# Patient Record
Sex: FEMALE | Race: WHITE | NOT HISPANIC OR LATINO | Employment: OTHER | URBAN - METROPOLITAN AREA
[De-identification: names, ages, dates, MRNs, and addresses within clinical notes are randomized per-mention and may not be internally consistent; named-entity substitution may affect disease eponyms.]

---

## 2017-01-23 ENCOUNTER — OFFICE VISIT - RIVER FALLS (OUTPATIENT)
Dept: FAMILY MEDICINE | Facility: CLINIC | Age: 82
End: 2017-01-23

## 2017-01-24 ENCOUNTER — OFFICE VISIT - RIVER FALLS (OUTPATIENT)
Dept: FAMILY MEDICINE | Facility: CLINIC | Age: 82
End: 2017-01-24

## 2017-01-24 ASSESSMENT — MIFFLIN-ST. JEOR: SCORE: 1106.01

## 2017-02-17 ENCOUNTER — OFFICE VISIT - RIVER FALLS (OUTPATIENT)
Dept: FAMILY MEDICINE | Facility: CLINIC | Age: 82
End: 2017-02-17

## 2017-03-17 ENCOUNTER — OFFICE VISIT - RIVER FALLS (OUTPATIENT)
Dept: FAMILY MEDICINE | Facility: CLINIC | Age: 82
End: 2017-03-17

## 2017-03-21 ENCOUNTER — OFFICE VISIT - RIVER FALLS (OUTPATIENT)
Dept: FAMILY MEDICINE | Facility: CLINIC | Age: 82
End: 2017-03-21

## 2017-03-21 ASSESSMENT — MIFFLIN-ST. JEOR: SCORE: 1093.54

## 2017-03-23 ENCOUNTER — OFFICE VISIT - RIVER FALLS (OUTPATIENT)
Dept: FAMILY MEDICINE | Facility: CLINIC | Age: 82
End: 2017-03-23

## 2017-03-23 ASSESSMENT — MIFFLIN-ST. JEOR: SCORE: 1101.7

## 2017-03-28 ENCOUNTER — OFFICE VISIT - RIVER FALLS (OUTPATIENT)
Dept: FAMILY MEDICINE | Facility: CLINIC | Age: 82
End: 2017-03-28

## 2017-04-13 ENCOUNTER — OFFICE VISIT - RIVER FALLS (OUTPATIENT)
Dept: FAMILY MEDICINE | Facility: CLINIC | Age: 82
End: 2017-04-13

## 2017-04-27 ENCOUNTER — OFFICE VISIT - RIVER FALLS (OUTPATIENT)
Dept: FAMILY MEDICINE | Facility: CLINIC | Age: 82
End: 2017-04-27

## 2017-05-02 ENCOUNTER — OFFICE VISIT - RIVER FALLS (OUTPATIENT)
Dept: FAMILY MEDICINE | Facility: CLINIC | Age: 82
End: 2017-05-02

## 2017-05-02 ASSESSMENT — MIFFLIN-ST. JEOR: SCORE: 1102.61

## 2017-05-08 ENCOUNTER — COMMUNICATION - RIVER FALLS (OUTPATIENT)
Dept: FAMILY MEDICINE | Facility: CLINIC | Age: 82
End: 2017-05-08

## 2017-05-08 ENCOUNTER — OFFICE VISIT - RIVER FALLS (OUTPATIENT)
Dept: FAMILY MEDICINE | Facility: CLINIC | Age: 82
End: 2017-05-08

## 2017-05-08 ASSESSMENT — MIFFLIN-ST. JEOR: SCORE: 1098.08

## 2017-05-09 LAB — HBA1C MFR BLD: 6.9 %

## 2017-05-12 ENCOUNTER — OFFICE VISIT - RIVER FALLS (OUTPATIENT)
Dept: FAMILY MEDICINE | Facility: CLINIC | Age: 82
End: 2017-05-12

## 2017-06-08 ENCOUNTER — OFFICE VISIT - RIVER FALLS (OUTPATIENT)
Dept: FAMILY MEDICINE | Facility: CLINIC | Age: 82
End: 2017-06-08

## 2017-06-08 ASSESSMENT — MIFFLIN-ST. JEOR: SCORE: 1102.61

## 2017-06-19 ENCOUNTER — AMBULATORY - RIVER FALLS (OUTPATIENT)
Dept: FAMILY MEDICINE | Facility: CLINIC | Age: 82
End: 2017-06-19

## 2017-07-17 ENCOUNTER — AMBULATORY - RIVER FALLS (OUTPATIENT)
Dept: FAMILY MEDICINE | Facility: CLINIC | Age: 82
End: 2017-07-17

## 2017-08-14 ENCOUNTER — AMBULATORY - RIVER FALLS (OUTPATIENT)
Dept: FAMILY MEDICINE | Facility: CLINIC | Age: 82
End: 2017-08-14

## 2017-08-22 ENCOUNTER — OFFICE VISIT - RIVER FALLS (OUTPATIENT)
Dept: FAMILY MEDICINE | Facility: CLINIC | Age: 82
End: 2017-08-22

## 2017-08-22 ASSESSMENT — MIFFLIN-ST. JEOR: SCORE: 1098.08

## 2017-08-29 ENCOUNTER — OFFICE VISIT - RIVER FALLS (OUTPATIENT)
Dept: FAMILY MEDICINE | Facility: CLINIC | Age: 82
End: 2017-08-29

## 2017-08-29 ASSESSMENT — MIFFLIN-ST. JEOR: SCORE: 1098.08

## 2017-09-01 ENCOUNTER — AMBULATORY - RIVER FALLS (OUTPATIENT)
Dept: FAMILY MEDICINE | Facility: CLINIC | Age: 82
End: 2017-09-01

## 2017-09-01 ENCOUNTER — OFFICE VISIT - RIVER FALLS (OUTPATIENT)
Dept: FAMILY MEDICINE | Facility: CLINIC | Age: 82
End: 2017-09-01

## 2017-09-01 ASSESSMENT — MIFFLIN-ST. JEOR: SCORE: 1098.08

## 2017-09-11 ENCOUNTER — OFFICE VISIT - RIVER FALLS (OUTPATIENT)
Dept: FAMILY MEDICINE | Facility: CLINIC | Age: 82
End: 2017-09-11

## 2017-09-11 ENCOUNTER — AMBULATORY - RIVER FALLS (OUTPATIENT)
Dept: FAMILY MEDICINE | Facility: CLINIC | Age: 82
End: 2017-09-11

## 2017-09-25 ENCOUNTER — AMBULATORY - RIVER FALLS (OUTPATIENT)
Dept: FAMILY MEDICINE | Facility: CLINIC | Age: 82
End: 2017-09-25

## 2017-11-02 ENCOUNTER — AMBULATORY - RIVER FALLS (OUTPATIENT)
Dept: FAMILY MEDICINE | Facility: CLINIC | Age: 82
End: 2017-11-02

## 2017-11-17 ENCOUNTER — AMBULATORY - RIVER FALLS (OUTPATIENT)
Dept: FAMILY MEDICINE | Facility: CLINIC | Age: 82
End: 2017-11-17

## 2017-11-22 ENCOUNTER — AMBULATORY - RIVER FALLS (OUTPATIENT)
Dept: FAMILY MEDICINE | Facility: CLINIC | Age: 82
End: 2017-11-22

## 2017-11-24 ENCOUNTER — AMBULATORY - RIVER FALLS (OUTPATIENT)
Dept: FAMILY MEDICINE | Facility: CLINIC | Age: 82
End: 2017-11-24

## 2017-11-25 LAB
CREAT SERPL-MCNC: 0.8 MG/DL (ref 0.6–0.88)
GLUCOSE BLD-MCNC: 145 MG/DL (ref 65–99)
HBA1C MFR BLD: 6.5 %

## 2017-11-30 ENCOUNTER — OFFICE VISIT - RIVER FALLS (OUTPATIENT)
Dept: FAMILY MEDICINE | Facility: CLINIC | Age: 82
End: 2017-11-30

## 2017-11-30 ENCOUNTER — AMBULATORY - RIVER FALLS (OUTPATIENT)
Dept: FAMILY MEDICINE | Facility: CLINIC | Age: 82
End: 2017-11-30

## 2017-11-30 ASSESSMENT — MIFFLIN-ST. JEOR: SCORE: 1093.54

## 2018-01-02 ENCOUNTER — AMBULATORY - RIVER FALLS (OUTPATIENT)
Dept: FAMILY MEDICINE | Facility: CLINIC | Age: 83
End: 2018-01-02

## 2018-01-29 ENCOUNTER — AMBULATORY - RIVER FALLS (OUTPATIENT)
Dept: FAMILY MEDICINE | Facility: CLINIC | Age: 83
End: 2018-01-29

## 2018-02-08 ENCOUNTER — OFFICE VISIT - RIVER FALLS (OUTPATIENT)
Dept: FAMILY MEDICINE | Facility: CLINIC | Age: 83
End: 2018-02-08

## 2018-02-08 ASSESSMENT — MIFFLIN-ST. JEOR: SCORE: 1113.95

## 2018-02-26 ENCOUNTER — AMBULATORY - RIVER FALLS (OUTPATIENT)
Dept: FAMILY MEDICINE | Facility: CLINIC | Age: 83
End: 2018-02-26

## 2018-03-13 ENCOUNTER — AMBULATORY - RIVER FALLS (OUTPATIENT)
Dept: FAMILY MEDICINE | Facility: CLINIC | Age: 83
End: 2018-03-13

## 2018-03-13 ENCOUNTER — OFFICE VISIT - RIVER FALLS (OUTPATIENT)
Dept: FAMILY MEDICINE | Facility: CLINIC | Age: 83
End: 2018-03-13

## 2018-03-13 ASSESSMENT — MIFFLIN-ST. JEOR: SCORE: 1102.61

## 2018-03-22 ENCOUNTER — AMBULATORY - RIVER FALLS (OUTPATIENT)
Dept: FAMILY MEDICINE | Facility: CLINIC | Age: 83
End: 2018-03-22

## 2018-03-22 ENCOUNTER — OFFICE VISIT - RIVER FALLS (OUTPATIENT)
Dept: FAMILY MEDICINE | Facility: CLINIC | Age: 83
End: 2018-03-22

## 2018-03-22 ASSESSMENT — MIFFLIN-ST. JEOR: SCORE: 1089

## 2018-03-23 LAB
CHOLEST SERPL-MCNC: 195 MG/DL
CHOLEST/HDLC SERPL: 4 {RATIO}
CREAT SERPL-MCNC: 0.84 MG/DL (ref 0.6–0.88)
GLUCOSE BLD-MCNC: 155 MG/DL (ref 65–99)
HBA1C MFR BLD: 7 %
HDLC SERPL-MCNC: 49 MG/DL
LDLC SERPL CALC-MCNC: 118 MG/DL
NONHDLC SERPL-MCNC: 146 MG/DL
TRIGL SERPL-MCNC: 158 MG/DL

## 2018-04-09 ENCOUNTER — AMBULATORY - RIVER FALLS (OUTPATIENT)
Dept: FAMILY MEDICINE | Facility: CLINIC | Age: 83
End: 2018-04-09

## 2018-05-04 ENCOUNTER — OFFICE VISIT - RIVER FALLS (OUTPATIENT)
Dept: FAMILY MEDICINE | Facility: CLINIC | Age: 83
End: 2018-05-04

## 2018-05-04 ASSESSMENT — MIFFLIN-ST. JEOR: SCORE: 1075.4

## 2018-05-09 ENCOUNTER — OFFICE VISIT - RIVER FALLS (OUTPATIENT)
Dept: FAMILY MEDICINE | Facility: CLINIC | Age: 83
End: 2018-05-09

## 2018-05-09 ASSESSMENT — MIFFLIN-ST. JEOR: SCORE: 1093.54

## 2018-05-22 ENCOUNTER — OFFICE VISIT - RIVER FALLS (OUTPATIENT)
Dept: FAMILY MEDICINE | Facility: CLINIC | Age: 83
End: 2018-05-22

## 2018-05-22 ASSESSMENT — MIFFLIN-ST. JEOR: SCORE: 1089

## 2018-06-06 ENCOUNTER — AMBULATORY - RIVER FALLS (OUTPATIENT)
Dept: FAMILY MEDICINE | Facility: CLINIC | Age: 83
End: 2018-06-06

## 2018-06-22 ENCOUNTER — AMBULATORY - RIVER FALLS (OUTPATIENT)
Dept: FAMILY MEDICINE | Facility: CLINIC | Age: 83
End: 2018-06-22

## 2018-06-23 LAB — HBA1C MFR BLD: 6.8 %

## 2018-07-05 ENCOUNTER — OFFICE VISIT - RIVER FALLS (OUTPATIENT)
Dept: FAMILY MEDICINE | Facility: CLINIC | Age: 83
End: 2018-07-05

## 2018-07-05 ASSESSMENT — MIFFLIN-ST. JEOR: SCORE: 1078.8

## 2018-07-09 ENCOUNTER — OFFICE VISIT - RIVER FALLS (OUTPATIENT)
Dept: FAMILY MEDICINE | Facility: CLINIC | Age: 83
End: 2018-07-09

## 2018-07-09 ASSESSMENT — MIFFLIN-ST. JEOR: SCORE: 1084.47

## 2018-07-17 ENCOUNTER — AMBULATORY - RIVER FALLS (OUTPATIENT)
Dept: FAMILY MEDICINE | Facility: CLINIC | Age: 83
End: 2018-07-17

## 2018-07-31 ENCOUNTER — AMBULATORY - RIVER FALLS (OUTPATIENT)
Dept: FAMILY MEDICINE | Facility: CLINIC | Age: 83
End: 2018-07-31

## 2018-08-14 ENCOUNTER — OFFICE VISIT - RIVER FALLS (OUTPATIENT)
Dept: FAMILY MEDICINE | Facility: CLINIC | Age: 83
End: 2018-08-14

## 2018-08-14 ENCOUNTER — AMBULATORY - RIVER FALLS (OUTPATIENT)
Dept: FAMILY MEDICINE | Facility: CLINIC | Age: 83
End: 2018-08-14

## 2018-08-14 ASSESSMENT — MIFFLIN-ST. JEOR: SCORE: 1084.47

## 2018-08-28 ENCOUNTER — AMBULATORY - RIVER FALLS (OUTPATIENT)
Dept: FAMILY MEDICINE | Facility: CLINIC | Age: 83
End: 2018-08-28

## 2018-09-26 ENCOUNTER — AMBULATORY - RIVER FALLS (OUTPATIENT)
Dept: FAMILY MEDICINE | Facility: CLINIC | Age: 83
End: 2018-09-26

## 2018-10-10 ENCOUNTER — AMBULATORY - RIVER FALLS (OUTPATIENT)
Dept: FAMILY MEDICINE | Facility: CLINIC | Age: 83
End: 2018-10-10

## 2018-11-08 ENCOUNTER — OFFICE VISIT - RIVER FALLS (OUTPATIENT)
Dept: FAMILY MEDICINE | Facility: CLINIC | Age: 83
End: 2018-11-08

## 2018-11-08 ASSESSMENT — MIFFLIN-ST. JEOR: SCORE: 1090.82

## 2018-11-09 ENCOUNTER — AMBULATORY - RIVER FALLS (OUTPATIENT)
Dept: FAMILY MEDICINE | Facility: CLINIC | Age: 83
End: 2018-11-09

## 2018-12-10 ENCOUNTER — AMBULATORY - RIVER FALLS (OUTPATIENT)
Dept: FAMILY MEDICINE | Facility: CLINIC | Age: 83
End: 2018-12-10

## 2018-12-24 ENCOUNTER — AMBULATORY - RIVER FALLS (OUTPATIENT)
Dept: FAMILY MEDICINE | Facility: CLINIC | Age: 83
End: 2018-12-24

## 2018-12-25 LAB — HBA1C MFR BLD: 7.2 %

## 2019-01-09 ENCOUNTER — AMBULATORY - RIVER FALLS (OUTPATIENT)
Dept: FAMILY MEDICINE | Facility: CLINIC | Age: 84
End: 2019-01-09

## 2019-01-09 LAB — INR PPP: 1.8

## 2019-01-24 ENCOUNTER — AMBULATORY - RIVER FALLS (OUTPATIENT)
Dept: FAMILY MEDICINE | Facility: CLINIC | Age: 84
End: 2019-01-24

## 2019-01-24 LAB — INR PPP: 2.1

## 2019-02-07 ENCOUNTER — OFFICE VISIT - RIVER FALLS (OUTPATIENT)
Dept: FAMILY MEDICINE | Facility: CLINIC | Age: 84
End: 2019-02-07

## 2019-02-22 ENCOUNTER — AMBULATORY - RIVER FALLS (OUTPATIENT)
Dept: FAMILY MEDICINE | Facility: CLINIC | Age: 84
End: 2019-02-22

## 2019-02-22 LAB — INR PPP: 1.9

## 2019-03-08 ENCOUNTER — AMBULATORY - RIVER FALLS (OUTPATIENT)
Dept: FAMILY MEDICINE | Facility: CLINIC | Age: 84
End: 2019-03-08

## 2019-03-08 LAB — INR PPP: 1.6

## 2019-03-28 ENCOUNTER — AMBULATORY - RIVER FALLS (OUTPATIENT)
Dept: FAMILY MEDICINE | Facility: CLINIC | Age: 84
End: 2019-03-28

## 2019-03-28 ENCOUNTER — OFFICE VISIT - RIVER FALLS (OUTPATIENT)
Dept: FAMILY MEDICINE | Facility: CLINIC | Age: 84
End: 2019-03-28

## 2019-03-28 ASSESSMENT — MIFFLIN-ST. JEOR: SCORE: 1074.49

## 2019-03-29 LAB
BUN SERPL-MCNC: 25 MG/DL (ref 7–25)
BUN/CREAT RATIO - HISTORICAL: ABNORMAL (ref 6–22)
CALCIUM SERPL-MCNC: 9.3 MG/DL (ref 8.6–10.4)
CHLORIDE BLD-SCNC: 101 MMOL/L (ref 98–110)
CHOLEST SERPL-MCNC: 165 MG/DL
CHOLEST/HDLC SERPL: 4 {RATIO}
CO2 SERPL-SCNC: 26 MMOL/L (ref 20–32)
CREAT SERPL-MCNC: 0.8 MG/DL (ref 0.6–0.88)
EGFRCR SERPLBLD CKD-EPI 2021: 65 ML/MIN/1.73M2
ERYTHROCYTE [DISTWIDTH] IN BLOOD BY AUTOMATED COUNT: 12 % (ref 11–15)
GLUCOSE BLD-MCNC: 141 MG/DL (ref 65–99)
HBA1C MFR BLD: 6.7 %
HCT VFR BLD AUTO: 39.8 % (ref 35–45)
HDLC SERPL-MCNC: 41 MG/DL
HGB BLD-MCNC: 13.4 GM/DL (ref 11.7–15.5)
INR PPP: 2
LDLC SERPL CALC-MCNC: 99 MG/DL
MCH RBC QN AUTO: 29.6 PG (ref 27–33)
MCHC RBC AUTO-ENTMCNC: 33.7 GM/DL (ref 32–36)
MCV RBC AUTO: 87.9 FL (ref 80–100)
NONHDLC SERPL-MCNC: 124 MG/DL
PLATELET # BLD AUTO: 204 10*3/UL (ref 140–400)
PMV BLD: 11 FL (ref 7.5–12.5)
POTASSIUM BLD-SCNC: 4.4 MMOL/L (ref 3.5–5.3)
PROTHROMBIN TIME: 20.1 S (ref 9–11.5)
RBC # BLD AUTO: 4.53 10*6/UL (ref 3.8–5.1)
SODIUM SERPL-SCNC: 137 MMOL/L (ref 135–146)
TRIGL SERPL-MCNC: 150 MG/DL
WBC # BLD AUTO: 5.9 10*3/UL (ref 3.8–10.8)

## 2019-04-22 ENCOUNTER — AMBULATORY - RIVER FALLS (OUTPATIENT)
Dept: FAMILY MEDICINE | Facility: CLINIC | Age: 84
End: 2019-04-22

## 2019-04-22 LAB — INR PPP: 2.4

## 2019-04-23 ENCOUNTER — OFFICE VISIT - RIVER FALLS (OUTPATIENT)
Dept: FAMILY MEDICINE | Facility: CLINIC | Age: 84
End: 2019-04-23

## 2019-04-23 ASSESSMENT — MIFFLIN-ST. JEOR: SCORE: 1075.4

## 2019-05-14 ENCOUNTER — OFFICE VISIT - RIVER FALLS (OUTPATIENT)
Dept: FAMILY MEDICINE | Facility: CLINIC | Age: 84
End: 2019-05-14

## 2019-05-14 ASSESSMENT — MIFFLIN-ST. JEOR: SCORE: 1082.65

## 2019-05-22 ENCOUNTER — COMMUNICATION - RIVER FALLS (OUTPATIENT)
Dept: FAMILY MEDICINE | Facility: CLINIC | Age: 84
End: 2019-05-22

## 2019-05-23 ENCOUNTER — AMBULATORY - RIVER FALLS (OUTPATIENT)
Dept: FAMILY MEDICINE | Facility: CLINIC | Age: 84
End: 2019-05-23

## 2019-05-23 LAB — INR PPP: 1.8

## 2019-06-06 ENCOUNTER — AMBULATORY - RIVER FALLS (OUTPATIENT)
Dept: FAMILY MEDICINE | Facility: CLINIC | Age: 84
End: 2019-06-06

## 2019-06-06 LAB — INR PPP: 1.4

## 2019-06-11 ENCOUNTER — OFFICE VISIT - RIVER FALLS (OUTPATIENT)
Dept: FAMILY MEDICINE | Facility: CLINIC | Age: 84
End: 2019-06-11

## 2019-06-11 ASSESSMENT — MIFFLIN-ST. JEOR: SCORE: 1061.79

## 2019-06-13 ENCOUNTER — AMBULATORY - RIVER FALLS (OUTPATIENT)
Dept: FAMILY MEDICINE | Facility: CLINIC | Age: 84
End: 2019-06-13

## 2019-06-13 LAB — INR PPP: 1.8

## 2019-06-28 ENCOUNTER — AMBULATORY - RIVER FALLS (OUTPATIENT)
Dept: FAMILY MEDICINE | Facility: CLINIC | Age: 84
End: 2019-06-28

## 2019-06-28 LAB — INR PPP: 1.9

## 2019-07-23 ENCOUNTER — OFFICE VISIT - RIVER FALLS (OUTPATIENT)
Dept: FAMILY MEDICINE | Facility: CLINIC | Age: 84
End: 2019-07-23

## 2019-07-23 ASSESSMENT — MIFFLIN-ST. JEOR: SCORE: 1058.16

## 2019-07-25 ENCOUNTER — OFFICE VISIT - RIVER FALLS (OUTPATIENT)
Dept: FAMILY MEDICINE | Facility: CLINIC | Age: 84
End: 2019-07-25

## 2019-07-25 ENCOUNTER — COMMUNICATION - RIVER FALLS (OUTPATIENT)
Dept: FAMILY MEDICINE | Facility: CLINIC | Age: 84
End: 2019-07-25

## 2019-07-25 ASSESSMENT — MIFFLIN-ST. JEOR: SCORE: 1057.25

## 2019-07-30 ENCOUNTER — AMBULATORY - RIVER FALLS (OUTPATIENT)
Dept: FAMILY MEDICINE | Facility: CLINIC | Age: 84
End: 2019-07-30

## 2019-07-30 LAB — INR PPP: 2.2

## 2019-08-15 ENCOUNTER — COMMUNICATION - RIVER FALLS (OUTPATIENT)
Dept: FAMILY MEDICINE | Facility: CLINIC | Age: 84
End: 2019-08-15

## 2019-08-16 ENCOUNTER — AMBULATORY - RIVER FALLS (OUTPATIENT)
Dept: FAMILY MEDICINE | Facility: CLINIC | Age: 84
End: 2019-08-16

## 2019-08-17 LAB
HBA1C MFR BLD: 6.1 %
TSH SERPL DL<=0.005 MIU/L-ACNC: 2.38 MIU/L (ref 0.4–4.5)

## 2019-08-19 ENCOUNTER — COMMUNICATION - RIVER FALLS (OUTPATIENT)
Dept: FAMILY MEDICINE | Facility: CLINIC | Age: 84
End: 2019-08-19

## 2019-09-05 ENCOUNTER — OFFICE VISIT - RIVER FALLS (OUTPATIENT)
Dept: FAMILY MEDICINE | Facility: CLINIC | Age: 84
End: 2019-09-05

## 2019-09-05 ASSESSMENT — MIFFLIN-ST. JEOR: SCORE: 1075.4

## 2019-09-19 ENCOUNTER — AMBULATORY - RIVER FALLS (OUTPATIENT)
Dept: FAMILY MEDICINE | Facility: CLINIC | Age: 84
End: 2019-09-19

## 2019-09-19 LAB — INR PPP: 2.5

## 2019-10-23 ENCOUNTER — AMBULATORY - RIVER FALLS (OUTPATIENT)
Dept: FAMILY MEDICINE | Facility: CLINIC | Age: 84
End: 2019-10-23

## 2019-10-23 LAB — INR PPP: 2.3

## 2019-11-20 ENCOUNTER — AMBULATORY - RIVER FALLS (OUTPATIENT)
Dept: FAMILY MEDICINE | Facility: CLINIC | Age: 84
End: 2019-11-20

## 2019-11-20 LAB — INR PPP: 2

## 2019-12-03 ENCOUNTER — OFFICE VISIT - RIVER FALLS (OUTPATIENT)
Dept: FAMILY MEDICINE | Facility: CLINIC | Age: 84
End: 2019-12-03

## 2019-12-10 ENCOUNTER — OFFICE VISIT - RIVER FALLS (OUTPATIENT)
Dept: FAMILY MEDICINE | Facility: CLINIC | Age: 84
End: 2019-12-10

## 2019-12-10 ASSESSMENT — MIFFLIN-ST. JEOR: SCORE: 1098.44

## 2019-12-11 ENCOUNTER — COMMUNICATION - RIVER FALLS (OUTPATIENT)
Dept: FAMILY MEDICINE | Facility: CLINIC | Age: 84
End: 2019-12-11

## 2019-12-11 LAB
BUN SERPL-MCNC: 23 MG/DL (ref 7–25)
BUN/CREAT RATIO - HISTORICAL: ABNORMAL (ref 6–22)
CALCIUM SERPL-MCNC: 9.6 MG/DL (ref 8.6–10.4)
CHLORIDE BLD-SCNC: 101 MMOL/L (ref 98–110)
CO2 SERPL-SCNC: 29 MMOL/L (ref 20–32)
CREAT SERPL-MCNC: 0.76 MG/DL (ref 0.6–0.88)
EGFRCR SERPLBLD CKD-EPI 2021: 69 ML/MIN/1.73M2
ERYTHROCYTE [DISTWIDTH] IN BLOOD BY AUTOMATED COUNT: 11.7 % (ref 11–15)
GLUCOSE BLD-MCNC: 126 MG/DL (ref 65–99)
HBA1C MFR BLD: 6.9 %
HCT VFR BLD AUTO: 35.2 % (ref 35–45)
HGB BLD-MCNC: 12.1 GM/DL (ref 11.7–15.5)
INR PPP: 1.8
MCH RBC QN AUTO: 30.7 PG (ref 27–33)
MCHC RBC AUTO-ENTMCNC: 34.4 GM/DL (ref 32–36)
MCV RBC AUTO: 89.3 FL (ref 80–100)
PLATELET # BLD AUTO: 176 10*3/UL (ref 140–400)
PMV BLD: 10.3 FL (ref 7.5–12.5)
POTASSIUM BLD-SCNC: 4.9 MMOL/L (ref 3.5–5.3)
PROTHROMBIN TIME: 17.7 S (ref 9–11.5)
RBC # BLD AUTO: 3.94 10*6/UL (ref 3.8–5.1)
SODIUM SERPL-SCNC: 137 MMOL/L (ref 135–146)
WBC # BLD AUTO: 5.6 10*3/UL (ref 3.8–10.8)

## 2019-12-27 ENCOUNTER — AMBULATORY - RIVER FALLS (OUTPATIENT)
Dept: FAMILY MEDICINE | Facility: CLINIC | Age: 84
End: 2019-12-27

## 2019-12-27 LAB — INR PPP: 2

## 2020-01-10 ENCOUNTER — OFFICE VISIT - RIVER FALLS (OUTPATIENT)
Dept: FAMILY MEDICINE | Facility: CLINIC | Age: 85
End: 2020-01-10

## 2020-01-10 LAB — INR PPP: 2.2

## 2020-01-10 ASSESSMENT — MIFFLIN-ST. JEOR: SCORE: 1098.08

## 2020-02-22 ENCOUNTER — OFFICE VISIT - RIVER FALLS (OUTPATIENT)
Dept: FAMILY MEDICINE | Facility: CLINIC | Age: 85
End: 2020-02-22

## 2020-02-22 ASSESSMENT — MIFFLIN-ST. JEOR: SCORE: 1128.92

## 2020-02-26 ENCOUNTER — AMBULATORY - RIVER FALLS (OUTPATIENT)
Dept: FAMILY MEDICINE | Facility: CLINIC | Age: 85
End: 2020-02-26

## 2020-02-26 LAB — INR PPP: 1.3

## 2020-03-03 ENCOUNTER — OFFICE VISIT - RIVER FALLS (OUTPATIENT)
Dept: FAMILY MEDICINE | Facility: CLINIC | Age: 85
End: 2020-03-03

## 2020-03-03 LAB — INR PPP: 1.6

## 2020-03-03 ASSESSMENT — MIFFLIN-ST. JEOR: SCORE: 1138.9

## 2020-03-17 ENCOUNTER — AMBULATORY - RIVER FALLS (OUTPATIENT)
Dept: FAMILY MEDICINE | Facility: CLINIC | Age: 85
End: 2020-03-17

## 2020-03-17 LAB — INR PPP: 1.4

## 2020-03-24 ENCOUNTER — AMBULATORY - RIVER FALLS (OUTPATIENT)
Dept: FAMILY MEDICINE | Facility: CLINIC | Age: 85
End: 2020-03-24

## 2020-03-25 LAB
CHOLEST SERPL-MCNC: 244 MG/DL
CHOLEST/HDLC SERPL: 4.4 {RATIO}
CREAT UR-MCNC: 65 MG/DL (ref 20–275)
HBA1C MFR BLD: 6.6 %
HDLC SERPL-MCNC: 55 MG/DL
LDLC SERPL CALC-MCNC: 159 MG/DL
MICROALBUMIN UR-MCNC: 0.6 MG/DL
MICROALBUMIN/CREAT UR: 9 MG/G{CREAT}
NONHDLC SERPL-MCNC: 189 MG/DL
TRIGL SERPL-MCNC: 167 MG/DL
TSH SERPL DL<=0.005 MIU/L-ACNC: 3.34 MIU/L (ref 0.4–4.5)

## 2020-03-26 ENCOUNTER — COMMUNICATION - RIVER FALLS (OUTPATIENT)
Dept: FAMILY MEDICINE | Facility: CLINIC | Age: 85
End: 2020-03-26

## 2020-03-27 LAB
INR PPP: 1.8
PROTHROMBIN TIME: 21.7 S (ref 10.5–13.1)

## 2020-04-03 ENCOUNTER — AMBULATORY - RIVER FALLS (OUTPATIENT)
Dept: FAMILY MEDICINE | Facility: CLINIC | Age: 85
End: 2020-04-03

## 2020-04-03 LAB
INR PPP: 1.8
PROTHROMBIN TIME: 21.1 S (ref 10.5–13.1)

## 2020-04-14 ENCOUNTER — AMBULATORY - RIVER FALLS (OUTPATIENT)
Dept: FAMILY MEDICINE | Facility: CLINIC | Age: 85
End: 2020-04-14

## 2020-04-14 LAB
INR PPP: 2.6
PROTHROMBIN TIME: 31.2 S (ref 10.5–13.1)

## 2020-04-23 ENCOUNTER — AMBULATORY - RIVER FALLS (OUTPATIENT)
Dept: FAMILY MEDICINE | Facility: CLINIC | Age: 85
End: 2020-04-23

## 2020-04-23 LAB
INR PPP: 2.5
PROTHROMBIN TIME: 30.4 S (ref 10.5–13.1)

## 2020-05-07 ENCOUNTER — COMMUNICATION - RIVER FALLS (OUTPATIENT)
Dept: FAMILY MEDICINE | Facility: CLINIC | Age: 85
End: 2020-05-07

## 2020-05-26 ENCOUNTER — AMBULATORY - RIVER FALLS (OUTPATIENT)
Dept: FAMILY MEDICINE | Facility: CLINIC | Age: 85
End: 2020-05-26

## 2020-05-26 LAB
INR PPP: 2.5
PROTHROMBIN TIME: 29.5 S (ref 10.5–13.1)

## 2020-06-23 ENCOUNTER — AMBULATORY - RIVER FALLS (OUTPATIENT)
Dept: FAMILY MEDICINE | Facility: CLINIC | Age: 85
End: 2020-06-23

## 2020-06-23 LAB
INR PPP: 2.1
PROTHROMBIN TIME: 24.7 S (ref 10.5–13.1)

## 2020-07-21 ENCOUNTER — AMBULATORY - RIVER FALLS (OUTPATIENT)
Dept: FAMILY MEDICINE | Facility: CLINIC | Age: 85
End: 2020-07-21

## 2020-07-21 ENCOUNTER — OFFICE VISIT - RIVER FALLS (OUTPATIENT)
Dept: FAMILY MEDICINE | Facility: CLINIC | Age: 85
End: 2020-07-21

## 2020-07-21 LAB
INR PPP: 2.8
PROTHROMBIN TIME: 33.9 S (ref 10.5–13.1)

## 2020-07-21 ASSESSMENT — MIFFLIN-ST. JEOR: SCORE: 1159.76

## 2020-08-24 ENCOUNTER — AMBULATORY - RIVER FALLS (OUTPATIENT)
Dept: FAMILY MEDICINE | Facility: CLINIC | Age: 85
End: 2020-08-24

## 2020-08-24 LAB
INR PPP: 2.1
PROTHROMBIN TIME: 25.1 S (ref 10.5–13.1)

## 2020-08-27 ENCOUNTER — COMMUNICATION - RIVER FALLS (OUTPATIENT)
Dept: FAMILY MEDICINE | Facility: CLINIC | Age: 85
End: 2020-08-27

## 2020-09-21 ENCOUNTER — AMBULATORY - RIVER FALLS (OUTPATIENT)
Dept: FAMILY MEDICINE | Facility: CLINIC | Age: 85
End: 2020-09-21

## 2020-09-22 ENCOUNTER — COMMUNICATION - RIVER FALLS (OUTPATIENT)
Dept: FAMILY MEDICINE | Facility: CLINIC | Age: 85
End: 2020-09-22

## 2020-09-22 LAB — HBA1C MFR BLD: 6.6 %

## 2020-10-05 ENCOUNTER — OFFICE VISIT - RIVER FALLS (OUTPATIENT)
Dept: FAMILY MEDICINE | Facility: CLINIC | Age: 85
End: 2020-10-05

## 2020-10-05 ASSESSMENT — MIFFLIN-ST. JEOR: SCORE: 1175.19

## 2020-10-08 ENCOUNTER — COMMUNICATION - RIVER FALLS (OUTPATIENT)
Dept: FAMILY MEDICINE | Facility: CLINIC | Age: 85
End: 2020-10-08

## 2020-10-12 ENCOUNTER — COMMUNICATION - RIVER FALLS (OUTPATIENT)
Dept: FAMILY MEDICINE | Facility: CLINIC | Age: 85
End: 2020-10-12

## 2020-10-21 LAB — INR PPP: 2.3

## 2020-10-26 ENCOUNTER — OFFICE VISIT - RIVER FALLS (OUTPATIENT)
Dept: FAMILY MEDICINE | Facility: CLINIC | Age: 85
End: 2020-10-26

## 2020-10-26 ASSESSMENT — MIFFLIN-ST. JEOR: SCORE: 1152.51

## 2020-10-28 LAB — INR PPP: 2

## 2020-11-04 LAB — INR PPP: 2.7

## 2020-11-11 LAB — INR PPP: 3.2

## 2020-11-18 LAB — INR PPP: 3

## 2020-12-03 LAB — INR PPP: 2.9

## 2020-12-31 LAB — INR PPP: 3.3

## 2021-01-07 ENCOUNTER — OFFICE VISIT - RIVER FALLS (OUTPATIENT)
Dept: FAMILY MEDICINE | Facility: CLINIC | Age: 86
End: 2021-01-07

## 2021-02-08 ENCOUNTER — AMBULATORY - RIVER FALLS (OUTPATIENT)
Dept: FAMILY MEDICINE | Facility: CLINIC | Age: 86
End: 2021-02-08

## 2021-02-09 ENCOUNTER — COMMUNICATION - RIVER FALLS (OUTPATIENT)
Dept: FAMILY MEDICINE | Facility: CLINIC | Age: 86
End: 2021-02-09

## 2021-02-09 LAB
A/G RATIO - HISTORICAL: 1.9 (ref 1–2.5)
ALBUMIN SERPL-MCNC: 4.1 GM/DL (ref 3.6–5.1)
ALP SERPL-CCNC: 47 UNIT/L (ref 37–153)
ALT SERPL W P-5'-P-CCNC: 14 UNIT/L (ref 6–29)
AST SERPL W P-5'-P-CCNC: 17 UNIT/L (ref 10–35)
BILIRUB SERPL-MCNC: 0.9 MG/DL (ref 0.2–1.2)
BUN SERPL-MCNC: 21 MG/DL (ref 7–25)
BUN/CREAT RATIO - HISTORICAL: ABNORMAL (ref 6–22)
CALCIUM SERPL-MCNC: 9.3 MG/DL (ref 8.6–10.4)
CHLORIDE BLD-SCNC: 99 MMOL/L (ref 98–110)
CHOLEST SERPL-MCNC: 235 MG/DL
CHOLEST/HDLC SERPL: 5.6 {RATIO}
CO2 SERPL-SCNC: 30 MMOL/L (ref 20–32)
CREAT SERPL-MCNC: 0.83 MG/DL (ref 0.6–0.88)
CREAT UR-MCNC: 85 MG/DL (ref 20–275)
EGFRCR SERPLBLD CKD-EPI 2021: 62 ML/MIN/1.73M2
ERYTHROCYTE [DISTWIDTH] IN BLOOD BY AUTOMATED COUNT: 11.9 % (ref 11–15)
GLOBULIN: 2.2 (ref 1.9–3.7)
GLUCOSE BLD-MCNC: 174 MG/DL (ref 65–99)
HBA1C MFR BLD: 7.6 %
HCT VFR BLD AUTO: 35.7 % (ref 35–45)
HDLC SERPL-MCNC: 42 MG/DL
HGB BLD-MCNC: 12.2 GM/DL (ref 11.7–15.5)
LDLC SERPL CALC-MCNC: 148 MG/DL
MCH RBC QN AUTO: 30.3 PG (ref 27–33)
MCHC RBC AUTO-ENTMCNC: 34.2 GM/DL (ref 32–36)
MCV RBC AUTO: 88.6 FL (ref 80–100)
MICROALBUMIN UR-MCNC: 1.3 MG/DL
MICROALBUMIN/CREAT UR: 15 MG/G{CREAT}
NONHDLC SERPL-MCNC: 193 MG/DL
PLATELET # BLD AUTO: 170 10*3/UL (ref 140–400)
PMV BLD: 10.1 FL (ref 7.5–12.5)
POTASSIUM BLD-SCNC: 4.3 MMOL/L (ref 3.5–5.3)
PROT SERPL-MCNC: 6.3 GM/DL (ref 6.1–8.1)
RBC # BLD AUTO: 4.03 10*6/UL (ref 3.8–5.1)
SODIUM SERPL-SCNC: 136 MMOL/L (ref 135–146)
TRIGL SERPL-MCNC: 277 MG/DL
TSH SERPL DL<=0.005 MIU/L-ACNC: 6.52 MIU/L (ref 0.4–4.5)
VIT B12 SERPL-MCNC: 522 PG/ML (ref 200–1100)
WBC # BLD AUTO: 4.1 10*3/UL (ref 3.8–10.8)

## 2021-02-11 LAB — INR PPP: 3.3

## 2021-02-18 ENCOUNTER — AMBULATORY - RIVER FALLS (OUTPATIENT)
Dept: FAMILY MEDICINE | Facility: CLINIC | Age: 86
End: 2021-02-18

## 2021-03-04 ENCOUNTER — OFFICE VISIT - RIVER FALLS (OUTPATIENT)
Dept: FAMILY MEDICINE | Facility: CLINIC | Age: 86
End: 2021-03-04

## 2021-03-04 ASSESSMENT — MIFFLIN-ST. JEOR: SCORE: 1202.4

## 2021-03-18 ENCOUNTER — AMBULATORY - RIVER FALLS (OUTPATIENT)
Dept: FAMILY MEDICINE | Facility: CLINIC | Age: 86
End: 2021-03-18

## 2021-04-08 ENCOUNTER — OFFICE VISIT - RIVER FALLS (OUTPATIENT)
Dept: FAMILY MEDICINE | Facility: CLINIC | Age: 86
End: 2021-04-08

## 2021-04-08 ASSESSMENT — MIFFLIN-ST. JEOR: SCORE: 1224.63

## 2021-05-03 ENCOUNTER — COMMUNICATION - RIVER FALLS (OUTPATIENT)
Dept: FAMILY MEDICINE | Facility: CLINIC | Age: 86
End: 2021-05-03

## 2021-05-06 LAB — INR PPP: 1.9

## 2021-05-07 ENCOUNTER — OFFICE VISIT - RIVER FALLS (OUTPATIENT)
Dept: FAMILY MEDICINE | Facility: CLINIC | Age: 86
End: 2021-05-07

## 2021-05-07 ASSESSMENT — MIFFLIN-ST. JEOR: SCORE: 1239.6

## 2021-05-24 ENCOUNTER — OFFICE VISIT - RIVER FALLS (OUTPATIENT)
Dept: FAMILY MEDICINE | Facility: CLINIC | Age: 86
End: 2021-05-24

## 2021-05-24 ASSESSMENT — MIFFLIN-ST. JEOR: SCORE: 1240.5

## 2021-06-17 LAB — INR PPP: 3.2

## 2021-08-11 ENCOUNTER — COMMUNICATION - RIVER FALLS (OUTPATIENT)
Dept: FAMILY MEDICINE | Facility: CLINIC | Age: 86
End: 2021-08-11

## 2021-08-11 ENCOUNTER — AMBULATORY - RIVER FALLS (OUTPATIENT)
Dept: FAMILY MEDICINE | Facility: CLINIC | Age: 86
End: 2021-08-11

## 2021-08-11 ENCOUNTER — OFFICE VISIT - RIVER FALLS (OUTPATIENT)
Dept: FAMILY MEDICINE | Facility: CLINIC | Age: 86
End: 2021-08-11

## 2021-08-11 ASSESSMENT — MIFFLIN-ST. JEOR: SCORE: 1248.22

## 2021-08-12 ENCOUNTER — COMMUNICATION - RIVER FALLS (OUTPATIENT)
Dept: FAMILY MEDICINE | Facility: CLINIC | Age: 86
End: 2021-08-12

## 2021-08-12 LAB
HBA1C MFR BLD: 7.3 %
TSH SERPL DL<=0.005 MIU/L-ACNC: 5.56 MIU/L (ref 0.4–4.5)

## 2021-08-17 ENCOUNTER — COMMUNICATION - RIVER FALLS (OUTPATIENT)
Dept: FAMILY MEDICINE | Facility: CLINIC | Age: 86
End: 2021-08-17

## 2021-08-17 ENCOUNTER — OFFICE VISIT - RIVER FALLS (OUTPATIENT)
Dept: FAMILY MEDICINE | Facility: CLINIC | Age: 86
End: 2021-08-17

## 2021-08-17 ASSESSMENT — MIFFLIN-ST. JEOR: SCORE: 1239.6

## 2021-10-15 LAB — INR PPP: 1.8

## 2021-11-04 ENCOUNTER — OFFICE VISIT - RIVER FALLS (OUTPATIENT)
Dept: FAMILY MEDICINE | Facility: CLINIC | Age: 86
End: 2021-11-04

## 2021-11-24 LAB — INR PPP: 1.9

## 2021-12-03 ENCOUNTER — LAB REQUISITION (OUTPATIENT)
Dept: LAB | Facility: CLINIC | Age: 86
End: 2021-12-03
Payer: MEDICARE

## 2021-12-03 ENCOUNTER — AMBULATORY - RIVER FALLS (OUTPATIENT)
Dept: FAMILY MEDICINE | Facility: CLINIC | Age: 86
End: 2021-12-03

## 2021-12-03 ENCOUNTER — OFFICE VISIT - RIVER FALLS (OUTPATIENT)
Dept: FAMILY MEDICINE | Facility: CLINIC | Age: 86
End: 2021-12-03

## 2021-12-03 DIAGNOSIS — U07.1 COVID-19: ICD-10-CM

## 2021-12-03 PROCEDURE — U0003 INFECTIOUS AGENT DETECTION BY NUCLEIC ACID (DNA OR RNA); SEVERE ACUTE RESPIRATORY SYNDROME CORONAVIRUS 2 (SARS-COV-2) (CORONAVIRUS DISEASE [COVID-19]), AMPLIFIED PROBE TECHNIQUE, MAKING USE OF HIGH THROUGHPUT TECHNOLOGIES AS DESCRIBED BY CMS-2020-01-R: HCPCS | Mod: ORL | Performed by: FAMILY MEDICINE

## 2021-12-04 ENCOUNTER — OFFICE VISIT - RIVER FALLS (OUTPATIENT)
Dept: FAMILY MEDICINE | Facility: CLINIC | Age: 86
End: 2021-12-04

## 2021-12-04 LAB — SARS-COV-2 RNA RESP QL NAA+PROBE: NEGATIVE

## 2021-12-04 ASSESSMENT — MIFFLIN-ST. JEOR: SCORE: 1257.74

## 2021-12-07 ENCOUNTER — OFFICE VISIT - RIVER FALLS (OUTPATIENT)
Dept: FAMILY MEDICINE | Facility: CLINIC | Age: 86
End: 2021-12-07

## 2021-12-07 LAB — SARS-COV-2 RNA RESP QL NAA+PROBE: NEGATIVE

## 2021-12-09 LAB — INR PPP: 1.7

## 2022-01-09 LAB
HCT VFR BLD AUTO: 39.2 %
HGB BLD-MCNC: 12.4 G/DL
PLATELET # BLD AUTO: 137 X10^3/UL
WBC # BLD AUTO: 4.8 X10^3/UL

## 2022-02-10 LAB — INR (EXTERNAL): 2.4 (ref 0.9–1.1)

## 2022-02-11 VITALS
WEIGHT: 150.75 LBS | HEART RATE: 76 BPM | DIASTOLIC BLOOD PRESSURE: 82 MMHG | HEIGHT: 64 IN | WEIGHT: 152 LBS | HEART RATE: 75 BPM | DIASTOLIC BLOOD PRESSURE: 80 MMHG | DIASTOLIC BLOOD PRESSURE: 70 MMHG | SYSTOLIC BLOOD PRESSURE: 125 MMHG | DIASTOLIC BLOOD PRESSURE: 72 MMHG | BODY MASS INDEX: 25.74 KG/M2 | SYSTOLIC BLOOD PRESSURE: 146 MMHG | DIASTOLIC BLOOD PRESSURE: 79 MMHG | SYSTOLIC BLOOD PRESSURE: 162 MMHG | HEIGHT: 64 IN | DIASTOLIC BLOOD PRESSURE: 69 MMHG | HEART RATE: 75 BPM | SYSTOLIC BLOOD PRESSURE: 114 MMHG | BODY MASS INDEX: 25.95 KG/M2 | HEIGHT: 64 IN | DIASTOLIC BLOOD PRESSURE: 60 MMHG | SYSTOLIC BLOOD PRESSURE: 162 MMHG | SYSTOLIC BLOOD PRESSURE: 162 MMHG | SYSTOLIC BLOOD PRESSURE: 140 MMHG | DIASTOLIC BLOOD PRESSURE: 70 MMHG | WEIGHT: 152 LBS | SYSTOLIC BLOOD PRESSURE: 142 MMHG | BODY MASS INDEX: 25.95 KG/M2

## 2022-02-11 VITALS
SYSTOLIC BLOOD PRESSURE: 162 MMHG | SYSTOLIC BLOOD PRESSURE: 130 MMHG | SYSTOLIC BLOOD PRESSURE: 132 MMHG | DIASTOLIC BLOOD PRESSURE: 68 MMHG | DIASTOLIC BLOOD PRESSURE: 76 MMHG | SYSTOLIC BLOOD PRESSURE: 134 MMHG | HEART RATE: 74 BPM | DIASTOLIC BLOOD PRESSURE: 82 MMHG | DIASTOLIC BLOOD PRESSURE: 72 MMHG | SYSTOLIC BLOOD PRESSURE: 152 MMHG | HEART RATE: 76 BPM | DIASTOLIC BLOOD PRESSURE: 62 MMHG | HEART RATE: 76 BPM | BODY MASS INDEX: 25.42 KG/M2 | DIASTOLIC BLOOD PRESSURE: 72 MMHG | OXYGEN SATURATION: 98 % | SYSTOLIC BLOOD PRESSURE: 148 MMHG | WEIGHT: 148.08 LBS | TEMPERATURE: 97.4 F

## 2022-02-11 VITALS
HEART RATE: 80 BPM | SYSTOLIC BLOOD PRESSURE: 124 MMHG | DIASTOLIC BLOOD PRESSURE: 78 MMHG | TEMPERATURE: 97.9 F | BODY MASS INDEX: 31.79 KG/M2 | WEIGHT: 186.2 LBS | HEIGHT: 64 IN

## 2022-02-12 VITALS
SYSTOLIC BLOOD PRESSURE: 136 MMHG | DIASTOLIC BLOOD PRESSURE: 76 MMHG | HEART RATE: 80 BPM | WEIGHT: 158.5 LBS | BODY MASS INDEX: 27.06 KG/M2 | DIASTOLIC BLOOD PRESSURE: 68 MMHG | SYSTOLIC BLOOD PRESSURE: 116 MMHG | HEIGHT: 64 IN | HEART RATE: 70 BPM

## 2022-02-12 VITALS
OXYGEN SATURATION: 97 % | SYSTOLIC BLOOD PRESSURE: 95 MMHG | WEIGHT: 186.2 LBS | SYSTOLIC BLOOD PRESSURE: 134 MMHG | BODY MASS INDEX: 30.39 KG/M2 | HEIGHT: 64 IN | TEMPERATURE: 97.5 F | DIASTOLIC BLOOD PRESSURE: 63 MMHG | DIASTOLIC BLOOD PRESSURE: 62 MMHG | HEART RATE: 81 BPM | WEIGHT: 178 LBS | TEMPERATURE: 97.5 F | WEIGHT: 182.9 LBS | BODY MASS INDEX: 31.22 KG/M2 | DIASTOLIC BLOOD PRESSURE: 76 MMHG | HEIGHT: 64 IN | SYSTOLIC BLOOD PRESSURE: 122 MMHG | TEMPERATURE: 98.1 F | BODY MASS INDEX: 31.79 KG/M2 | HEART RATE: 92 BPM | HEART RATE: 86 BPM | HEIGHT: 64 IN

## 2022-02-12 VITALS
BODY MASS INDEX: 26.88 KG/M2 | DIASTOLIC BLOOD PRESSURE: 78 MMHG | DIASTOLIC BLOOD PRESSURE: 80 MMHG | HEIGHT: 64 IN | HEART RATE: 72 BPM | HEART RATE: 84 BPM | BODY MASS INDEX: 26.6 KG/M2 | HEART RATE: 80 BPM | HEIGHT: 64 IN | WEIGHT: 155 LBS | HEART RATE: 80 BPM | HEART RATE: 83 BPM | HEIGHT: 64 IN | WEIGHT: 155.8 LBS | TEMPERATURE: 98.3 F | SYSTOLIC BLOOD PRESSURE: 173 MMHG | DIASTOLIC BLOOD PRESSURE: 74 MMHG | WEIGHT: 154 LBS | DIASTOLIC BLOOD PRESSURE: 78 MMHG | DIASTOLIC BLOOD PRESSURE: 80 MMHG | SYSTOLIC BLOOD PRESSURE: 142 MMHG | SYSTOLIC BLOOD PRESSURE: 166 MMHG | WEIGHT: 156.6 LBS | HEART RATE: 81 BPM | SYSTOLIC BLOOD PRESSURE: 162 MMHG | BODY MASS INDEX: 26.29 KG/M2 | WEIGHT: 156 LBS | BODY MASS INDEX: 26.46 KG/M2 | BODY MASS INDEX: 26.63 KG/M2 | HEIGHT: 64 IN | SYSTOLIC BLOOD PRESSURE: 158 MMHG | SYSTOLIC BLOOD PRESSURE: 154 MMHG | DIASTOLIC BLOOD PRESSURE: 81 MMHG

## 2022-02-12 VITALS
OXYGEN SATURATION: 97 % | DIASTOLIC BLOOD PRESSURE: 78 MMHG | HEIGHT: 64 IN | BODY MASS INDEX: 31.82 KG/M2 | WEIGHT: 186.4 LBS | SYSTOLIC BLOOD PRESSURE: 129 MMHG | TEMPERATURE: 97.2 F | DIASTOLIC BLOOD PRESSURE: 81 MMHG | SYSTOLIC BLOOD PRESSURE: 118 MMHG | HEART RATE: 73 BPM | TEMPERATURE: 97.5 F | WEIGHT: 188.1 LBS | OXYGEN SATURATION: 97 % | BODY MASS INDEX: 32.11 KG/M2 | HEART RATE: 80 BPM | HEIGHT: 64 IN

## 2022-02-12 VITALS
HEIGHT: 64 IN | HEART RATE: 74 BPM | BODY MASS INDEX: 26.29 KG/M2 | SYSTOLIC BLOOD PRESSURE: 126 MMHG | HEIGHT: 64 IN | SYSTOLIC BLOOD PRESSURE: 129 MMHG | WEIGHT: 153 LBS | HEART RATE: 91 BPM | DIASTOLIC BLOOD PRESSURE: 73 MMHG | BODY MASS INDEX: 26.63 KG/M2 | OXYGEN SATURATION: 96 % | WEIGHT: 154 LBS | HEIGHT: 64 IN | BODY MASS INDEX: 26.12 KG/M2 | HEART RATE: 71 BPM | HEART RATE: 82 BPM | DIASTOLIC BLOOD PRESSURE: 70 MMHG | WEIGHT: 156 LBS | BODY MASS INDEX: 25.61 KG/M2 | SYSTOLIC BLOOD PRESSURE: 128 MMHG | HEIGHT: 64 IN | DIASTOLIC BLOOD PRESSURE: 60 MMHG | SYSTOLIC BLOOD PRESSURE: 162 MMHG | SYSTOLIC BLOOD PRESSURE: 117 MMHG | DIASTOLIC BLOOD PRESSURE: 83 MMHG | HEART RATE: 70 BPM | BODY MASS INDEX: 26.12 KG/M2 | SYSTOLIC BLOOD PRESSURE: 145 MMHG | HEART RATE: 94 BPM | TEMPERATURE: 97.8 F | WEIGHT: 150 LBS | WEIGHT: 153 LBS | DIASTOLIC BLOOD PRESSURE: 92 MMHG | DIASTOLIC BLOOD PRESSURE: 75 MMHG | HEIGHT: 64 IN

## 2022-02-12 VITALS
SYSTOLIC BLOOD PRESSURE: 128 MMHG | SYSTOLIC BLOOD PRESSURE: 154 MMHG | DIASTOLIC BLOOD PRESSURE: 82 MMHG | HEART RATE: 76 BPM | HEART RATE: 76 BPM | SYSTOLIC BLOOD PRESSURE: 152 MMHG | HEART RATE: 72 BPM | SYSTOLIC BLOOD PRESSURE: 136 MMHG | DIASTOLIC BLOOD PRESSURE: 68 MMHG | WEIGHT: 154 LBS | BODY MASS INDEX: 26.29 KG/M2 | HEART RATE: 78 BPM | HEIGHT: 64 IN | DIASTOLIC BLOOD PRESSURE: 68 MMHG | DIASTOLIC BLOOD PRESSURE: 82 MMHG | DIASTOLIC BLOOD PRESSURE: 82 MMHG | SYSTOLIC BLOOD PRESSURE: 180 MMHG

## 2022-02-12 VITALS
DIASTOLIC BLOOD PRESSURE: 78 MMHG | HEIGHT: 64 IN | TEMPERATURE: 97.8 F | BODY MASS INDEX: 25.61 KG/M2 | DIASTOLIC BLOOD PRESSURE: 76 MMHG | SYSTOLIC BLOOD PRESSURE: 142 MMHG | DIASTOLIC BLOOD PRESSURE: 60 MMHG | SYSTOLIC BLOOD PRESSURE: 124 MMHG | WEIGHT: 150 LBS | DIASTOLIC BLOOD PRESSURE: 70 MMHG | BODY MASS INDEX: 25.75 KG/M2 | HEIGHT: 64 IN | HEIGHT: 64 IN | SYSTOLIC BLOOD PRESSURE: 144 MMHG | SYSTOLIC BLOOD PRESSURE: 130 MMHG | HEART RATE: 64 BPM | BODY MASS INDEX: 25.75 KG/M2

## 2022-02-12 VITALS
HEIGHT: 64 IN | SYSTOLIC BLOOD PRESSURE: 158 MMHG | TEMPERATURE: 97 F | DIASTOLIC BLOOD PRESSURE: 74 MMHG | SYSTOLIC BLOOD PRESSURE: 140 MMHG | BODY MASS INDEX: 26.47 KG/M2 | BODY MASS INDEX: 26.62 KG/M2 | HEIGHT: 64 IN | TEMPERATURE: 97.5 F | HEART RATE: 68 BPM | HEIGHT: 64 IN | BODY MASS INDEX: 26.46 KG/M2 | WEIGHT: 155.08 LBS | DIASTOLIC BLOOD PRESSURE: 80 MMHG | SYSTOLIC BLOOD PRESSURE: 148 MMHG | HEART RATE: 78 BPM | DIASTOLIC BLOOD PRESSURE: 88 MMHG | OXYGEN SATURATION: 98 % | WEIGHT: 155 LBS

## 2022-02-12 VITALS
WEIGHT: 150 LBS | SYSTOLIC BLOOD PRESSURE: 135 MMHG | SYSTOLIC BLOOD PRESSURE: 122 MMHG | BODY MASS INDEX: 25.88 KG/M2 | DIASTOLIC BLOOD PRESSURE: 77 MMHG | HEIGHT: 64 IN | HEIGHT: 64 IN | SYSTOLIC BLOOD PRESSURE: 134 MMHG | DIASTOLIC BLOOD PRESSURE: 70 MMHG | HEART RATE: 72 BPM | BODY MASS INDEX: 25.61 KG/M2 | HEIGHT: 64 IN | BODY MASS INDEX: 25.57 KG/M2 | DIASTOLIC BLOOD PRESSURE: 68 MMHG | WEIGHT: 151.6 LBS | DIASTOLIC BLOOD PRESSURE: 78 MMHG | SYSTOLIC BLOOD PRESSURE: 138 MMHG | SYSTOLIC BLOOD PRESSURE: 146 MMHG | HEART RATE: 65 BPM | WEIGHT: 149.8 LBS | DIASTOLIC BLOOD PRESSURE: 76 MMHG

## 2022-02-12 VITALS
WEIGHT: 155 LBS | HEART RATE: 72 BPM | HEART RATE: 71 BPM | SYSTOLIC BLOOD PRESSURE: 119 MMHG | HEIGHT: 64 IN | OXYGEN SATURATION: 97 % | DIASTOLIC BLOOD PRESSURE: 80 MMHG | DIASTOLIC BLOOD PRESSURE: 68 MMHG | WEIGHT: 155 LBS | HEART RATE: 72 BPM | WEIGHT: 156 LBS | BODY MASS INDEX: 26.46 KG/M2 | DIASTOLIC BLOOD PRESSURE: 74 MMHG | BODY MASS INDEX: 26.63 KG/M2 | SYSTOLIC BLOOD PRESSURE: 132 MMHG | WEIGHT: 155 LBS | DIASTOLIC BLOOD PRESSURE: 81 MMHG | DIASTOLIC BLOOD PRESSURE: 78 MMHG | SYSTOLIC BLOOD PRESSURE: 124 MMHG | HEIGHT: 64 IN | SYSTOLIC BLOOD PRESSURE: 162 MMHG | HEART RATE: 72 BPM | HEART RATE: 77 BPM | DIASTOLIC BLOOD PRESSURE: 84 MMHG | SYSTOLIC BLOOD PRESSURE: 158 MMHG | BODY MASS INDEX: 26.46 KG/M2 | HEART RATE: 96 BPM | HEIGHT: 64 IN | BODY MASS INDEX: 26.46 KG/M2 | SYSTOLIC BLOOD PRESSURE: 144 MMHG | HEIGHT: 64 IN

## 2022-02-12 VITALS
BODY MASS INDEX: 25.1 KG/M2 | HEIGHT: 64 IN | TEMPERATURE: 97.2 F | WEIGHT: 146 LBS | HEART RATE: 62 BPM | SYSTOLIC BLOOD PRESSURE: 144 MMHG | WEIGHT: 146.2 LBS | HEIGHT: 64 IN | DIASTOLIC BLOOD PRESSURE: 88 MMHG | BODY MASS INDEX: 24.96 KG/M2 | DIASTOLIC BLOOD PRESSURE: 72 MMHG | SYSTOLIC BLOOD PRESSURE: 134 MMHG | DIASTOLIC BLOOD PRESSURE: 74 MMHG | WEIGHT: 147 LBS | HEIGHT: 64 IN | BODY MASS INDEX: 24.92 KG/M2 | HEART RATE: 63 BPM | SYSTOLIC BLOOD PRESSURE: 130 MMHG | TEMPERATURE: 97.3 F

## 2022-02-12 VITALS
HEIGHT: 64 IN | SYSTOLIC BLOOD PRESSURE: 132 MMHG | WEIGHT: 167 LBS | TEMPERATURE: 96.7 F | HEART RATE: 83 BPM | TEMPERATURE: 97.8 F | BODY MASS INDEX: 29.37 KG/M2 | WEIGHT: 172 LBS | DIASTOLIC BLOOD PRESSURE: 64 MMHG | OXYGEN SATURATION: 97 % | SYSTOLIC BLOOD PRESSURE: 138 MMHG | HEIGHT: 64 IN | HEART RATE: 84 BPM | OXYGEN SATURATION: 98 % | SYSTOLIC BLOOD PRESSURE: 134 MMHG | DIASTOLIC BLOOD PRESSURE: 94 MMHG | BODY MASS INDEX: 28.51 KG/M2 | DIASTOLIC BLOOD PRESSURE: 74 MMHG

## 2022-02-12 VITALS
DIASTOLIC BLOOD PRESSURE: 82 MMHG | SYSTOLIC BLOOD PRESSURE: 152 MMHG | WEIGHT: 153.4 LBS | BODY MASS INDEX: 26.19 KG/M2 | DIASTOLIC BLOOD PRESSURE: 70 MMHG | SYSTOLIC BLOOD PRESSURE: 146 MMHG | DIASTOLIC BLOOD PRESSURE: 76 MMHG | SYSTOLIC BLOOD PRESSURE: 158 MMHG | HEIGHT: 64 IN

## 2022-02-12 VITALS
DIASTOLIC BLOOD PRESSURE: 70 MMHG | WEIGHT: 161.8 LBS | BODY MASS INDEX: 28 KG/M2 | BODY MASS INDEX: 27.62 KG/M2 | BODY MASS INDEX: 27.77 KG/M2 | HEART RATE: 83 BPM | WEIGHT: 164 LBS | HEIGHT: 64 IN | SYSTOLIC BLOOD PRESSURE: 154 MMHG | HEIGHT: 64 IN | SYSTOLIC BLOOD PRESSURE: 176 MMHG | HEART RATE: 81 BPM | TEMPERATURE: 98.6 F | BODY MASS INDEX: 28.15 KG/M2 | DIASTOLIC BLOOD PRESSURE: 74 MMHG | HEIGHT: 64 IN | SYSTOLIC BLOOD PRESSURE: 146 MMHG | OXYGEN SATURATION: 98 % | DIASTOLIC BLOOD PRESSURE: 64 MMHG | HEIGHT: 64 IN | DIASTOLIC BLOOD PRESSURE: 82 MMHG | TEMPERATURE: 97.2 F | SYSTOLIC BLOOD PRESSURE: 154 MMHG

## 2022-02-12 VITALS
DIASTOLIC BLOOD PRESSURE: 78 MMHG | BODY MASS INDEX: 28.79 KG/M2 | WEIGHT: 168.6 LBS | HEART RATE: 69 BPM | HEIGHT: 64 IN | TEMPERATURE: 97 F | SYSTOLIC BLOOD PRESSURE: 132 MMHG

## 2022-02-12 VITALS
SYSTOLIC BLOOD PRESSURE: 178 MMHG | SYSTOLIC BLOOD PRESSURE: 132 MMHG | HEIGHT: 64 IN | DIASTOLIC BLOOD PRESSURE: 115 MMHG | HEART RATE: 91 BPM | HEART RATE: 67 BPM | OXYGEN SATURATION: 98 % | BODY MASS INDEX: 32.47 KG/M2 | WEIGHT: 190.2 LBS | DIASTOLIC BLOOD PRESSURE: 86 MMHG | TEMPERATURE: 97.6 F | OXYGEN SATURATION: 93 %

## 2022-02-12 VITALS
WEIGHT: 156.75 LBS | HEIGHT: 64 IN | BODY MASS INDEX: 26.76 KG/M2 | OXYGEN SATURATION: 98 % | DIASTOLIC BLOOD PRESSURE: 80 MMHG | SYSTOLIC BLOOD PRESSURE: 142 MMHG | HEART RATE: 80 BPM | SYSTOLIC BLOOD PRESSURE: 130 MMHG | HEART RATE: 84 BPM | DIASTOLIC BLOOD PRESSURE: 80 MMHG

## 2022-02-12 VITALS — SYSTOLIC BLOOD PRESSURE: 144 MMHG | DIASTOLIC BLOOD PRESSURE: 92 MMHG

## 2022-02-15 NOTE — TELEPHONE ENCOUNTER
---------------------  From: Yaneth Jones CMA   Sent: 8/27/2020 4:55:10 PM CDT  Subject: Phone Message, labs.     Collete, daughter of pt called at 1546 trying to schedule INR lab, and some other lab at the same time. Unsure what the other lab is? Heather called pt back advise Hgb A1C. See other message.

## 2022-02-15 NOTE — LETTER
(Inserted Image. Unable to display)         April 02, 2021      GERHARDAARON HOWE  [address] [place] [address]          Dear GERHARD,      Thank you for selecting [facility] for your healthcare needs.    Our records indicate you are due for the following services:     Follow-up office visit.    (FYI   Regarding office visits: In some instances, a video visit or telephone visit may be offered as an option.)      To schedule an appointment or if you have further questions, please contact your clinic at [phone].      Powered by [employer]    Sincerely,    Hilario Whiting MD, FACP

## 2022-02-15 NOTE — PROGRESS NOTES
Chief Complaint    Patient presents for itchy scalp x couple weeks.  History of Present Illness      Patient complains of itchy scalp and her daughter started on cold tar shampoo a couple of times per week.  No other skin complaints.  She is worried that she does not have to diabetes regularly and assured her and her daughter that is not necessary for her to test regularly.  Patient's had a couple of nosebleeds.  Review of Systems      No hypoglycemia, chest pain, dyspnea, edema, palpitations, other bleeding.  Physical Exam   Vitals & Measurements    T: 97.0   F (Tympanic)  HR: 68(Peripheral)  BP: 140/80  SpO2: 98%     HT: 64 in  WT: 155 lb  BMI: 26.6       Patient is a healthy-appearing older woman in no distress.  Becoming increasingly forgetful.  Alert and oriented.  HEENT exam unremarkable.  Chest clear.  Cardiac exam irregular.  No edema.  Assessment/Plan       Long term (current) use of anticoagulants (Z79.01)         INR due to nosebleeds.       Seborrheic dermatitis of scalp (L21.9)         Continue Coulthard shampoo twice per week.       Orders:         Basic Metabolic Panel* ([facility]), Specimen Type: Serum, Collection Date: 12/10/19 11:36:00 CST  Patient Information     Name:GERHARD HOWE      Address:      [address] [place] [id number]     Sex:Female     YOB: 1929     [phone]     Emergency Contact:GENTRY PARK     MRN:[id number]     FIN:[id number]     Location:[facility]     Date of Service:01/10/2020      Primary Care Physician:       Hilario Whiting MD, [phone]      Attending Physician:       Hilario Whiting MD, [phone]  Problem List/Past Medical History    Ongoing     Allergic rhinitis     Benign positional vertigo     Bilateral hearing loss     Carotid artery plaque     Diabetic peripheral neuropathy     Dyslipidemia, goal LDL below 100     Frailty     History of mitral valve insufficiency       Comments: Mild by Echo in  2009     Hypothyroidism (acquired)     Long term (current) use of anticoagulants     Obese     Osteoarthritis     Paroxysmal atrial fibrillation     S/P placement of cardiac pacemaker     Seborrheic dermatitis     Seborrheic dermatitis of scalp     Statin intolerance     Symptomatic varicose veins     Tinnitus     Type 2 diabetes mellitus with other circulatory complications     White coat syndrome with hypertension    Historical     Inpatient stay       Comments: @[facility], [place] - Atrial fibrillation with an episode of hypotension     Inpatient stay       Comments: @[facility], [place] - Vertigo     Inpatient stay       Comments: @[place], [place] - Postoperative surgical complication involving subcutaneous tissue  Procedure/Surgical History     Replacement of pacemaker pulse generator (05/30/2019)      Comments: Implanted left pectoral.      [employer] [id number]      [id number].     Phacoemulsification of cataract with intraocular lens implantation (03/27/2018)      Comments: Left..     Colonoscopy (05/23/2012)      Comments: Unremarkable.     LAVH-BSO (04/06/2012)     Hysteroscopy, D&C, polypectomy (11/04/2011)     Implantation of heart pacemaker (2009)     Replacement of right knee joint (11.2008)     Replacement of left knee joint (12/10/2007)     Flexible sigmoidoscopy (06/13/2006)     Cholecystectomy (1951)     Repair of umbilical hernia (1949)     Appendectomy (1947)     Extracapsular cataract extraction and insertion of intraocular lens      Comments: Right..     Plastic repair of rotator cuff of shoulder     Pregnancy      Comments: full term X 3.  Medications    acetaminophen,   Not taking    Flonase 50 mcg/inh nasal spray, 2 spray(s), Nasal, daily, 11 refills,   Not taking    levothyroxine 125 mcg (0.125 mg) oral tablet, 125 mcg= 1 tab(s), Oral, daily, 3 refills    Metoprolol Succinate  mg oral tablet, extended release, See Instructions, 3 refills    warfarin 5 mg  oral tablet, See Instructions, 3 refills  Allergies    Vicodin (rash)    Ancef (Diarrhea)    Calan    ciprofloxacin    phenobarbital (tremors)    probiotic (Rash..)    simvastatin (GI upset)  Social History    Smoking Status - 01/10/2020     Never smoker     Alcohol - Denies Alcohol Use, 04/06/2010      Never, 05/09/2017     Employment/School      Retired, 10/27/2011     Home/Environment      Marital status: ., 10/27/2011     Substance Abuse - Denies Substance Abuse, 08/29/2018     Tobacco - Denies Tobacco Use, 04/06/2010  Family History    CABG - Coronary artery bypass graft: Mother.    CAD - Coronary artery disease: Mother and Father.    Diabetes mellitus: Mother.    Gout: Mother and Brother.    Hypertension: Mother.    Leukemia: Father.    MI - Myocardial infarction: Brother.    MS - Multiple sclerosis: Sister.    Parkinson's disease: Sister.    Valvular heart disease: Brother.  Immunizations      Vaccine Date Status          zoster vaccine, inactivated 10/15/2019 Recorded          influenza virus vaccine, inactivated 09/05/2019 Given          influenza virus vaccine, inactivated 10/10/2018 Given          influenza virus vaccine, inactivated 09/01/2017 Given          influenza virus vaccine, inactivated 01/24/2017 Given          ZOS, shingles 11/24/2015 Recorded              Comments : [11/24/2015] Freemans          influenza virus vaccine, inactivated 10/07/2015 Given          pneumococcal (PCV13) 03/19/2015 Given          influenza virus vaccine, inactivated 10/09/2014 Given          influenza virus vaccine, inactivated 10/11/2013 Given          influenza virus vaccine, inactivated 10/09/2012 Given          Td 01/11/2012 Given          influenza virus vaccine, inactivated 10/08/2011 Given          influenza virus vaccine, inactivated 10/07/2010 Given          influenza, H1N1, inactivated 12/18/2009 Recorded          pneumococcal (PPSV23) 04/24/2006 Recorded          pneumococcal (PPSV23) 01/08/2001  Recorded  Lab Results          Lab Results (Last 4 results within 90 days)           Sodium Level: 137 mmol/L [135 mmol/L - 146 mmol/L] (12/10/19 11:42:00)          Potassium Level: 4.9 mmol/L [3.5 mmol/L - 5.3 mmol/L] (12/10/19 11:42:00)          Chloride Level: 101 mmol/L [98 mmol/L - 110 mmol/L] (12/10/19 11:42:00)          CO2 Level: 29 mmol/L [20 mmol/L - 32 mmol/L] (12/10/19 11:42:00)          Glucose Level: 126 mg/dL High [65 mg/dL - 99 mg/dL] (12/10/19 11:42:00)          BUN: 23 mg/dL [7 mg/dL - 25 mg/dL] (12/10/19 11:42:00)          Creatinine Level: 0.76 mg/dL [0.6 mg/dL - 0.88 mg/dL] (12/10/19 11:42:00)          BUN/Creat Ratio: NOT APPLICABLE [6  - 22] (12/10/19 11:42:00)          eGFR: 69 mL/min/1.73m2 (12/10/19 11:42:00)          eGFR African American: 80 mL/min/1.73m2 (12/10/19 11:42:00)          Calcium Level: 9.6 mg/dL [8.6 mg/dL - 10.4 mg/dL] (12/10/19 11:42:00)          Hgb A1c: 6.9 High (12/10/19 11:42:00)          WBC: 5.6 [3.8  - 10.8] (12/10/19 11:42:00)          RBC: 3.94 [3.8  - 5.1] (12/10/19 11:42:00)          Hgb: 12.1 gm/dL [11.7 gm/dL - 15.5 gm/dL] (12/10/19 11:42:00)          Hct: 35.2 % [35 % - 45 %] (12/10/19 11:42:00)          MCV: 89.3 fL [80 fL - 100 fL] (12/10/19 11:42:00)          MCH: 30.7 pg [27 pg - 33 pg] (12/10/19 11:42:00)          MCHC: 34.4 gm/dL [32 gm/dL - 36 gm/dL] (12/10/19 11:42:00)          RDW: 11.7 % [11 % - 15 %] (12/10/19 11:42:00)          Platelet: 176 [140  - 400] (12/10/19 11:42:00)          MPV: 10.3 fL [7.5 fL - 12.5 fL] (12/10/19 11:42:00)          PT: 17.7 High [9  - 11.5] (12/10/19 11:42:00)          INR: 2 (12/27/19 11:25:00)          INR: 1.8 High (12/10/19 11:42:00)          INR: 2 (11/20/19 09:16:00)          INR: 2.3 (10/23/19 15:37:00)

## 2022-02-15 NOTE — RESULTS
Anticoagulation Therapy Entered On:  7/30/2019 8:22 AM CDT    Performed On:  7/30/2019 8:19 AM CDT by Jennifer Astudillo RN               Warfarin Management   Week 1 Sunday Dose :   5    Week 1 Monday Dose :   5    Week 1 Tuesday Dose :   5    Week 1 Wednesday Dose :   5    Week 1 Thursday Dose :   5    Week 1 Friday Dose :   7.5    Week 1 Saturday Dose :   5    Week 1 Dose Total :   37.5    Week 2 Sunday Dose :   5    Week 2 Monday Dose :   5    Week 2 Tuesday Dose :   5    Week 2 Wednesday Dose :   5    Week 2 Thursday Dose :   5    Week 2 Friday Dose :   7.5    Week 2 Saturday Dose :   5    Week 2 Dose Total :   37.5    Planned Duration :   Indefinite   Indication :   Atrial fibrillation   Warfarin Management Comments :   [facility] Office  [address] [facility], [address]  [phone]  Capillary Specimen     International Normalization Ratio :   2.2    INR Range :   2.0 - 3.0   INR Therapeutic Range :   Yes   Warfarin Abnormal Findings :   none   Anticoagulation Recheck :   4 weeks   Warfarin Physician :   Hilario Whiting MD   Warfarin Special Instructions :   INR = 2.2 per POC Patient is to stay on 7.5mg warfarin on Fri and 5mg the rest of the days of the week. Recheck INR in 4 weeks per protocol. Patient advisded in office.   Jennifer Astudillo RN - 7/30/2019 8:19 AM CDT

## 2022-02-15 NOTE — PROGRESS NOTES
Chief Complaint    has been seeing chiropractor and they are requesting an xray, been in afib x 2 days  History of Present Illness      Patient complains of year of right hip pain.  That period of time she took over a footstool in her apartment twice and fell not recently however.  He been seeing a chiropractor who thought an x-ray might help to guide his treatment.  No recent falls.  No weakness.  She does have peripheral neuropathy.  She has not been lightheaded.  She has been in atrial fibrillation for 2 days.  Review of Systems      No chest pain, dyspnea, edema, back pain, leg weakness.  Physical Exam   Vitals & Measurements    HR: 91(Peripheral)  BP: 117/73       Is comfortable in no distress.  Alert and oriented.  H&T exam is atraumatic.  Neck nontender.  Chest clear.  Cardiac exam is irregular trace ankle edema.  Right hip is nontender range of motion is normal.  Gait appears normal.  Assessment/Plan       Chronic right hip pain(M25.551)        We will get an x-ray as osteoarthritis is in the differential.         Orders:          83720 office outpatient visit 25 minutes (Charge), Quantity: 1, Chronic right hip pain  Paroxysmal atrial fibrillation  Long term (current) use of anticoagulants          XR Hip Min 2 Views Right (Request), Priority: STAT, Chronic right hip pain       Long term (current) use of anticoagulants(Z79.01)        INR was therapeutic May 4.         Orders:          73413 office outpatient visit 25 minutes (Charge), Quantity: 1, Chronic right hip pain  Paroxysmal atrial fibrillation  Long term (current) use of anticoagulants       Paroxysmal atrial fibrillation(I48.0)        Currently in atrial fibrillation with controlled rate.  Asymptomatic other than the palpitation.         Orders:          18249 office outpatient visit 25 minutes (Charge), Quantity: 1, Chronic right hip pain  Paroxysmal atrial fibrillation  Long term (current) use of anticoagulants  Patient Information      Name:GERHARD HOWE      Address:      [address]      Sex:Female      YOB: 1929      [phone]      Emergency Contact:GENTRY PARK     MRN:[id number]     FIN:[id number]     Location:[facility]     Date of Service:05/22/2018      Primary Care Physician:       Hilario Whiting MD, [phone]      Attending Physician:       Hilario Whiting MD, [phone]  Problem List/Past Medical History    Ongoing     Allergic rhinitis     Benign positional vertigo     Bilateral hearing loss     Carotid artery plaque     Dyslipidemia, goal LDL below 100     Frailty     History of mitral valve insufficiency       Comments: Mild by Echo in 2009     HTN, goal below 140/90     Hypothyroidism (acquired)     Long term (current) use of anticoagulants     Obese     Osteoarthritis     Paroxysmal atrial fibrillation     S/P placement of cardiac pacemaker     Seborrheic dermatitis     Statin intolerance     Symptomatic varicose veins     Tinnitus     Type 2 diabetes mellitus with other circulatory complications    Historical     *Hospitalized@[facility] - Atrial fibrillation with an episode of hypotension     *Hospitalized@[facility] - Vertigo  Procedure/Surgical History     Phacoemulsification of cataract with intraocular lens implantation (03/27/2018)     Colonoscopy (05/23/2012)     LAVH-BSO (04/06/2012)     Hysteroscopy, DC, polypectomy (11/04/2011)     Implantation of heart pacemaker (2009)     Replacement of right knee joint (11/2008)     Replacement of left knee joint (12/10/2007)     Flexible sigmoidoscopy (06/13/2006)     Cholecystectomy (1951)     Repair of umbilical hernia (1949)     Appendectomy (1947)     Extracapsular cataract extraction and insertion of intraocular lens     Plastic repair of rotator cuff of shoulder     Pregnancy  Medications        levothyroxine 125 mcg (0.125 mg) oral tablet: 125 mcg, 1 tab(s), po, daily, for 90 day(s), 90 tab(s), 3 Refill(s).        digoxin 125  mcg (0.125 mg) oral tablet: 125 mcg, 1 tab(s), po, daily, 90 tab(s), 3 Refill(s).        pravastatin 10 mg oral tablet: 10 mg, 1 tab(s), PO, Daily, 90 tab(s), 3 Refill(s).        Flonase 50 mcg/inh nasal spray: 2 spray(s), nasal, daily, 1 EA, 11 Refill(s).        Metoprolol Succinate  mg oral tablet, extended release: See Instructions, TAKE ONE TABLET BY MOUTH EVERY DAY, 90 tab(s), 3 Refill(s).        warfarin 5 mg oral tablet: 5 mg, 1 tab(s), po, daily, for 90 day(s), 90 tab(s), 3 Refill(s).                Allergies    Vicodin (rash)    Ancef    Calan    ciprofloxacin    phenobarbital    probiotic (Rash..)    simvastatin (GI upset)  Social History    Smoking Status - 05/09/2018     Never smoker     Alcohol - Denies Alcohol Use, 04/06/2010      Never, 05/09/2017     Employment and Education      Retired, 10/27/2011     Home and Environment      Marital status: ., 10/27/2011     Tobacco - Denies Tobacco Use, 04/06/2010  Family History    CA - Lung cancer: Spouse.    CABG - Coronary artery bypass graft: Mother.    CAD - Coronary artery disease: Mother and Father.    Diabetes mellitus: Mother.    Gout: Mother and Brother.    Hypertension: Mother.    Leukemia: Father.    MI - Myocardial infarction: Brother.    MS - Multiple sclerosis: Sister.    Parkinson's disease: Sister.    Valvular heart disease: Brother.  Immunizations      Vaccine Date Status Comments      influenza virus vaccine, inactivated 09/01/2017 Given      influenza virus vaccine, inactivated 01/24/2017 Given      ZOS, shingles 11/24/2015 Recorded [11/24/2015] Freemans      influenza virus vaccine, inactivated 10/07/2015 Given      pneumococcal (PCV13) 03/19/2015 Given      influenza virus vaccine, inactivated 10/09/2014 Given      influenza virus vaccine, inactivated 10/11/2013 Given      influenza virus vaccine, inactivated 10/09/2012 Given      Td 01/11/2012 Given      influenza virus vaccine, inactivated 10/08/2011 Given      influenza  virus vaccine, inactivated 10/07/2010 Given      influenza, H1N1, inactivated 12/18/2009 Recorded      pneumococcal (PPSV23) 04/24/2006 Recorded      pneumococcal (PPSV23) 01/08/2001 Recorded  Lab Results          Lab Results (Last 4 results within 90 days)           Sodium Level: 141 mmol/L [135 mmol/L - 146 mmol/L] (03/22/18 09:33:00)          Potassium Level: 4.6 mmol/L [3.5 mmol/L - 5.3 mmol/L] (03/22/18 09:33:00)          Chloride Level: 104 mmol/L [98 mmol/L - 110 mmol/L] (03/22/18 09:33:00)          CO2 Level: 27 mmol/L [20 mmol/L - 31 mmol/L] (03/22/18 09:33:00)          Glucose Level: 155 mg/dL High [65 mg/dL - 99 mg/dL] (03/22/18 09:33:00)          BUN: 18 mg/dL [7 mg/dL - 25 mg/dL] (03/22/18 09:33:00)          Creatinine Level: 0.84 mg/dL [0.6 mg/dL - 0.88 mg/dL] (03/22/18 09:33:00)          BUN/Creat Ratio: NOT APPLICABLE [6  - 22] (03/22/18 09:33:00)          eGFR: 62 mL/min/1.73m2 [8.6 mg/dL - 10.4 mg/dL] (03/22/18 09:33:00)          eGFR African American: 72 mL/min/1.73m2 [6  - 22] (03/22/18 09:33:00)          Calcium Level: 9.3 mg/dL [8.6 mg/dL - 10.4 mg/dL] (03/22/18 09:33:00)          Hgb A1c: 7 High [11.7 gm/dL - 15.5 gm/dL] (03/22/18 09:33:00)          Cholesterol: 195 mg/dL [8.6 mg/dL - 10.4 mg/dL] (03/22/18 09:33:00)          Non-HDL Cholesterol: 146 High [20 mmol/L - 31 mmol/L] (03/22/18 09:33:00)          HDL: 49 mg/dL Low [35 % - 45 %] (03/22/18 09:33:00)          Cholesterol/HDL Ratio: 4 [0.6 mg/dL - 0.88 mg/dL] (03/22/18 09:33:00)          LDL: 118 High [0.6 mg/dL - 0.88 mg/dL] (03/22/18 09:33:00)          Triglyceride: 158 mg/dL High [8.6 mg/dL - 10.4 mg/dL] (03/22/18 09:33:00)          TSH: 1.35 mIU/L [0.4 mIU/L - 4.5 mIU/L] (03/22/18 09:33:00)          WBC: 5.2 [3.8  - 10.8] (03/22/18 09:33:00)          RBC: 4.27 [3.8  - 5.1] (03/22/18 09:33:00)          Hgb: 13 gm/dL [11.7 gm/dL - 15.5 gm/dL] (03/22/18 09:33:00)          Hct: 37.9 % [35 % - 45 %] (03/22/18 09:33:00)          MCV: 88.8  fL [80 fL - 100 fL] (03/22/18 09:33:00)          MCH: 30.4 pg [27 pg - 33 pg] (03/22/18 09:33:00)          MCHC: 34.3 gm/dL [32 gm/dL - 36 gm/dL] (03/22/18 09:33:00)          RDW: 11.7 % [11 % - 15 %] (03/22/18 09:33:00)          Platelet: 159 [140  - 400] (03/22/18 09:33:00)          MPV: 10.6 fL [7.5 fL - 12.5 fL] (03/22/18 09:33:00)          PT: 21.3 High [9  - 11.5] (03/22/18 09:33:00)          INR: 2.9 [0.6 mg/dL - 0.88 mg/dL] (05/04/18 08:53:00)          INR: 2 [0.6 mg/dL - 0.88 mg/dL] (04/09/18 11:05:00)          INR: 2.1 High [0.6 mg/dL - 0.88 mg/dL] (03/22/18 09:33:00)          INR: 2.5 [0.6 mg/dL - 0.88 mg/dL] (03/13/18 15:43:00)          UA pH: 6 [5  - 8] (05/09/18 18:48:00)          UA Specific Gravity: < OR = 1.005 [1.001  - 1.035] (05/09/18 18:48:00)          UA Glucose: NEGATIVE [0.6 mg/dL - 0.88 mg/dL] (05/09/18 18:48:00)          UA Bilirubin: NEGATIVE [0.6 mg/dL - 0.88 mg/dL] (05/09/18 18:48:00)          UA Ketones: NEGATIVE [0.6 mg/dL - 0.88 mg/dL] (05/09/18 18:48:00)          Urine Occult Blood: NEGATIVE [0.6 mg/dL - 0.88 mg/dL] (05/09/18 18:48:00)          UA Protein: NEGATIVE [0.6 mg/dL - 0.88 mg/dL] (05/09/18 18:48:00)          UA Nitrite: NEGATIVE [0.6 mg/dL - 0.88 mg/dL] (05/09/18 18:48:00)          UA Leukocyte Esterase: NEGATIVE [0.6 mg/dL - 0.88 mg/dL] (05/09/18 18:48:00)          Test in Question - Test(s) Ordered: BMP A1C [6 - 22] (03/22/18 09:33:00)          Misc Test CPT Code: 80969 496 [6 - 22] (03/22/18 09:33:00)          Misc Test Client Contact: DESHAUN PEREZ [facility] [6 - 22] (03/22/18 09:33:00)          Misc Test Comment: See comment [6 - 22] (03/22/18 09:33:00)          Misc Test Comment: See comment [6 - 22] (03/22/18 09:33:00)

## 2022-02-15 NOTE — LETTER
(Inserted Image. Unable to display)   February 09, 2021      GERHARD HOWE      [address] [place], [place] [id number]        Dear GERHARD,    Thank you for selecting [facility] ([facility]) for your healthcare needs.  Below you will find the results of your recent tests done at our clinic.     TSH slightly high. Diabetes adequately controlled. Repeat TSH and Hgb A1c in 3-6 months.      Result Name Current Result Previous Result Reference Range   Sodium Level (mmol/L)  136 2/8/2021  137 12/10/2019 135 - 146   Potassium Level (mmol/L)  4.3 2/8/2021  4.9 12/10/2019 3.5 - 5.3   Chloride Level (mmol/L)  99 2/8/2021  101 12/10/2019 98 - 110   CO2 Level (mmol/L)  30 2/8/2021  29 12/10/2019 20 - 32   Glucose Level (mg/dL) ((H)) 174 2/8/2021 ((H)) 126 12/10/2019 65 - 99   BUN (mg/dL)  21 2/8/2021  23 12/10/2019 7 - 25   Creatinine Level (mg/dL)  0.83 2/8/2021  0.76 12/10/2019 0.60 - 0.88   BUN/Creat Ratio  NOT APPLICABLE 2/8/2021  NOT APPLICABLE 12/10/2019 6 - 22   eGFR (mL/min/1.73m2)  62 2/8/2021  69 12/10/2019 > OR = 60 -    Calcium Level (mg/dL)  9.3 2/8/2021  9.6 12/10/2019 8.6 - 10.4   Bilirubin Total (mg/dL)  0.9 2/8/2021  0.2 - 1.2   Alkaline Phosphatase (unit/L)  47 2/8/2021  37 - 153   AST/SGOT (unit/L)  17 2/8/2021  10 - 35   ALT/SGPT (unit/L)  14 2/8/2021  6 - 29   Protein Total (gm/dL)  6.3 2/8/2021  6.1 - 8.1   Albumin Level (gm/dL)  4.1 2/8/2021  3.6 - 5.1   Hgb A1c ((H)) 7.6 2/8/2021 ((H)) 6.6 9/21/2020  - <5.7   Vitamin B12 Level (pg/mL)  522 2/8/2021  200 - 1,100   Cholesterol (mg/dL) ((H)) 235 2/8/2021 ((H)) 244 3/24/2020  - <200   Non-HDL Cholesterol ((H)) 193 2/8/2021 ((H)) 189 3/24/2020  - <130   HDL (mg/dL) ((L)) 42 2/8/2021  55 3/24/2020 > OR = 50 -    Cholesterol/HDL Ratio ((H)) 5.6 2/8/2021  4.4 3/24/2020  - <5.0   LDL ((H)) 148 2/8/2021 ((H)) 159 3/24/2020    Triglyceride (mg/dL) ((H)) 277 2/8/2021 ((H)) 167 3/24/2020  - <150   TSH (mIU/L) ((H)) 6.52  2/8/2021  3.34 3/24/2020 0.40 - 4.50   Ur Microalbumin/Creatinine Ratio  15 2/8/2021  9 3/24/2020  - <30   WBC  4.1 2/8/2021  5.6 12/10/2019 3.8 - 10.8   Hgb (gm/dL)  12.2 2/8/2021  12.1 12/10/2019 11.7 - 15.5   Platelet  170 2/8/2021  176 12/10/2019 140 - 400       Please contact me or my assistant at [phone] if you have any questions.     Sincerely,        Hilario Whiting MD

## 2022-02-15 NOTE — PROGRESS NOTES
Chief Complaint    Rash on both lower legs, developed after using topical agent for circulation  History of Present Illness      patient is 89 year old here with daughter with concerns about rash on her legs      one week ago she was given a new topical lotion with essential oils including rosemary and peppermint to try on her legs      used on left leg on Saturday and both on Sunday      on Monday morning woke up with rash on both legs      legs have been itchy and irritated, developed several small open areas      rash is itchy, noted small amount of bleeding from one lesion      patient noted to have poor memory      has a history of neuropathy and diabetes mellitus, DM has been adequately controlled with diet in the past although most recent A1c was up a little from prior measurements at 6.9      has not been able to check blood sugars recently as she is out of supplies, had been getting mail order but company went out of business      most recent diabetes follow up in December, up to date on labs      checks blood sugars once a day, reports that they are good although daughter notes it has been more than two weeks since she has checked  Review of Systems      no fevers      no rash except to legs, no other skin lesions      no change in appetite, gets meals through meals on wheels  Physical Exam   Vitals & Measurements    T: 97.2   F (Tympanic)  HR: 81(Peripheral)  BP: 154/82  SpO2: 98%     HT: 64 in  WT: 161.8 lb  BMI: 27.77       alert and cooperative, no distress      oral mucosa moist, no lesions      normal pulses in legs, trace bilateral peripheral edema to both lower extremities      on skin exam, has bright red blanching lesions on both legs plus two areas of open lesions on right shin, each less than 1cm, no signs of infection  Assessment/Plan       Allergic reaction (T78.40XA)         symptoms appear allergic, has discontinued new topical, continue local cares and add antihistamine        if not  improving with loratadine, can call and will do short course of prednisone but given potential effect on blood sugar will try antihistamines first         Ordered:          loratadine, = 1 tab(s) ( 10 mg ), Oral, daily, # 14 tab(s), 0 Refill(s), Type: Maintenance, Pharmacy: [facility] #[id number], 1 tab(s) Oral daily,x14 day(s), (Ordered)                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 If needs new meter or other supplies, can let us know. Diabetes follow up currently up to date.         Ordered:          Miscellaneous Rx Supply, glucose meter test strips and lancets, See Instructions, Instructions: check blood sugar daily, Supply, # 100 EA, 4 Refill(s), Type: Maintenance, Pharmacy: [facility] #[id number], patient will bring her glucose meter to you so that you know what..., (Ordered)           Patient Information     Name:GERHARD HOWE      Address:      [address] [place] [id number]     Sex:Female     YOB: 1929     [phone]     Emergency Contact:GENTRY PARK     MRN:[id number]     FIN:[id number]     Location:[facility]     Date of Service:02/22/2020      Primary Care Physician:       Hilario Whiting MD, [phone]      Attending Physician:       Fatmata Leach MD, [phone]  Problem List/Past Medical History    Ongoing     Allergic rhinitis     Benign positional vertigo     Bilateral hearing loss     Carotid artery plaque     Diabetic peripheral neuropathy     Dyslipidemia, goal LDL below 100     Frailty     History of mitral valve insufficiency       Comments: Mild by Echo in 2009     Hypothyroidism (acquired)     Long term (current) use of anticoagulants     Obese     Osteoarthritis     Paroxysmal atrial fibrillation     S/P placement of cardiac pacemaker     Seborrheic dermatitis     Seborrheic dermatitis of  scalp     Statin intolerance     Symptomatic varicose veins     Tinnitus     Type 2 diabetes mellitus with other circulatory complications     White coat syndrome with hypertension    Historical     Inpatient stay       Comments: @[facility], [place] - Atrial fibrillation with an episode of hypotension     Inpatient stay       Comments: @[facility], [place] - Vertigo     Inpatient stay       Comments: @[place], [place] - Postoperative surgical complication involving subcutaneous tissue  Procedure/Surgical History     Replacement of pacemaker pulse generator (05/30/2019)      Comments: Implanted left pectoral.      [employer]tronic [id number]      [id number].     Phacoemulsification of cataract with intraocular lens implantation (03/27/2018)      Comments: Left..     Colonoscopy (05/23/2012)      Comments: Unremarkable.     LAVH-BSO (04/06/2012)     Hysteroscopy, D&C, polypectomy (11/04/2011)     Implantation of heart pacemaker (2009)     Replacement of right knee joint (11.2008)     Replacement of left knee joint (12/10/2007)     Flexible sigmoidoscopy (06/13/2006)     Cholecystectomy (1951)     Repair of umbilical hernia (1949)     Appendectomy (1947)     Extracapsular cataract extraction and insertion of intraocular lens      Comments: Right..     Plastic repair of rotator cuff of shoulder     Pregnancy      Comments: full term X 3.  Medications    acetaminophen,   Not taking    Flonase 50 mcg/inh nasal spray, 2 spray(s), Nasal, daily, 11 refills,   Not taking    glucose meter test strips and lancets, See Instructions, 4 refills    levothyroxine 125 mcg (0.125 mg) oral tablet, 1 tab(s), Oral, daily    loratadine 10 mg oral tablet, 10 mg= 1 tab(s), Oral, daily    Metoprolol Succinate  mg oral tablet, extended release, See Instructions, 3 refills    warfarin 5 mg oral tablet, See Instructions  Allergies    Vicodin (rash)    Ancef (Diarrhea)    Calan    ciprofloxacin    phenobarbital  (tremors)    probiotic (Rash..)    simvastatin (GI upset)  Social History    Smoking Status - 01/10/2020     Never smoker     Alcohol - Denies Alcohol Use, 04/06/2010      Never, 05/09/2017     Employment/School      Retired, 10/27/2011     Home/Environment      Marital status: ., 10/27/2011     Substance Abuse - Denies Substance Abuse, 08/29/2018     Tobacco - Denies Tobacco Use, 04/06/2010  Family History    CABG - Coronary artery bypass graft: Mother.    CAD - Coronary artery disease: Mother and Father.    Diabetes mellitus: Mother.    Gout: Mother and Brother.    Hypertension: Mother.    Leukemia: Father.    MI - Myocardial infarction: Brother.    MS - Multiple sclerosis: Sister.    Parkinson's disease: Sister.    Valvular heart disease: Brother.  Immunizations      Vaccine Date Status          zoster vaccine, inactivated 10/15/2019 Recorded          influenza virus vaccine, inactivated 09/05/2019 Given          influenza virus vaccine, inactivated 10/10/2018 Given          influenza virus vaccine, inactivated 09/01/2017 Given          influenza virus vaccine, inactivated 01/24/2017 Given          ZOS, shingles 11/24/2015 Recorded              Comments : [11/24/2015] Freemans          influenza virus vaccine, inactivated 10/07/2015 Given          pneumococcal (PCV13) 03/19/2015 Given          influenza virus vaccine, inactivated 10/09/2014 Given          influenza virus vaccine, inactivated 10/11/2013 Given          influenza virus vaccine, inactivated 10/09/2012 Given          Td 01/11/2012 Given          influenza virus vaccine, inactivated 10/08/2011 Given          influenza virus vaccine, inactivated 10/07/2010 Given          influenza, H1N1, inactivated 12/18/2009 Recorded          pneumococcal (PPSV23) 04/24/2006 Recorded          pneumococcal (PPSV23) 01/08/2001 Recorded  Lab Results       Lab Results (Last 4 results within 90 days)        Sodium Level: 137 mmol/L [135 mmol/L - 146 mmol/L]  (12/10/19 11:42:00)       Potassium Level: 4.9 mmol/L [3.5 mmol/L - 5.3 mmol/L] (12/10/19 11:42:00)       Chloride Level: 101 mmol/L [98 mmol/L - 110 mmol/L] (12/10/19 11:42:00)       CO2 Level: 29 mmol/L [20 mmol/L - 32 mmol/L] (12/10/19 11:42:00)       Glucose Level: 126 mg/dL High [65 mg/dL - 99 mg/dL] (12/10/19 11:42:00)       BUN: 23 mg/dL [7 mg/dL - 25 mg/dL] (12/10/19 11:42:00)       Creatinine Level: 0.76 mg/dL [0.6 mg/dL - 0.88 mg/dL] (12/10/19 11:42:00)       BUN/Creat Ratio: NOT APPLICABLE [6  - 22] (12/10/19 11:42:00)       eGFR: 69 mL/min/1.73m2 (12/10/19 11:42:00)       eGFR : 80 mL/min/1.73m2 (12/10/19 11:42:00)       Calcium Level: 9.6 mg/dL [8.6 mg/dL - 10.4 mg/dL] (12/10/19 11:42:00)       Hgb A1c: 6.9 High (12/10/19 11:42:00)       WBC: 5.6 [3.8  - 10.8] (12/10/19 11:42:00)       RBC: 3.94 [3.8  - 5.1] (12/10/19 11:42:00)       Hgb: 12.1 gm/dL [11.7 gm/dL - 15.5 gm/dL] (12/10/19 11:42:00)       Hct: 35.2 % [35 % - 45 %] (12/10/19 11:42:00)       MCV: 89.3 fL [80 fL - 100 fL] (12/10/19 11:42:00)       MCH: 30.7 pg [27 pg - 33 pg] (12/10/19 11:42:00)       MCHC: 34.4 gm/dL [32 gm/dL - 36 gm/dL] (12/10/19 11:42:00)       RDW: 11.7 % [11 % - 15 %] (12/10/19 11:42:00)       Platelet: 176 [140  - 400] (12/10/19 11:42:00)       MPV: 10.3 fL [7.5 fL - 12.5 fL] (12/10/19 11:42:00)       PT: 17.7 High [9  - 11.5] (12/10/19 11:42:00)       INR: 2.2 (01/10/20 15:21:00)       INR: 2 (12/27/19 11:25:00)       INR: 1.8 High (12/10/19 11:42:00)

## 2022-02-15 NOTE — NURSING NOTE
Quick Intake Entered On:  10/23/2019 3:44 PM CDT    Performed On:  10/23/2019 3:39 PM CDT by Ava Velasquez RN               Summary   Chief Complaint :   BP   Height Measured :   64 in(Converted to: 5 ft 4 in, 162.56 cm)    Systolic Blood Pressure :   144 mmHg (HI)    Diastolic Blood Pressure :   60 mmHg   Mean Arterial Pressure :   88 mmHg   BP Site :   Right arm   BP Method :   Manual   Race :      Languages :   English   Ava Velasquez RN - 10/23/2019 3:39 PM CDT

## 2022-02-15 NOTE — RESULTS
Anticoagulation Therapy Entered On:  2/22/2019 1:43 PM CST    Performed On:  2/22/2019 1:41 PM CST by Ava Velasquez RN               Warfarin Management   Week 1 Sunday Dose :   5    Week 1 Monday Dose :   5    Week 1 Tuesday Dose :   5    Week 1 Wednesday Dose :   5    Week 1 Thursday Dose :   5    Week 1 Friday Dose :   7.5    Week 1 Saturday Dose :   5    Week 1 Dose Total :   37.5    Week 2 Sunday Dose :   5    Week 2 Monday Dose :   5    Week 2 Tuesday Dose :   5    Week 2 Wednesday Dose :   5    Week 2 Thursday Dose :   5    Week 2 Friday Dose :   7.5    Week 2 Saturday Dose :   5    Week 2 Dose Total :   37.5    Planned Duration :   Indefinite   Indication :   Atrial fibrillation   Warfarin Management Comments :   Lab test performed by:  [facility]  [address] [id number]  Phone # [phone]  Fax# [phone]  Capillary   International Normalization Ratio :   1.9    INR Range :   2.0 - 3.0   INR Therapeutic Range :   No   Warfarin Abnormal Findings :   Missed a dose a day or two ago   Ava Velasquez RN - 2/22/2019 1:42 PM CST   Warfarin Management and Results Grid   Signs of Thrombolic :   No   Signs of Warfarin Intolerance :   No   Changes in Diet or Alcohol Intake :   No   Changes in Medication or Antibiotics :   No   Unusual Bleeding or Bruising :   No   Rectal Bleeding or Black Stools :   No   Vitamin K Food Handout :   No   Heart Valve Replacement :   No   Ava Velasquez RN - 2/22/2019 1:42 PM CST   Anticoagulation Recheck :   2 weeks   Warfarin Special Instructions :   Per protocol continue warfarin 7.5 mg Fridays and 5 mg ROW; recheck 2 weeks; notified in office and given written directions.   Ava Velasquez RN - 2/22/2019 1:42 PM CST

## 2022-02-15 NOTE — NURSING NOTE
Anticoagulation Therapy Management Entered On:  11/11/2021 9:30 AM CST    Performed On:  11/11/2021 9:26 AM CST by Jennifer Astudillo RN               Anticoagulation Visit Assessment   Type of Visit - Anticoagulation :   Telephone   Anticoagulation Indication :   Atrial fibrillation   Anticoagulant Duration :   Undetermined   Anticoagulation Medication Verified :   Yes   Patient Preferred Contact Method :   Cell   Jennifer Astudillo RN - 11/11/2021 9:26 AM CST   Anticoagulation Patient Assessment Grid   Change in Alcohol Consumption :   No   Change in Diet :   No   Change in Medications :   Yes   (Comment: off warfarin 2 days for minor surgery [Jennifer Astudillo RN - 11/11/2021 9:26 AM CST] )   Diarrhea :   No   Rectal Bleeding :   No   Signs of Clotting :   No   Signs of Warfarin Intolerance :   No   Unusual Bleeding, Bruising :   No   Upcoming Procedures :   Yes   (Comment: sty excision on 11/11/21 [Jennifer Astudillo RN - 11/11/2021 9:26 AM CST] )   Jennifer Astudillo RN - 11/11/2021 9:26 AM CST   Patient on Warfarin :   Yes   Jennifer Astudillo RN - 11/11/2021 9:26 AM CST   Warfarin   Anticoagulant INR Goal Lower :   2    Anticoagulant INR Goal Upper :   3    Sunday :   7.5 mg   Monday :   5 mg   Tuesday :   7.5 mg   Wednesday :   5 mg   Thursday :   7.5 mg   Friday :   5 mg   Saturday :   5 mg   Total Dose :   42.5 mg   Warfarin Pt Reported Previous Week Dose :    Sun Mon Tues Wed Thurs Fri Sat Weekly Total Dose   Week 1 7.5 mg 5 mg 7.5 mg 5 mg 5 mg 5 mg 5 mg 40 mg   Week 2  mg  mg  mg  mg  mg  mg  mg  mg   Week 3  mg  mg  mg  mg  mg  mg  mg  mg   Week 4  mg  mg  mg  mg  mg  mg  mg  mg         Patient is taking single or multiple strength tablet(s) :   Single strength tab(s)   One Tab Strength :   5 mg tab   Sunday :   7.5 mg   Monday :   5 mg   Tuesday :   7.5 mg   Wednesday :   5 mg   Thursday :   7.5 mg   Friday :   5 mg   Saturday :   5 mg   Week 1 Total Dose :   42.5 mg   Sunday :   1.5 tab(s)   Monday :   1  tab(s)   Tuesday :   1.5 tab(s)   Wednesday :   1 tab(s)   Thursday :   1.5 tab(s)   Friday :   1 tab(s)   Saturday :   1 tab(s)   Warfarin Dosing Acknowledgement :   I have reviewed the patient's warfarin dosing schedule and confirmed its accuracy for today's visit.   Patient Instructions :   INR = 1.5 today per MDINR.  Patient will resume 7.5mg warfarin Sun, Tues, Thurs and 5mg the rest of the days of the week  post op per Surgeon. Recheck INR in 1 week. Daughter advised via call.     Alaina Astudillo RNth - 11/11/2021 9:26 AM CST

## 2022-02-15 NOTE — TELEPHONE ENCOUNTER
Entered by Tasia Lopez CMA on February 10, 2020 2:34:51 PM CST  ---------------------  From: Tasia Lopez CMA   To: [facility] #[id number]    Sent: 2/10/2020 2:34:51 PM CST  Subject: Medication Management     ** Not Approved: Refill not appropriate, not currently taking - suspended on med list **  digoxin (DIGOXIN 0.125MG TABLETS (YELLOW))  TAKE 1 TABLET BY MOUTH DAILY  Qty:  90 tab(s)        Days Supply:  90        Refills:  0          Substitutions Allowed     Route To [facility] #[id number]   Signed by Tasia Lopez CMA            ** Submitted: **  Order:warfarin (warfarin 5 mg oral tablet)  See Instructions  TAKE 1 AND 1/2 TABLETS BY MOUTH FRIDAYS AND 1 TABLET EVERY OTHER DAY  Qty:  180 tab(s)        Refills:  0          Substitutions Allowed     Route To [facility] #[id number]    Signed by Tasia Lopez CMA  2/10/2020 2:32:00 PM    ** Submitted: **  Complete:warfarin (warfarin 5 mg oral tablet)   Signed by Tasia Lopez CMA  2/10/2020 2:33:00 PM    ** Not Approved:  **  warfarin (WARFARIN SOD 5MG TABLETS (PEACH))  TAKE 1 AND 1/2 TABLETS BY MOUTH FRIDAYS AND 1 TABLET EVERY OTHER DAY  Qty:  100 tab(s)        Days Supply:  90        Refills:  0          Substitutions Allowed     [id number]   Signed by Tasia Lopez CMA            ** Submitted: **  Order:levothyroxine (levothyroxine 125 mcg (0.125 mg) oral tablet)  1 tab(s)  Oral  daily  Qty:  90 tab(s)        Days Supply:  90        Refills:  0          Substitutions Allowed     [id number]    Signed by Tasia Lopez CMA  2/10/2020 2:31:00 PM    ** Submitted: **  Complete:levothyroxine (levothyroxine 125 mcg (0.125 mg) oral tablet)   Signed by Tasia Lopez CMA  2/10/2020 2:31:00 PM    ** Not Approved:  **  levothyroxine (LEVOTHYROXINE 0.125MG (125MCG) TAB)  TAKE 1 TABLET BY MOUTH DAILY  Qty:  90 tab(s)        Days Supply:  90        Refills:  0           Substitutions Allowed     Route To Pharmacy - [facility] #[id number]   Signed by Tasia Lopez CMA            ------------------------------------------  From: [facility] #[id number]  To: Hilario Whiting MD  Sent: February 9, 2020 10:01:20 AM CST  Subject: Medication Management  Due: February 10, 2020 10:01:20 AM CST    ** On Hold Pending Signature **  Drug: digoxin (Digox 125 mcg (0.125 mg) oral tablet)  TAKE 1 TABLET BY MOUTH DAILY  Quantity: 90 tab(s)  Days Supply: 90  Refills: 0  Substitutions Allowed  Notes from Pharmacy:     Dispensed Drug: digoxin (Digox 125 mcg (0.125 mg) oral tablet)  TAKE 1 TABLET BY MOUTH DAILY  Quantity: 90 tab(s)  Days Supply: 90  Refills: 0  Substitutions Allowed  Notes from Pharmacy:     ** On Hold Pending Signature **  Drug: levothyroxine (levothyroxine 125 mcg (0.125 mg) oral tablet)  TAKE 1 TABLET BY MOUTH DAILY  Quantity: 90 tab(s)  Days Supply: 90  Refills: 0  Substitutions Allowed  Notes from Pharmacy:     Dispensed Drug: levothyroxine (levothyroxine 125 mcg (0.125 mg) oral tablet)  TAKE 1 TABLET BY MOUTH DAILY  Quantity: 90 tab(s)  Days Supply: 90  Refills: 0  Substitutions Allowed  Notes from Pharmacy:     ** On Hold Pending Signature **  Drug: warfarin (warfarin 5 mg oral tablet)  TAKE 1 AND 1/2 TABLETS BY MOUTH FRIDAYS AND 1 TABLET EVERY OTHER DAY  Quantity: 100 tab(s)  Days Supply: 90  Refills: 0  Substitutions Allowed  Notes from Pharmacy:     Dispensed Drug: warfarin (warfarin 5 mg oral tablet)  TAKE 1 AND 1/2 TABLETS BY MOUTH FRIDAYS AND 1 TABLET EVERY OTHER DAY  Quantity: 100 tab(s)  Days Supply: 90  Refills: 0  Substitutions Allowed  Notes from Pharmacy:   ------------------------------------------

## 2022-02-15 NOTE — NURSING NOTE
Comprehensive Intake Entered On:  10/26/2020 3:06 PM CDT    Performed On:  10/26/2020 3:01 PM CDT by Sari Turner CMA               Summary   Chief Complaint :   Hospital discharge f/u from [facility] on 10/9/20   Weight Measured :   167 lb(Converted to: 167 lb 0 oz, 75.750 kg)    Height Measured :   64 in(Converted to: 5 ft 4 in, 162.56 cm)    Body Mass Index :   28.66 kg/m2 (HI)    Body Surface Area :   1.85 m2   Systolic Blood Pressure :   132 mmHg (HI)    Diastolic Blood Pressure :   74 mmHg   Mean Arterial Pressure :   93 mmHg   Peripheral Pulse Rate :   83 bpm   BP Site :   Right arm   BP Method :   Manual   Temperature Tympanic :   96.7 DegF(Converted to: 35.9 DegC)  (LOW)    Oxygen Saturation :   97 %   Race :      Languages :   English   Sari Turner CMA - 10/26/2020 3:01 PM CDT   Health Status   Allergies Verified? :   Yes   Medication History Verified? :   Yes   Pre-Visit Planning Status :   Completed   Tobacco Use? :   Never smoker   Hospitalized since last visit? :   Yes   Inpatient? :   Yes   Date of Discharge :   10/9/2020 CDT   Follow-up visit date :   10/26/2020 CDT   Med List Reconciled? :   Yes   Sari Turner CMA - 10/26/2020 3:01 PM CDT   Demographics   Last Name :   Arturo   Address :   [address] [place] [address]   First Name :   Rebecca   Stephy Initial :   CLOVER   Responsible Party Date of Birth () :   11/10/1929 CST   City :   [place]   State :   [place]   Zip Code :   [address]   Sari Turner CMA - 10/26/2020 3:01 PM CDT   Providers Grid   Provider Name :    Shraddha Lucas        Provider Specialty :    eye   Dental   Heart          Sari Turner CMA - 10/26/2020 3:01 PM CDT  Sari Turner CMA - 10/26/2020 3:01 PM CDT  Sari Turner CMA 10/26/2020 3:01 PM CDT       Ancillary Services Grid   Name :    [place]              Type of Service :    Pharmacy                Sari Turner CMA - 10/26/2020 3:01 PM CDT         Meds / Allergies   (As Of: 10/26/2020 3:06:30 PM CDT)   Allergies  (Active)   Ancef  Estimated Onset Date:   Unspecified ; Reactions:   Diarrhea ; Created By:   Chloe Bruce; Reaction Status:   Active ; Category:   Drug ; Substance:   Ancef ; Type:   Allergy ; Updated By:   Chloe Bruce; Reviewed Date:   10/5/2020 3:25 PM CDT      Calan  Estimated Onset Date:   Unspecified ; Created By:   Sravanthi Dietz; Reaction Status:   Active ; Category:   Drug ; Substance:   Calan ; Updated By:   Sravanthi Dietz; Source:   Paper Chart ; Reviewed Date:   10/5/2020 3:25 PM CDT      ciprofloxacin  Estimated Onset Date:   Unspecified ; Created By:   Cherise Christiansen MA; Reaction Status:   Active ; Category:   Drug ; Substance:   ciprofloxacin ; Type:   Allergy ; Updated By:   Cherise Christiansen MA; Reviewed Date:   10/5/2020 3:25 PM CDT      phenobarbital  Estimated Onset Date:   Unspecified ; Reactions:   tremors ; Created By:   Chloe Bruce; Reaction Status:   Active ; Category:   Drug ; Substance:   phenobarbital ; Type:   Allergy ; Updated By:   Chloe Bruce; Source:   Paper Chart ; Reviewed Date:   10/5/2020 3:25 PM CDT      probiotic  Estimated Onset Date:   Unspecified ; Reactions:   Rash.. ; Created By:   Sari Turner CMA; Reaction Status:   Active ; Category:   Drug ; Substance:   probiotic ; Type:   Allergy ; Updated By:   Sari Turner CMA; Reviewed Date:   10/5/2020 3:25 PM CDT      simvastatin  Estimated Onset Date:   Unspecified ; Reactions:   GI upset ; Created By:   Sravanthi Dietz; Reaction Status:   Active ; Category:   Drug ; Substance:   simvastatin ; Type:   Allergy ; Updated By:   Sravanthi Dietz; Source:   Paper Chart ; Reviewed Date:   10/5/2020 3:25 PM CDT      Vicodin  Estimated Onset Date:   Unspecified ; Reactions:   rash ; Created By:   Mis Donaldson MA; Reaction Status:   Active ; Category:   Drug ; Substance:   Vicodin ; Type:   Allergy ; Severity:   Moderate ; Updated By:   Mis Donaldson MA; Reviewed Date:   10/5/2020 3:25 PM CDT         Medication List   (As Of: 10/26/2020 3:06:30 PM CDT)   Prescription/Discharge Order    amoxicillin  :   amoxicillin ; Status:   Prescribed ; Ordered As Mnemonic:   amoxicillin 500 mg oral tablet ; Simple Display Line:   See Instructions, 4 tab(s) Oral 1 hour before dental procedure, 4 EA, 1 Refill(s) ; Ordering Provider:   Beatriz Petit; Catalog Code:   amoxicillin ; Order Dt/Tm:   5/7/2020 2:05:13 PM CDT          donepezil  :   donepezil ; Status:   Prescribed ; Ordered As Mnemonic:   Aricept 5 mg oral tablet ; Simple Display Line:   5 mg, 1 tab(s), Oral, hs, 30 tab(s), 5 Refill(s) ; Ordering Provider:   Hilario Whiting MD; Catalog Code:   donepezil ; Order Dt/Tm:   10/5/2020 3:59:19 PM CDT          fluticasone nasal  :   fluticasone nasal ; Status:   Prescribed ; Ordered As Mnemonic:   Flonase 50 mcg/inh nasal spray ; Simple Display Line:   2 spray(s), nasal, daily, 1 EA, 11 Refill(s) ; Ordering Provider:   Shimon Nesbitt MD; Catalog Code:   fluticasone nasal ; Order Dt/Tm:   7/21/2020 11:28:21 AM CDT          levothyroxine  :   levothyroxine ; Status:   Prescribed ; Ordered As Mnemonic:   levothyroxine 125 mcg (0.125 mg) oral tablet ; Simple Display Line:   1 tab(s), Oral, daily, 90 tab(s), 1 Refill(s) ; Ordering Provider:   Shimon Nesbitt MD; Catalog Code:   levothyroxine ; Order Dt/Tm:   7/21/2020 11:28:22 AM CDT          metoprolol  :   metoprolol ; Status:   Prescribed ; Ordered As Mnemonic:   Metoprolol Succinate  mg oral tablet, extended release ; Simple Display Line:   100 mg, 1 tab(s), Oral, daily, 90 tab(s), 1 Refill(s) ; Ordering Provider:   Shimon Nesbitt MD; Catalog Code:   metoprolol ; Order Dt/Tm:   7/21/2020 11:28:23 AM CDT          Miscellaneous Rx Supply  :   Miscellaneous Rx Supply ; Status:   Prescribed ; Ordered As Mnemonic:   ACCU-CHECK GUIDE LANCETS ; Simple Display Line:   See Instructions, TEST BLOOD SUGAR DAILY, 100 EA, 11 Refill(s) ; Ordering Provider:    Shimon Nesbitt MD; Catalog Code:   Miscellaneous Rx Supply ; Order Dt/Tm:   7/21/2020 8:47:38 AM CDT          Miscellaneous Rx Supply  :   Miscellaneous Rx Supply ; Status:   Prescribed ; Ordered As Mnemonic:   ACCU-CHECK GUIDE TEST STRIPS ; Simple Display Line:   See Instructions, TEST BLOOD SUGAR DAILY, 100 EA, 11 Refill(s) ; Ordering Provider:   Shimon Nesbitt MD; Catalog Code:   Miscellaneous Rx Supply ; Order Dt/Tm:   7/21/2020 8:47:37 AM CDT          warfarin  :   warfarin ; Status:   Prescribed ; Ordered As Mnemonic:   warfarin 5 mg oral tablet ; Simple Display Line:   See Instructions, TAKE 1 AND 1/2 TABLETS BY MOUTH FRIDAYS AND 1 TABLET EVERY OTHER DAY, 180 tab(s), 1 Refill(s) ; Ordering Provider:   Shimon Nesbitt MD; Catalog Code:   warfarin ; Order Dt/Tm:   7/21/2020 11:28:22 AM CDT            Home Meds    acetaminophen  :   acetaminophen ; Status:   Documented ; Ordered As Mnemonic:   acetaminophen ; Simple Display Line:   0 Refill(s) ; Catalog Code:   acetaminophen ; Order Dt/Tm:   5/14/2019 1:44:43 PM CDT          lisinopril  :   lisinopril ; Status:   Documented ; Ordered As Mnemonic:   lisinopril 20 mg oral tablet ; Simple Display Line:   20 mg, 1 tab(s), Oral, daily, 0 Refill(s) ; Catalog Code:   lisinopril ; Order Dt/Tm:   10/5/2020 3:26:04 PM CDT            ID Risk Screen   Recent Travel History :   No recent travel   Family Member Travel History :   No recent travel   Other Exposure to Infectious Disease :   Unknown   Sari Turner CMA - 10/26/2020 3:01 PM CDT

## 2022-02-15 NOTE — PROGRESS NOTES
"Chief Complaint    Medication f/u  History of Present Illness       Patient tells me she is \"doing normal.\"  Daughter states things are going much better.  She is not up at night.  She is sleeping from approximately 1030 till 9 or 10 in the morning.  Patient is gotten very forgetful.  No further irritability, paranoia, or delusions.  She did have 1 fall.  Review of Systems       Denies dizziness.  No fever, chills, headache, myalgia.  Has had a coronavirus immunizations.  Physical Exam   Vitals & Measurements    T: 97.5  F (Tympanic)  HR: 92 (Peripheral)  BP: 134/76     HT: 64 in  WT: 182.9 lb  BMI: 31.39        Patient appears comfortable and in no distress.  Alert and oriented.  Chest clear.  Cardiac exam is regular.  Trace edema. Poor memory.  Assessment/Plan       Anticoagulated (Z79.01)         Stable         Ordered:          65609 office o/p est mod 30-39 min (Charge), Quantity: 1, Type 2 diabetes mellitus with other circulatory complications  Paroxysmal atrial fibrillation  Dementia  Anticoagulated  White coat syndrome with hypertension                Anxiety (F41.9)          Improved with citalopram 10 mg daily.                 Dementia (F03.90)          Progressive.  Behavioral issues resolved though she has fallen once.  Anxiety seems better.  Decrease Seroquel by 50%.  Virtual visit in a month.  We will likely discontinue Seroquel if doing well at that time.  Call with problems.         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)          Adequately controlled         Ordered:          23335 office o/p est mod 30-39 min (Charge), Quantity: 1, Type 2 diabetes mellitus with other circulatory complications  Paroxysmal atrial fibrillation  Dementia  Anticoagulated  White coat syndrome with hypertension                White coat syndrome with hypertension (I10)          Adequately controlled         Ordered:          26644 office o/p est mod 30-39 min (Charge), Quantity: 1, Type 2 diabetes mellitus with other circulatory complications  Paroxysmal atrial fibrillation  Dementia  Anticoagulated  White coat syndrome with hypertension                Orders:         citalopram, = 1 tab(s) ( 10 mg ), Oral, daily, # 30 tab(s), 1 Refill(s), Type: Hard Stop, Pharmacy: [facility], 64, in, 03/04/21 14:36:00 CST, Height Measured, 178, lb, 03/04/21 14:36:00 CST, Weight Measured, (Completed)         citalopram, = 1 tab(s) ( 10 mg ), Oral, daily, # 30 tab(s), 11 Refill(s), Type: Maintenance, Pharmacy: [facility], 1 tab(s) Oral daily, 64, in, 04/08/21 14:21:00 CDT, Height Measured, 182.9, lb, 04/08/21 13:59:00 CDT, Weight Measured, (Ordered)         QUEtiapine, See Instructions, Instructions: 0.5 tab(s) Oral qhs 30 day(s), # 15 EA, 0 Refill(s), Type: Maintenance, Pharmacy: [facility], 0.5 tab(s) Oral qhs 30 day(s), 64, in, 04/08/21 14:21:00 CDT, Height Measured, 182.9, lb, 04/08/21 1..., (Ordered)         Return to Clinic (Request), RFV: Dementia, Return in 4 weeks virtual OK  Patient Information     Name:GERHARD HOWE      Address:      [address] [place] [id number]     Sex:Female     YOB: 1929     [phone]     Emergency Contact:GENTRY PARK URSZULA     MRN:[id number]     FIN:[id number]     Location:[facility]     Date of  Service:04/08/2021      Primary Care Physician:       Hilario Whiting MD, [phone]      Attending Physician:       Hilario Whiting MD, [phone]  Problem List/Past Medical History    Ongoing     Allergic rhinitis     Anticoagulated     Benign positional vertigo     Bilateral hearing loss     Carotid artery plaque     Dementia     Diabetic peripheral neuropathy     Dyslipidemia, goal LDL below 100     Frailty     History of mitral valve insufficiency       Comments: Mild by Echo in 2009     Hypothyroidism (acquired)     Long term (current) use of anticoagulants     Obese     Osteoarthritis     Paroxysmal atrial fibrillation     S/P placement of cardiac pacemaker     Seborrheic dermatitis     Seborrheic dermatitis of scalp     Statin intolerance     Symptomatic varicose veins     Tinnitus     Type 2 diabetes mellitus with other circulatory complications     White coat syndrome with hypertension    Historical     Inpatient stay       Comments: @[facility], [place] - Atrial fibrillation with an episode of hypotension     Inpatient stay       Comments: @[facility], [place] - Vertigo     Inpatient stay       Comments: @[place], [place] - Postoperative surgical complication involving subcutaneous tissue     Inpatient stay       Comments: [facility], [place] - Benign paroxysmal positional vertigo.  Procedure/Surgical History     Replacement of pacemaker pulse generator (05/30/2019)      Comments: Implanted left pectoral.      Medtronic [id number]      [id number].     Phacoemulsification of cataract with intraocular lens implantation (03/27/2018)      Comments: Left..     Colonoscopy (05/23/2012)      Comments: Unremarkable.     LAV-BSO (04/06/2012)     Hysteroscopy, D&C, polypectomy (11/04/2011)     Implantation of heart pacemaker (2009)     Replacement of right knee joint (11/2008)     Replacement of left knee joint (12/10/2007)     Flexible sigmoidoscopy (06/13/2006)      Cholecystectomy (1951)     Repair of umbilical hernia (1949)     Appendectomy (1947)     Extracapsular cataract extraction and insertion of intraocular lens      Comments: Right..     Plastic repair of rotator cuff of shoulder     Pregnancy      Comments: full term X 3.  Medications    ACCU-CHECK GUIDE LANCETS, See Instructions, 11 refills    ACCU-CHECK GUIDE TEST STRIPS, See Instructions, 11 refills    acetaminophen    Calcium with vitamin D, 1 tab, Oral, daily    citalopram 10 mg oral tablet, 10 mg= 1 tab(s), Oral, daily, 11 refills    Daily Multiple Vitamins, 1 tab(s), Oral, daily    Fish Oil, 1 tab, Oral, daily    Flonase 50 mcg/inh nasal spray, 2 spray(s), Nasal, daily, 11 refills    levothyroxine 125 mcg (0.125 mg) oral tablet, 1 tab(s), Oral, daily, 3 refills    lisinopril 20 mg oral tablet, 20 mg= 1 tab(s), Oral, daily    Metoprolol Succinate  mg oral tablet, extended release, 100 mg= 1 tab(s), Oral, daily    PreserVision, 1 cap(s), Oral, bid    SEROquel 25 mg oral tablet, See Instructions    Vitamin C, 500 mg, Oral, daily    Vitamin D3, 2000 International Unit, Oral, daily    warfarin 5 mg oral tablet, See Instructions, 1 refills  Allergies    Vicodin (rash)    Ancef (Diarrhea)    Calan    ciprofloxacin    phenobarbital (tremors)    probiotic (Rash..)    simvastatin (GI upset)  Social History    Smoking Status     Never smoker     Alcohol - Denies Alcohol Use      Never     Electronic Cigarette/Vaping      Electronic Cigarette Use: Never.     Employment/School      Retired     Home/Environment      Marital status: .     Substance Abuse - Denies Substance Abuse     Tobacco - Denies Tobacco Use      Never (less than 100 in lifetime)  Family History    CABG - Coronary artery bypass graft: Mother.    CAD - Coronary artery disease: Mother and Father.    Diabetes mellitus: Mother.    Gout: Mother and Brother.    Hypertension: Mother.    Leukemia: Father.    MI - Myocardial infarction: Brother.    MS  - Multiple sclerosis: Sister.    Parkinson's disease: Sister.    Valvular heart disease: Brother.  Immunizations      Vaccine Date Status          SARS-CoV-2 (COVID-19) Moderna-1273 03/18/2021 Given          SARS-CoV-2 (COVID-19) Moderna-1273 02/18/2021 Given          influenza virus vaccine, inactivated 10/05/2020 Given          zoster vaccine, inactivated 12/16/2019 Recorded          zoster vaccine, inactivated 10/15/2019 Recorded          influenza virus vaccine, inactivated 09/05/2019 Given          influenza virus vaccine, inactivated 10/10/2018 Given          influenza virus vaccine, inactivated 09/01/2017 Given          influenza virus vaccine, inactivated 01/24/2017 Given          ZOS, shingles 11/24/2015 Recorded              Comments : [11/24/2015] Freemans          influenza virus vaccine, inactivated 10/07/2015 Given          pneumococcal (PCV13) 03/19/2015 Given          influenza virus vaccine, inactivated 10/09/2014 Given          influenza virus vaccine, inactivated 10/11/2013 Given          influenza virus vaccine, inactivated 10/09/2012 Given          Td 01/11/2012 Given          influenza virus vaccine, inactivated 10/08/2011 Given          influenza virus vaccine, inactivated 10/07/2010 Given          influenza, H1N1, inactivated 12/18/2009 Recorded          pneumococcal (PPSV23) 04/24/2006 Recorded          pneumococcal (PPSV23) 01/08/2001 Recorded  Lab Results          Lab Results (Last 4 results within 90 days)           Sodium Level: 136 mmol/L [135 mmol/L - 146 mmol/L] (02/08/21 09:53:00)          Potassium Level: 4.3 mmol/L [3.5 mmol/L - 5.3 mmol/L] (02/08/21 09:53:00)          Chloride Level: 99 mmol/L [98 mmol/L - 110 mmol/L] (02/08/21 09:53:00)          CO2 Level: 30 mmol/L [20 mmol/L - 32 mmol/L] (02/08/21 09:53:00)          Glucose Level: 174 mg/dL High [65 mg/dL - 99 mg/dL] (02/08/21 09:53:00)          BUN: 21 mg/dL [7 mg/dL - 25 mg/dL] (02/08/21 09:53:00)          Creatinine Level:  0.83 mg/dL [0.6 mg/dL - 0.88 mg/dL] (02/08/21 09:53:00)          BUN/Creat Ratio: NOT APPLICABLE [6  - 22] (02/08/21 09:53:00)          eGFR: 62 mL/min/1.73m2 (02/08/21 09:53:00)          eGFR African American: 71 mL/min/1.73m2 (02/08/21 09:53:00)          Calcium Level: 9.3 mg/dL [8.6 mg/dL - 10.4 mg/dL] (02/08/21 09:53:00)          Bilirubin Total: 0.9 mg/dL [0.2 mg/dL - 1.2 mg/dL] (02/08/21 09:53:00)          Alkaline Phosphatase: 47 unit/L [37 unit/L - 153 unit/L] (02/08/21 09:53:00)          AST/SGOT: 17 unit/L [10 unit/L - 35 unit/L] (02/08/21 09:53:00)          ALT/SGPT: 14 unit/L [6 unit/L - 29 unit/L] (02/08/21 09:53:00)          Protein Total: 6.3 gm/dL [6.1 gm/dL - 8.1 gm/dL] (02/08/21 09:53:00)          Albumin Level: 4.1 gm/dL [3.6 gm/dL - 5.1 gm/dL] (02/08/21 09:53:00)          Globulin: 2.2 [1.9  - 3.7] (02/08/21 09:53:00)          A/G Ratio: 1.9 [1  - 2.5] (02/08/21 09:53:00)          Hgb A1c: 7.6 High (02/08/21 09:53:00)          Vitamin B12 Level: 522 pg/mL [200 pg/mL - 1100 pg/mL] (02/08/21 09:53:00)          Cholesterol: 235 mg/dL High (02/08/21 09:53:00)          Non-HDL Cholesterol: 193 High (02/08/21 09:53:00)          HDL: 42 mg/dL Low (02/08/21 09:53:00)          Cholesterol/HDL Ratio: 5.6 High (02/08/21 09:53:00)          LDL: 148 High (02/08/21 09:53:00)          Triglyceride: 277 mg/dL High (02/08/21 09:53:00)          TSH: 6.52 mIU/L High [0.4 mIU/L - 4.5 mIU/L] (02/08/21 09:53:00)          U Creatinine: 85 mg/dL [20 mg/dL - 275 mg/dL] (02/08/21 09:53:00)          U Microalbumin: 1.3 mg/dL (02/08/21 09:53:00)          Ur Microalbumin/Creatinine Ratio: 15 (02/08/21 09:53:00)          WBC: 4.1 [3.8  - 10.8] (02/08/21 09:53:00)          RBC: 4.03 [3.8  - 5.1] (02/08/21 09:53:00)          Hgb: 12.2 gm/dL [11.7 gm/dL - 15.5 gm/dL] (02/08/21 09:53:00)          Hct: 35.7 % [35 % - 45 %] (02/08/21 09:53:00)          MCV: 88.6 fL [80 fL - 100 fL] (02/08/21 09:53:00)          MCH: 30.3 pg [27 pg -  33 pg] (02/08/21 09:53:00)          MCHC: 34.2 gm/dL [32 gm/dL - 36 gm/dL] (02/08/21 09:53:00)          RDW: 11.9 % [11 % - 15 %] (02/08/21 09:53:00)          Platelet: 170 [140  - 400] (02/08/21 09:53:00)          MPV: 10.1 fL [7.5 fL - 12.5 fL] (02/08/21 09:53:00)          INR TR: 3.3 (02/11/21 14:24:00)

## 2022-02-15 NOTE — TELEPHONE ENCOUNTER
"---------------------  From: Jennifer Echevarria LPN (Phone Messages Pool ([id number]_[facility]))   To: [facility] Message Pool ([id number]_[place] [facility] [place]);     Sent: 3/3/2021 12:26:55 PM CST  Subject: concerns     FYI    Phone Message    PCP:   NINFA      Time of Call:  11:34am       Person Calling:  pt daughter Joanna  Phone number:  [phone] K to LM     Returned call at: 12:20am    Note:   Joanna RONALD stating pt has dementia and the last month pt has been having more paranoia and agitation. Joanna is wondering if there are any medications to help pt be more calm/feel more at peace or if there is something else that she can do for pt.    Returned call and informed Joanna INNFA out of clinic today. Joanna says the agitation and paranoia will happen at any time. She says they were just eating lunch and pt started crying saying \"you are all to blame\" and talking about calling the authorities.    Told Jonana that [facility]DL will be out the next 2 weeks- asked if we could get pt in to see him since she is overdue for visit and discuss concerns and see what can be done to help. Joanna is ok with this and was transferred to scheduling.    Last office visit and reason:  10/26/20 [facility] F/U, D/C Aricept---------------------  From: Yumiko Escamilla ([facility] Message Pool ([id number] [facility] [place]))   To: Hilario Whiting MD;     Sent: 3/4/2021 9:47:13 AM CST  Subject: FW: concerns---------------------  From: Hilario Whiting MD   To: [facility] Message Pool ([id number] [facility] [place]);     Sent: 3/4/2021 10:08:27 AM CST  Subject: RE: concerns     ok  "

## 2022-02-15 NOTE — NURSING NOTE
Anticoagulation Therapy Management Entered On:  10/21/2020 2:09 PM CDT    Performed On:  10/21/2020 2:08 PM CDT by Madiha Britt RN               Anticoagulation Visit Assessment   Type of Visit - Anticoagulation :   Telephone   Anticoagulation Indication :   Atrial fibrillation   Anticoagulant Duration :   Undetermined   Anticoagulation Medication Verified :   Yes   Patient Preferred Contact Method :   Cell   Patient on Warfarin :   Yes   Madiha Britt RN - 10/21/2020 2:08 PM CDT   Warfarin   International Normalization Ratio TR :   2.3    Anticoagulant INR Goal Lower :   2    Anticoagulant INR Goal Upper :   3    Sunday :   7.5 mg   Monday :   5 mg   Tuesday :   7.5 mg   Wednesday :   5 mg   Thursday :   7.5 mg   Friday :   5 mg   Saturday :   5 mg   Total Dose :   42.5 mg   Warfarin Pt Reported Previous Week Dose :    Sun Mon Tues Wed Thurs Fri Sat Weekly Total Dose   Week 1 7.5 mg 5 mg 7.5 mg 5 mg 7.5 mg 5 mg 5 mg 42.5 mg   Week 2  mg  mg  mg  mg  mg  mg  mg  mg   Week 3  mg  mg  mg  mg  mg  mg  mg  mg   Week 4  mg  mg  mg  mg  mg  mg  mg  mg         Patient is taking single or multiple strength tablet(s) :   Single strength tab(s)   One Tab Strength :   5 mg tab   Sunday :   7.5 mg   Monday :   5 mg   Tuesday :   7.5 mg   Wednesday :   5 mg   Thursday :   7.5 mg   Friday :   5 mg   Saturday :   5 mg   Week 1 Total Dose :   42.5 mg   Sunday :   1.5 tab(s)   Monday :   1 tab(s)   Tuesday :   1.5 tab(s)   Wednesday :   1 tab(s)   Thursday :   1.5 tab(s)   Friday :   1 tab(s)   Saturday :   1 tab(s)   Patient Instructions :   INR = 2.3 per [facility]. Per protocol, continue Warfarin 7.5mg Tues/Thurs/Sun and 5mg ROW. Recheck INR in 1 week. Advised home health RN by phone.      Madiha Britt RN - 10/21/2020 2:08 PM CDT

## 2022-02-15 NOTE — CARE COORDINATION
Dilated Retinal Eye Exam Communication Form provided at pt's visit with DM educator on 12/03/19  Lindsey Bourne CMA

## 2022-02-15 NOTE — NURSING NOTE
Comprehensive Intake Entered On:  12/10/2019 11:08 AM CST    Performed On:  12/10/2019 11:01 AM CST by Yumiko Escamilla               Summary   Chief Complaint :   Patient is here today to f/u ED stay- 11/28/19, wanting to know if she should d/c the medications she was given   Weight Measured :   155.08 lb(Converted to: 155 lb 1 oz, 70.34 kg)    Height Measured :   64 in(Converted to: 5 ft 4 in, 162.56 cm)    Body Mass Index :   26.62 kg/m2 (HI)    Body Surface Area :   1.78 m2   Systolic Blood Pressure :   158 mmHg (HI)    Diastolic Blood Pressure :   88 mmHg (HI)    Mean Arterial Pressure :   111 mmHg   Peripheral Pulse Rate :   78 bpm   BP Site :   Right arm   BP Method :   Electronic   HR Method :   Electronic   Temperature Tympanic :   97.5 DegF(Converted to: 36.4 DegC)  (LOW)    Race :      Languages :   English   Yumiko Escamilla - 12/10/2019 11:01 AM CST   Health Status   Allergies Verified? :   Yes   Medication History Verified? :   Yes   Medical History Verified? :   Yes   Pre-Visit Planning Status :   Completed   Tobacco Use? :   Never smoker   Yumiko Escamilla - 12/10/2019 11:01 AM CST   Consents   Consent for Immunization Exchange :   Consent Granted   Consent for Immunizations to Providers :   Consent Granted   Yumiko Escamilla - 12/10/2019 11:01 AM CST   Meds / Allergies   (As Of: 12/10/2019 11:08:09 AM CST)   Allergies (Active)   Ancef  Estimated Onset Date:   Unspecified ; Reactions:   Diarrhea ; Created By:   Chloe Bruce; Reaction Status:   Active ; Category:   Drug ; Substance:   Ancef ; Type:   Allergy ; Updated By:   Chloe Bruce; Reviewed Date:   12/10/2019 11:06 AM CST      Calan  Estimated Onset Date:   Unspecified ; Created By:   Vaishali Gannon CMA; Reaction Status:   Active ; Substance:   Calan ; Updated By:   Vaishali Gannon CMA; Source:   Paper Chart ; Reviewed Date:   12/10/2019 11:06 AM CST      ciprofloxacin  Estimated Onset Date:   Unspecified ;  Created By:   Cherise Christiansen MA; Reaction Status:   Active ; Category:   Drug ; Substance:   ciprofloxacin ; Type:   Allergy ; Updated By:   Cherise Christiansen MA; Reviewed Date:   12/10/2019 11:06 AM CST      phenobarbital  Estimated Onset Date:   Unspecified ; Reactions:   tremors ; Created By:   Chloe Bruce; Reaction Status:   Active ; Category:   Drug ; Substance:   phenobarbital ; Type:   Allergy ; Updated By:   Chloe Bruce; Source:   Paper Chart ; Reviewed Date:   12/10/2019 11:06 AM CST      probiotic  Estimated Onset Date:   Unspecified ; Reactions:   Rash.. ; Created By:   Sari Turner CMA; Reaction Status:   Active ; Category:   Drug ; Substance:   probiotic ; Type:   Allergy ; Updated By:   Sari Turner CMA; Reviewed Date:   12/10/2019 11:06 AM CST      simvastatin  Estimated Onset Date:   Unspecified ; Reactions:   GI upset ; Created By:   Vaishali Gannon CMA; Reaction Status:   Active ; Substance:   simvastatin ; Type:   Allergy ; Updated By:   Vaishali Gannon CMA; Source:   Paper Chart ; Reviewed Date:   12/10/2019 11:06 AM CST      Vicodin  Estimated Onset Date:   Unspecified ; Reactions:   rash ; Created By:   Mis Donaldson MA; Reaction Status:   Active ; Category:   Drug ; Substance:   Vicodin ; Type:   Allergy ; Severity:   Moderate ; Updated By:   Mis Donaldson MA; Reviewed Date:   12/10/2019 11:06 AM CST        Medication List   (As Of: 12/10/2019 11:08:09 AM CST)   Prescription/Discharge Order    warfarin  :   warfarin ; Status:   Prescribed ; Ordered As Mnemonic:   warfarin 5 mg oral tablet ; Simple Display Line:   See Instructions, 1.5 tabs Fridays and one every other day., 100 EA, 3 Refill(s) ; Ordering Provider:   Hilario Whiting MD; Catalog Code:   warfarin ; Order Dt/Tm:   2/7/2019 9:57:05 AM CST          pravastatin  :   pravastatin ; Status:   Prescribed ; Ordered As Mnemonic:   pravastatin 10 mg oral tablet ; Simple Display Line:   10 mg, 1 tab(s), PO, Daily, 90  tab(s), 3 Refill(s) ; Ordering Provider:   Hilario Whiting MD; Catalog Code:   pravastatin ; Order Dt/Tm:   2/7/2019 9:57:02 AM CST          metoprolol  :   metoprolol ; Status:   Prescribed ; Ordered As Mnemonic:   Metoprolol Succinate  mg oral tablet, extended release ; Simple Display Line:   See Instructions, TAKE ONE TABLET BY MOUTH EVERY DAY, 90 tab(s), 3 Refill(s) ; Ordering Provider:   Hilario Whiting MD; Catalog Code:   metoprolol ; Order Dt/Tm:   2/7/2019 9:56:57 AM CST          levothyroxine  :   levothyroxine ; Status:   Prescribed ; Ordered As Mnemonic:   levothyroxine 125 mcg (0.125 mg) oral tablet ; Simple Display Line:   125 mcg, 1 tab(s), po, daily, for 90 day(s), 90 tab(s), 3 Refill(s) ; Ordering Provider:   Hilario Whiting MD; Catalog Code:   levothyroxine ; Order Dt/Tm:   2/7/2019 9:55:38 AM CST          fluticasone nasal  :   fluticasone nasal ; Status:   Prescribed ; Ordered As Mnemonic:   Flonase 50 mcg/inh nasal spray ; Simple Display Line:   2 spray(s), nasal, daily, 1 EA, 11 Refill(s) ; Ordering Provider:   Hilario Whiting MD; Catalog Code:   fluticasone nasal ; Order Dt/Tm:   2/7/2019 9:55:34 AM CST          digoxin  :   digoxin ; Status:   Prescribed ; Ordered As Mnemonic:   digoxin 125 mcg (0.125 mg) oral tablet ; Simple Display Line:   125 mcg, 1 tab(s), po, daily, 90 tab(s), 3 Refill(s) ; Ordering Provider:   Hilario Whiting MD; Catalog Code:   digoxin ; Order Dt/Tm:   2/7/2019 9:55:28 AM CST            Home Meds    acetaminophen  :   acetaminophen ; Status:   Documented ; Ordered As Mnemonic:   acetaminophen ; Simple Display Line:   0 Refill(s) ; Catalog Code:   acetaminophen ; Order Dt/Tm:   5/14/2019 1:44:43 PM CDT

## 2022-02-15 NOTE — NURSING NOTE
Comprehensive Intake Entered On:  2019 9:42 AM CST    Performed On:  2019 9:35 AM CST by Leora Higgins LPN               Summary   Chief Complaint :   HTN check.    Weight Measured :   148.08 lb(Converted to: 148 lb 1 oz, 67.17 kg)    Systolic Blood Pressure :   130 mmHg   Diastolic Blood Pressure :   68 mmHg   Mean Arterial Pressure :   89 mmHg   Peripheral Pulse Rate :   74 bpm   BP Site :   Right arm   BP Method :   Manual   HR Method :   Electronic   Temperature Tympanic :   97.4 DegF(Converted to: 36.3 DegC)  (LOW)    Oxygen Saturation :   98 %   Race :      Languages :   English   Leora Higgins LPN 2019 9:35 AM CST   Health Status   Allergies Verified? :   Yes   Medication History Verified? :   Yes   Medical History Verified? :   Yes   Pre-Visit Planning Status :   Completed   Tobacco Use? :   Never smoker   Leora Higgins LPN 2019 9:35 AM CST   Demographics   Last Name :   [place]   Address :   [address] [place] [address]   First Name :   Rebecca Keyes Initial :   J   Responsible Party Date of Birth () :   11/10/1929 CST   City :   [place]   State :   [place]   Zip Code :   [address]   Leora olivares LPN 2019 9:35 AM CST   Providers Grid   Provider Name :    Shraddha Lucas        Provider Specialty :    eye   Dental   Heart          Leora Higgins LPN 2019 9:35 AM CST  Leora Higgins LPN 2019 9:35 AM CST  Leora Higgins LPN 2019 9:35 AM CST       Ancillary Services Grid   Name :    [facility]              Type of Service :    Pharmacy                Leora Higgins LPN 2019 9:35 AM CST         Consents   Consent for Immunization Exchange :   Consent Granted   Consent for Immunizations to Providers :   Consent Granted   Leora Higgins LPN 2019 9:35 AM CST   Meds / Allergies   (As Of: 2019 9:42:28 AM CST)   Allergies (Active)   Ancef  Estimated Onset Date:   Unspecified ; Reactions:   Diarrhea ; Created By:   Chloe Bruce; Reaction Status:   Active ;  Category:   Drug ; Substance:   Ancef ; Type:   Allergy ; Updated By:   Chloe Bruce; Reviewed Date:   2/7/2019 9:39 AM CST      Calan  Estimated Onset Date:   Unspecified ; Created By:   Vaishali Cortés; Reaction Status:   Active ; Substance:   Calan ; Updated By:   Vaishali Cortés; Source:   Paper Chart ; Reviewed Date:   2/7/2019 9:39 AM CST      ciprofloxacin  Estimated Onset Date:   Unspecified ; Created By:   Cherise Christiansen MA; Reaction Status:   Active ; Category:   Drug ; Substance:   ciprofloxacin ; Type:   Allergy ; Updated By:   Cherise Christiansen MA; Reviewed Date:   2/7/2019 9:39 AM CST      phenobarbital  Estimated Onset Date:   Unspecified ; Reactions:   tremors ; Created By:   Chloe Bruce; Reaction Status:   Active ; Category:   Drug ; Substance:   phenobarbital ; Type:   Allergy ; Updated By:   Chloe Bruce; Source:   Paper Chart ; Reviewed Date:   2/7/2019 9:39 AM CST      probiotic  Estimated Onset Date:   Unspecified ; Reactions:   Rash.. ; Created By:   Sari Turner CMA; Reaction Status:   Active ; Category:   Drug ; Substance:   probiotic ; Type:   Allergy ; Updated By:   Sari Turner CMA; Reviewed Date:   2/7/2019 9:39 AM CST      simvastatin  Estimated Onset Date:   Unspecified ; Reactions:   GI upset ; Created By:   Vaishali Cortés; Reaction Status:   Active ; Substance:   simvastatin ; Type:   Allergy ; Updated By:   Vaishali Cortés; Source:   Paper Chart ; Reviewed Date:   2/7/2019 9:39 AM CST      Vicodin  Estimated Onset Date:   Unspecified ; Reactions:   rash ; Created By:   Mis Donaldson MA; Reaction Status:   Active ; Category:   Drug ; Substance:   Vicodin ; Type:   Allergy ; Severity:   Moderate ; Updated By:   Mis Donaldson MA; Reviewed Date:   2/7/2019 9:39 AM CST        Medication List   (As Of: 2/7/2019 9:42:28 AM CST)   Prescription/Discharge Order    digoxin  :   digoxin ; Status:   Prescribed ; Ordered As Mnemonic:   digoxin 125 mcg  (0.125 mg) oral tablet ; Simple Display Line:   125 mcg, 1 tab(s), po, daily, 90 tab(s), 3 Refill(s) ; Ordering Provider:   Hilario Whiting MD; Catalog Code:   digoxin ; Order Dt/Tm:   3/22/2018 10:00:36 AM          fluticasone nasal  :   fluticasone nasal ; Status:   Prescribed ; Ordered As Mnemonic:   Flonase 50 mcg/inh nasal spray ; Simple Display Line:   2 spray(s), nasal, daily, 1 EA, 11 Refill(s) ; Ordering Provider:   Hilario Whiting MD; Catalog Code:   fluticasone nasal ; Order Dt/Tm:   3/22/2018 10:00:39 AM          levothyroxine  :   levothyroxine ; Status:   Prescribed ; Ordered As Mnemonic:   levothyroxine 125 mcg (0.125 mg) oral tablet ; Simple Display Line:   125 mcg, 1 tab(s), po, daily, for 90 day(s), 90 tab(s), 3 Refill(s) ; Ordering Provider:   Hilario Whiting MD; Catalog Code:   levothyroxine ; Order Dt/Tm:   3/22/2018 10:00:43 AM          metoprolol  :   metoprolol ; Status:   Prescribed ; Ordered As Mnemonic:   Metoprolol Succinate  mg oral tablet, extended release ; Simple Display Line:   See Instructions, TAKE ONE TABLET BY MOUTH EVERY DAY, 90 tab(s), 3 Refill(s) ; Ordering Provider:   Hilario Whiting MD; Catalog Code:   metoprolol ; Order Dt/Tm:   3/22/2018 10:00:47 AM          pravastatin  :   pravastatin ; Status:   Prescribed ; Ordered As Mnemonic:   pravastatin 10 mg oral tablet ; Simple Display Line:   10 mg, 1 tab(s), PO, Daily, 90 tab(s), 3 Refill(s) ; Ordering Provider:   Hilario Whiting MD; Catalog Code:   pravastatin ; Order Dt/Tm:   3/22/2018 10:00:50 AM          warfarin  :   warfarin ; Status:   Prescribed ; Ordered As Mnemonic:   warfarin 5 mg oral tablet ; Simple Display Line:   5 mg, 1 tab(s), po, daily, for 90 day(s), 90 tab(s), 3 Refill(s) ; Ordering Provider:   Hilario Whiting MD; Catalog Code:   warfarin ; Order Dt/Tm:   3/22/2018 10:00:57 AM

## 2022-02-15 NOTE — PROGRESS NOTES
Patient:   GERHARD HOWE            MRN: [id number]            FIN: [id number]               Age:   89 years     Sex:  Female     :  11/10/1929   Associated Diagnoses:   Acute viral syndrome   Author:   Shimon Nesbitt MD      Visit Information      Date of Service: 2021 07:22 am  Performing Location: [facility]  Encounter#: [id number]      Primary Care Provider (PCP):  Hilario Whiting MD    NPI# [id number]      Referring Provider:  Shimon Nesbitt MD    NPI# [id number]   Visit type:  Telephone Encounter.    Source of history:  Daughter.    Location of patient:  Home  Location of Provider: Work  Call Start Time:   _ 740  Call End Time:    _ 745    Consent for virtual telephone visit obtained.      Chief Complaint   12/3/2021 7:33 AM CST    Verbal consent given for telephone call. Cough that is worsening - keeps up at night. No fevers. Has more of a runny nose than usual.     _      History of Present Illness   Today's visit was conducted via telephone due to the COVID-19 pandemic. Patient's consent to telephone visit was obtained and documented.      Reason for visit:  _  chief complaint and symptoms as noted above confirmed with patient   Patient is had a cough for 3 days it seems to be getting worse.  Keeps her up some at night.  No shortness of breath.  No fever.  Some head congestion.  No known exposures.  She is vaccinated for Covid.         Impression and Plan   Diagnosis     Acute viral syndrome (RTB21-UT B34.9).     Plan:  Patient with 3-day history of congestion and cough we will get her tested for Covid.  Isolation in the meantime discussed with daughter.  If she worsens gets a fever get shortness of breath and confusion she needs to go in and be seen should be tested for Covid this afternoon.  .       Health Status   Allergies:    Allergic Reactions (Selected)  Moderate  Vicodin (Rash)  Severity Not Documented  Ancef (Diarrhea)  Calan (No reactions were  documented)  Ciprofloxacin (No reactions were documented)  Phenobarbital (Tremors)  Probiotic (Rash..)  Simvastatin (Gi upset)   Medications:  (Selected)   Prescriptions  Prescribed  ACCU-CHECK GUIDE LANCETS: ACCU-CHECK GUIDE LANCETS, See Instructions, Instructions: TEST BLOOD SUGAR DAILY, Supply, # 100 EA, 11 Refill(s), Type: Maintenance, Pharmacy: [facility] #[id number], TEST BLOOD SUGAR DAILY, 64, in, 03/17/20 8:54:00 CDT, Height Measured, 164, lb,...  ACCU-CHECK GUIDE TEST STRIPS: ACCU-CHECK GUIDE TEST STRIPS, See Instructions, Instructions: TEST BLOOD SUGAR DAILY, Supply, # 100 EA, 11 Refill(s), Type: Maintenance, Pharmacy: [facility] STORE #[id number], TEST BLOOD SUGAR DAILY, 64, in, 03/17/20 8:54:00 CDT, Height Measured, 164,...  Metoprolol Succinate  mg oral tablet, extended release: = 1 tab(s) ( 100 mg ), Oral, daily, # 90 tab(s), 3 Refill(s), Type: Soft Stop, Pharmacy: [facility], 1 tab(s) Oral daily, 64, in, 08/17/21 14:28:00 CDT, Height Measured, 186.2, lb, 08/17/21 14:05:00 CDT, Weight Measured  SEROquel 25 mg oral tablet: = 1 tab(s) ( 25 mg ), Oral, qhs, # 90 tab(s), 3 Refill(s), Type: Maintenance, Pharmacy: [facility], 1 tab(s) Oral qhs, 64, in, 08/17/21 14:28:00 CDT, Height Measured, 186.2, lb, 08/17/21 14:05:00 CDT, Weight Measured  citalopram 10 mg oral tablet: = 1 tab(s) ( 10 mg ), Oral, daily, # 90 tab(s), 3 Refill(s), Type: Maintenance, Pharmacy: [facility], 1 tab(s) Oral daily, 64, in, 08/17/21 14:28:00 CDT, Height Measured, 186.2, lb, 08/17/21 14:05:00 CDT, Weight Measured  levothyroxine 137 mcg (0.137 mg) oral capsule: = 1 cap(s) ( 137 mcg ), Oral, daily, # 90 cap(s), 3 Refill(s), Type: Maintenance, Pharmacy: [facility], 1 cap(s) Oral daily, 64, in, 08/17/21 14:28:00 CDT, Height Measured, 186.2, lb, 08/17/21 14:05:00 CDT, Weight Measured  warfarin 5 mg oral tablet: See Instructions,  Instructions: 7.5mg Sun/Tues/Thurs and 5mg rest of days, # 120 EA, 3 Refill(s), Type: Maintenance, Pharmacy: [facility], 7.5mg Sun/Tues/Thurs and 5mg rest of days, 64, in, 08/17/21 14:28:00 CDT, Height Measur...  Documented Medications  Documented  Calcium with vitamin D: Calcium with vitamin D, 1 tab, Oral, daily, 0 Refill(s), Type: Maintenance  Daily Multiple Vitamins: 1 tab(s), Oral, daily, 0 Refill(s), Type: Maintenance  Fish Oil: 1 tab, Oral, daily, 0 Refill(s), Type: Maintenance  PreserVision: 1 cap(s), Oral, bid, 0 Refill(s), Type: Maintenance  Vitamin C: ( 500 mg ), Oral, daily, 0 Refill(s), Type: Maintenance  Vitamin D3: ( 2,000 International Unit ), Oral, daily, 0 Refill(s), Type: Maintenance  acetaminophen: 0 Refill(s), Type: Maintenance   Problem list:    All Problems  Type 2 diabetes mellitus with other circulatory complications / SNOMED CT 755236557 / Confirmed  Osteoarthritis / SNOMED CT 9102891173 / Confirmed  Dyslipidemia, goal LDL below 100 / SNOMED CT 61390802 / Confirmed  Paroxysmal atrial fibrillation / SNOMED CT 830830981 / Confirmed  Hypothyroidism (acquired) / SNOMED CT 218216130 / Confirmed  Allergic rhinitis / SNOMED CT 345509950 / Confirmed  Obese / SNOMED CT 3990625216 / Probable  Benign positional vertigo / SNOMED CT 750204160 / Confirmed  Statin intolerance / SNOMED CT 98491179 / Confirmed  Long term (current) use of anticoagulants / SNOMED CT 312998725 / Confirmed  Bilateral hearing loss / SNOMED CT 495747911 / Confirmed  Tinnitus / SNOMED CT 652894326 / Confirmed  S/P placement of cardiac pacemaker / SNOMED CT 218077140 / Confirmed  History of mitral valve insufficiency / SNOMED CT 239977136 / Confirmed  White coat syndrome with hypertension / SNOMED CT 3978030203 / Confirmed  Symptomatic varicose veins / SNOMED CT 948123744 / Confirmed  Carotid artery plaque / SNOMED CT 572529969 / Confirmed  Seborrheic dermatitis / SNOMED CT 85146987 / Confirmed  Frailty /  SNOMED CT 875842872 / Confirmed  Diabetic peripheral neuropathy / SNOMED CT 4862438345 / Confirmed  Seborrheic dermatitis of scalp / SNOMED CT 637239746 / Confirmed  Anticoagulated / SNOMED CT 584478893 / Confirmed  Chronic dementia with behavioral disturbance / SNOMED CT 4836644379 / Confirmed  Anxiety / SNOMED CT 57033465 / Confirmed      Histories   Past Medical History:    Active  Type 2 diabetes mellitus with other circulatory complications (656691559): Onset on 1999 at 69 years.  Osteoarthritis (2456268523)  Dyslipidemia, goal LDL below 100 (50684941)  Paroxysmal atrial fibrillation (035693083)  Hypothyroidism (acquired) (878002031)  Allergic rhinitis (199608838)  Obese (5703838532)  Benign positional vertigo (625999267)  Statin intolerance (61566899)  Long term (current) use of anticoagulants (311063550)  Bilateral hearing loss (516544234)  Tinnitus (404036117)  S/P placement of cardiac pacemaker (269841309)  White coat syndrome with hypertension (3947058315)  Resolved  Inpatient stay (203379227): Onset on 10/8/2020 at 89 years.  Resolved on 10/9/2020 at 89 years.  Comments:  10/12/2020 CDT 1:56 PM CDT - Lucy Dawson  [facility] [place], [place] - Benign paroxysmal positional vertigo.  Inpatient stay (207136756): Onset on 2019 at 89 years.  Resolved on 2019 at 89 years.  Comments:  2019 CDT 6:55 AM CDT - Leonarda Padilla  @[place], [place] - Postoperative surgical complication involving subcutaneous tissue  Inpatient stay (460912615): Onset on 2012 at 83 years.  Resolved.  Comments:  2019 CDT 6:54 AM CDT - Leonarda Padilla  @[facility] [place] [place] - Vertigo  Inpatient stay (989540976): Onset on 2010 at 80 years.  Resolved.  Comments:  2019 CDT 6:53 AM CDT - Leonarda Padilla  @[facility] [place] [place] - Atrial fibrillation with an episode of hypotension   Family History:    Leukemia  Father ()  Gout  Brother  Mother ()  Diabetes  mellitus  Mother ()  Hypertension  Mother ()  Parkinson's disease  Sister  Valvular heart disease  Brother ()  MS - Multiple sclerosis  Sister  CABG - Coronary artery bypass graft  Mother ()  MI - Myocardial infarction  Brother ()  CAD - Coronary artery disease  Father ()  Mother ()     Procedure history:    Replacement of pacemaker pulse generator (SNOMED CT 3742141) on 2019 at 89 Years.  Comments:  2019 10:01 AM CDT - Aida Contreras  Implanted left pectoral.   Medtronic [id number]  [id number]  Phacoemulsification of cataract with intraocular lens implantation (SNOMED CT 9455334958) performed by Andrew Llanes MD on 3/27/2018 at 88 Years.  Comments:  2018 9:31 AM CDT - Samir Lucy  Left.  Colonoscopy (SNOMED CT 161613175) on 2012 at 82 Years.  Comments:  2012 8:59 AM CDT - Hilario Whiting MD  [facility] performed by Nba Alcaraz MD on 2012 at 82 Years.  Hysteroscopy, D&C, polypectomy performed by Nba Alcaraz MD on 2011 at 81 Years.  Implantation of heart pacemaker (SNOMED CT 9191311686) performed by Dr. Lewis Mejia in  at 80 Years.  Replacement of right knee joint (SNOMED CT 5610797025) in the month of 2008 at 78 Years.  Replacement of left knee joint (SNOMED CT 9955937296) on 12/10/2007 at 78 Years.  Flexible sigmoidoscopy (SNOMED CT 60481747) performed by Hilario Whiting MD on 2006 at 76 Years.  Cholecystectomy (SNOMED CT 98030472) in 195 at 22 Years.  Repair of umbilical hernia (SNOMED CT 30183369) in 1949 at 20 Years.  Appendectomy (SNOMED CT 667777329) in 1947 at 18 Years.  Plastic repair of rotator cuff of shoulder (SNOMED CT 189639351).  Pregnancy (SNOMED CT 727938377).  Comments:  2010 9:08 AM CDT - Vaishali Cortés  full term X 3  Extracapsular cataract extraction and insertion of intraocular lens (SNOMED CT 899549687) performed by Andrew Llanes MD.  Comments:  2018 1:14  PM CDT - Lucy Dawson.   Social History:        Electronic Cigarette/Vaping Assessment            Electronic Cigarette Use: Never.      Alcohol Assessment: Denies Alcohol Use            Never      Tobacco Assessment: Denies Tobacco Use            Never (less than 100 in lifetime)      Substance Abuse Assessment: Denies Substance Abuse      Employment and Education Assessment            Retired      Home and Environment Assessment            Marital status: .

## 2022-02-15 NOTE — NURSING NOTE
Anticoagulation Therapy Entered On:  1/10/2020 3:23 PM CST    Performed On:  1/10/2020 3:21 PM CST by Ava Velasquez RN               Warfarin Management   Week 1 Sunday Dose :   5    Week 1 Monday Dose :   5    Week 1 Tuesday Dose :   5    Week 1 Wednesday Dose :   5    Week 1 Thursday Dose :   5    Week 1 Friday Dose :   7.5    Week 1 Saturday Dose :   5    Week 1 Dose Total :   37.5    Week 2 Sunday Dose :   5    Week 2 Monday Dose :   5    Week 2 Tuesday Dose :   5    Week 2 Wednesday Dose :   5    Week 2 Thursday Dose :   5    Week 2 Friday Dose :   7.5    Week 2 Saturday Dose :   5    Week 2 Dose Total :   37.5    Planned Duration :   Indefinite   Indication :   Atrial fibrillation   Warfarin Management Comments :   Lab test performed by:  [facility] Office  [address] [address]  Phone # [phone]  Fax# [phone]  Capillary   International Normalization Ratio :   2.2    INR Range :   2.0 - 3.0   INR Therapeutic Range :   Yes   Ava Velasquez RN - 1/10/2020 3:21 PM CST   Warfarin Management and Results Grid   Signs of Thrombolic :   No   Signs of Warfarin Intolerance :   No   Changes in Diet or Alcohol Intake :   No   Changes in Medication or Antibiotics :   No   Unusual Bleeding or Bruising :   No   Rectal Bleeding or Black Stools :   No   Vitamin K Food Handout :   No   Heart Valve Replacement :   No   Ava Velasquez RN - 1/10/2020 3:21 PM CST   Anticoagulation Recheck :   4 weeks   Warfarin Special Instructions :   Per protocol continue warfarin 7.5 mg Fri and 5 mg ROW; recheck INR in 4 weeks; pt and daughter notified in office.   Ava Velasquez RN - 1/10/2020 3:21 PM CST

## 2022-02-15 NOTE — NURSING NOTE
Vital Signs Entered On:  2/8/2021 10:13 AM CST    Performed On:  2/8/2021 10:13 AM CST by Madiha Britt RN               Vital Signs   Systolic Blood Pressure :   144 mmHg (HI)    Diastolic Blood Pressure :   92 mmHg (HI)    Mean Arterial Pressure :   109 mmHg   BP Site :   Right arm   Madiha Britt RN - 2/8/2021 10:13 AM CST

## 2022-02-15 NOTE — TELEPHONE ENCOUNTER
---------------------  From: Karen Wang (Phone Messages Pool ([id number]_[facility]))   To: [facility] Message Pool ([id number]_[place] [facility] [place]);     Sent: 8/15/2019 1:38:43 PM CDT  Subject: CT order     Phone Message    PCP:   NINFA    Time of Call:  1:23pm        Person Calling:  Collette daughter  Phone number:  [phone]  Returned call at:   Last office visit and reason: spinal stenosis 7/25/19    Note: Daughter asking that an order be placed for CT scan of right hip per chiropractor. Daughter is asking that no appt be needed as they have talked with [facility] about this in the past.---------------------  From: Shannan/Yumiko PENA ([employer] Message Pool ([id number]_[place]))   To: Hilario Whiting MD;     Sent: 8/15/2019 1:47:56 PM CDT  Subject: FW: CT order---------------------  From: Hilario Whiting MD   To: [employer] Message Pool ([id number]_[place] [facility] [place]);     Sent: 8/15/2019 2:35:58 PM CDT  Subject: RE: CT order     donePaper given to referrals.

## 2022-02-15 NOTE — NURSING NOTE
Anticoagulation Therapy Entered On:  3/29/2019 9:56 AM CDT    Performed On:  3/29/2019 9:55 AM CDT by Ava Velasquez RN               Warfarin Management   Week 1 Sunday Dose :   5    Week 1 Monday Dose :   5    Week 1 Tuesday Dose :   5    Week 1 Wednesday Dose :   5    Week 1 Thursday Dose :   5    Week 1 Friday Dose :   7.5    Week 1 Saturday Dose :   5    Week 1 Dose Total :   37.5    Week 2 Sunday Dose :   5    Week 2 Monday Dose :   5    Week 2 Tuesday Dose :   5    Week 2 Wednesday Dose :   5    Week 2 Thursday Dose :   5    Week 2 Friday Dose :   7.5    Week 2 Saturday Dose :   5    Week 2 Dose Total :   37.5    Planned Duration :   Indefinite   Indication :   Atrial fibrillation   Warfarin Management Comments :    Lab Test performed by:  [facility]  [address] [place] [address]   Phone # [phone]  Fax # [phone]   INR Range :   2.0 - 3.0   INR Therapeutic Range :   Yes   Ava Velasquez RN - 3/29/2019 9:55 AM CDT   Warfarin Management and Results Grid   Signs of Thrombolic :   No   Signs of Warfarin Intolerance :   No   Changes in Diet or Alcohol Intake :   No   Changes in Medication or Antibiotics :   No   Unusual Bleeding or Bruising :   No   Rectal Bleeding or Black Stools :   No   Vitamin K Food Handout :   No   Heart Valve Replacement :   No   Ava Velasquez RN - 3/29/2019 9:55 AM CDT   Anticoagulation Recheck :   4 weeks   Warfarin Special Instructions :   [facility] venous INR on 3/28/19 = 2.0; per protocol continue warfarin 7.5 mg Fridays and 5 mg all other days; recheck 4 weeks; pt notified by phone on 3/29/19   Ava Velasquez RN - 3/29/2019 9:55 AM CDT

## 2022-02-15 NOTE — PROGRESS NOTES
Patient:   GERHARD HOWE            MRN: [id number]            FIN: [id number]               Age:   87 years     Sex:  Female     :  11/10/1929   Associated Diagnoses:   Diabetes Mellitus Type II, Controlled; Dyslipidemia, goal LDL below 100   Author:   Ashley Sawyer      Visit Information   Visit type:  Annual Diabetes Self Management Education - last seen by RD 16.    Referral source:  Hilario Whiting MD.       Chief Complaint   DM Type II controlled, Dyslipidemia (LDL goal <100)      History of Present Illness   Discussed nutrition, diabetes, and lifestyle management with pt.    Nutrition:  Pt is eating most evening meals at her daughters and has guests over frequently.  Working on increasing fiber with whole grain bread.  Tries to eat fresh and organic foods.  Enjoys tuna, egg, whole grain, fruits, and some vegetables.  Minimal sweets - one small piece of chocolate/ day 2gm CHO  Physical Activity:  no set routine, worries about going into a fib   Stress:   low  Sleep: good   Support: family   BG Testing: am testing 14 day avg out of 16 readings is 138mg/dL stable (did not test 2 hr pp as recommended)    DM related medication: none at this time, pt would like to avoid medication and evaluate further after next A1C   Routine diabetes care: eye exam yearly and dentist exam 2x/ year, influenza vaccine q fall,  feet examined with MD, no open elayne/ skin issues   Estimated Average Glucose (eAG) 160 mg/dL with A1C of 7.2      Review of Systems            Health Status   Allergies:    Allergic Reactions (Selected)  Moderate  Vicodin (Rash)  Severity Not Documented  Calan (No reactions were documented)  Phenobarbital (No reactions were documented)  Probiotic (Rash..)  Simvastatin (Gi upset)   Medications:  (Selected)   Prescriptions  Prescribed  atenolol 50 mg oral tablet: 1 tab(s) ( 50 mg ), po, daily, # 90 tab(s), 3 Refill(s), Type: Maintenance, Pharmacy: [facility], 1 tab(s) po daily,x90 day(s)  digoxin 125  mcg (0.125 mg) oral tablet: 1 tab(s) ( 125 mcg ), po, daily, # 90 tab(s), 3 Refill(s), Type: Maintenance, Pharmacy: [facility], 1 tab(s) po daily  levothyroxine 125 mcg (0.125 mg) oral tablet: See Instructions, Instructions: TAKE ONE TABLET BY MOUTH ONCE DAILY, # 90 unknown unit, Type: Soft Stop, Pharmacy: [facility], TAKE ONE TABLET BY MOUTH ONCE DAILY  pravastatin 10 mg oral tablet: 1 tab(s) ( 10 mg ), PO, Daily, # 90 tab(s), 3 Refill(s), Type: Maintenance, Pharmacy: [facility], 1 tab(s) po daily  warfarin 5 mg oral tablet: See Instructions, Instructions: TAKE ONE TABLET BY MOUTH ONCE DAILY, # 90 unknown unit, Type: Soft Stop, Pharmacy: [facility], TAKE ONE TABLET BY MOUTH ONCE DAILY  Suspended  Flonase 50 mcg/inh nasal spray: 2 spray(s), nasal, daily, # 1 EA, 11 Refill(s), Type: Maintenance, Pharmacy: [facility], 2 spray(s) nasal daily   Problem list:    All Problems  Hypothyroidism (acquired) / SNOMED CT 288033910 / Confirmed  Allergic Rhinitis / ICD-9-.9 / Confirmed  Benign Positional Vertigo / ICD-9-.11 / Confirmed  Bilateral Hearing Loss / ICD-9-.9 / Confirmed  Carotid artery plaque / SNOMED CT 278210021 / Confirmed  Diabetes Mellitus Type II, Controlled / ICD-9-.00 / Confirmed  Diabetic Neuropathy, Type II Diabetes Mellitus / ICD-9-.60 / Confirmed  Dyslipidemia, goal LDL below 100 / ICD-9-.4 / Confirmed  White coat hypertension / SNOMED CT 3763606130 / Confirmed  History of mitral valve insufficiency / SNOMED CT 051259711 / Confirmed  HTN, goal below 140/90 / ICD-9-.9 / Confirmed  Long-Term (Current) Use of Anticoagulants, INR Goal 2.0-3.0 / ICD-9-CM V58.61 / Confirmed  Obese / ICD-9-.00 / Probable  Osteoarthritis / ICD-9-.90 / Confirmed  Paroxysmal atrial fibrillation / SNOMED CT 113291752 / Confirmed  Rhinitis, chronic / ICD-9-.0 / Confirmed  S/P placement of cardiac pacemaker / ICD-9-CM V45.01 / Confirmed  Seborrheic dermatitis / SNOMED  CT 85976644 / Confirmed  Statin intolerance / ICD-9-.27 / Confirmed  Tinnitus / ICD-9-.30 / Confirmed  Symptomatic varicose veins / SNOMED CT 711742780 / Confirmed  White Coat Hypertension / ICD-9-.2 / Confirmed  Resolved: *Hospitalized@[facility] - Atrial fibrillation with an episode of hypotension  Resolved: *Hospitalized@[facility] - Vertigo  Canceled: Hypertension / SNOMED CT 0596641733      Histories   Past Medical History:    Active  Diabetes Mellitus Type II, Controlled (250.00): Onset on 1999 at 69 years.  Osteoarthritis (715.90)  Dyslipidemia, goal LDL below 100 (272.4)  Paroxysmal atrial fibrillation (615046706)  Diabetic Neuropathy, Type II Diabetes Mellitus (250.60)  Hypothyroidism (acquired) (162456147)  White Coat Hypertension (796.2)  Allergic Rhinitis (477.9)  Obese (278.00)  Benign Positional Vertigo (386.11)  Statin intolerance (995.27)  White coat hypertension (3086168533)  HTN, goal below 140/90 (401.9)  Resolved  *Hospitalized@[facility] - Vertigo: Onset on 2012 at 83 years.  Resolved.  *Hospitalized@[facility] - Atrial fibrillation with an episode of hypotension: Onset on 2010 at 80 years.  Resolved.   Family History:    Leukemia  Father ()  Gout  Brother  Mother ()  Diabetes mellitus  Mother ()  CA - Lung cancer  Spouse ()  Hypertension  Mother ()  Parkinson's disease  Sister  Valvular heart disease  Brother ()  MS - Multiple sclerosis  Sister  CABG - Coronary artery bypass graft  Mother ()  MI - Myocardial infarction  Brother ()  CAD - Coronary artery disease  Father ()  Mother ()     Procedure history:    Colonoscopy (SNOMED CT 368557571) on 2012 at 82 Years.  Comments:  2012 8:59 AM - Hilario Whiting MD-BSO performed by Nba Alcaraz MD on 2012 at 82 Years.  Hysteroscopy, D&C, polypectomy performed by Nba Alcaraz MD on 2011 at 81 Years.  Implantation of heart  pacemaker (SNOMED CT 0037658737) in 2009 at 80 Years.  Replacement of right knee joint (SNOMED CT 5533449056) in the month of 11/2008 at 78 Years.  Replacement of left knee joint (SNOMED CT 6540959402) on 12/10/2007 at 78 Years.  Flexible sigmoidoscopy (SNOMED CT 39569064) performed by Hilario Whiting MD on 6/13/2006 at 76 Years.  Cholecystectomy (SNOMED CT 32320306) in 1951 at 22 Years.  Repair of umbilical hernia (SNOMED CT 86350472) in 1949 at 20 Years.  Appendectomy (SNOMED CT 971754537) in 1947 at 18 Years.  Plastic repair of rotator cuff of shoulder (SNOMED CT 015859850).  Pregnancy (SNOMED CT 503636382).  Comments:  4/6/2010 9:08 AM - Vaishali Cortés  full term X 3   Social History:        Alcohol Assessment: Denies Alcohol Use      Tobacco Assessment: Denies Tobacco Use      Employment and Education Assessment            Retired      Home and Environment Assessment            Marital status: .        Physical Examination   VS/Measurements      Health Maintenance      Recommendations     Pending (in the next year)        OverDue           DM - Foot Exam due  07/03/15  and every 1  year(s)           DM - HgbA1c due  02/21/17  and every 3  month(s)        Due            DM - Communication with Managing Provider due  03/25/17  and every 1  year(s)           Fall Risk Screen (Female) due  03/25/17  and every 1  year(s)           Osteoporosis Screen due  03/25/17  and every 2  year(s)        Near Due            Depression Screen (Female) near due  04/26/17  and every 1  year(s)        Due In Future            DM - Eye Exam not due until  10/05/17  and every 1  year(s)           DM - Microalbumin not due until  11/21/17  and every 1  year(s)           Type 2 Diabetes Mellitus Screen (Female) not due until  11/21/17  and every 1  year(s)           Influenza Vaccine not due until  01/24/18  and every 1  year(s)           Lipid Disorders Screen (Female) not due until  03/17/18  and every 1  year(s)            Tobacco Use Screen (Female) not due until  03/21/18  and every 1  year(s)           High Blood Pressure Screen (Female) not due until  03/21/18  and every 1  year(s)           Body Mass Index Check (Female) not due until  03/23/18  and every 1  year(s)           Obesity Screen and Counseling (Female) not due until  03/23/18  and every 1  year(s)     Satisfied (in the past 1 year)        Satisfied            Body Mass Index Check (Female) on  03/23/17.           Body Mass Index Check (Female) on  03/21/17.           Body Mass Index Check (Female) on  01/24/17.           Body Mass Index Check (Female) on  08/11/16.           Body Mass Index Check (Female) on  05/12/16.           Body Mass Index Check (Female) on  04/26/16.           Body Mass Index Check (Female) on  04/22/16.           Body Mass Index Check (Female) on  04/07/16.           DM - Eye Exam on  10/05/16.           DM - HgbA1c on  11/21/16.           DM - HgbA1c on  08/22/16.           DM - HgbA1c on  04/18/16.           DM - Microalbumin on  11/21/16.           DM - Microalbumin on  11/21/16.           DM - Microalbumin on  08/23/16.           DM - Microalbumin on  08/23/16.           Depression Screen (Female) on  04/26/16.           Depression Screen (Female) on  04/26/16.           High Blood Pressure Screen (Female) on  03/21/17.           High Blood Pressure Screen (Female) on  03/21/17.           High Blood Pressure Screen (Female) on  02/17/17.           High Blood Pressure Screen (Female) on  01/24/17.           High Blood Pressure Screen (Female) on  01/23/17.           High Blood Pressure Screen (Female) on  10/24/16.           High Blood Pressure Screen (Female) on  09/20/16.           High Blood Pressure Screen (Female) on  08/11/16.           High Blood Pressure Screen (Female) on  07/21/16.           High Blood Pressure Screen (Female) on  06/09/16.           High Blood Pressure Screen (Female) on  05/12/16.           High Blood  Pressure Screen (Female) on  05/11/16.           High Blood Pressure Screen (Female) on  05/11/16.           High Blood Pressure Screen (Female) on  04/26/16.           High Blood Pressure Screen (Female) on  04/22/16.           High Blood Pressure Screen (Female) on  04/22/16.           Influenza Vaccine on  01/24/17.           Lipid Disorders Screen (Female) on  03/17/17.           Lipid Disorders Screen (Female) on  03/17/17.           Lipid Disorders Screen (Female) on  03/17/17.           Lipid Disorders Screen (Female) on  03/17/17.           Obesity Screen and Counseling (Female) on  03/23/17.           Obesity Screen and Counseling (Female) on  03/21/17.           Obesity Screen and Counseling (Female) on  01/24/17.           Obesity Screen and Counseling (Female) on  08/11/16.           Obesity Screen and Counseling (Female) on  05/12/16.           Obesity Screen and Counseling (Female) on  04/26/16.           Obesity Screen and Counseling (Female) on  04/22/16.           Obesity Screen and Counseling (Female) on  04/07/16.           Tobacco Use Screen (Female) on  03/21/17.           Tobacco Use Screen (Female) on  01/24/17.           Tobacco Use Screen (Female) on  05/12/16.           Tobacco Use Screen (Female) on  04/26/16.           Tobacco Use Screen (Female) on  04/22/16.           Type 2 Diabetes Mellitus Screen (Female) on  11/21/16.           Type 2 Diabetes Mellitus Screen (Female) on  08/22/16.           Type 2 Diabetes Mellitus Screen (Female) on  04/18/16.          Review / Management   Results review:  Lab results   3/17/2017 11:59 AM CDT Cholesterol 195 mg/dL    Non-    HDL 46 mg/dL    Chol/HDL Ratio 4.2        Triglyceride 137 mg/dL    TSH 1.46 mIU/L    WBC 4.4    RBC 4.53    Hgb 13.2 gm/dL    Hct 39.7 %    MCV 87.6 fL    MCH 29.1 pg    MCHC 33.2 gm/dL    RDW 12.8 %    Platelet 167    MPV 8.9 fL    PT 24.7  HI    INR 2.5  HI   2/17/2017 11:07 AM CST INR 2.7   1/23/2017 11:13  AM CST INR 2.6   12/20/2016 8:23 AM CST INR 2.0   .       Impression and Plan   Diagnosis     Diabetes Mellitus Type II, Controlled (FEQ07-YD E11.9).     Dyslipidemia, goal LDL below 100 (EXK70-JH E78.5).       Counseled: Patient,  Review of instruction on AADE7 Self Care Behaviors;   Healthy Eating - Reviewed carbohydrate controlled meal plan.  Diabetic plate method as a general rule, basic reading food labels, appropriate portion sizes, well balanced meals.  Patient is provided with numerous ideas to incorporate dietary recommendations, meal planning and preperation.  Education on dietary sources of omega 3 FA, soluble fiber 10gm/d ay to help reduce LDL cholesterol.    Being Active - Education on how exercise helps with : encouraged 5 min walk q hr x 6 hrs    - lowering BG by increasing the muscles ability to take up and use glucose   - weight loss   - healthier heart (improve lipid profile)   - improve sleep, mood, energy   - decrease stress  Monitoring - Reviewed recommended testing schedule and blood glucose target levels.  Again encouraged to test pre/ 2 hr pp the largest meal of the day a couple times/ week for pt to understand how intake significantly affects glucose readings.    Taking Medication - Discussed potential need to add medications and pt does not want medication.  Pt is willing to continue to adjust diet and lifestyle.    Problem Solving -  medical support team assistance, resources provided (written); good control of stress  Healthy Coping - support of friends, family, and medical support team   Reducing Risks - Recommend [facility], adequate sleep, importance of controlling BG to prevent developing complications related to uncontrolled DM including nephropathy, neuropathy, retinopathy, and cardiovascular disease.  Discussed importance of proper skin, dental, foot and eye care.      Goals:   1.  Practice healthy stress management and get good quality sleep with the goal of 7-8  hours per night.    2.   Physical activity: Recommend increasing chair exercises, walking in place, stay active throughout the day  3.  Eat in a healthy way, per diabetic plate method.  Nutrition reference: Eat 3 meals a day; snacks are optional.  A meal is three or more food groups; make it colorful for better nutrition.  Incorporate TLC dietary heart healthy guidelines.  **continue to increasing fiber in diet, more vegeterian meals, fish 2+x/ week)  4.  Read handouts provided.  F/u 4-6 month  5.  Pt to monitor BG BID on 2 - 3 days/ week.  BG goals: Fasting and before meals 80 - 130 mg/dL, 2 hrs after the start of a meal 80 - 160 mg/dL.           Professional Services   Time spent with pt 60 min   champ OCASIO

## 2022-02-15 NOTE — PROGRESS NOTES
Chief Complaint    right hip painful, no injury  History of Present Illness      Patient complains of 1 week of right hip pain.  No recent falls.  She is using her walker.  Discomfort is worse with weightbearing and walking.  She walks with a limp.  She had a similar episode in May 2018 which resolved spontaneously.  She saw her chiropractor the day after it started and the treatment improve the situation for a couple of days but then it recurred as bad as ever.  No back pain or leg weakness.  Review of Systems      No fever, chills, history of cancer, chest pain, dyspnea, hypoglycemia.  Physical Exam   Vitals & Measurements    HR: 65(Peripheral)  BP: 135/77     HT: 64 in  WT: 150 lb  BMI: 25.74       Patient appears comfortable and in no acute distress.  Alert and oriented.  Chest clear.  Cardiac exam regular.  Trace ankle edema.  Spine is nontender.  Tenderness over the right trochanteric bursa.  No tenderness over the hip joint.  She does walk with a limp but is able to bear weight.  Strength is intact in the legs.  Decreased decreased sensation in both feet.  Assessment/Plan       Acute right hip pain (M25.551)         Trace negative doubt fracture presentation seems more like trochanteric bursitis.  Celebrex daily for 10days PT.         Ordered:          06224 office outpatient visit 25 minutes (Charge), Quantity: 1, Acute right hip pain  Long term (current) use of anticoagulants          XR Hip Min 2 Views Right (Request), Priority: STAT, Acute right hip pain                Long term (current) use of anticoagulants (Z79.01)         No bleeding complications.  INR in therapeutic range yesterday.         Ordered:          22700 prothrombin time (Form/Charge), S/P placement of cardiac pacemaker  Paroxysmal atrial fibrillation  Long term (current) use of anticoagulants          84173 office outpatient visit 25 minutes (Charge), Quantity: 1, Acute right hip pain  Long term (current) use of anticoagulants            Patient Information     Name:GERHARD HOWE      Address:      [address] [place] [address] [address] [place] [id number]     Sex:Female     YOB: 1929     [phone]     Emergency Contact:GENTRY PARK     MRN:[id number]     FIN:[id number]     Location:[facility]     Date of Service:04/23/2019      Primary Care Physician:       Hilario Whiting MD, [phone]      Attending Physician:       Hilario Whiting MD, [phone]  Problem List/Past Medical History    Ongoing     Allergic rhinitis     Benign positional vertigo     Bilateral hearing loss     Carotid artery plaque     Diabetic peripheral neuropathy     Dyslipidemia, goal LDL below 100     Frailty     History of mitral valve insufficiency       Comments: Mild by Echo in 2009     Hypothyroidism (acquired)     Long term (current) use of anticoagulants     Obese     Osteoarthritis     Paroxysmal atrial fibrillation     S/P placement of cardiac pacemaker     Seborrheic dermatitis     Statin intolerance     Symptomatic varicose veins     Tinnitus     Type 2 diabetes mellitus with other circulatory complications     White coat syndrome with hypertension    Historical     *Hospitalized@[facility] - Atrial fibrillation with an episode of hypotension     *Hospitalized@[facility] - Vertigo  Procedure/Surgical History     Phacoemulsification of cataract with intraocular lens implantation (03/27/2018)      Comments: Left..     Colonoscopy (05/23/2012)      Comments: Unremarkable.     LAVH-BSO (04/06/2012)     Hysteroscopy, D&C, polypectomy (11/04/2011)     Implantation of heart pacemaker (2009)     Replacement of right knee joint (11.2008)     Replacement of left knee joint (12/10/2007)     Flexible sigmoidoscopy (06/13/2006)     Cholecystectomy (1951)     Repair of umbilical hernia (1949)     Appendectomy (1947)     Extracapsular cataract extraction and insertion of intraocular lens      Comments: Right..     Plastic repair of rotator cuff  of shoulder     Pregnancy      Comments: full term X 3.  Medications        pravastatin 10 mg oral tablet: 10 mg, 1 tab(s), PO, Daily, 90 tab(s), 3 Refill(s).        digoxin 125 mcg (0.125 mg) oral tablet: 125 mcg, 1 tab(s), po, daily, 90 tab(s), 3 Refill(s).        levothyroxine 125 mcg (0.125 mg) oral tablet: 125 mcg, 1 tab(s), po, daily, for 90 day(s), 90 tab(s), 3 Refill(s).        Metoprolol Succinate  mg oral tablet, extended release: See Instructions, TAKE ONE TABLET BY MOUTH EVERY DAY, 90 tab(s), 3 Refill(s).        warfarin 5 mg oral tablet: See Instructions, 1.5 tabs Fridays and one every other day., 100 EA, 3 Refill(s).        Flonase 50 mcg/inh nasal spray: 2 spray(s), nasal, daily, 1 EA, 11 Refill(s).         Allergies    Vicodin (rash)    Ancef (Diarrhea)    Calan    ciprofloxacin    phenobarbital (tremors)    probiotic (Rash..)    simvastatin (GI upset)  Social History    Smoking Status - 02/07/2019     Never smoker     Alcohol - Denies Alcohol Use, 04/06/2010      Never, 05/09/2017     Employment and Education      Retired, 10/27/2011     Home and Environment      Marital status: ., 10/27/2011     Substance Abuse - Denies Substance Abuse, 08/29/2018     Tobacco - Denies Tobacco Use, 04/06/2010  Family History    CA - Lung cancer: Spouse.    CABG - Coronary artery bypass graft: Mother.    CAD - Coronary artery disease: Mother and Father.    Diabetes mellitus: Mother.    Gout: Mother and Brother.    Hypertension: Mother.    Leukemia: Father.    MI - Myocardial infarction: Brother.    MS - Multiple sclerosis: Sister.    Parkinson's disease: Sister.    Valvular heart disease: Brother.  Immunizations      Vaccine Date Status Comments      influenza virus vaccine, inactivated 10/10/2018 Given      influenza virus vaccine, inactivated 09/01/2017 Given      influenza virus vaccine, inactivated 01/24/2017 Given      ZOS, shingles 11/24/2015 Recorded [11/24/2015] Freemans      influenza virus  vaccine, inactivated 10/07/2015 Given      pneumococcal (PCV13) 03/19/2015 Given      influenza virus vaccine, inactivated 10/09/2014 Given      influenza virus vaccine, inactivated 10/11/2013 Given      influenza virus vaccine, inactivated 10/09/2012 Given      Td 01/11/2012 Given      influenza virus vaccine, inactivated 10/08/2011 Given      influenza virus vaccine, inactivated 10/07/2010 Given      influenza, H1N1, inactivated 12/18/2009 Recorded      pneumococcal (PPSV23) 04/24/2006 Recorded      pneumococcal (PPSV23) 01/08/2001 Recorded  Lab Results          Lab Results (Last 4 results within 90 days)           Sodium Level: 137 mmol/L [135 mmol/L - 146 mmol/L] (03/28/19 09:06:00)          Potassium Level: 4.4 mmol/L [3.5 mmol/L - 5.3 mmol/L] (03/28/19 09:06:00)          Chloride Level: 101 mmol/L [98 mmol/L - 110 mmol/L] (03/28/19 09:06:00)          CO2 Level: 26 mmol/L [20 mmol/L - 32 mmol/L] (03/28/19 09:06:00)          Glucose Level: 141 mg/dL High [65 mg/dL - 99 mg/dL] (03/28/19 09:06:00)          BUN: 25 mg/dL [7 mg/dL - 25 mg/dL] (03/28/19 09:06:00)          Creatinine Level: 0.8 mg/dL [0.6 mg/dL - 0.88 mg/dL] (03/28/19 09:06:00)          BUN/Creat Ratio: NOT APPLICABLE [6  - 22] (03/28/19 09:06:00)          eGFR: 65 mL/min/1.73m2 (03/28/19 09:06:00)          eGFR African American: 76 mL/min/1.73m2 (03/28/19 09:06:00)          Calcium Level: 9.3 mg/dL [8.6 mg/dL - 10.4 mg/dL] (03/28/19 09:06:00)          Hgb A1c: 6.7 High (03/28/19 09:06:00)          Cholesterol: 165 mg/dL (03/28/19 09:06:00)          Non-HDL Cholesterol: 124 (03/28/19 09:06:00)          HDL: 41 mg/dL Low (03/28/19 09:06:00)          Cholesterol/HDL Ratio: 4 (03/28/19 09:06:00)          LDL: 99 (03/28/19 09:06:00)          Triglyceride: 150 mg/dL High (03/28/19 09:06:00)          WBC: 5.9 [3.8  - 10.8] (03/28/19 09:06:00)          RBC: 4.53 [3.8  - 5.1] (03/28/19 09:06:00)          Hgb: 13.4 gm/dL [11.7 gm/dL - 15.5 gm/dL] (03/28/19  09:06:00)          Hct: 39.8 % [35 % - 45 %] (03/28/19 09:06:00)          MCV: 87.9 fL [80 fL - 100 fL] (03/28/19 09:06:00)          MCH: 29.6 pg [27 pg - 33 pg] (03/28/19 09:06:00)          MCHC: 33.7 gm/dL [32 gm/dL - 36 gm/dL] (03/28/19 09:06:00)          RDW: 12 % [11 % - 15 %] (03/28/19 09:06:00)          Platelet: 204 [140  - 400] (03/28/19 09:06:00)          MPV: 11 fL [7.5 fL - 12.5 fL] (03/28/19 09:06:00)          PT: 20.1 High [9  - 11.5] (03/28/19 09:06:00)          INR: 2.4 (04/22/19 14:57:00)          INR: 2 High (03/28/19 09:06:00)          INR: 1.6 (03/08/19 13:47:00)          INR: 1.9 (02/22/19 13:41:00)  Diagnostic Results   X-rays negative

## 2022-02-15 NOTE — PROGRESS NOTES
Patient:   GERHARD HOWE            MRN: [id number]            FIN: [id number]               Age:   87 years     Sex:  Female     :  11/10/1929   Associated Diagnoses:   Allergic Rhinitis; Chronic sinus infection; Epistaxis   Author:   Emil Rosas MD      Visit Information      Date of Service: 2017 03:58 pm  Performing Location: [facility]  Encounter#: [id number]      Primary Care Provider (PCP):  Hilario Whiting MD    NPI# [id number]      Referring Provider:  No referring provider recorded for selected visit.      Chief Complaint   3/28/2017 4:06 PM CDT    f/u sinus pain/pressure/bloody nasal drainage.  Was seen 3/21 and told to hold her warfarin and flonase  x 4days, resumed warfarin 3/26              Additional Information:No additional information recorded during visit.   Chief complaint and symptoms as noted above and confirmed with patient.  Recent lab and diagnostic studies reviewed with patient      History of Present Illness   3/28/2017: Presents with persistent sinus pressure and bloody mucus based nasal discharge.  No kimberly epistaxis.  She did notice that her degree of bloody mucus was better when off of Coumadin.  She did resume Coumadin at Dr. Gan s previous instructions.  She has tried sinus irrigation in the past and has had mixed tolerance.       Review of Systems   Constitutional:  Fatigue, No fever, No chills.    Eye:  Negative except as documented in history of present illness.    Ear/Nose/Mouth/Throat:  Nasal congestion.    Respiratory:  No shortness of breath.    Cardiovascular:  No chest pain, No palpitations, No peripheral edema, No syncope.    Gastrointestinal:  No nausea, No vomiting, No abdominal pain.    Genitourinary:  No dysuria, No hematuria.    Hematology/Lymphatics:  Negative except as documented in history of present illness.    Endocrine:  No excessive thirst, No polyuria.    Immunologic:  No recurrent fevers.    Musculoskeletal:  No joint pain, No muscle  pain.    Neurologic:  Alert and oriented X4, No numbness, No tingling, No headache.       Health Status   Allergies:    Allergic Reactions (Selected)  Moderate  Vicodin (Rash)  Severity Not Documented  Calan (No reactions were documented)  Phenobarbital (No reactions were documented)  Probiotic (Rash..)  Simvastatin (Gi upset)   Medications:  (Selected)   Prescriptions  Prescribed  Amoxil 500 mg oral capsule: 1 cap(s) ( 500 mg ), PO, TID, # 21 cap(s), 0 Refill(s), Type: Maintenance, Pharmacy: [facility], 1 cap(s) po tid,x7 day(s)  atenolol 50 mg oral tablet: 1 tab(s) ( 50 mg ), po, daily, # 90 tab(s), 3 Refill(s), Type: Maintenance, Pharmacy: [facility], 1 tab(s) po daily,x90 day(s)  digoxin 125 mcg (0.125 mg) oral tablet: 1 tab(s) ( 125 mcg ), po, daily, # 90 tab(s), 3 Refill(s), Type: Maintenance, Pharmacy: [facility], 1 tab(s) po daily  levothyroxine 125 mcg (0.125 mg) oral tablet: See Instructions, Instructions: TAKE ONE TABLET BY MOUTH ONCE DAILY, # 90 unknown unit, Type: Soft Stop, Pharmacy: [facility], TAKE ONE TABLET BY MOUTH ONCE DAILY  pravastatin 10 mg oral tablet: 1 tab(s) ( 10 mg ), PO, Daily, # 90 tab(s), 3 Refill(s), Type: Maintenance, Pharmacy: [facility], 1 tab(s) po daily  warfarin 5 mg oral tablet: See Instructions, Instructions: TAKE ONE TABLET BY MOUTH ONCE DAILY, # 90 unknown unit, Type: Soft Stop, Pharmacy: [facility], TAKE ONE TABLET BY MOUTH ONCE DAILY  Suspended  Flonase 50 mcg/inh nasal spray: 2 spray(s), nasal, daily, # 1 EA, 11 Refill(s), Type: Maintenance, Pharmacy: [facility], 2 spray(s) nasal daily   Problem list:    All Problems  Hypothyroidism (acquired) / SNOMED CT 835729920 / Confirmed  Allergic Rhinitis / ICD-9-.9 / Confirmed  Benign Positional Vertigo / ICD-9-.11 / Confirmed  Bilateral Hearing Loss / ICD-9-.9 / Confirmed  Carotid artery plaque / SNOMED CT 828665299 / Confirmed  Diabetes Mellitus Type II, Controlled / ICD-9-.00 /  Confirmed  Diabetic Neuropathy, Type II Diabetes Mellitus / ICD-9-.60 / Confirmed  Dyslipidemia, goal LDL below 100 / ICD-9-.4 / Confirmed  White coat hypertension / SNOMED CT 8553505973 / Confirmed  History of mitral valve insufficiency / SNOMED CT 023910354 / Confirmed  HTN, goal below 140/90 / ICD-9-.9 / Confirmed  Long-Term (Current) Use of Anticoagulants, INR Goal 2.0-3.0 / ICD-9-CM V58.61 / Confirmed  Obese / ICD-9-.00 / Probable  Osteoarthritis / ICD-9-.90 / Confirmed  Paroxysmal atrial fibrillation / SNOMED CT 387433314 / Confirmed  Rhinitis, chronic / ICD-9-.0 / Confirmed  S/P placement of cardiac pacemaker / ICD-9-CM V45.01 / Confirmed  Seborrheic dermatitis / SNOMED CT 47691176 / Confirmed  Statin intolerance / ICD-9-.27 / Confirmed  Tinnitus / ICD-9-.30 / Confirmed  Symptomatic varicose veins / SNOMED CT 597055471 / Confirmed  White Coat Hypertension / ICD-9-.2 / Confirmed  Resolved: *Hospitalized@[facility] - Atrial fibrillation with an episode of hypotension  Resolved: *Hospitalized@[facility] - Vertigo  Canceled: Hypertension / SNOMED CT 6049450405      Histories   Past Medical History:    Active  Diabetes Mellitus Type II, Controlled (250.00): Onset on 1999 at 69 years.  Osteoarthritis (715.90)  Dyslipidemia, goal LDL below 100 (272.4)  Paroxysmal atrial fibrillation (799864584)  Diabetic Neuropathy, Type II Diabetes Mellitus (250.60)  Hypothyroidism (acquired) (756227901)  White Coat Hypertension (796.2)  Allergic Rhinitis (477.9)  Obese (278.00)  Benign Positional Vertigo (386.11)  Statin intolerance (995.27)  White coat hypertension (9319844273)  HTN, goal below 140/90 (401.9)  Resolved  *Hospitalized@[facility] - Vertigo: Onset on 2012 at 83 years.  Resolved.  *Hospitalized@[facility] - Atrial fibrillation with an episode of hypotension: Onset on 2010 at 80 years.  Resolved.   Family History:    Leukemia  Father ()  Gout  Brother  Mother  ()  Diabetes mellitus  Mother ()  CA - Lung cancer  Spouse ()  Hypertension  Mother ()  Parkinson's disease  Sister  Valvular heart disease  Brother ()  MS - Multiple sclerosis  Sister  CABG - Coronary artery bypass graft  Mother ()  MI - Myocardial infarction  Brother ()  CAD - Coronary artery disease  Father ()  Mother ()     Procedure history:    Colonoscopy (242306606) on 2012 at 82 Years.  Comments:  2012 8:59 AM - Henok GANDARA, Hilario KIM-BSO on 2012 at 82 Years.  Hysteroscopy, D&C, polypectomy on 2011 at 81 Years.  Implantation of heart pacemaker (7236413221) in  at 80 Years.  Replacement of right knee joint (7895700317) in the month of 2008 at 78 Years.  Replacement of left knee joint (9965309882) on 12/10/2007 at 78 Years.  Flexible sigmoidoscopy (35101552) on 2006 at 76 Years.  Cholecystectomy (79891662) in 1951 at 22 Years.  Repair of umbilical hernia (60177013) in 1949 at 20 Years.  Appendectomy (531518974) in 1947 at 18 Years.  Plastic repair of rotator cuff of shoulder (659940673).  Pregnancy (692672328).  Comments:  2010 9:08 AM - Vaishali Cortés  full term X 3   Social History:        Alcohol Assessment: Denies Alcohol Use      Tobacco Assessment: Denies Tobacco Use      Employment and Education Assessment            Retired      Home and Environment Assessment            Marital status: .        Physical Examination   vital signs stable, as noted above   Vital Signs   3/28/2017 4:06 PM CDT Temperature Tympanic 98.3 DegF    Peripheral Pulse Rate 80 bpm    Systolic Blood Pressure 184 mmHg  HI    Diastolic Blood Pressure 76 mmHg    Mean Arterial Pressure 112 mmHg    BP Site Right arm    BP Systolic Repeat 158 mmHg    BP Diastolic Repeat 80 mmHg      Measurements from flowsheet : Measurements   3/28/2017 4:06 PM CDT    Weight Measured - Standard                156.6 lb      General:  Alert and oriented, No acute distress.    Eye:  Extraocular movements are intact.    HENT:  Normocephalic, Tympanic membranes are clear, Oral mucosa is moist, No pharyngeal erythema, stigmata of previous bleeding in nares; no visible lesions.    Neck:  Supple, No carotid bruit, No jugular venous distention.    Respiratory:  Lungs are clear to auscultation, Respirations are non-labored, Breath sounds are equal.    Cardiovascular:  Normal rate, No murmur, No edema.    Musculoskeletal:  Normal range of motion.    Neurologic:  Alert, Oriented.    Cognition and Speech:  Oriented, Speech clear and coherent.    Psychiatric:  Appropriate mood & affect.       Review / Management   Results review:  Lab results   3/17/2017 11:59 AM CDT Cholesterol 195 mg/dL    Non-    HDL 46 mg/dL    Chol/HDL Ratio 4.2        Triglyceride 137 mg/dL    TSH 1.46 mIU/L    WBC 4.4    RBC 4.53    Hgb 13.2 gm/dL    Hct 39.7 %    MCV 87.6 fL    MCH 29.1 pg    MCHC 33.2 gm/dL    RDW 12.8 %    Platelet 167    MPV 8.9 fL    PT 24.7  HI    INR 2.5  HI   2/17/2017 11:07 AM CST INR 2.7   1/23/2017 11:13 AM CST INR 2.6   12/20/2016 8:23 AM CST INR 2.0   .       Impression and Plan   Diagnosis     Allergic Rhinitis (VGI62-EX J30.9).     Chronic sinus infection (ZVM35-QJ J32.9).     Epistaxis (RGP72-DG R04.0).       - persistent sinusitis symptoms, worsening bloody mucous while on warfarin  - start on amoxil 500mg TID for 1 week  - temporarily stop warfarin again   - remain off flonase  - advised sinus irrigation; start with saline nasal spray; advance to Neti pot if tolerates  - should see JDL this coming Friday; potentially could have JVT assess if any further complications

## 2022-02-15 NOTE — NURSING NOTE
Quick Intake Entered On:  12/27/2019 11:30 AM CST    Performed On:  12/27/2019 11:30 AM CST by Jennifer Astudillo RN               Summary   Height Measured :   64 in(Converted to: 5 ft 4 in, 162.56 cm)    Systolic Blood Pressure :   148 mmHg (HI)    Diastolic Blood Pressure :   74 mmHg   Mean Arterial Pressure :   99 mmHg   Race :      Languages :   English   Jennifer Astudillo RN - 12/27/2019 11:30 AM CST

## 2022-02-15 NOTE — TELEPHONE ENCOUNTER
GERHARD HOWEKhadijah : 11/10/1929      2019 MRN: [id number]  PHONE NOTE:  Lumbar CT shows degenerative change at multiple levels and confirmed severe spinal stenosis.  Her symptom is leg pain with standing and ambulation relieved by sitting and lying down.  She notes it is better when she is using a walker or grocery cart with a little bit of a bent posture.    P: I really do not think at age 89 there are good options.  I do not think surgery or injection therapy is likely to be helpful.  She wants me to send a letter that she can review with her daughter.    D:  2019  T:  2019                                                Hilario Whiting MD, FACP/sq

## 2022-02-15 NOTE — TELEPHONE ENCOUNTER
---------------------  From: Karen Wang (Phone Messages Pool ([address] [place] [address]))   To: [facility] ([address] [place] [address]);     Sent: 5/7/2020 12:01:45 PM CDT  Subject: Abx     Phone Message    PCP:   NINFA Grossman until Monday    Time of Call:  11:31       Person Calling:  Daughter  Phone number:  [phone]  Returned call at:   Last office visit and reason:  rash 3/3/20    Note: Daughter calling wondering if pt still needs an abx before a dental procedure.---------------------  From: Beatriz Petit ([facility] Provider [facility] ([address] [place] [address]))   To: Phone [facility] ([address] [place] [address]);     Sent: 5/7/2020 2:06:33 PM CDT  Subject: RE: Abx     I sent rx for Amox, take 4 tabs one hour prior to procedureDaughter notified. Daughter wanting to know if pt should stop Warfarin before procedure. Patient is getting 2 teeth pulled. Please advise.---------------------  From: Yumiko Escamilla (Phone Messages Pool ([address] [place] [address]))   To: Beatriz Petit;     Sent: 5/7/2020 2:16:07 PM CDT  Subject: RE: Abx---------------------  From: Beatriz Petit   To: Phone [facility] ([address] [place] [address]);     Sent: 5/7/2020 3:30:41 PM CDT  Subject: RE: Abx     Pt's on Warfarin may continue Warfarin for dental extractions as long as INR has been in desired rangeDaughter notified.

## 2022-02-15 NOTE — RESULTS
Anticoagulation Therapy Entered On:  3/17/2020 8:58 AM CDT    Performed On:  3/17/2020 8:55 AM CDT by Ava Velasquez RN               Warfarin Management   Week 1 Sunday Dose :   7.5    Week 1 Monday Dose :   5    Week 1 Tuesday Dose :   7.5    Week 1 Wednesday Dose :   5    Week 1 Thursday Dose :   7.5    Week 1 Friday Dose :   5    Week 1 Saturday Dose :   5    Week 1 Dose Total :   42.5    Planned Duration :   Indefinite   Indication :   Atrial fibrillation   Warfarin Management Comments :   Lab test performed by:  [facility]  [address] [place] [address]  Phone # [phone]  Fax# [phone]  Capillary   International Normalization Ratio :   1.4    INR Range :   2.0 - 3.0   INR Therapeutic Range :   No   Ava Velasquez RN - 3/17/2020 8:56 AM CDT   Warfarin Management and Results Grid   Signs of Thrombolic :   No   Signs of Warfarin Intolerance :   No   Changes in Diet or Alcohol Intake :   No   Changes in Medication or Antibiotics :   No   Unusual Bleeding or Bruising :   No   Rectal Bleeding or Black Stools :   No   Vitamin K Food Handout :   No   Heart Valve Replacement :   No   Ava Velasquez RN - 3/17/2020 8:56 AM CDT   Anticoagulation Recheck :   1 week   Warfarin Special Instructions :   Per protocol take booster dose of 7.5 mg warfarin today then 7.5 mg Thurs/Sun and 5 mg ROW; recheck INR in 1 week. Pt and daughter notified in office. Given written directions.   Ava Velasquez RN - 3/17/2020 8:56 AM CDT
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
## 2022-02-15 NOTE — NURSING NOTE
Anticoagulation Therapy Management Entered On:  6/17/2021 10:58 AM CDT    Performed On:  6/17/2021 10:55 AM CDT by Madiha Britt RN               Anticoagulation Visit Assessment   Type of Visit - Anticoagulation :   Telephone   Anticoagulation Indication :   Atrial fibrillation   Anticoagulant Duration :   Undetermined   Anticoagulation Medication Verified :   Yes   Patient Preferred Contact Method :   Cell   Madiha Britt RN - 6/17/2021 10:55 AM CDT   Anticoagulation Patient Assessment Grid   Change in Alcohol Consumption :   No   Change in Diet :   No   Change in Medications :   No   Diarrhea :   No   Rectal Bleeding :   No   Signs of Clotting :   No   Signs of Warfarin Intolerance :   No   Unusual Bleeding, Bruising :   No   Upcoming Procedures :   No   Vomiting :   No   Madiha Britt RN - 6/17/2021 10:55 AM CDT   Patient on Warfarin :   Yes   Madiha Britt RN - 6/17/2021 10:55 AM CDT   Warfarin   International Normalization Ratio TR :   3.2    Anticoagulant INR Goal Lower :   2    Anticoagulant INR Goal Upper :   3    Sunday :   7.5 mg   Monday :   5 mg   Tuesday :   7.5 mg   Wednesday :   5 mg   Thursday :   7.5 mg   Friday :   5 mg   Saturday :   5 mg   Total Dose :   42.5 mg   Warfarin Pt Reported Previous Week Dose :    Sun Mon Tues Wed Thurs Fri Sat Weekly Total Dose   Week 1 7.5 mg 5 mg 7.5 mg 5 mg 7.5 mg 5 mg 5 mg 42.5 mg   Week 2  mg  mg  mg  mg  mg  mg  mg  mg   Week 3  mg  mg  mg  mg  mg  mg  mg  mg   Week 4  mg  mg  mg  mg  mg  mg  mg  mg         Patient is taking single or multiple strength tablet(s) :   Single strength tab(s)   One Tab Strength :   5 mg tab   Sunday :   7.5 mg   Monday :   5 mg   Tuesday :   7.5 mg   Wednesday :   5 mg   Thursday :   5 mg   Friday :   5 mg   Saturday :   5 mg   Week 1 Total Dose :   40 mg   Sunday :   1.5 tab(s)   Monday :   1 tab(s)   Tuesday :   1.5 tab(s)   Wednesday :   1 tab(s)   Thursday :   1 tab(s)   Friday :   1 tab(s)   Saturday :   1 tab(s)   Warfarin Dosing  Acknowledgement :   I have reviewed the patient's warfarin dosing schedule and confirmed its accuracy for today's visit.   Patient Instructions :   INR = 3.2 per mdINR. Per protocol, take 5mg Warfarin today then resume 7.5mg Sun/Tues/Thurs and 5mg ROW. Recheck INR in 1 week. Advised daughter by phone.      Lorenza LARA, Madiha - 6/17/2021 10:55 AM CDT

## 2022-02-15 NOTE — NURSING NOTE
Quick Intake Entered On:  1/9/2019 1:48 PM CST    Performed On:  1/9/2019 1:44 PM CST by Ava Velasquez RN               Summary   Chief Complaint :   BP   Systolic Blood Pressure :   162 mmHg (HI)    Diastolic Blood Pressure :   76 mmHg   Mean Arterial Pressure :   105 mmHg   Peripheral Pulse Rate :   76 bpm   BP Site :   Right arm   Pulse Site :   Radial artery   BP Method :   Manual   HR Method :   Manual   Race :      Languages :   English   Ava Velasquez RN - 1/9/2019 1:44 PM CST   More Vitals   Systolic Blood Pressure Repeat :   162 mmHg   Diastolic Blood Pressure Repeat :   76 mmHg   BP Site Repeat :   Right arm   Ava Velasquez RN - 1/9/2019 1:44 PM CST

## 2022-02-15 NOTE — TELEPHONE ENCOUNTER
---------------------  From: Itzel Child LPN   Sent: 7/1/2020 1:45:34 PM CDT  Subject: General Message       Phone Message Template    PCP:   NINFA      Time of Call:  1122       Person Calling:  Collette, daughter  Phone number:  [phone]    Returned call at: spoke to Collette and is fine with making an in clinic appt following her INR appt on the 21st.     Note:   Collette wondering about DM visit. and if they should do it over the phone or come in.   Patient does come into clinic for INR's and also is very [place].     Last office visit and reason:  03/03/20

## 2022-02-15 NOTE — NURSING NOTE
Quick Intake Entered On:  4/22/2019 2:52 PM CDT    Performed On:  4/22/2019 2:52 PM CDT by Jennifer Astudillo RN               Summary   Systolic Blood Pressure :   138 mmHg (HI)    Diastolic Blood Pressure :   70 mmHg   Mean Arterial Pressure :   93 mmHg   Race :      Languages :   English   Jennifer Astudillo RN - 4/22/2019 2:52 PM CDT

## 2022-02-15 NOTE — TELEPHONE ENCOUNTER
---------------------  From: Ava Velasquez RN   Sent: 7/26/2019 9:40:49 AM CDT  Subject: INR reminder     Patient is 14 days past due for INR.  First letter has been printed from [employer] and sent to [facility] for scanning and mailing.

## 2022-02-15 NOTE — PROGRESS NOTES
Chief Complaint    follow up er visit  History of Present Illness      Patient was in the ED May 3 due to hypertension on her home monitor.  Recent cataract surgery.  She fell that day and needed help getting up no significant injuries though she was worried about damaging her pacemaker.  No recurrent falls.  But she is concerned that she was unable to get up.  No bleeding, headache, loss of consciousness, palpitation, chest pain, dyspnea.  Review of Systems      Diabetes controlled.  Known peripheral neuropathy.  No fever or recent illness.  Physical Exam   Vitals & Measurements    HR: 94(Peripheral)  BP: 129/83     HT: 64 in  WT: 150 lb  BMI: 25.74       Eyes appear normal.  HEENT exam atraumatic.  Neck supple no adenopathy thyromegaly.  Chest clear.  Cardiac exam irregular.  Device left upper chest.  Hips and knees nontender.  Minimal lateral chest wall tenderness without ecchymosis.  Gait normal.  No tremor.  Assessment/Plan       Accidental fall (W19.XXXA)         Refer for PT.         Orders:          [id number] office outpatient visit 25 minutes (Charge), Quantity: 1, White coat syndrome with hypertension  Accidental fall  Frailty  S/P placement of cardiac pacemaker  Paroxysmal atrial fibrillation  Long-term (current) use of anticoagulants, INR goal 2.0-3.0  Diabetes mellitus type II, controlled...          Physical Therapy (Request), Instructions: Eval and treat, Accidental fall  Frailty                Carotid artery plaque (I65.29)         On statin and aspirin.  Does not tolerate higher dose statin.         Orders:          [id number] office outpatient visit 25 minutes (Charge), Quantity: 1, White coat syndrome with hypertension  Accidental fall  Frailty  S/P placement of cardiac pacemaker  Paroxysmal atrial fibrillation  Long-term (current) use of anticoagulants, INR goal 2.0-3.0  Diabetes mellitus type II, controlled...                Diabetes mellitus type II, controlled (E11.9)         Controlled          Orders:          [id number] office outpatient visit 25 minutes (Charge), Quantity: 1, White coat syndrome with hypertension  Accidental fall  Frailty  S/P placement of cardiac pacemaker  Paroxysmal atrial fibrillation  Long-term (current) use of anticoagulants, INR goal 2.0-3.0  Diabetes mellitus type II, controlled...                Frailty (R54)         PT referral I emphasized the need for strengthening and physical activity.         Orders:          [id number] office outpatient visit 25 minutes (Charge), Quantity: 1, White coat syndrome with hypertension  Accidental fall  Frailty  S/P placement of cardiac pacemaker  Paroxysmal atrial fibrillation  Long-term (current) use of anticoagulants, INR goal 2.0-3.0  Diabetes mellitus type II, controlled...          Physical Therapy (Request), Instructions: Eval and treat, Accidental fall  Frailty                Long-term (current) use of anticoagulants, INR goal 2.0-3.0 (Z79.01)         INR today.         Orders:          [id number] office outpatient visit 25 minutes (Charge), Quantity: 1, White coat syndrome with hypertension  Accidental fall  Frailty  S/P placement of cardiac pacemaker  Paroxysmal atrial fibrillation  Long-term (current) use of anticoagulants, INR goal 2.0-3.0  Diabetes mellitus type II, controlled...                Paroxysmal atrial fibrillation (I48.0)         Chronic A. fib         Orders:          [id number] office outpatient visit 25 minutes (Charge), Quantity: 1, White coat syndrome with hypertension  Accidental fall  Frailty  S/P placement of cardiac pacemaker  Paroxysmal atrial fibrillation  Long-term (current) use of anticoagulants, INR goal 2.0-3.0  Diabetes mellitus type II, controlled...                S/P placement of cardiac pacemaker (Z95.0)         Orders:          [id number] office outpatient visit 25 minutes (Charge), Quantity: 1, White coat syndrome with hypertension  Accidental fall  Frailty  S/P placement of cardiac  pacemaker  Paroxysmal atrial fibrillation  Long-term (current) use of anticoagulants, INR goal 2.0-3.0  Diabetes mellitus type II, controlled...                White coat syndrome with hypertension (I10)         Her home readings indicate generally well-controlled hypertension.         Orders:          [id number] office outpatient visit 25 minutes (Charge), Quantity: 1, White coat syndrome with hypertension  Accidental fall  Frailty  S/P placement of cardiac pacemaker  Paroxysmal atrial fibrillation  Long-term (current) use of anticoagulants, INR goal 2.0-3.0  Diabetes mellitus type II, controlled...          Return to Clinic (Request), Return in 3 months                Orders:         Return to Clinic (Request), RFV: Diabetes, Return in 6 months, Instructions: aft4er lab  Patient Information     Name:GERHARD HOWE      Address:      [address] [place] [id number]     Sex:Female     YOB: 1929     [phone]     Emergency Contact:GENTRY PARK     MRN:[id number]     FIN:[id number]     Location:[facility]     Date of Service:05/04/2018      Primary Care Physician:       Hilario Whiting MD, [phone]      Attending Physician:       Hilario Whiting MD, [phone]  Problem List/Past Medical History    Ongoing     Allergic rhinitis     Benign positional vertigo     Bilateral hearing loss     Carotid artery plaque     Diabetes mellitus type II, controlled     Dyslipidemia, goal LDL below 100     Frailty     History of mitral valve insufficiency       Comments: Mild by Echo in 2009     HTN, goal below 140/90     Hypothyroidism (acquired)     Long-term (current) use of anticoagulants, INR goal 2.0-3.0     Obese     Osteoarthritis     Paroxysmal atrial fibrillation     S/P placement of cardiac pacemaker     Seborrheic dermatitis     Statin intolerance     Symptomatic varicose veins     Tinnitus     White coat syndrome with hypertension     Historical     *Hospitalized@[facility] - Atrial fibrillation with an episode of hypotension     *Hospitalized@[facility] - Vertigo  Procedure/Surgical History     Phacoemulsification of cataract with intraocular lens implantation (03/27/2018)       Comments: Left.     Colonoscopy (05/23/2012)       Comments: Unremarkable     LAVH-BSO (04/06/2012)     Hysteroscopy, D&C, polypectomy (11/04/2011)     Implantation of heart pacemaker (2009)     Replacement of right knee joint (11/2008)     Replacement of left knee joint (12/10/2007)     Flexible sigmoidoscopy (06/13/2006)     Cholecystectomy (1951)     Repair of umbilical hernia (1949)     Appendectomy (1947)     Plastic repair of rotator cuff of shoulder     Pregnancy       Comments: full term X 3  Medications        levothyroxine 125 mcg (0.125 mg) oral tablet: 125 mcg, 1 tab(s), po, daily, for 90 day(s), 90 tab(s), 3 Refill(s).        digoxin 125 mcg (0.125 mg) oral tablet: 125 mcg, 1 tab(s), po, daily, 90 tab(s), 3 Refill(s).        pravastatin 10 mg oral tablet: 10 mg, 1 tab(s), PO, Daily, 90 tab(s), 3 Refill(s).        Flonase 50 mcg/inh nasal spray: 2 spray(s), nasal, daily, 1 EA, 11 Refill(s).        Metoprolol Succinate  mg oral tablet, extended release: See Instructions, TAKE ONE TABLET BY MOUTH EVERY DAY, 90 tab(s), 3 Refill(s).        warfarin 5 mg oral tablet: 5 mg, 1 tab(s), po, daily, for 90 day(s), 90 tab(s), 3 Refill(s).                Allergies    Vicodin (rash)    Ancef    Calan    ciprofloxacin    phenobarbital    probiotic (Rash..)    simvastatin (GI upset)  Social History    Smoking Status - 05/04/2018     Never smoker     Alcohol - Denies Alcohol Use, 04/06/2010      Never, 05/09/2017     Employment and Education      Retired, 10/27/2011     Home and Environment      Marital status: ., 10/27/2011     Tobacco - Denies Tobacco Use, 04/06/2010  Family History    CA - Lung cancer: Spouse.    CABG - Coronary artery bypass graft: Mother.    CAD -  Coronary artery disease: Mother and Father.    Diabetes mellitus: Mother.    Gout: Mother and Brother.    Hypertension: Mother.    Leukemia: Father.    MI - Myocardial infarction: Brother.    MS - Multiple sclerosis: Sister.    Parkinson's disease: Sister.    Valvular heart disease: Brother.  Immunizations      Vaccine Date Status Comments      influenza virus vaccine, inactivated 09/01/2017 Given      influenza virus vaccine, inactivated 01/24/2017 Given      ZOS, shingles 11/24/2015 Recorded [11/24/2015] Freemans      influenza virus vaccine, inactivated 10/07/2015 Given      pneumococcal (PCV13) 03/19/2015 Given      influenza virus vaccine, inactivated 10/09/2014 Given      influenza virus vaccine, inactivated 10/11/2013 Given      influenza virus vaccine, inactivated 10/09/2012 Given      Td 01/11/2012 Given      influenza virus vaccine, inactivated 10/08/2011 Given      influenza virus vaccine, inactivated 10/07/2010 Given      influenza, H1N1, inactivated 12/18/2009 Recorded      pneumococcal (PPSV23) 04/24/2006 Recorded      pneumococcal (PPSV23) 01/08/2001 Recorded  Lab Results          Lab Results (Last 4 results within 90 days)           Sodium Level: 141 mmol/L [135 mmol/L - 146 mmol/L] (03/22/18 09:33:00)          Potassium Level: 4.6 mmol/L [3.5 mmol/L - 5.3 mmol/L] (03/22/18 09:33:00)          Chloride Level: 104 mmol/L [98 mmol/L - 110 mmol/L] (03/22/18 09:33:00)          CO2 Level: 27 mmol/L [20 mmol/L - 31 mmol/L] (03/22/18 09:33:00)          Glucose Level: 155 mg/dL High [65 mg/dL - 99 mg/dL] (03/22/18 09:33:00)          BUN: 18 mg/dL [7 mg/dL - 25 mg/dL] (03/22/18 09:33:00)          Creatinine Level: 0.84 mg/dL [0.6 mg/dL - 0.88 mg/dL] (03/22/18 09:33:00)          BUN/Creat Ratio: NOT APPLICABLE [6  - 22] (03/22/18 09:33:00)          eGFR: 62 mL/min/1.73m2 [8.6 mg/dL - 10.4 mg/dL] (03/22/18 09:33:00)          eGFR African American: 72 mL/min/1.73m2 [6  - 22] (03/22/18 09:33:00)          Calcium  Level: 9.3 mg/dL [8.6 mg/dL - 10.4 mg/dL] (03/22/18 09:33:00)          Hgb A1c: 7 High [11.7 gm/dL - 15.5 gm/dL] (03/22/18 09:33:00)          Cholesterol: 195 mg/dL [8.6 mg/dL - 10.4 mg/dL] (03/22/18 09:33:00)          Non-HDL Cholesterol: 146 High [20 mmol/L - 31 mmol/L] (03/22/18 09:33:00)          HDL: 49 mg/dL Low [35 % - 45 %] (03/22/18 09:33:00)          Cholesterol/HDL Ratio: 4 [0.6 mg/dL - 0.88 mg/dL] (03/22/18 09:33:00)          LDL: 118 High [0.6 mg/dL - 0.88 mg/dL] (03/22/18 09:33:00)          Triglyceride: 158 mg/dL High [8.6 mg/dL - 10.4 mg/dL] (03/22/18 09:33:00)          TSH: 1.35 mIU/L [0.4 mIU/L - 4.5 mIU/L] (03/22/18 09:33:00)          WBC: 5.2 [3.8  - 10.8] (03/22/18 09:33:00)          RBC: 4.27 [3.8  - 5.1] (03/22/18 09:33:00)          Hgb: 13 gm/dL [11.7 gm/dL - 15.5 gm/dL] (03/22/18 09:33:00)          Hct: 37.9 % [35 % - 45 %] (03/22/18 09:33:00)          MCV: 88.8 fL [80 fL - 100 fL] (03/22/18 09:33:00)          MCH: 30.4 pg [27 pg - 33 pg] (03/22/18 09:33:00)          MCHC: 34.3 gm/dL [32 gm/dL - 36 gm/dL] (03/22/18 09:33:00)          RDW: 11.7 % [11 % - 15 %] (03/22/18 09:33:00)          Platelet: 159 [140  - 400] (03/22/18 09:33:00)          MPV: 10.6 fL [7.5 fL - 12.5 fL] (03/22/18 09:33:00)          PT: 21.3 High [9  - 11.5] (03/22/18 09:33:00)          INR: 2 [0.6 mg/dL - 0.88 mg/dL] (04/09/18 11:05:00)          INR: 2.1 High [0.6 mg/dL - 0.88 mg/dL] (03/22/18 09:33:00)          INR: 2.5 [0.6 mg/dL - 0.88 mg/dL] (03/13/18 15:43:00)          INR: 2.2 [0.6 mg/dL - 0.88 mg/dL] (02/26/18 09:33:00)          Test in Question - Test(s) Ordered: BMP A1C [6 - 22] (03/22/18 09:33:00)          Misc Test CPT Code: 92917 496 [6 - 22] (03/22/18 09:33:00)          Misc Test Client Contact: DESHAUN PEREZ IOP [6 - 22] (03/22/18 09:33:00)          Misc Test Comment: See comment [6 - 22] (03/22/18 09:33:00)          Misc Test Comment: See comment [6 - 22] (03/22/18 09:33:00)

## 2022-02-15 NOTE — PROGRESS NOTES
"Chief Complaint    Medical f/u  History of Present Illness       Patient is here with her daughter with whom she lives.  No wandering or dangerous behavior.  Progressive forgetfulness.  No falls since last visit.  Since we decreased the Seroquel dose there are 2 or 3 outbursts of paranoia and anxiety, not as bad as before starting the medication.  Patient says she \"feels just normal.\"  Generally significantly but has gotten up early couple times per week since decreasing Seroquel dose.  Complains of postnasal drainage congestion, not using Flonase.  Review of Systems       No bleeding, hypoglycemia, fever, chills, headache, myalgia, cough, dyspnea.  Physical Exam   Vitals & Measurements    T: 97.5  F (Tympanic)  HR: 81 (Peripheral)  BP: 122/62  SpO2: 97%     HT: 64 in  WT: 186.2 lb  BMI: 31.96        Patient appears comfortable and in no distress.  Alert and oriented.  Chest clear.  Cardiac exam is regular.  Trace edema.  Poor memory.  Affect normal.  Assessment/Plan       Allergic rhinitis (J30.9)          Resume Flonase         Ordered:          33116 office o/p est mod 30-39 min (Charge), Quantity: 1, Allergic rhinitis  Chronic dementia with behavioral disturbance  Paroxysmal atrial fibrillation  Type 2 diabetes mellitus with other circulatory complications  White coat syndrome with hypertension  Anxiety  Anticoagulated                Anticoagulated (Z79.01)          Stable.  INR is this month of elevated between 2 and 3 on home monitoring.         Ordered:          39512 office o/p est mod 30-39 min (Charge), Quantity: 1, Allergic rhinitis  Chronic dementia with behavioral disturbance  Paroxysmal atrial fibrillation  Type 2 diabetes mellitus with other circulatory complications  White coat syndrome with hypertension  Anxiety  Anticoagulated                Anxiety (F41.9)          Improved with escitalopram.         Ordered:          54879 office o/p est mod 30-39 min (Charge), Quantity: 1, Allergic " rhinitis  Chronic dementia with behavioral disturbance  Paroxysmal atrial fibrillation  Type 2 diabetes mellitus with other circulatory complications  White coat syndrome with hypertension  Anxiety  Anticoagulated                Chronic dementia with behavioral disturbance (F03.91)          Patient did better on 25 mg of Seroquel than 12-1/2.  Will increase Seroquel to 25 mg.  If there are problems falls or other issues call otherwise follow-up in a couple of months.         Ordered:          54443 office o/p est mod 30-39 min (Charge), Quantity: 1, Allergic rhinitis  Chronic dementia with behavioral disturbance  Paroxysmal atrial fibrillation  Type 2 diabetes mellitus with other circulatory complications  White coat syndrome with hypertension  Anxiety  Anticoagulated                Paroxysmal atrial fibrillation (I48.0)          Stable         Ordered:          30307 office o/p est mod 30-39 min (Charge), Quantity: 1, Allergic rhinitis  Chronic dementia with behavioral disturbance  Paroxysmal atrial fibrillation  Type 2 diabetes mellitus with other circulatory complications  White coat syndrome with hypertension  Anxiety  Anticoagulated                Type 2 diabetes mellitus with other circulatory complications (E11.59)          Stable         Ordered:          29364 office o/p est mod 30-39 min (Charge), Quantity: 1, Allergic rhinitis  Chronic dementia with behavioral disturbance  Paroxysmal atrial fibrillation  Type 2 diabetes mellitus with other circulatory complications  White coat syndrome with hypertension  Anxiety  Anticoagulated                White coat syndrome with hypertension (I10)          Controlled.  Discussed the fact that the cough could be due to lisinopril.         Ordered:          95774 office o/p est mod 30-39 min (Charge), Quantity: 1, Allergic rhinitis  Chronic dementia with behavioral disturbance  Paroxysmal atrial fibrillation  Type 2 diabetes mellitus with  other circulatory complications  White coat syndrome with hypertension  Anxiety  Anticoagulated                Orders:         QUEtiapine, = 1 tab(s) ( 25 mg ), Oral, qhs, # 30 tab(s), 5 Refill(s), Type: Maintenance, Pharmacy: [facility] [place], 1 tab(s) Oral qhs, 64, in, 05/07/21 12:59:00 CDT, Height Measured, 186.2, lb, 05/07/21 12:59:00 CDT, Weight Measured, (Ordered)         Return to Clinic (Request), RFV: Dementia, Return in 2 months  Patient Information     Name:GERHARD HOWE      Address:      [address] [place] [id number]     Sex:Female     YOB: 1929     [phone]     Emergency Contact:GENTRY PARK     MRN:[id number]     FIN:[id number]     Location:[facility]     Date of Service:05/07/2021      Primary Care Physician:       Hilario Whiting MD, [phone]      Attending Physician:       Hilario Whiting MD, [phone]  Problem List/Past Medical History    Ongoing     Allergic rhinitis     Anticoagulated     Anxiety     Benign positional vertigo     Bilateral hearing loss     Carotid artery plaque     Chronic dementia with behavioral disturbance     Diabetic peripheral neuropathy     Dyslipidemia, goal LDL below 100     Frailty     History of mitral valve insufficiency       Comments: Mild by Echo in 2009     Hypothyroidism (acquired)     Long term (current) use of anticoagulants     Obese     Osteoarthritis     Paroxysmal atrial fibrillation     S/P placement of cardiac pacemaker     Seborrheic dermatitis     Seborrheic dermatitis of scalp     Statin intolerance     Symptomatic varicose veins     Tinnitus     Type 2 diabetes mellitus with other circulatory complications     White coat syndrome with hypertension    Historical     Inpatient stay       Comments: @[place], [place] - Atrial fibrillation with an episode of hypotension     Inpatient stay       Comments: @[place], [place] - Vertigo     Inpatient  stay       Comments: @[place], [place] - Postoperative surgical complication involving subcutaneous tissue     Inpatient stay       Comments: [facility] [place], [place] - Benign paroxysmal positional vertigo.  Procedure/Surgical History     Replacement of pacemaker pulse generator (05/30/2019)      Comments: Implanted left pectoral.      Medtronic [id number]      [id number].     Phacoemulsification of cataract with intraocular lens implantation (03/27/2018)      Comments: Left..     Colonoscopy (05/23/2012)      Comments: Unremarkable.     LAVH-BSO (04/06/2012)     Hysteroscopy, D&C, polypectomy (11/04/2011)     Implantation of heart pacemaker (2009)     Replacement of right knee joint (11/2008)     Replacement of left knee joint (12/10/2007)     Flexible sigmoidoscopy (06/13/2006)     Cholecystectomy (1951)     Repair of umbilical hernia (1949)     Appendectomy (1947)     Extracapsular cataract extraction and insertion of intraocular lens      Comments: Right..     Plastic repair of rotator cuff of shoulder     Pregnancy      Comments: full term X 3.  Medications    ACCU-CHECK GUIDE LANCETS, See Instructions, 11 refills    ACCU-CHECK GUIDE TEST STRIPS, See Instructions, 11 refills    acetaminophen    Calcium with vitamin D, 1 tab, Oral, daily    citalopram 10 mg oral tablet, 10 mg= 1 tab(s), Oral, daily, 11 refills    Daily Multiple Vitamins, 1 tab(s), Oral, daily    Fish Oil, 1 tab, Oral, daily    Flonase 50 mcg/inh nasal spray, 2 spray(s), Nasal, daily, 11 refills    levothyroxine 125 mcg (0.125 mg) oral tablet, 1 tab(s), Oral, daily, 3 refills    Metoprolol Succinate  mg oral tablet, extended release, 100 mg= 1 tab(s), Oral, daily    PreserVision, 1 cap(s), Oral, bid    SEROquel 25 mg oral tablet, 25 mg= 1 tab(s), Oral, qhs, 5 refills    Vitamin C, 500 mg, Oral, daily    Vitamin D3, 2000 International Unit, Oral, daily    warfarin 5 mg oral tablet, See Instructions, 1 refills  Allergies     Vicodin (rash)    Ancef (Diarrhea)    Calan    ciprofloxacin    phenobarbital (tremors)    probiotic (Rash..)    simvastatin (GI upset)  Social History    Smoking Status     Never smoker     Alcohol - Denies Alcohol Use      Never     Electronic Cigarette/Vaping      Electronic Cigarette Use: Never.     Employment/School      Retired     Home/Environment      Marital status: .     Substance Abuse - Denies Substance Abuse     Tobacco - Denies Tobacco Use      Never (less than 100 in lifetime)  Family History    CABG - Coronary artery bypass graft: Mother.    CAD - Coronary artery disease: Mother and Father.    Diabetes mellitus: Mother.    Gout: Mother and Brother.    Hypertension: Mother.    Leukemia: Father.    MI - Myocardial infarction: Brother.    MS - Multiple sclerosis: Sister.    Parkinson's disease: Sister.    Valvular heart disease: Brother.  Immunizations      Vaccine Date Status          SARS-CoV-2 (COVID-19) Moderna-1273 03/18/2021 Given          SARS-CoV-2 (COVID-19) Moderna-1273 02/18/2021 Given          influenza virus vaccine, inactivated 10/05/2020 Given          zoster vaccine, inactivated 12/16/2019 Recorded          zoster vaccine, inactivated 10/15/2019 Recorded          influenza virus vaccine, inactivated 09/05/2019 Given          influenza virus vaccine, inactivated 10/10/2018 Given          influenza virus vaccine, inactivated 09/01/2017 Given          influenza virus vaccine, inactivated 01/24/2017 Given          ZOS, shingles 11/24/2015 Recorded              Comments : [11/24/2015] [facility]ans          influenza virus vaccine, inactivated 10/07/2015 Given          pneumococcal (PCV13) 03/19/2015 Given          influenza virus vaccine, inactivated 10/09/2014 Given          influenza virus vaccine, inactivated 10/11/2013 Given          influenza virus vaccine, inactivated 10/09/2012 Given          Td 01/11/2012 Given          influenza virus vaccine, inactivated 10/08/2011 Given           influenza virus vaccine, inactivated 10/07/2010 Given          influenza, H1N1, inactivated 12/18/2009 Recorded          pneumococcal (PPSV23) 04/24/2006 Recorded          pneumococcal (PPSV23) 01/08/2001 Recorded  Lab Results          Lab Results (Last 4 results within 90 days)           Sodium Level: 136 mmol/L [135 mmol/L - 146 mmol/L] (02/08/21 09:53:00)          Potassium Level: 4.3 mmol/L [3.5 mmol/L - 5.3 mmol/L] (02/08/21 09:53:00)          Chloride Level: 99 mmol/L [98 mmol/L - 110 mmol/L] (02/08/21 09:53:00)          CO2 Level: 30 mmol/L [20 mmol/L - 32 mmol/L] (02/08/21 09:53:00)          Glucose Level: 174 mg/dL High [65 mg/dL - 99 mg/dL] (02/08/21 09:53:00)          BUN: 21 mg/dL [7 mg/dL - 25 mg/dL] (02/08/21 09:53:00)          Creatinine Level: 0.83 mg/dL [0.6 mg/dL - 0.88 mg/dL] (02/08/21 09:53:00)          BUN/Creat Ratio: NOT APPLICABLE [6  - 22] (02/08/21 09:53:00)          eGFR: 62 mL/min/1.73m2 (02/08/21 09:53:00)          eGFR African American: 71 mL/min/1.73m2 (02/08/21 09:53:00)          Calcium Level: 9.3 mg/dL [8.6 mg/dL - 10.4 mg/dL] (02/08/21 09:53:00)          Bilirubin Total: 0.9 mg/dL [0.2 mg/dL - 1.2 mg/dL] (02/08/21 09:53:00)          Alkaline Phosphatase: 47 unit/L [37 unit/L - 153 unit/L] (02/08/21 09:53:00)          AST/SGOT: 17 unit/L [10 unit/L - 35 unit/L] (02/08/21 09:53:00)          ALT/SGPT: 14 unit/L [6 unit/L - 29 unit/L] (02/08/21 09:53:00)          Protein Total: 6.3 gm/dL [6.1 gm/dL - 8.1 gm/dL] (02/08/21 09:53:00)          Albumin Level: 4.1 gm/dL [3.6 gm/dL - 5.1 gm/dL] (02/08/21 09:53:00)          Globulin: 2.2 [1.9  - 3.7] (02/08/21 09:53:00)          A/G Ratio: 1.9 [1  - 2.5] (02/08/21 09:53:00)          Hgb A1c: 7.6 High (02/08/21 09:53:00)          Vitamin B12 Level: 522 pg/mL [200 pg/mL - 1100 pg/mL] (02/08/21 09:53:00)          Cholesterol: 235 mg/dL High (02/08/21 09:53:00)          Non-HDL Cholesterol: 193 High (02/08/21 09:53:00)          HDL: 42 mg/dL Low  (02/08/21 09:53:00)          Cholesterol/HDL Ratio: 5.6 High (02/08/21 09:53:00)          LDL: 148 High (02/08/21 09:53:00)          Triglyceride: 277 mg/dL High (02/08/21 09:53:00)          TSH: 6.52 mIU/L High [0.4 mIU/L - 4.5 mIU/L] (02/08/21 09:53:00)          U Creatinine: 85 mg/dL [20 mg/dL - 275 mg/dL] (02/08/21 09:53:00)          U Microalbumin: 1.3 mg/dL (02/08/21 09:53:00)          Ur Microalbumin/Creatinine Ratio: 15 (02/08/21 09:53:00)          WBC: 4.1 [3.8  - 10.8] (02/08/21 09:53:00)          RBC: 4.03 [3.8  - 5.1] (02/08/21 09:53:00)          Hgb: 12.2 gm/dL [11.7 gm/dL - 15.5 gm/dL] (02/08/21 09:53:00)          Hct: 35.7 % [35 % - 45 %] (02/08/21 09:53:00)          MCV: 88.6 fL [80 fL - 100 fL] (02/08/21 09:53:00)          MCH: 30.3 pg [27 pg - 33 pg] (02/08/21 09:53:00)          MCHC: 34.2 gm/dL [32 gm/dL - 36 gm/dL] (02/08/21 09:53:00)          RDW: 11.9 % [11 % - 15 %] (02/08/21 09:53:00)          Platelet: 170 [140  - 400] (02/08/21 09:53:00)          MPV: 10.1 fL [7.5 fL - 12.5 fL] (02/08/21 09:53:00)          INR TR: 1.9 (05/06/21 15:23:00)          INR TR: 3.3 (02/11/21 14:24:00)

## 2022-02-15 NOTE — NURSING NOTE
Comprehensive Intake Entered On:  3/4/2021 2:42 PM CST    Performed On:  3/4/2021 2:36 PM CST by Yumiko Escamilla               Summary   Chief Complaint :   Medical f/u    Advance Directive :   Yes   Weight Measured :   178 lb(Converted to: 178 lb 0 oz, 80.739 kg)    Height Measured :   64 in(Converted to: 5 ft 4 in, 162.56 cm)    Body Mass Index :   30.55 kg/m2 (HI)    Body Surface Area :   1.91 m2   Systolic Blood Pressure :   95 mmHg   Diastolic Blood Pressure :   63 mmHg   Mean Arterial Pressure :   74 mmHg   Peripheral Pulse Rate :   86 bpm   BP Site :   Right arm   BP Method :   Manual   HR Method :   Electronic   Temperature Tympanic :   98.1 DegF(Converted to: 36.7 DegC)    Race :      Languages :   English   Yumiko Escamilla - 3/4/2021 2:36 PM CST   Health Status   Allergies Verified? :   Yes   Medication History Verified? :   Yes   Medical History Verified? :   Yes   Pre-Visit Planning Status :   Completed   Tobacco Use? :   Never smoker   Yumiko Escamilla - 3/4/2021 2:36 PM CST   Consents   Consent for Immunization Exchange :   Consent Granted   Consent for Immunizations to Providers :   Consent Granted   Yumiko Escamilla - 3/4/2021 2:36 PM CST   Meds / Allergies   (As Of: 3/4/2021 2:42:19 PM CST)   Allergies (Active)   Ancef  Estimated Onset Date:   Unspecified ; Reactions:   Diarrhea ; Created By:   Chloe Bruce; Reaction Status:   Active ; Category:   Drug ; Substance:   Ancef ; Type:   Allergy ; Updated By:   Chloe Bruce; Reviewed Date:   3/4/2021 2:38 PM CST      Calan  Estimated Onset Date:   Unspecified ; Created By:   Sravanthi Dietz; Reaction Status:   Active ; Category:   Drug ; Substance:   Calan ; Updated By:   Sravanthi Dietz; Source:   Paper Chart ; Reviewed Date:   3/4/2021 2:38 PM CST      ciprofloxacin  Estimated Onset Date:   Unspecified ; Created By:   Cherise Christiansen MA; Reaction Status:   Active ; Category:   Drug ; Substance:   ciprofloxacin ; Type:    Allergy ; Updated By:   Cherise Christiansen MA; Reviewed Date:   3/4/2021 2:38 PM CST      phenobarbital  Estimated Onset Date:   Unspecified ; Reactions:   tremors ; Created By:   Chloe Bruce; Reaction Status:   Active ; Category:   Drug ; Substance:   phenobarbital ; Type:   Allergy ; Updated By:   Chloe Bruce; Source:   Paper Chart ; Reviewed Date:   3/4/2021 2:38 PM CST      probiotic  Estimated Onset Date:   Unspecified ; Reactions:   Rash.. ; Created By:   Sari Turner CMA; Reaction Status:   Active ; Category:   Drug ; Substance:   probiotic ; Type:   Allergy ; Updated By:   Sari Turner CMA; Reviewed Date:   3/4/2021 2:38 PM CST      simvastatin  Estimated Onset Date:   Unspecified ; Reactions:   GI upset ; Created By:   Sravanthi Dietz; Reaction Status:   Active ; Category:   Drug ; Substance:   simvastatin ; Type:   Allergy ; Updated By:   Sravanthi Dietz; Source:   Paper Chart ; Reviewed Date:   3/4/2021 2:38 PM CST      Vicodin  Estimated Onset Date:   Unspecified ; Reactions:   rash ; Created By:   Mis Donaldson MA; Reaction Status:   Active ; Category:   Drug ; Substance:   Vicodin ; Type:   Allergy ; Severity:   Moderate ; Updated By:   Mis Donaldson MA; Reviewed Date:   3/4/2021 2:38 PM CST        Medication List   (As Of: 3/4/2021 2:42:19 PM CST)   Prescription/Discharge Order    warfarin  :   warfarin ; Status:   Prescribed ; Ordered As Mnemonic:   warfarin 5 mg oral tablet ; Simple Display Line:   See Instructions, TAKE 1 AND 1/2 TABLETS BY MOUTH FRIDAYS AND 1 TABLET EVERY OTHER DAY, 180 tab(s), 1 Refill(s) ; Ordering Provider:   Shimon Nesbitt MD; Catalog Code:   warfarin ; Order Dt/Tm:   7/21/2020 11:28:22 AM CDT          Miscellaneous Rx Supply  :   Miscellaneous Rx Supply ; Status:   Prescribed ; Ordered As Mnemonic:   ACCU-CHECK GUIDE TEST STRIPS ; Simple Display Line:   See Instructions, TEST BLOOD SUGAR DAILY, 100 EA, 11 Refill(s) ; Ordering Provider:   Shimon Nesbitt MD;  Catalog Code:   Miscellaneous Rx Supply ; Order Dt/Tm:   7/21/2020 8:47:37 AM CDT          Miscellaneous Rx Supply  :   Miscellaneous Rx Supply ; Status:   Prescribed ; Ordered As Mnemonic:   ACCU-CHECK GUIDE LANCETS ; Simple Display Line:   See Instructions, TEST BLOOD SUGAR DAILY, 100 EA, 11 Refill(s) ; Ordering Provider:   Shimon Nesbitt MD; Catalog Code:   Miscellaneous Rx Supply ; Order Dt/Tm:   7/21/2020 8:47:38 AM CDT          metoprolol  :   metoprolol ; Status:   Prescribed ; Ordered As Mnemonic:   Metoprolol Succinate  mg oral tablet, extended release ; Simple Display Line:   100 mg, 1 tab(s), Oral, daily, 90 tab(s), 0 Refill(s) ; Ordering Provider:   Hilario Whiting MD; Catalog Code:   metoprolol ; Order Dt/Tm:   3/1/2021 11:17:09 AM CST          levothyroxine  :   levothyroxine ; Status:   Prescribed ; Ordered As Mnemonic:   levothyroxine 125 mcg (0.125 mg) oral tablet ; Simple Display Line:   1 tab(s), Oral, daily, 90 tab(s), 3 Refill(s) ; Ordering Provider:   Hilario Whiting MD; Catalog Code:   levothyroxine ; Order Dt/Tm:   12/28/2020 12:52:12 PM CST          fluticasone nasal  :   fluticasone nasal ; Status:   Prescribed ; Ordered As Mnemonic:   Flonase 50 mcg/inh nasal spray ; Simple Display Line:   2 spray(s), nasal, daily, 1 EA, 11 Refill(s) ; Ordering Provider:   Shimon Nesbitt MD; Catalog Code:   fluticasone nasal ; Order Dt/Tm:   7/21/2020 11:28:21 AM CDT          donepezil  :   donepezil ; Status:   Suspended ; Ordered As Mnemonic:   Aricept 5 mg oral tablet ; Simple Display Line:   5 mg, 1 tab(s), Oral, hs, 30 tab(s), 5 Refill(s) ; Ordering Provider:   Hilario Whiting MD; Catalog Code:   donepezil ; Order Dt/Tm:   10/5/2020 3:59:19 PM CDT          amoxicillin  :   amoxicillin ; Status:   Processing ; Ordered As Mnemonic:   amoxicillin 500 mg oral tablet ; Ordering Provider:   Beatriz Petit; Action Display:   Complete ; Catalog Code:   amoxicillin ; Order Dt/Tm:   3/4/2021  2:40:07 PM CST            Home Meds    ondansetron  :   ondansetron ; Status:   Processing ; Ordered As Mnemonic:   Zofran 4 mg oral tablet ; Action Display:   Complete ; Catalog Code:   ondansetron ; Order Dt/Tm:   3/4/2021 2:39:12 PM CST          omega-3 polyunsaturated fatty acids  :   omega-3 polyunsaturated fatty acids ; Status:   Documented ; Ordered As Mnemonic:   Fish Oil ; Simple Display Line:   1 tab, Oral, daily, 0 Refill(s) ; Catalog Code:   omega-3 polyunsaturated fatty acids ; Order Dt/Tm:   10/26/2020 3:10:12 PM CDT          multivitamin with minerals  :   multivitamin with minerals ; Status:   Documented ; Ordered As Mnemonic:   PreserVision ; Simple Display Line:   1 cap(s), Oral, bid, 0 Refill(s) ; Catalog Code:   multivitamin with minerals ; Order Dt/Tm:   10/26/2020 3:10:54 PM CDT          multivitamin  :   multivitamin ; Status:   Documented ; Ordered As Mnemonic:   Daily Multiple Vitamins ; Simple Display Line:   1 tab(s), Oral, daily, 0 Refill(s) ; Catalog Code:   multivitamin ; Order Dt/Tm:   10/26/2020 3:09:47 PM CDT          Miscellaneous Prescription  :   Miscellaneous Prescription ; Status:   Documented ; Ordered As Mnemonic:   Calcium with vitamin D ; Simple Display Line:   1 tab, Oral, daily, 0 Refill(s) ; Catalog Code:   Miscellaneous Prescription ; Order Dt/Tm:   10/26/2020 3:08:29 PM CDT          melatonin  :   melatonin ; Status:   Processing ; Ordered As Mnemonic:   melatonin 3 mg oral tablet ; Action Display:   Complete ; Catalog Code:   melatonin ; Order Dt/Tm:   3/4/2021 2:39:48 PM CST          lisinopril  :   lisinopril ; Status:   Documented ; Ordered As Mnemonic:   lisinopril 20 mg oral tablet ; Simple Display Line:   20 mg, 1 tab(s), Oral, daily, 0 Refill(s) ; Catalog Code:   lisinopril ; Order Dt/Tm:   10/5/2020 3:26:04 PM CDT          cholecalciferol  :   cholecalciferol ; Status:   Documented ; Ordered As Mnemonic:   Vitamin D3 ; Simple Display Line:   2,000 International  Unit, Oral, daily, 0 Refill(s) ; Catalog Code:   cholecalciferol ; Order Dt/Tm:   10/26/2020 3:10:48 PM CDT          ascorbic acid  :   ascorbic acid ; Status:   Documented ; Ordered As Mnemonic:   Vitamin C ; Simple Display Line:   500 mg, Oral, daily, 0 Refill(s) ; Catalog Code:   ascorbic acid ; Order Dt/Tm:   10/26/2020 3:10:37 PM CDT          acetaminophen  :   acetaminophen ; Status:   Documented ; Ordered As Mnemonic:   acetaminophen ; Simple Display Line:   0 Refill(s) ; Catalog Code:   acetaminophen ; Order Dt/Tm:   5/14/2019 1:44:43 PM CDT            ID Risk Screen   Recent Travel History :   No recent travel   Family Member Travel History :   No recent travel   Other Exposure to Infectious Disease :   Unknown   COVID-19 Testing Status :   No COVID-19 test performed   Yumiko Escamilla - 3/4/2021 2:36 PM CST   Social History   Social History   (As Of: 3/4/2021 2:42:19 PM CST)   Alcohol:  Denies Alcohol Use      Never   (Last Updated: 5/9/2017 10:53:49 AM CDT by Deandra Mathews)          Tobacco:  Denies Tobacco Use      Never (less than 100 in lifetime)   (Last Updated: 3/4/2021 2:38:33 PM CST by Yumiko Escamilla)          Electronic Cigarette/Vaping:        Electronic Cigarette Use: Never.   (Last Updated: 3/4/2021 2:38:38 PM CST by Yumiko Escamilla)          Substance Abuse:  Denies Substance Abuse      (Last Updated: 8/29/2018 2:28:22 PM CDT by Chloe Bruce )         Employment/School:        Retired   (Last Updated: 10/27/2011 10:22:36 AM CDT by Hilario Whiting MD)          Home/Environment:        Marital status: .   (Last Updated: 10/27/2011 10:22:36 AM CDT by Hilario Whiting MD)

## 2022-02-15 NOTE — NURSING NOTE
Quick Intake Entered On:  3/8/2019 1:46 PM CST    Performed On:  3/8/2019 1:46 PM CST by Ava Velasquez RN               Summary   Chief Complaint :   BP   Systolic Blood Pressure :   122 mmHg   Diastolic Blood Pressure :   78 mmHg   Mean Arterial Pressure :   93 mmHg   BP Site :   Right arm   BP Method :   Manual   Race :      Languages :   English   Ava Velasquez RN - 3/8/2019 1:46 PM CST

## 2022-02-15 NOTE — NURSING NOTE
Vital Signs Entered On:  9/21/2020 9:40 AM CDT    Performed On:  9/21/2020 9:40 AM CDT by Madiha Britt RN               Vital Signs   Systolic Blood Pressure :   134 mmHg (HI)    Diastolic Blood Pressure :   94 mmHg (HI)    Mean Arterial Pressure :   107 mmHg   BP Site :   Left arm   Madiha Britt RN - 9/21/2020 9:40 AM CDT

## 2022-02-15 NOTE — PROGRESS NOTES
Patient:   GERHARD HOWE            MRN: [id number]            FIN: [id number]               Age:   89 years     Sex:  Female     :  11/10/1929   Associated Diagnoses:   Diabetes Mellitus Type II, Controlled; Dyslipidemia, goal LDL below 100   Author:   Ashley Sawyer      Visit Information   Visit type:  Diabetes Self Management Education .    Accompanied by:  Family member.    Referral source:  Hilario Whiting MD.       Chief Complaint   DM Type II controlled, Dyslipidemia (LDL goal <100)      History of Present Illness   Discussed nutrition, diabetes, and lifestyle management with pt.    Nutrition:  Am egg and toast, pt continues to have meals delivered from the [facility] Mon, Wed, Friday.  Pt has 1 meal/ week at her daughters who will also provide leftovers.  Purchases fresh and organic foods.  Enjoys tuna, egg, whole grain, fruits, and some vegetables.  Minimal sweets    Physical Activity: stopped walking 20 min/ day around her house with walker, has given up driving   Stress:   low  Sleep: good   Support: family   BG Testing: am testing 14 day avg 126 mg/dL out of 9 readings   DM related medication: none at this time, pt would like to avoid medication and continue to monitor A1C   Routine diabetes care: eye exam due with Dr. Llanes 18 - new eye exam form given today, and dentist exam 2x/ year, influenza vaccine q fall,  feet examined with MD, no open elayne/ skin issues, influenza vaccine yearly  Estimated Average Glucose (eAG) 146 mg/dL with A1C of 6.7% on 18      Review of Systems             Health Status   Allergies:    Allergic Reactions (Selected)  Moderate  Vicodin (Rash)  Severity Not Documented  Ancef (Diarrhea)  Calan (No reactions were documented)  Ciprofloxacin (No reactions were documented)  Phenobarbital (Tremors)  Probiotic (Rash..)  Simvastatin (Gi upset)   Medications:  (Selected)   Prescriptions  Prescribed  Flonase 50 mcg/inh nasal spray: = 2 spray(s), nasal, daily, # 1 EA, 11  Refill(s), Type: Maintenance, Pharmacy: [facility] [id number], 2 spray(s) Nasal daily  Metoprolol Succinate  mg oral tablet, extended release: See Instructions, Instructions: TAKE ONE TABLET BY MOUTH EVERY DAY, # 90 tab(s), 3 Refill(s), Type: Soft Stop, Pharmacy: [facility] [id number], TAKE ONE TABLET BY MOUTH EVERY DAY  digoxin 125 mcg (0.125 mg) oral tablet: = 1 tab(s) ( 125 mcg ), po, daily, # 90 tab(s), 3 Refill(s), Type: Maintenance, Pharmacy: [facility] [id number], 1 tab(s) Oral daily  levothyroxine 125 mcg (0.125 mg) oral tablet: = 1 tab(s) ( 125 mcg ), po, daily, # 90 tab(s), 3 Refill(s), Type: Soft Stop, Pharmacy: [facility] [id number], 1 tab(s) Oral daily,x90 day(s)  pravastatin 10 mg oral tablet: = 1 tab(s) ( 10 mg ), PO, Daily, # 90 tab(s), 3 Refill(s), Type: Maintenance, Pharmacy: [facility] [id number], 1 tab(s) Oral daily  warfarin 5 mg oral tablet: See Instructions, Instructions: 1.5 tabs Fridays and one every other day., # 100 EA, 3 Refill(s), Type: Soft Stop, Pharmacy: [facility] [id number], 1.5 tabs Fridays and one every other day.  Documented Medications  Documented  acetaminophen: 0 Refill(s), Type: Maintenance,    Medications          *denotes recorded medication          *acetaminophen: 0 Refill(s).          digoxin 125 mcg (0.125 mg) oral tablet: 125 mcg, 1 tab(s), po, daily, 90 tab(s), 3 Refill(s).          Flonase 50 mcg/inh nasal spray: 2 spray(s), nasal, daily, 1 EA, 11 Refill(s).          levothyroxine 125 mcg (0.125 mg) oral tablet: 125 mcg, 1 tab(s), po, daily, for 90 day(s), 90 tab(s), 3 Refill(s).          Metoprolol Succinate  mg oral tablet, extended release: See Instructions, TAKE ONE TABLET BY MOUTH EVERY DAY, 90 tab(s), 3 Refill(s).          pravastatin 10 mg oral tablet: 10 mg, 1 tab(s), PO, Daily, 90 tab(s), 3 Refill(s).          warfarin 5 mg oral tablet: See Instructions, 1.5 tabs Fridays and one every other day., 100 EA, 3  Refill(s).     Problem list:    All Problems  Hypothyroidism (acquired) / SNOMED CT 797729725 / Confirmed  Allergic rhinitis / SNOMED CT 886223836 / Confirmed  Benign positional vertigo / SNOMED CT 534168266 / Confirmed  Bilateral hearing loss / SNOMED CT 976196852 / Confirmed  Carotid artery plaque / SNOMED CT 911306185 / Confirmed  Diabetic peripheral neuropathy / SNOMED CT 5034661500 / Confirmed  Statin intolerance / SNOMED CT 51425666 / Confirmed  Long term (current) use of anticoagulants / SNOMED CT 888156922 / Confirmed  Frailty / SNOMED CT 983383712 / Confirmed  S/P placement of cardiac pacemaker / SNOMED CT 615736202 / Confirmed  History of mitral valve insufficiency / SNOMED CT 567612800 / Confirmed  Dyslipidemia, goal LDL below 100 / SNOMED CT 70777258 / Confirmed  White coat syndrome with hypertension / SNOMED CT 0560539281 / Confirmed  Obese / SNOMED CT 8570826488 / Probable  Osteoarthritis / SNOMED CT 7735084970 / Confirmed  Paroxysmal atrial fibrillation / SNOMED CT 503662962 / Confirmed  Seborrheic dermatitis / SNOMED CT 17852134 / Confirmed  Tinnitus / SNOMED CT 603159625 / Confirmed  Type 2 diabetes mellitus with other circulatory complications / SNOMED CT 637902016 / Confirmed  Symptomatic varicose veins / SNOMED CT 762358986 / Confirmed  Resolved: Inpatient stay / SNOMED CT 775408090  Resolved: Inpatient stay / SNOMED CT 616641997  Resolved: Inpatient stay / SNOMED CT 556499217  Canceled: Rhinitis, chronic / SNOMED CT 644716449  Canceled: Diabetic Neuropathy, Type II Diabetes Mellitus / ICD-9-.60  Canceled: White coat hypertension / SNOMED CT 0635326581  Canceled: Hypertension / SNOMED CT 4542471504  Canceled: White coat syndrome with hypertension / SNOMED CT 2722727686      Histories   Past Medical History:    Active  Type 2 diabetes mellitus with other circulatory complications (197761014): Onset on 1/1/1999 at 69 years.  Osteoarthritis (7331133513)  Dyslipidemia, goal LDL below 100  (59734441)  Paroxysmal atrial fibrillation (593849593)  Hypothyroidism (acquired) (107118480)  Allergic rhinitis (844209964)  Obese (5438448815)  Benign positional vertigo (888154779)  Statin intolerance (95903414)  Long term (current) use of anticoagulants (631057346)  Bilateral hearing loss (856563967)  Tinnitus (582809618)  S/P placement of cardiac pacemaker (197699609)  White coat syndrome with hypertension (5764284599)  Resolved  Inpatient stay (898416924): Onset on 2019 at 89 years.  Resolved on 2019 at 89 years.  Comments:  2019 CDT 6:55 AM CDT - Leonarda Padilla  @[email or web address], [place] - Postoperative surgical complication involving subcutaneous tissue  Inpatient stay (067714672): Onset on 2012 at 83 years.  Resolved.  Comments:  2019 CDT 6:54 AM CDT - Leonarda Padilla  @[facility], [place] - Vertigo  Inpatient stay (296148635): Onset on 2010 at 80 years.  Resolved.  Comments:  2019 CDT 6:53 AM CDT - Leonarda Padilla  @[facility], [place] - Atrial fibrillation with an episode of hypotension   Family History:    Leukemia  Father ()  Gout  Brother  Mother ()  Diabetes mellitus  Mother ()  Hypertension  Mother ()  Parkinson's disease  Sister  Valvular heart disease  Brother ()  MS - Multiple sclerosis  Sister  CABG - Coronary artery bypass graft  Mother ()  MI - Myocardial infarction  Brother ()  CAD - Coronary artery disease  Father ()  Mother ()     Procedure history:    Replacement of pacemaker pulse generator (SNOMED CT 3166211) on 2019 at 89 Years.  Comments:  2019 10:01 AM CDT - Aida Contreras  Implanted left pectoral.   Medtronic [id number]  [id number]  Phacoemulsification of cataract with intraocular lens implantation (SNOMED CT 4053502977) performed by Andrew Llanes MD on 3/27/2018 at 88 Years.  Comments:  2018 9:31 AM CDT - Lucy Dawson  Left.  Colonoscopy (SNOMED CT  400894092) on 5/23/2012 at 82 Years.  Comments:  5/23/2012 8:59 AM CDT - Hilario Whiting MD  [facility] performed by Nba Alcaraz MD on 4/6/2012 at 82 Years.  Hysteroscopy, D&C, polypectomy performed by Nba Alcaraz MD on 11/4/2011 at 81 Years.  Implantation of heart pacemaker (SNOMED CT 0460987928) performed by Dr. Lewis Mejia in 2009 at 80 Years.  Replacement of right knee joint (SNOMED CT 9544479517) in the month of 11/2008 at 78 Years.  Replacement of left knee joint (SNOMED CT 7066341366) on 12/10/2007 at 78 Years.  Flexible sigmoidoscopy (SNOMED CT 04184917) performed by Hilario Whiting MD on 6/13/2006 at 76 Years.  Cholecystectomy (SNOMED CT 51655480) in 1951 at 22 Years.  Repair of umbilical hernia (SNOMED CT 00777301) in 1949 at 20 Years.  Appendectomy (SNOMED CT 201023871) in 1947 at 18 Years.  Plastic repair of rotator cuff of shoulder (SNOMED CT 270466080).  Pregnancy (SNOMED CT 009876272).  Comments:  4/6/2010 9:08 AM CDT - Vaishali Cortés  full term X 3  Extracapsular cataract extraction and insertion of intraocular lens (SNOMED CT 414298670) performed by Andrew Llanes MD.  Comments:  5/11/2018 1:14 PM CDT - Lucy Dawson.   Social History:        Alcohol Assessment: Denies Alcohol Use            Never      Tobacco Assessment: Denies Tobacco Use      Substance Abuse Assessment: Denies Substance Abuse      Employment and Education Assessment            Retired      Home and Environment Assessment            Marital status: .      Physical Examination   Measurements from flowsheet : Measurements   7/23/2019 3:18 PM CDT Height Measured - Standard 64 in    Weight Measured - Standard 146.2 lb    BSA 1.73 m2    Body Mass Index 25.09 kg/m2  HI         Health Maintenance      Recommendations     Pending (in the next year)        OverDue           Depression Screen (Female) due  05/08/18  and every 1  year(s)           DM - Eye Exam due  06/22/19  and every 1  year(s)         Due            DM - HgbA1c due  06/28/19  and every 3  month(s)           DM - Foot Exam due  07/09/19  and every 1  year(s)           DM - Communication with Managing Provider due  07/23/19  and every 1  year(s)           Fall Risk Screen (Female) due  07/23/19  and every 1  year(s)           Osteoporosis Screen due  07/23/19  and every 2  year(s)        Near Due            Influenza Vaccine near due  09/01/19  and every 1  year(s)        Due In Future            DM - Microalbumin not due until  12/24/19  and every 1  year(s)           Lipid Disorders Screen (Female) not due until  03/28/20  and every 1  year(s)           Type 2 Diabetes Mellitus Screen (Female) not due until  03/28/20  and every 1  year(s)           High Blood Pressure Screen (Female) not due until  06/11/20  and every 1  year(s)           Tobacco Use Screen (Female) not due until  06/11/20  and every 1  year(s)     Satisfied (in the past 1 year)        Satisfied            Body Mass Index Check (Female) on  07/23/19.           Body Mass Index Check (Female) on  06/11/19.           Body Mass Index Check (Female) on  05/14/19.           Body Mass Index Check (Female) on  04/23/19.           Body Mass Index Check (Female) on  03/28/19.           Body Mass Index Check (Female) on  11/08/18.           Body Mass Index Check (Female) on  08/14/18.           DM - HgbA1c on  03/28/19.           DM - HgbA1c on  12/24/18.           DM - Microalbumin on  12/24/18.           DM - Microalbumin on  12/24/18.           High Blood Pressure Screen (Female) on  06/11/19.           High Blood Pressure Screen (Female) on  05/23/19.           High Blood Pressure Screen (Female) on  05/14/19.           High Blood Pressure Screen (Female) on  04/23/19.           High Blood Pressure Screen (Female) on  04/22/19.           High Blood Pressure Screen (Female) on  03/08/19.           High Blood Pressure Screen (Female) on  02/22/19.           High Blood Pressure Screen  (Female) on  02/22/19.           High Blood Pressure Screen (Female) on  02/07/19.           High Blood Pressure Screen (Female) on  01/24/19.           High Blood Pressure Screen (Female) on  01/09/19.           High Blood Pressure Screen (Female) on  01/09/19.           High Blood Pressure Screen (Female) on  12/24/18.           High Blood Pressure Screen (Female) on  12/10/18.           High Blood Pressure Screen (Female) on  11/09/18.           High Blood Pressure Screen (Female) on  10/10/18.           High Blood Pressure Screen (Female) on  09/26/18.           High Blood Pressure Screen (Female) on  08/28/18.           High Blood Pressure Screen (Female) on  08/14/18.           High Blood Pressure Screen (Female) on  08/14/18.           High Blood Pressure Screen (Female) on  08/14/18.           Influenza Vaccine on  10/10/18.           Lipid Disorders Screen (Female) on  03/28/19.           Lipid Disorders Screen (Female) on  03/28/19.           Lipid Disorders Screen (Female) on  03/28/19.           Lipid Disorders Screen (Female) on  03/28/19.           Obesity Screen and Counseling (Female) on  07/23/19.           Obesity Screen and Counseling (Female) on  06/11/19.           Obesity Screen and Counseling (Female) on  05/14/19.           Obesity Screen and Counseling (Female) on  04/23/19.           Obesity Screen and Counseling (Female) on  03/28/19.           Obesity Screen and Counseling (Female) on  02/07/19.           Obesity Screen and Counseling (Female) on  11/08/18.           Obesity Screen and Counseling (Female) on  08/14/18.           Tobacco Use Screen (Female) on  06/11/19.           Tobacco Use Screen (Female) on  05/14/19.           Tobacco Use Screen (Female) on  02/07/19.           Tobacco Use Screen (Female) on  08/14/18.           Type 2 Diabetes Mellitus Screen (Female) on  03/28/19.           Type 2 Diabetes Mellitus Screen (Female) on  12/24/18.        Review / Management   Results  review:  Lab results   6/28/2019 2:35 PM CDT INR 1.9   6/13/2019 3:41 PM CDT INR 1.8   6/6/2019 11:39 AM CDT INR 1.4   5/23/2019 3:33 PM CDT INR 1.8   4/22/2019 2:57 PM CDT INR 2.4   .       Impression and Plan   Diagnosis     Diabetes Mellitus Type II, Controlled (KNQ77-CP E11.9).     Dyslipidemia, goal LDL below 100 (UUE71-EP E78.5).       Counseled: Patient,  Review of instruction on AADE7 Self Care Behaviors;   Healthy Eating - Adult daughter is helping pt with some meals.  Reviewed the basics of carbohydrates and a carb controlled meal plan.  Discussed importance of controlling carb intake as pt would like to stay off DM meds.  Pt does not have control over what foods are for meals on wheels but will ask for sugar free pudding for desserts and will occ have double the vegetables.  Continue to consume high fiber and increase intake of nonstarchy vegetables.  Diabetic plate method as a general rule, basic reading food labels, appropriate portion sizes, well balanced meals.  Patient is provided with snack and meal ideas to incorporate dietary recommendations, meal planning and preparation  Reviewed education on dietary sources of omega 3 FA and soluble fiber 10gm/d ay to help maintain good LDL cholesterol.  Discussed ideas for snacks  Being Active - Education on how exercise helps with : encouraged 15 walk BID   - lowering BG by increasing the muscles ability to take up and use glucose   - weight loss   - healthier heart (improve lipid profile)   - improve sleep, mood, energy   - decrease stress  Monitoring - Reviewed recommended testing schedule and blood glucose target levels.  Again encouraged to test pre/ 2 hr pp the largest meal of the day a couple times/ week for pt to understand how intake significantly affects glucose readings.    Taking Medication - Discussed potential need to add medication in the future with the natural progression of diabetes and pt does not want medication.  Pt is willing to continue  to adjust diet and lifestyle.    Problem Solving -  medical support team assistance, resources provided (written); good control of stress  Healthy Coping - support of family and medical support team   Reducing Risks - Detailed education on potential complications associated with uncontrolled diabetes and prevention recommendations.  Recommendations include: annual eye exam, good oral cares with brushing BID and flossing daily, routine dental appointments, good foot care tips and shoe wear - discussed monofilament test yearly.  Importance of adequate sleep and good control of BG to prevent developing complications related to uncontrolled DM including nephropathy, neuropathy, retinopathy, and cardiovascular disease.  Weight loss goal of 7% of current body weight pounds as a reasonable goal to help increase insulin sensitivity      Goals:   1.  Practice healthy stress management and get good quality sleep with the goal of 7-8 hours per night.    2.   Physical activity: Recommend increasing chair exercises, walking in place, stay active throughout the day  3.  Eat in a healthy way, per diabetic plate method.  Nutrition reference: Eat 3 meals a day; snacks are optional.  A meal is three or more food groups; make it colorful for better nutrition.  Incorporate TLC dietary heart healthy guidelines.  **continue to increasing fiber in diet, more vegetarian meals, fish 2+x/ week)  4.  Read handouts provided.  F/u 4 month  5.  Pt to monitor BG BID on 2 - 3 days/ week.  BG goals: Fasting and before meals 80 - 130 mg/dL, 2 hrs after the start of a meal 80 - 160 mg/dL.           Professional Services   Time spent with pt 60 min   champ OCASIO

## 2022-02-15 NOTE — RESULTS
Anticoagulation Therapy Entered On:  4/22/2019 3:00 PM CDT    Performed On:  4/22/2019 2:57 PM CDT by Jennifer Astudillo RN               Warfarin Management   Week 1 Sunday Dose :   5    Week 1 Monday Dose :   5    Week 1 Tuesday Dose :   5    Week 1 Wednesday Dose :   5    Week 1 Thursday Dose :   5    Week 1 Friday Dose :   7.5    Week 1 Saturday Dose :   5    Week 1 Dose Total :   37.5    Week 2 Sunday Dose :   5    Week 2 Monday Dose :   5    Week 2 Tuesday Dose :   5    Week 2 Wednesday Dose :   5    Week 2 Thursday Dose :   5    Week 2 Friday Dose :   7.5    Week 2 Saturday Dose :   5    Week 2 Dose Total :   37.5    Planned Duration :   Indefinite   Indication :   Atrial fibrillation   Warfarin Management Comments :   [facility]  [address], [address]  [phone]  Capillary Specimen     International Normalization Ratio :   2.4    INR Range :   2.0 - 3.0   INR Therapeutic Range :   Yes   Warfarin Abnormal Findings :   missed single dose last week   Anticoagulation Recheck :   4 weeks   Warfarin Physician :   Hilario Whiting MD   Warfarin Special Instructions :   INR = 2.4 per POC Patient is to stay on 7.5mg warfarin on Fri and 5mg the rest of the days of thedays of the  week. Recheck INR in 4 weeks per protocol. Patient aadvised in office. Written and verbal.   Jennifer Astudillo RN - 4/22/2019 2:57 PM CDT

## 2022-02-15 NOTE — TELEPHONE ENCOUNTER
---------------------  From: Itzel Ramos CMA (Phone Messages Pool ([id number]_[facility]))   To: "[employer] Message Pool ([id number] - [place]);     Sent: 12/28/2020 12:05:30 PM CST  Subject: General Message       PCP:  NINFA    Medication:  Levothyroxine  Last Filled:  7/21/2020    Quantity: 90  Refills:  1    Date of last office visit and reason:  _  Date of last labs pertaining to condition:  3/24/2020    Note:  Patients daughter Collette called for medication refill. [phone]      Return to Clinic order placed?  _7/21/2020---------------------  From: Itzel Child LPN ("[employer] Message Pool ([id number] - [place]))   To: Hilario Whiting MD;     Sent: 12/28/2020 12:07:38 PM CST  Subject: FW: General Message---------------------  From: Hilario Whiting MD   To: "[employer] Message Pool ([id number] - [place]);     Sent: 12/28/2020 12:47:20 PM CST  Subject: RE: General Message     ok for one yearnotified that medication was sent to pharmacy

## 2022-02-15 NOTE — PROGRESS NOTES
Patient:   GERHARD HOWE            MRN: [id number]            FIN: [id number]               Age:   89 years     Sex:  Female     :  11/10/1929   Associated Diagnoses:   Type 2 diabetes mellitus with other circulatory complications; S/P placement of cardiac pacemaker; Long term (current) use of anticoagulants; Osteoarthritis; White coat syndrome with hypertension; Statin intolerance; Frailty; Carotid artery plaque; Paroxysmal atrial fibrillation; Hypothyroidism (acquired); Diabetic peripheral neuropathy   Author:   Hilario Whiting MD      Visit Information   Visit type:  Scheduled follow-up.    Accompanied by:  Family member.    Source of history:  Self, Family member.    History limitation:  None.       Chief Complaint   12/10/2019 11:01 AM CST  Patient is here today to f/u ED stay- 19, wanting to know if she should d/c the medications she was given      History of Present Illness    The patient is here for follow up with her daughter.  She has chronic back pain and had an exacerbation and was seen in the emergency department with right buttock and right leg radicular type symptoms.  CT scan showed nothing acute.  She was treated with Tylenol and topical lidocaine patches with resolution of her symptoms.  Her daughter is with and tells me the patient has some confusion and unfamiliar with medications in general and they looked at her medications with an eye to simplify things.  She takes a number of supplements and we decided to discontinue them all except a multivitamin with D.  The patient lives in her own home and does her own cooking and can bathe herself.  No safety issues.  She does not drive.  She has neuropathy but no diabetic kidney disease or eye disease.           Review of Systems   Constitutional:  No weakness, No fatigue, No decreased activity.    Eye:  No recent visual problem.    Respiratory:  No shortness of breath, No cough.    Cardiovascular:  No chest pain, No peripheral edema.     Gastrointestinal:  No nausea, No vomiting, No diarrhea.    Genitourinary:  No dysuria, No hematuria.    Hematology/Lymphatics:  Bruising tendency, No bleeding tendency.    Endocrine:  Negative except as documented in history of present illness.    Musculoskeletal:  Negative except as documented in history of present illness.    Neurologic:  Negative except as documented in history of present illness.       Health Status   Allergies:    Allergic Reactions (Selected)  Moderate  Vicodin (Rash)  Severity Not Documented  Ancef (Diarrhea)  Calan (No reactions were documented)  Ciprofloxacin (No reactions were documented)  Phenobarbital (Tremors)  Probiotic (Rash..)  Simvastatin (Gi upset)   Medications:  (Selected)   Prescriptions  Prescribed  Flonase 50 mcg/inh nasal spray: = 2 spray(s), nasal, daily, # 1 EA, 11 Refill(s), Type: Maintenance, Pharmacy: [facility] [id number], 2 spray(s) Nasal daily  Metoprolol Succinate  mg oral tablet, extended release: See Instructions, Instructions: TAKE ONE TABLET BY MOUTH EVERY DAY, # 90 tab(s), 3 Refill(s), Type: Soft Stop, Pharmacy: [facility] [id number], TAKE ONE TABLET BY MOUTH EVERY DAY  levothyroxine 125 mcg (0.125 mg) oral tablet: = 1 tab(s) ( 125 mcg ), po, daily, # 90 tab(s), 3 Refill(s), Type: Soft Stop, Pharmacy: [facility] [id number], 1 tab(s) Oral daily,x90 day(s)  warfarin 5 mg oral tablet: See Instructions, Instructions: 1.5 tabs Fridays and one every other day., # 100 EA, 3 Refill(s), Type: Soft Stop, Pharmacy: [facility] [id number], 1.5 tabs Fridays and one every other day.  Suspended  digoxin 125 mcg (0.125 mg) oral tablet: = 1 tab(s) ( 125 mcg ), po, daily, # 90 tab(s), 3 Refill(s), Type: Maintenance, Pharmacy: [facility] [id number], 1 tab(s) Oral daily  Documented Medications  Documented  acetaminophen: 0 Refill(s), Type: Maintenance   Problem list:    All Problems  Allergic rhinitis / SNOMED CT 716255044 / Confirmed  Benign  positional vertigo / SNOMED CT 946749775 / Confirmed  Bilateral hearing loss / SNOMED CT 885101491 / Confirmed  Carotid artery plaque / SNOMED CT 710043711 / Confirmed  Diabetic peripheral neuropathy / SNOMED CT 9963907337 / Confirmed  Dyslipidemia, goal LDL below 100 / SNOMED CT 98865139 / Confirmed  Frailty / SNOMED CT 659643587 / Confirmed  History of mitral valve insufficiency / SNOMED CT 032250818 / Confirmed  Hypothyroidism (acquired) / SNOMED CT 509184207 / Confirmed  Long term (current) use of anticoagulants / SNOMED CT 248811986 / Confirmed  Obese / SNOMED CT 2947937555 / Probable  Osteoarthritis / SNOMED CT 8066007066 / Confirmed  Paroxysmal atrial fibrillation / SNOMED CT 889097411 / Confirmed  S/P placement of cardiac pacemaker / SNOMED CT 625185752 / Confirmed  Seborrheic dermatitis / SNOMED CT 52092141 / Confirmed  Statin intolerance / SNOMED CT 00041895 / Confirmed  Symptomatic varicose veins / SNOMED CT 886939595 / Confirmed  Tinnitus / SNOMED CT 326306952 / Confirmed  Type 2 diabetes mellitus with other circulatory complications / SNOMED CT 689276348 / Confirmed  White coat syndrome with hypertension / SNOMED CT 7818729732 / Confirmed  Resolved: Inpatient stay / SNOMED CT 086069563  Resolved: Inpatient stay / SNOMED CT 211563088  Resolved: Inpatient stay / SNOMED CT 842299538  Canceled: Diabetic Neuropathy, Type II Diabetes Mellitus / ICD-9-.60  Canceled: Hypertension / SNOMED CT 9112219229  Canceled: Rhinitis, chronic / SNOMED CT 141487431  Canceled: White coat hypertension / SNOMED CT 8608877202  Canceled: White coat syndrome with hypertension / SNOMED CT 4074234656      Histories   Past Medical History:    Active  Type 2 diabetes mellitus with other circulatory complications (785875183): Onset on 1/1/1999 at 69 years.  Osteoarthritis (2862389056)  Dyslipidemia, goal LDL below 100 (44623833)  Paroxysmal atrial fibrillation (860455000)  Hypothyroidism (acquired) (216832109)  Allergic rhinitis  (207644386)  Obese (1779179357)  Benign positional vertigo (184679452)  Statin intolerance (33966496)  Long term (current) use of anticoagulants (668866413)  Bilateral hearing loss (607427309)  Tinnitus (099186266)  S/P placement of cardiac pacemaker (576716725)  White coat syndrome with hypertension (6010066915)  Resolved  Inpatient stay (008036156): Onset on 2019 at 89 years.  Resolved on 2019 at 89 years.  Comments:  2019 CDT 6:55 AM MEG - Leonarda Padilla  @[place], [place] - Postoperative surgical complication involving subcutaneous tissue  Inpatient stay (955512201): Onset on 2012 at 83 years.  Resolved.  Comments:  2019 CDT 6:54 AM Leonarda Flores  @[facility], [place] - Vertigo  Inpatient stay (046522156): Onset on 2010 at 80 years.  Resolved.  Comments:  2019 CDT 6:53 AM MEG - Leonarda Padilla  @[facility], [place] - Atrial fibrillation with an episode of hypotension   Family History:    Leukemia  Father ()  Gout  Brother  Mother ()  Diabetes mellitus  Mother ()  Hypertension  Mother ()  Parkinson's disease  Sister  Valvular heart disease  Brother ()  MS - Multiple sclerosis  Sister  CABG - Coronary artery bypass graft  Mother ()  MI - Myocardial infarction  Brother ()  CAD - Coronary artery disease  Father ()  Mother ()     Procedure history:    Replacement of pacemaker pulse generator (8930234) on 2019 at 89 Years.  Comments:  2019 10:01 AM CDT - Aida Contreras  Implanted left pectoral.   [employer]onic [id number]  [id number]  Phacoemulsification of cataract with intraocular lens implantation (1621936633) on 3/27/2018 at 88 Years.  Comments:  2018 9:31 AM CDT - Lucy Dawson  Left.  Colonoscopy (858985973) on 2012 at 82 Years.  Comments:  2012 8:59 AM CDT - Henok GANDARA, Hilario KIM-BSO on 2012 at 82 Years.  Hysteroscopy, D&C, polypectomy  on 11/4/2011 at 81 Years.  Implantation of heart pacemaker (4053536462) in 2009 at 80 Years.  Replacement of right knee joint (3940320206) in the month of 11/2008 at 78 Years.  Replacement of left knee joint (6937384453) on 12/10/2007 at 78 Years.  Flexible sigmoidoscopy (12439550) on 6/13/2006 at 76 Years.  Cholecystectomy (98068169) in 1951 at 22 Years.  Repair of umbilical hernia (37597268) in 1949 at 20 Years.  Appendectomy (931987086) in 1947 at 18 Years.  Plastic repair of rotator cuff of shoulder (233722904).  Pregnancy (482339978).  Comments:  4/6/2010 9:08 AM CDT - Vaishali Cortés  full term X 3  Extracapsular cataract extraction and insertion of intraocular lens (272851074).  Comments:  5/11/2018 1:14 PM CDT - Lucy Dawson.   Social History:        Alcohol Assessment: Denies Alcohol Use            Never      Tobacco Assessment: Denies Tobacco Use      Substance Abuse Assessment: Denies Substance Abuse      Employment and Education Assessment            Retired      Home and Environment Assessment            Marital status: .        Physical Examination   Vital Signs   12/10/2019 11:01 AM CST Temperature Tympanic 97.5 DegF  LOW    Peripheral Pulse Rate 78 bpm    HR Method Electronic    Systolic Blood Pressure 158 mmHg  HI    Diastolic Blood Pressure 88 mmHg  HI    Mean Arterial Pressure 111 mmHg    BP Site Right arm    BP Method Electronic      Measurements from flowsheet : Measurements   12/10/2019 11:01 AM CST Height Measured - Standard 64 in    Weight Measured - Standard 155.08 lb    BSA 1.78 m2    Body Mass Index 26.62 kg/m2  HI      General:  Alert and oriented, No acute distress.    Eye:  Normal conjunctiva.    HENT:  Normocephalic, Normal hearing, Oral mucosa is moist.    Neck:  Supple, Non-tender, No carotid bruit, No lymphadenopathy, No thyromegaly.    Respiratory:  Lungs are clear to auscultation, Respirations are non-labored.    Cardiovascular:  Normal rate, Regular rhythm,  No murmur, No gallop, Normal peripheral perfusion, No edema, Pacemaker.    Gastrointestinal:  Soft, Non-tender.    Musculoskeletal:  Walks with walker, Hips and pelvis nontender.    Integumentary:  No pallor.    Feet:  Normal by visual exam, Normal pulses, absent monofilament, and decreased vibration.    Neurologic:  Normal motor function, Cranial Nerves II-XII are grossly intact.    Cognition and Speech:  Speech clear and coherent, Functional cognition intact.    Psychiatric:  Cooperative, Appropriate mood & affect.       Review / Management   Results review:  Lab results   11/20/2019 9:16 AM CST INR 2.0   10/23/2019 3:37 PM CDT INR 2.3   9/19/2019 2:34 PM CDT INR 2.5   .       Impression and Plan   Diagnosis     Type 2 diabetes mellitus with other circulatory complications (DKY65-MD E11.59).     S/P placement of cardiac pacemaker (TOF17-OE Z95.0).     Long term (current) use of anticoagulants (VXN63-JZ Z79.01).     Osteoarthritis (JUN67-GU M19.90).     White coat syndrome with hypertension (NVI94-NS I10).     Statin intolerance (CDA86-PO Z88.9).     Frailty (EMU21-JP R54).     Carotid artery plaque (VDP91-OD I65.29).     Paroxysmal atrial fibrillation (EHJ04-MG I48.0).     Hypothyroidism (acquired) (BQX98-TY E03.9).     Diabetic peripheral neuropathy (GOP40-XC E11.42).     Course:  Progressing as expected.    Patient Instructions:       Counseled: Patient, Family, Regarding medications, Diet, Activity, Verbalized understanding.    Orders     Orders (Selected)   Outpatient Orders  Ordered  Return to Clinic (Request): RFV: Cardio FU with Dr Cuadra, Return in 1 year  Return to Clinic (Request): Return in 4 mo w/ D. Silverio bring meter and log  Return to Office (Request): RFV: Hgb A1c q3-6. BMP, CBC, TSH, lipids, BENITO q12.  Ordered (In Transit)  Basic Metabolic Panel* ([facility]): Specimen Type: Serum, Collection Date: 12/10/19 11:36:00 CST  CBC (h/h, RBC, indices, WBC, Plt)* ([facility]): Specimen Type: Blood, Collection Date:  12/10/19 11:34:00 CST  Hemoglobin A1c* ([facility]): Specimen Type: Blood, Collection Date: 12/10/19 11:34:00 CST  Prothrombin time-INR* ([facility]): Specimen Type: Blood, Collection Date: 12/10/19 11:34:00 CST  Order  RTC (Request): RFV: Wellness, diabetes, Return in 6 months, Instructions: lab before visit  Prescriptions  Prescribed  Flonase 50 mcg/inh nasal spray: = 2 spray(s), nasal, daily, # 1 EA, 11 Refill(s), Type: Maintenance, Pharmacy: [facility] [address], 2 spray(s) Nasal daily  Metoprolol Succinate  mg oral tablet, extended release: See Instructions, Instructions: TAKE ONE TABLET BY MOUTH EVERY DAY, # 90 tab(s), 3 Refill(s), Type: Soft Stop, Pharmacy: [facility] [address], TAKE ONE TABLET BY MOUTH EVERY DAY  levothyroxine 125 mcg (0.125 mg) oral tablet: = 1 tab(s) ( 125 mcg ), po, daily, # 90 tab(s), 3 Refill(s), Type: Soft Stop, Pharmacy: [facility] [address], 1 tab(s) Oral daily,x90 day(s)  warfarin 5 mg oral tablet: See Instructions, Instructions: 1.5 tabs Fridays and one every other day., # 100 EA, 3 Refill(s), Type: Soft Stop, Pharmacy: [facility] [address], 1.5 tabs Fridays and one every other day.  Suspended  digoxin 125 mcg (0.125 mg) oral tablet: = 1 tab(s) ( 125 mcg ), po, daily, # 90 tab(s), 3 Refill(s), Type: Maintenance, Pharmacy: [facility] [address], 1 tab(s) Oral daily  Documented Medications  Documented  acetaminophen: 0 Refill(s), Type: Maintenance.

## 2022-02-15 NOTE — TELEPHONE ENCOUNTER
---------------------  From: Madiha Britt RN   To: [employer] Message Pool ([id number]_[facility]);     Cc: INR Pool ( 32224_[facility]);      Sent: 11/18/2020 1:52:48 PM CST  Subject: INR Home Testing     Per [facility] RN - patient would like to see possibility of having INR home testing as it is hard for her daughter to get her into clinic. Enrollment form printed and put in [employer] box to sign.  (please return form to INR nurse to fax after completed)---------------------  From: Anastasia Moody CMA ([employer] Message Pool (32224_[place] [facility] [place]))   To: Hilario Whiting MD;     Sent: 11/19/2020 8:48:34 AM CST  Subject: FW: INR Home Testing     Form in your box.---------------------  From: Hilario Whiting MD   To: [employer] Message Pool (32224_[place] [facility] [place]);     Sent: 11/19/2020 9:40:58 AM CST  Subject: RE: INR Home Testing     okReturned to INR.Form faxed

## 2022-02-15 NOTE — TELEPHONE ENCOUNTER
---------------------  From: Sari Turner CMA (Phone Messages Pool (74905_[facility]))   To: INR Pool ( 78824_[facility]);     Sent: 5/3/2021 3:38:29 PM CDT  Subject: Refills/questions     PCP:   NINFA      Time of Call:  320pm       Person Calling:  Pt's daughter Joanna  Phone number:  [phone]      Note:   Daughter calls with a few questions    1. Needs refill of Citalopram-advised refills sent to pharmacy on 4/8 and just needs to call to get refills.   2. Needs Seroquel refill-advised JDL is weaning off and wanted pt to have a 1 mo f/u in early May to discuss remaining on vs d/c Seroquel. Pt has 1 week left of med and daughter will schedule an appt before pt runs out.   3. Requesting a refill of warfarin. We do not have INR documented since Feb 2021. Pts daughter monitors/reports her INR's at home through [facility]-she said she has not gotten calls from us in some time re: dosing. I advised I would send a msg to our INR nurses to review/refill her warfarin for her. She agreed.     Last office visit and reason:  Dementia on 4/8Refills sent to pharmacy. Called and left message for Collete on identified voicemail letting her know this was done.

## 2022-02-15 NOTE — TELEPHONE ENCOUNTER
---------------------  From: Ijeoma Vázquez RN (Phone Messages Pool (32224_[facility]))   To: [facility]FS Message Pool (32224_[place] [facility] [place]);     Sent: 12/7/2021 10:54:26 AM CST  Subject: Appointment today     Care giver calling stating that she has come down with the same illness that Rebecca has.  At first said that she was the only one available to bring the pt to the appointment but then after further discussion realized her sister would be able to bring the pt in and she would be able to take the afternoon off of work.  No changes or approval needed.noted

## 2022-02-15 NOTE — TELEPHONE ENCOUNTER
---------------------  From: Gosia Benítez CMA (Phone Messages Pool ([id number]_[place] [facility])   To: Hilario Whiting MD;     Sent: 10/14/2020 4:40:08 PM CDT  Subject: Pt call     Rhea Zimmerman PT at [facility] at [address].    Would like verbal order for 1 further PT visit this week and 1x/wk for the next 2 weeks for leg strengthening.    Can call her at [phone]---------------------  From: Hilario Whiting MD   To: Phone Messages Pool (00495_[place] [facility]);     Sent: 10/14/2020 4:42:01 PM CDT  Subject: RE: Pt call     okLVM for Rhea that is ok for PT.

## 2022-02-15 NOTE — PROGRESS NOTES
Chief Complaint  3 weeks of sinus pain, wakes up with bloody nasal drainage  History of Present Illness  Rebecca is an 87-year-old on warfarin who complains of 3 weeks of frequent epistaxis. ?She has had mild sinus pressure but no purulent drainage she has been using fluticasone nasal spray which she does not always use.  Review of Systems  No fevers, chills, other bleeding, cough, dyspnea.  Problem List/Past Medical History  Ongoing  Allergic Rhinitis  Benign Positional Vertigo  Bilateral Hearing Loss  Carotid artery plaque  Diabetes Mellitus Type II, Controlled  Diabetic Neuropathy, Type II Diabetes Mellitus  Dyslipidemia, goal LDL below 100  History of mitral valve insufficiency  HTN, goal below 140/90  Hypothyroidism (acquired)  Long-Term (Current) Use of Anticoagulants, INR Goal 2.0-3.0  Obese  Osteoarthritis  Paroxysmal atrial fibrillation  Rhinitis, chronic  S/P placement of cardiac pacemaker  Seborrheic dermatitis  Statin intolerance  Symptomatic varicose veins  Tinnitus  White coat hypertension  White Coat Hypertension  Resolved  *Hospitalized@[facility] - Atrial fibrillation with an episode of hypotension  *Hospitalized@[facility] - Vertigo  Procedure/Surgical History  Colonoscopy (05/23/2012),  LAVH-BSO (04/06/2012),  Hysteroscopy, D&C, polypectomy (11/04/2011),  Implantation of heart pacemaker (2009),  Replacement of right knee joint (11/2008),  Replacement of left knee joint (12/10/2007),  Flexible sigmoidoscopy (06/13/2006),  Cholecystectomy (1951),  Repair of umbilical hernia (1949),  Appendectomy (1947),  Plastic repair of rotator cuff of shoulder,  Pregnancy.  Home Medications  atenolol 50 mg oral tablet, 50 mg, 1 tab(s), po, daily, 3 refills  digoxin 125 mcg (0.125 mg) oral tablet, 125 mcg, 1 tab(s), po, daily, 3 refills  Flonase 50 mcg/inh nasal spray, 2 spray(s), nasal, daily, 11 refills  levothyroxine 125 mcg (0.125 mg) oral tablet  pravastatin 10 mg oral tablet, 10 mg, 1 tab(s), po, daily, 3  refills  warfarin 5 mg oral tablet  Allergies  Vicodin?(rash)  Calan  phenobarbital  probiotic?(Rash..)  simvastatin?(GI upset)  Social History  Family History  CA - Lung cancer: Spouse.  CABG - Coronary artery bypass graft: Mother.  CAD - Coronary artery disease: Mother  and Father.  Diabetes mellitus: Mother.  Gout: Mother  and Brother.  Hypertension: Mother.  Leukemia: Father.  MI - Myocardial infarction: Brother.  MS - Multiple sclerosis: Sister.  Parkinson's disease: Sister.  Valvular heart disease: Brother.  Immunizations  Physical Exam  Vitals & Measurements  HR:?81?(Peripheral)?  BP:?154/80?  HT:?64?in?  WT:?154?lb?  BMI:?26.43?  Patient appears comfortable. ?Alert and oriented. ?HEENT exam: Nasal passages unremarkable, sinuses nontender. ?Next thyromegaly. ?Chest clear to auscultation percussion. ?Cardiac exam is irregular.  Lab Results  Diagnostic Results  INR 2.5 on March 17  Assessment/Plan  Allergic Rhinitis  Discontinue fluticasone nasal recurrent epistaxis.  Epistaxis  Stop fluticasone and hold warfarin for 3-4 days. ?If bleeding continues or sinus pressure persists call.  Long-Term (Current) Use of Anticoagulants, INR Goal 2.0-3.0  Stable. ?Recent INR therapeutic.  White Coat Hypertension  Blood pressure remains controlled at home.

## 2022-02-15 NOTE — LETTER
(Inserted Image. Unable to display)       January 10, 2019      GERHARD HOWE  [address]  [place], [place] [id number]          Dear GERHARD,      Thank you for selecting [facility] for your healthcare needs.     Our records indicate you are due for the following services:     White Coat Hypertension Check ~ Please remember to bring any at-home blood pressure readings with you to your appointment.    To schedule an appointment or if you have further questions, please contact your primary clinic:   [facility]       [phone]   [facility]       [phone]              [facility]     [phone]    Powered by [employer] and [employer]    Sincerely,    Hilario Whiting MD, FACP  
Patient/Caregiver provided printed discharge information.

## 2022-02-15 NOTE — NURSING NOTE
Eduardo Fall Risk Scale Score Entered On:  7/21/2020 8:52 AM CDT    Performed On:  7/21/2020 8:52 AM CDT by Sravanthi Dietz Fall Risk   History of Fall in Last 3 Months Eduardo :   Yes   Sravanthi Dietz - 7/21/2020 8:52 AM CDT

## 2022-02-15 NOTE — PROGRESS NOTES
Chief Complaint  vericose veins, having pain in both legs  History of Present Illness  Rebecca complains of pain in her left thigh 2 days ago. ?She noticed that her varicose veins had gotten worse as well. ?The pain is improved but was severe for a time. ?She is on warfarin for chronic atrial fibrillation. ?Has had no bleeding. ?She has peripheral sensory neuropathy but does not describe burning or neuropathic pain.  Review of Systems  Diabetes well controlled by her home readings. ?Blood pressure well controlled by home readings. ?No recent illness, fever, chills, dyspnea, chest pain.  Problem List/Past Medical History  Ongoing  Allergic Rhinitis  Benign Positional Vertigo  Bilateral Hearing Loss  Carotid artery plaque  Diabetes Mellitus Type II, Controlled  Diabetic Neuropathy, Type II Diabetes Mellitus  Dyslipidemia, goal LDL below 100  History of mitral valve insufficiency  HTN, goal below 140/90  Hypothyroidism (acquired)  Long-Term (Current) Use of Anticoagulants, INR Goal 2.0-3.0  Obese  Osteoarthritis  Paroxysmal atrial fibrillation  Rhinitis, chronic  S/P placement of cardiac pacemaker  Seborrheic dermatitis  Statin intolerance  Symptomatic varicose veins  Tinnitus  White coat hypertension  White Coat Hypertension  Resolved  *Hospitalized@[facility] - Atrial fibrillation with an episode of hypotension  *Hospitalized@[facility] - Vertigo  Procedure/Surgical History  Colonoscopy (05/23/2012),  LAVH-BSO (04/06/2012),  Hysteroscopy, D&C, polypectomy (11/04/2011),  Implantation of heart pacemaker (2009),  Replacement of right knee joint (11/2008),  Replacement of left knee joint (12/10/2007),  Flexible sigmoidoscopy (06/13/2006),  Cholecystectomy (1951),  Repair of umbilical hernia (1949),  Appendectomy (1947),  Plastic repair of rotator cuff of shoulder,  Pregnancy.  Home Medications  atenolol 50 mg oral tablet, 50 mg, 1 tab(s), po, daily  digoxin 125 mcg (0.125 mg) oral tablet, 125 mcg, 1 tab(s), po, daily, 3  refills  Flonase 50 mcg/inh nasal spray, 2 spray(s), nasal, daily, 11 refills  levothyroxine 125 mcg (0.125 mg) oral tablet  pravastatin 10 mg oral tablet, 10 mg, 1 tab(s), po, daily, 3 refills  warfarin 5 mg oral tablet  Allergies  Vicodin?(rash)  Calan  phenobarbital  probiotic?(Rash..)  simvastatin?(GI upset)  Social History  Family History  CA - Lung cancer: Spouse.  CABG - Coronary artery bypass graft: Mother.  CAD - Coronary artery disease: Mother  and Father.  Diabetes mellitus: Mother.  Gout: Mother  and Brother.  Hypertension: Mother.  Leukemia: Father.  MI - Myocardial infarction: Brother.  MS - Multiple sclerosis: Sister.  Parkinson's disease: Sister.  Valvular heart disease: Brother.  Immunizations  Physical Exam  Vitals & Measurements  HR:?83?(Peripheral)?  BP:?162/74?  HT:?64?in?  WT:?156?lb?  BMI:?26.77?  Patient appears comfortable. ?Alert and oriented. ?Chest clear. ?Cardiac exam regular. ?Extremities have a trace of pitting edema. ?She has large superficial varicosities from below to above the left knee. ?No clot warmth or erythema appreciated. ?Left knee is nontender. ?Strength is intact in the left leg.  Lab Results  INR on April 27 was 2.0  Diagnostic Results  Venous ultrasound left leg is negative  Assessment/Plan  Diabetic Neuropathy, Type II Diabetes Mellitus  Long-Term (Current) Use of Anticoagulants, INR Goal 2.0-3.0  Osteoarthritis  Symptomatic varicose veins  Symptomatic varicose veins refer to Dr. Buenrostro.  Ordered:  Referral (Request)

## 2022-02-15 NOTE — TELEPHONE ENCOUNTER
---------------------  From: Lindsey Bourne CMA   To: Hilario Whiting MD;     Sent: 2/26/2020 9:25:33 AM CST  Subject: Itchy legs     Marion called this morning to set up an appointment with NINFA.  When she was told JDL was not in clinic this week, she became agitated and very upset and the  staff asked CC to call her.    CC called and spoke with her.  Her legs are still bothering her a lot.  They are still has rash and very itchy.  She was in to see [facility] on Saturday.  She is taking the Loratadine that was prescribed for her.  She feels like NINFA just knows what she needs.  She would like us to call her daughter to get the facts of what was given and what she used prior to the rash starting.    [facility] Note from 02/22/2020:  History of Present Illness patient is 89 year old here with daughter with concerns about rash on her legs  one week ago she was given a new topical lotion with essential oils including rosemary and peppermint to try on her legs  used on left leg on Saturday and both on Sunday  on Monday morning woke up with rash on both legs  legs have been itchy and irritated, developed several small open areas  rash is itchy, noted small amount of bleeding from one lesion  patient noted to have poor memory  has a history of neuropathy and diabetes mellitus, DM has been adequately controlled with diet in the past although most recent A1c was up a little from prior measurements at 6.9  has not been able to check blood sugars recently as she is out of supplies, had been getting mail order but company went out of business  most recent diabetes follow up in December, up to date on labs  checks blood sugars once a day, reports that they are good although daughter notes it has been more than two weeks since she has checked  Assessment/Plan Allergic reaction (T78.40XA) symptoms appear allergic, has discontinued new topical, continue local cares and add antihistamine  if not improving with loratadine, can call and  will do short course of prednisone but given potential effect on blood sugar will try antihistamines first   Ordered:   loratadine, = 1 tab(s) ( 10 mg ), Oral, daily, # 14 tab(s), 0 Refill(s), Type: Maintenance, Pharmacy: [facility] #[id number], 1 tab(s) Oral daily,x14 day(s), (Ordered)   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 If needs new meter or other supplies, can let us know. Diabetes follow up currently up to date.   Ordered:   Miscellaneous Rx Supply, glucose meter test strips and lancets, See Instructions, Instructions: check blood sugar daily, Supply, # 100 EA, 4 Refill(s), Type: Maintenance, Pharmacy: [facility] #[id number], patient will bring her glucose meter to you so that you know what..., (Ordered)    Medications          *denotes recorded medication          Glucose meter, test strips and lancets: See Instructions, check blood sugar daily; DX: E11.59, 100 EA, 4 Refill(s).          ACCU-CHECK GUIDE TEST STRIPS: See Instructions, TEST BLOOD SUGAR DAILY, 100 EA, 4 Refill(s).          ACCU-CHECK GUIDE METER: See Instructions, CHECK BLOOD SUGAR DAILY, 1 EA, 0 Refill(s).          ACCU-CHECK GUIDE LANCETS: See Instructions, TEST BLOOD SUGAR DAILY, 100 EA, 4 Refill(s).          *acetaminophen: 0 Refill(s).          Flonase 50 mcg/inh nasal spray: 2 spray(s), nasal, daily, 1 EA, 11 Refill(s).          levothyroxine 125 mcg (0.125 mg) oral tablet: 1 tab(s), Oral, daily, 90 tab(s), 0 Refill(s).          loratadine 10 mg oral tablet: 10 mg, 1 tab(s), Oral, daily, for 14 day(s), 14 tab(s), 0 Refill(s).          Metoprolol Succinate  mg oral tablet, extended release: See Instructions, TAKE ONE TABLET BY MOUTH EVERY DAY, 90 tab(s), 1 Refill(s).          warfarin 5 mg oral tablet: See Instructions, TAKE 1 AND 1/2 TABLETS BY MOUTH FRIDAYS AND 1 TABLET EVERY OTHER DAY, 180 tab(s), 0 Refill(s).        NINFA  do you have any further recommendations for her without seeing her? She said she will f/u when you are back in clinic.    Lindsey Bourne CMA---------------------  From: Hilario Whiting MD   To: Lindsey Bourne CMA;     Sent: 2/26/2020 11:34:55 AM CST  Subject: RE: Itchy legs     Prednisone 20 mg daily for 7 days. F/U if not better.Rx sent to pharmacy, Daughter Collette was notified. She verbalized understanding and agreed.  She had no further questions or concerns.  Lindsey Bourne CMA.

## 2022-02-15 NOTE — NURSING NOTE
Comprehensive Intake Entered On:  4/23/2019 1:28 PM CDT    Performed On:  4/23/2019 1:26 PM CDT by Katalina Hardy MA               Summary   Chief Complaint :   right hip painful, no injury   Weight Measured :   150 lb(Converted to: 150 lb 0 oz, 68.04 kg)    Height Measured :   64 in(Converted to: 5 ft 4 in, 162.56 cm)    Body Mass Index :   25.74 kg/m2 (HI)    Body Surface Area :   1.75 m2   Systolic Blood Pressure :   135 mmHg (HI)    Diastolic Blood Pressure :   77 mmHg   Mean Arterial Pressure :   96 mmHg   Peripheral Pulse Rate :   65 bpm   BP Site :   Right arm   Race :      Languages :   English   Katalina Hardy MA - 4/23/2019 1:26 PM CDT   Health Status   Allergies Verified? :   Yes   Medication History Verified? :   Yes   Katalina Hardy MA - 4/23/2019 1:26 PM CDT   Meds / Allergies   (As Of: 4/23/2019 1:28:07 PM CDT)   Allergies (Active)   Ancef  Estimated Onset Date:   Unspecified ; Reactions:   Diarrhea ; Created By:   Chloe Bruce; Reaction Status:   Active ; Category:   Drug ; Substance:   Ancef ; Type:   Allergy ; Updated By:   Chloe Bruce; Reviewed Date:   2/7/2019 9:39 AM CST      Calan  Estimated Onset Date:   Unspecified ; Created By:   Vaishali Cortés; Reaction Status:   Active ; Substance:   Calan ; Updated By:   Vaishali Cortés; Source:   Paper Chart ; Reviewed Date:   2/7/2019 9:39 AM CST      ciprofloxacin  Estimated Onset Date:   Unspecified ; Created By:   Cherise Christiansen MA; Reaction Status:   Active ; Category:   Drug ; Substance:   ciprofloxacin ; Type:   Allergy ; Updated By:   Cherise Christiansen MA; Reviewed Date:   2/7/2019 9:39 AM CST      phenobarbital  Estimated Onset Date:   Unspecified ; Reactions:   tremors ; Created By:   Chloe Bruce; Reaction Status:   Active ; Category:   Drug ; Substance:   phenobarbital ; Type:   Allergy ; Updated By:   Chloe Bruce; Source:   Paper Chart ; Reviewed Date:   2/7/2019 9:39 AM CST      probiotic  Estimated  Onset Date:   Unspecified ; Reactions:   Rash.. ; Created By:   Sari Turner CMA; Reaction Status:   Active ; Category:   Drug ; Substance:   probiotic ; Type:   Allergy ; Updated By:   Sari Turner CMA; Reviewed Date:   2/7/2019 9:39 AM CST      simvastatin  Estimated Onset Date:   Unspecified ; Reactions:   GI upset ; Created By:   Vaishali Cortés; Reaction Status:   Active ; Substance:   simvastatin ; Type:   Allergy ; Updated By:   Vaishali Cortés; Source:   Paper Chart ; Reviewed Date:   2/7/2019 9:39 AM CST      Vicodin  Estimated Onset Date:   Unspecified ; Reactions:   rash ; Created By:   Mis Donaldson MA; Reaction Status:   Active ; Category:   Drug ; Substance:   Vicodin ; Type:   Allergy ; Severity:   Moderate ; Updated By:   Mis Donaldson MA; Reviewed Date:   2/7/2019 9:39 AM CST        Medication List   (As Of: 4/23/2019 1:28:07 PM CDT)   Prescription/Discharge Order    digoxin  :   digoxin ; Status:   Prescribed ; Ordered As Mnemonic:   digoxin 125 mcg (0.125 mg) oral tablet ; Simple Display Line:   125 mcg, 1 tab(s), po, daily, 90 tab(s), 3 Refill(s) ; Ordering Provider:   Hilario Whiting MD; Catalog Code:   digoxin ; Order Dt/Tm:   2/7/2019 9:55:28 AM          fluticasone nasal  :   fluticasone nasal ; Status:   Prescribed ; Ordered As Mnemonic:   Flonase 50 mcg/inh nasal spray ; Simple Display Line:   2 spray(s), nasal, daily, 1 EA, 11 Refill(s) ; Ordering Provider:   Hilario Whiting MD; Catalog Code:   fluticasone nasal ; Order Dt/Tm:   2/7/2019 9:55:34 AM          levothyroxine  :   levothyroxine ; Status:   Prescribed ; Ordered As Mnemonic:   levothyroxine 125 mcg (0.125 mg) oral tablet ; Simple Display Line:   125 mcg, 1 tab(s), po, daily, for 90 day(s), 90 tab(s), 3 Refill(s) ; Ordering Provider:   Hilario Whiting MD; Catalog Code:   levothyroxine ; Order Dt/Tm:   2/7/2019 9:55:38 AM          metoprolol  :   metoprolol ; Status:   Prescribed ; Ordered As Mnemonic:   Metoprolol  Succinate  mg oral tablet, extended release ; Simple Display Line:   See Instructions, TAKE ONE TABLET BY MOUTH EVERY DAY, 90 tab(s), 3 Refill(s) ; Ordering Provider:   Hilario Whiting MD; Catalog Code:   metoprolol ; Order Dt/Tm:   2/7/2019 9:56:57 AM          pravastatin  :   pravastatin ; Status:   Prescribed ; Ordered As Mnemonic:   pravastatin 10 mg oral tablet ; Simple Display Line:   10 mg, 1 tab(s), PO, Daily, 90 tab(s), 3 Refill(s) ; Ordering Provider:   Hilario Whiting MD; Catalog Code:   pravastatin ; Order Dt/Tm:   2/7/2019 9:57:02 AM          warfarin  :   warfarin ; Status:   Prescribed ; Ordered As Mnemonic:   warfarin 5 mg oral tablet ; Simple Display Line:   See Instructions, 1.5 tabs Fridays and one every other day., 100 EA, 3 Refill(s) ; Ordering Provider:   Hilario Whiting MD; Catalog Code:   warfarin ; Order Dt/Tm:   2/7/2019 9:57:05 AM

## 2022-02-15 NOTE — NURSING NOTE
Comprehensive Intake Entered On:  6/11/2019 3:38 PM CDT    Performed On:  6/11/2019 3:30 PM CDT by Yumiko Escamilla               Summary   Chief Complaint :   c/o hip pain, pt had physical therapy- hips are not getting better    Weight Measured :   147 lb(Converted to: 147 lb 0 oz, 66.68 kg)    Height Measured :   64 in(Converted to: 5 ft 4 in, 162.56 cm)    Body Mass Index :   25.23 kg/m2 (HI)    Body Surface Area :   1.73 m2   Systolic Blood Pressure :   134 mmHg (HI)    Diastolic Blood Pressure :   88 mmHg (HI)    Mean Arterial Pressure :   103 mmHg   Peripheral Pulse Rate :   63 bpm   BP Site :   Right arm   Pulse Site :   Radial artery   BP Method :   Manual   HR Method :   Manual   Temperature Tympanic :   97.2 DegF(Converted to: 36.2 DegC)  (LOW)    Race :      Languages :   English   Yumiko Escamilla - 6/11/2019 3:30 PM CDT   Health Status   Allergies Verified? :   Yes   Medication History Verified? :   Yes   Medical History Verified? :   Yes   Pre-Visit Planning Status :   Completed   Tobacco Use? :   Never smoker   Yumiko Escamilla - 6/11/2019 3:30 PM CDT   Consents   Consent for Immunization Exchange :   Consent Granted   Consent for Immunizations to Providers :   Consent Granted   Yumiko Escamilla - 6/11/2019 3:30 PM CDT   Meds / Allergies   (As Of: 6/11/2019 3:38:35 PM CDT)   Allergies (Active)   Ancef  Estimated Onset Date:   Unspecified ; Reactions:   Diarrhea ; Created By:   Chloe Bruce; Reaction Status:   Active ; Category:   Drug ; Substance:   Ancef ; Type:   Allergy ; Updated By:   Chloe Bruce; Reviewed Date:   6/11/2019 3:35 PM CDT      Calan  Estimated Onset Date:   Unspecified ; Created By:   Vaishali Cortés; Reaction Status:   Active ; Substance:   Calan ; Updated By:   Vaishali Cortés; Source:   Paper Chart ; Reviewed Date:   6/11/2019 3:35 PM CDT      ciprofloxacin  Estimated Onset Date:   Unspecified ; Created By:   Cherise Christiansen MA; Reaction  Status:   Active ; Category:   Drug ; Substance:   ciprofloxacin ; Type:   Allergy ; Updated By:   Cherise Christiansen MA; Reviewed Date:   6/11/2019 3:35 PM CDT      phenobarbital  Estimated Onset Date:   Unspecified ; Reactions:   tremors ; Created By:   Chloe Bruce; Reaction Status:   Active ; Category:   Drug ; Substance:   phenobarbital ; Type:   Allergy ; Updated By:   Chloe Bruce; Source:   Paper Chart ; Reviewed Date:   6/11/2019 3:35 PM CDT      probiotic  Estimated Onset Date:   Unspecified ; Reactions:   Rash.. ; Created By:   Sari Turenr CMA; Reaction Status:   Active ; Category:   Drug ; Substance:   probiotic ; Type:   Allergy ; Updated By:   Sari Turner CMA; Reviewed Date:   6/11/2019 3:35 PM CDT      simvastatin  Estimated Onset Date:   Unspecified ; Reactions:   GI upset ; Created By:   Vaishali Cortés; Reaction Status:   Active ; Substance:   simvastatin ; Type:   Allergy ; Updated By:   Vaishali Cortés; Source:   Paper Chart ; Reviewed Date:   6/11/2019 3:35 PM CDT      Vicodin  Estimated Onset Date:   Unspecified ; Reactions:   rash ; Created By:   Mis Donaldson MA; Reaction Status:   Active ; Category:   Drug ; Substance:   Vicodin ; Type:   Allergy ; Severity:   Moderate ; Updated By:   Mis Donaldson MA; Reviewed Date:   6/11/2019 3:35 PM CDT        Medication List   (As Of: 6/11/2019 3:38:35 PM CDT)   Prescription/Discharge Order    warfarin  :   warfarin ; Status:   Prescribed ; Ordered As Mnemonic:   warfarin 5 mg oral tablet ; Simple Display Line:   See Instructions, 1.5 tabs Fridays and one every other day., 100 EA, 3 Refill(s) ; Ordering Provider:   Hilario Whiting MD; Catalog Code:   warfarin ; Order Dt/Tm:   2/7/2019 9:57:05 AM          pravastatin  :   pravastatin ; Status:   Prescribed ; Ordered As Mnemonic:   pravastatin 10 mg oral tablet ; Simple Display Line:   10 mg, 1 tab(s), PO, Daily, 90 tab(s), 3 Refill(s) ; Ordering Provider:   Hilario Whiting MD;  Catalog Code:   pravastatin ; Order Dt/Tm:   2/7/2019 9:57:02 AM          metoprolol  :   metoprolol ; Status:   Prescribed ; Ordered As Mnemonic:   Metoprolol Succinate  mg oral tablet, extended release ; Simple Display Line:   See Instructions, TAKE ONE TABLET BY MOUTH EVERY DAY, 90 tab(s), 3 Refill(s) ; Ordering Provider:   Hilario Whiting MD; Catalog Code:   metoprolol ; Order Dt/Tm:   2/7/2019 9:56:57 AM          levothyroxine  :   levothyroxine ; Status:   Prescribed ; Ordered As Mnemonic:   levothyroxine 125 mcg (0.125 mg) oral tablet ; Simple Display Line:   125 mcg, 1 tab(s), po, daily, for 90 day(s), 90 tab(s), 3 Refill(s) ; Ordering Provider:   Hilario Whiting MD; Catalog Code:   levothyroxine ; Order Dt/Tm:   2/7/2019 9:55:38 AM          fluticasone nasal  :   fluticasone nasal ; Status:   Prescribed ; Ordered As Mnemonic:   Flonase 50 mcg/inh nasal spray ; Simple Display Line:   2 spray(s), nasal, daily, 1 EA, 11 Refill(s) ; Ordering Provider:   Hilario Whiting MD; Catalog Code:   fluticasone nasal ; Order Dt/Tm:   2/7/2019 9:55:34 AM          digoxin  :   digoxin ; Status:   Prescribed ; Ordered As Mnemonic:   digoxin 125 mcg (0.125 mg) oral tablet ; Simple Display Line:   125 mcg, 1 tab(s), po, daily, 90 tab(s), 3 Refill(s) ; Ordering Provider:   Hilario Whiting MD; Catalog Code:   digoxin ; Order Dt/Tm:   2/7/2019 9:55:28 AM            Home Meds    acetaminophen  :   acetaminophen ; Status:   Documented ; Ordered As Mnemonic:   acetaminophen ; Simple Display Line:   0 Refill(s) ; Catalog Code:   acetaminophen ; Order Dt/Tm:   5/14/2019 1:44:43 PM

## 2022-02-15 NOTE — TELEPHONE ENCOUNTER
---------------------  From: Karen Adair CMA   To: Shimon Nesbitt MD;     Sent: 12/5/2021 8:57:35 AM CST  Subject: Covid results     Per BAM, patients daughter Collette was notified of patients negative covid results. [facility] left a spacer at the  for .

## 2022-02-15 NOTE — TELEPHONE ENCOUNTER
---------------------  From: Yaneth Jones CMA (Phone Messages Pool ([id number] - [place]))   To: Hilario Whiting MD;     Sent: 10/12/2020 9:04:35 AM CDT  Subject: Phone Message     Phone Message    PCP:   NINFA      Time of Call:  0809       Person Calling:  Mariann from [employer]  Phone number:  [phone]    Returned call at: 0900    Note:   Calls to verify [facility] is pt primary physician. Daughter Joanna mentioned JSARAY wanted labs done 2 weeks after starting Aricept. Labs can be drawn by home care they will need orders. Please advise.    Last office visit and reason:  10/05/20 office visit NINFA---------------------  From: Hilario Whiting MD   To: Phone Messages [employer] ([id number]);     Sent: 10/12/2020 10:32:34 AM CDT  Subject: RE: Phone Message     BMP 2 weeks after starting LISINOPRIL.Notified Mariann-- states she does not need anything faxed, she will put in the order and send to J. Also confirmed that it is for Lisinopril not Aricept.

## 2022-02-15 NOTE — LETTER
(Inserted Image. Unable to display)   August 09, 2019      GERHARD HOWE  [address]  [place], [place] [id number]        Dear GERHARD,      Thank you for selecting [facility] ([facility] for your healthcare needs.       CT scan confirms lumbar spinal stenosis which is the cause of your leg discomfort. I do not think that there are any good treatment options, but would be happy to refer you to the spine specialist. It is important to keep moving as tolerated by your leg discomfort.          Please contact me or my assistant at [phone] you have any questions or concerns.     Sincerely,        Hilario Whiting MD

## 2022-02-15 NOTE — NURSING NOTE
Comprehensive Intake Entered On:  5/24/2021 1:44 PM CDT    Performed On:  5/24/2021 1:40 PM CDT by Thalia Boles LPN               Summary   Chief Complaint :   fell backwards 5/17/21 against a cupboard, was not seen at that time, having pain under left breast into the left side of back   Advance Directive :   Yes   Weight Measured :   186.4 lb(Converted to: 186 lb 6 oz, 84.550 kg)    Height Measured :   64 in(Converted to: 5 ft 4 in, 162.56 cm)    Body Mass Index :   31.99 kg/m2 (HI)    Body Surface Area :   1.95 m2   Systolic Blood Pressure :   118 mmHg   Diastolic Blood Pressure :   78 mmHg   Mean Arterial Pressure :   91 mmHg   Peripheral Pulse Rate :   73 bpm   BP Site :   Right arm   BP Method :   Manual   Temperature Tympanic :   97.2 DegF(Converted to: 36.2 DegC)  (LOW)    Oxygen Saturation :   97 %   Race :      Languages :   English   Thalia Boles LPN - 5/24/2021 1:40 PM CDT   Health Status   Allergies Verified? :   Yes   Medication History Verified? :   Yes   Medical History Verified? :   No   Pre-Visit Planning Status :   Completed   Tobacco Use? :   Never smoker   Thalia Boles LPN - 5/24/2021 1:40 PM CDT   Meds / Allergies   (As Of: 5/24/2021 1:44:45 PM CDT)   Allergies (Active)   Ancef  Estimated Onset Date:   Unspecified ; Reactions:   Diarrhea ; Created By:   Chloe Bruce; Reaction Status:   Active ; Category:   Drug ; Substance:   Ancef ; Type:   Allergy ; Updated By:   Chloe Bruce; Reviewed Date:   5/7/2021 1:03 PM CDT      Calan  Estimated Onset Date:   Unspecified ; Created By:   Sravanthi Dietz; Reaction Status:   Active ; Category:   Drug ; Substance:   Calan ; Updated By:   Sravanthi Dietz; Source:   Paper Chart ; Reviewed Date:   5/7/2021 1:03 PM CDT      ciprofloxacin  Estimated Onset Date:   Unspecified ; Created By:   Cherise Christiansen MA; Reaction Status:   Active ; Category:   Drug ; Substance:   ciprofloxacin ; Type:   Allergy ; Updated By:    Cherise Christiansen MA; Reviewed Date:   5/7/2021 1:03 PM CDT      phenobarbital  Estimated Onset Date:   Unspecified ; Reactions:   tremors ; Created By:   Chloe Bruce; Reaction Status:   Active ; Category:   Drug ; Substance:   phenobarbital ; Type:   Allergy ; Updated By:   Chloe Bruce; Source:   Paper Chart ; Reviewed Date:   5/7/2021 1:03 PM CDT      probiotic  Estimated Onset Date:   Unspecified ; Reactions:   Rash.. ; Created By:   Sari Turner CMA; Reaction Status:   Active ; Category:   Drug ; Substance:   probiotic ; Type:   Allergy ; Updated By:   Sari Turner CMA; Reviewed Date:   5/7/2021 1:03 PM CDT      simvastatin  Estimated Onset Date:   Unspecified ; Reactions:   GI upset ; Created By:   Sravanthi Dietz; Reaction Status:   Active ; Category:   Drug ; Substance:   simvastatin ; Type:   Allergy ; Updated By:   Sravanthi Dietz; Source:   Paper Chart ; Reviewed Date:   5/7/2021 1:03 PM CDT      Vicodin  Estimated Onset Date:   Unspecified ; Reactions:   rash ; Created By:   Mis Donaldson MA; Reaction Status:   Active ; Category:   Drug ; Substance:   Vicodin ; Type:   Allergy ; Severity:   Moderate ; Updated By:   Mis Donaldson MA; Reviewed Date:   5/7/2021 1:03 PM CDT        Medication List   (As Of: 5/24/2021 1:44:45 PM CDT)   Prescription/Discharge Order    warfarin  :   warfarin ; Status:   Prescribed ; Ordered As Mnemonic:   warfarin 5 mg oral tablet ; Simple Display Line:   See Instructions, 7.5mg Sun/Tues/Thurs and 5mg rest of days, 90 tab(s), 1 Refill(s) ; Ordering Provider:   Hilario Whiting MD; Catalog Code:   warfarin ; Order Dt/Tm:   5/3/2021 3:42:11 PM CDT          Miscellaneous Rx Supply  :   Miscellaneous Rx Supply ; Status:   Prescribed ; Ordered As Mnemonic:   ACCU-CHECK GUIDE TEST STRIPS ; Simple Display Line:   See Instructions, TEST BLOOD SUGAR DAILY, 100 EA, 11 Refill(s) ; Ordering Provider:   Shimon Nesbitt MD; Catalog Code:   Miscellaneous Rx Supply ; Order Dt/Tm:    7/21/2020 8:47:37 AM CDT          Miscellaneous Rx Supply  :   Miscellaneous Rx Supply ; Status:   Prescribed ; Ordered As Mnemonic:   ACCU-CHECK GUIDE LANCETS ; Simple Display Line:   See Instructions, TEST BLOOD SUGAR DAILY, 100 EA, 11 Refill(s) ; Ordering Provider:   Shimon Nesbitt MD; Catalog Code:   Miscellaneous Rx Supply ; Order Dt/Tm:   7/21/2020 8:47:38 AM CDT          fluticasone nasal  :   fluticasone nasal ; Status:   Prescribed ; Ordered As Mnemonic:   Flonase 50 mcg/inh nasal spray ; Simple Display Line:   2 spray(s), nasal, daily, 1 EA, 11 Refill(s) ; Ordering Provider:   Shimon Nesbitt MD; Catalog Code:   fluticasone nasal ; Order Dt/Tm:   7/21/2020 11:28:21 AM CDT          levothyroxine  :   levothyroxine ; Status:   Prescribed ; Ordered As Mnemonic:   levothyroxine 125 mcg (0.125 mg) oral tablet ; Simple Display Line:   1 tab(s), Oral, daily, 90 tab(s), 3 Refill(s) ; Ordering Provider:   Hilario Whiting MD; Catalog Code:   levothyroxine ; Order Dt/Tm:   12/28/2020 12:52:12 PM CST          donepezil  :   donepezil ; Status:   Suspended ; Ordered As Mnemonic:   Aricept 5 mg oral tablet ; Simple Display Line:   5 mg, 1 tab(s), Oral, hs, 30 tab(s), 5 Refill(s) ; Ordering Provider:   Hilario Whiting MD; Catalog Code:   donepezil ; Order Dt/Tm:   10/5/2020 3:59:19 PM CDT          QUEtiapine  :   QUEtiapine ; Status:   Prescribed ; Ordered As Mnemonic:   SEROquel 25 mg oral tablet ; Simple Display Line:   25 mg, 1 tab(s), Oral, qhs, 30 tab(s), 5 Refill(s) ; Ordering Provider:   Hilario Whiting MD; Catalog Code:   QUEtiapine ; Order Dt/Tm:   5/7/2021 1:19:02 PM CDT          metoprolol  :   metoprolol ; Status:   Prescribed ; Ordered As Mnemonic:   Metoprolol Succinate  mg oral tablet, extended release ; Simple Display Line:   100 mg, 1 tab(s), Oral, daily, 90 tab(s), 0 Refill(s) ; Ordering Provider:   Hilario Whiting MD; Catalog Code:   metoprolol ; Order Dt/Tm:   3/1/2021 11:17:09 AM CST           citalopram  :   citalopram ; Status:   Prescribed ; Ordered As Mnemonic:   citalopram 10 mg oral tablet ; Simple Display Line:   10 mg, 1 tab(s), Oral, daily, 30 tab(s), 11 Refill(s) ; Ordering Provider:   Hilario Whiting MD; Catalog Code:   citalopram ; Order Dt/Tm:   4/8/2021 2:27:40 PM CDT            Home Meds    Miscellaneous Prescription  :   Miscellaneous Prescription ; Status:   Documented ; Ordered As Mnemonic:   Calcium with vitamin D ; Simple Display Line:   1 tab, Oral, daily, 0 Refill(s) ; Catalog Code:   Miscellaneous Prescription ; Order Dt/Tm:   10/26/2020 3:08:29 PM CDT          omega-3 polyunsaturated fatty acids  :   omega-3 polyunsaturated fatty acids ; Status:   Documented ; Ordered As Mnemonic:   Fish Oil ; Simple Display Line:   1 tab, Oral, daily, 0 Refill(s) ; Catalog Code:   omega-3 polyunsaturated fatty acids ; Order Dt/Tm:   10/26/2020 3:10:12 PM CDT          multivitamin  :   multivitamin ; Status:   Documented ; Ordered As Mnemonic:   Daily Multiple Vitamins ; Simple Display Line:   1 tab(s), Oral, daily, 0 Refill(s) ; Catalog Code:   multivitamin ; Order Dt/Tm:   10/26/2020 3:09:47 PM CDT          multivitamin with minerals  :   multivitamin with minerals ; Status:   Documented ; Ordered As Mnemonic:   PreserVision ; Simple Display Line:   1 cap(s), Oral, bid, 0 Refill(s) ; Catalog Code:   multivitamin with minerals ; Order Dt/Tm:   10/26/2020 3:10:54 PM CDT          acetaminophen  :   acetaminophen ; Status:   Documented ; Ordered As Mnemonic:   acetaminophen ; Simple Display Line:   0 Refill(s) ; Catalog Code:   acetaminophen ; Order Dt/Tm:   5/14/2019 1:44:43 PM CDT          ascorbic acid  :   ascorbic acid ; Status:   Documented ; Ordered As Mnemonic:   Vitamin C ; Simple Display Line:   500 mg, Oral, daily, 0 Refill(s) ; Catalog Code:   ascorbic acid ; Order Dt/Tm:   10/26/2020 3:10:37 PM CDT          cholecalciferol  :   cholecalciferol ; Status:   Documented ; Ordered As  Mnemonic:   Vitamin D3 ; Simple Display Line:   2,000 International Unit, Oral, daily, 0 Refill(s) ; Catalog Code:   cholecalciferol ; Order Dt/Tm:   10/26/2020 3:10:48 PM CDT            ID Risk Screen   Recent Travel History :   No recent travel   Family Member Travel History :   No recent travel   Other Exposure to Infectious Disease :   Unknown   COVID-19 Testing Status :   No COVID-19 test performed   Thalia Boles LPN - 5/24/2021 1:40 PM CDT

## 2022-02-15 NOTE — NURSING NOTE
Anticoagulation Therapy Management Entered On:  11/4/2020 1:58 PM CST    Performed On:  11/4/2020 1:57 PM CST by Madiha Britt RN               Anticoagulation Visit Assessment   Type of Visit - Anticoagulation :   Telephone   Anticoagulation Indication :   Atrial fibrillation   Anticoagulant Duration :   Undetermined   Anticoagulation Medication Verified :   Yes   Patient Preferred Contact Method :   Cell   Madiha Britt RN - 11/4/2020 1:57 PM CST   Anticoagulation Patient Assessment Grid   Change in Medications :   Yes   (Comment: Stopped Aricept [Madiha Britt RN - 11/4/2020 1:57 PM CST] )   Madiha Britt RN - 11/4/2020 1:57 PM CST   Patient on Warfarin :   Yes   Madiha Britt RN - 11/4/2020 1:57 PM CST   Warfarin   International Normalization Ratio TR :   2.7    Anticoagulant INR Goal Lower :   2    Anticoagulant INR Goal Upper :   3    Sunday :   5 mg   Monday :   5 mg   Tuesday :   7.5 mg   Wednesday :   5 mg   Thursday :   7.5 mg   Friday :   5 mg   Saturday :   7.5 mg   Total Dose :   42.5 mg   Warfarin Pt Reported Previous Week Dose :    Sun Mon Tues Wed Thurs Fri Sat Weekly Total Dose   Week 1 7.5 mg 5 mg 7.5 mg 5 mg 7.5 mg 5 mg 5 mg 42.5 mg   Week 2  mg  mg  mg  mg  mg  mg  mg  mg   Week 3  mg  mg  mg  mg  mg  mg  mg  mg   Week 4  mg  mg  mg  mg  mg  mg  mg  mg         Patient is taking single or multiple strength tablet(s) :   Single strength tab(s)   One Tab Strength :   5 mg tab   Sunday :   5 mg   Monday :   5 mg   Tuesday :   7.5 mg   Wednesday :   5 mg   Thursday :   7.5 mg   Friday :   5 mg   Saturday :   7.5 mg   Week 1 Total Dose :   42.5 mg   Sunday :   1 tab(s)   Monday :   1 tab(s)   Tuesday :   1.5 tab(s)   Wednesday :   1 tab(s)   Thursday :   1.5 tab(s)   Friday :   1 tab(s)   Saturday :   1.5 tab(s)   Patient Instructions :   INR = 2.7 per [facility]. Per protocol, continue Warfarin 7.5mg Tues/Thurs/Sun and 5mg ROW. Recheck INR in 1 week. Advised  RN by phone.      Madiha Britt RN -  11/4/2020 1:57 PM CST

## 2022-02-15 NOTE — NURSING NOTE
Comprehensive Intake Entered On:  1/10/2020 2:51 PM CST    Performed On:  1/10/2020 2:45 PM CST by Yaneth Jones CMA               Summary   Chief Complaint :   Patient presents for itchy scalp x couple weeks.   Weight Measured :   155 lb(Converted to: 155 lb 0 oz, 70.31 kg)    Height Measured :   64 in(Converted to: 5 ft 4 in, 162.56 cm)    Body Mass Index :   26.6 kg/m2 (HI)    Body Surface Area :   1.78 m2   Systolic Blood Pressure :   140 mmHg (HI)    Diastolic Blood Pressure :   80 mmHg   Mean Arterial Pressure :   100 mmHg   Peripheral Pulse Rate :   68 bpm   BP Site :   Right arm   BP Method :   Manual   HR Method :   Electronic   Temperature Tympanic :   97.0 DegF(Converted to: 36.1 DegC)  (LOW)    Oxygen Saturation :   98 %   Race :      Languages :   English   Yaneth Jones CMA - 1/10/2020 2:45 PM CST   Health Status   Allergies Verified? :   Yes   Medication History Verified? :   Yes   Pre-Visit Planning Status :   Not completed   Tobacco Use? :   Never smoker   Yaneth Jones CMA - 1/10/2020 2:45 PM CST   Consents   Consent for Immunization Exchange :   Consent Granted   Consent for Immunizations to Providers :   Consent Granted   Yaneth Jones CMA - 1/10/2020 2:45 PM CST   Meds / Allergies   (As Of: 1/10/2020 2:51:29 PM CST)   Allergies (Active)   Ancef  Estimated Onset Date:   Unspecified ; Reactions:   Diarrhea ; Created By:   Chloe Bruce; Reaction Status:   Active ; Category:   Drug ; Substance:   Ancef ; Type:   Allergy ; Updated By:   Chloe Bruce; Reviewed Date:   1/10/2020 2:48 PM CST      Calan  Estimated Onset Date:   Unspecified ; Created By:   Vaishali Gannon CMA; Reaction Status:   Active ; Substance:   Calan ; Updated By:   Vaishali Gannon CMA; Source:   Paper Chart ; Reviewed Date:   1/10/2020 2:48 PM CST      ciprofloxacin  Estimated Onset Date:   Unspecified ; Created By:   Cherise Chirstiansen MA; Reaction Status:   Active ; Category:   Drug ;  Substance:   ciprofloxacin ; Type:   Allergy ; Updated By:   Cherise Christiansen MA; Reviewed Date:   1/10/2020 2:48 PM CST      phenobarbital  Estimated Onset Date:   Unspecified ; Reactions:   tremors ; Created By:   Chloe Bruce; Reaction Status:   Active ; Category:   Drug ; Substance:   phenobarbital ; Type:   Allergy ; Updated By:   Chloe Bruce; Source:   Paper Chart ; Reviewed Date:   1/10/2020 2:48 PM CST      probiotic  Estimated Onset Date:   Unspecified ; Reactions:   Rash.. ; Created By:   Sari Turner CMA; Reaction Status:   Active ; Category:   Drug ; Substance:   probiotic ; Type:   Allergy ; Updated By:   Sari Turner CMA; Reviewed Date:   1/10/2020 2:48 PM CST      simvastatin  Estimated Onset Date:   Unspecified ; Reactions:   GI upset ; Created By:   Vaishali Gannon CMA; Reaction Status:   Active ; Substance:   simvastatin ; Type:   Allergy ; Updated By:   Vaishali Gannon CMA; Source:   Paper Chart ; Reviewed Date:   1/10/2020 2:48 PM CST      Vicodin  Estimated Onset Date:   Unspecified ; Reactions:   rash ; Created By:   Mis Donaldson MA; Reaction Status:   Active ; Category:   Drug ; Substance:   Vicodin ; Type:   Allergy ; Severity:   Moderate ; Updated By:   Mis Donaldson MA; Reviewed Date:   1/10/2020 2:48 PM CST        Medication List   (As Of: 1/10/2020 2:51:29 PM CST)   Prescription/Discharge Order    digoxin  :   digoxin ; Status:   Suspended ; Ordered As Mnemonic:   digoxin 125 mcg (0.125 mg) oral tablet ; Simple Display Line:   125 mcg, 1 tab(s), po, daily, 90 tab(s), 3 Refill(s) ; Ordering Provider:   Hilario Whiting MD; Catalog Code:   digoxin ; Order Dt/Tm:   2/7/2019 9:55:28 AM CST          fluticasone nasal  :   fluticasone nasal ; Status:   Prescribed ; Ordered As Mnemonic:   Flonase 50 mcg/inh nasal spray ; Simple Display Line:   2 spray(s), nasal, daily, 1 EA, 11 Refill(s) ; Ordering Provider:   Hilario Whiting MD; Catalog Code:   fluticasone nasal ; Order  Dt/Tm:   2/7/2019 9:55:34 AM CST          levothyroxine  :   levothyroxine ; Status:   Prescribed ; Ordered As Mnemonic:   levothyroxine 125 mcg (0.125 mg) oral tablet ; Simple Display Line:   125 mcg, 1 tab(s), po, daily, for 90 day(s), 90 tab(s), 3 Refill(s) ; Ordering Provider:   Hilario Whiting MD; Catalog Code:   levothyroxine ; Order Dt/Tm:   2/7/2019 9:55:38 AM CST          metoprolol  :   metoprolol ; Status:   Prescribed ; Ordered As Mnemonic:   Metoprolol Succinate  mg oral tablet, extended release ; Simple Display Line:   See Instructions, TAKE ONE TABLET BY MOUTH EVERY DAY, 90 tab(s), 3 Refill(s) ; Ordering Provider:   Hilario Whiting MD; Catalog Code:   metoprolol ; Order Dt/Tm:   2/7/2019 9:56:57 AM CST          warfarin  :   warfarin ; Status:   Prescribed ; Ordered As Mnemonic:   warfarin 5 mg oral tablet ; Simple Display Line:   See Instructions, 1.5 tabs Fridays and one every other day., 100 EA, 3 Refill(s) ; Ordering Provider:   Hilario Whiting MD; Catalog Code:   warfarin ; Order Dt/Tm:   2/7/2019 9:57:05 AM CST            Home Meds    acetaminophen  :   acetaminophen ; Status:   Documented ; Ordered As Mnemonic:   acetaminophen ; Simple Display Line:   0 Refill(s) ; Catalog Code:   acetaminophen ; Order Dt/Tm:   5/14/2019 1:44:43 PM CDT

## 2022-02-15 NOTE — NURSING NOTE
Comprehensive Intake Entered On:  10/5/2020 3:27 PM CDT    Performed On:  10/5/2020 3:22 PM CDT by Yumiko Escamilla               Summary   Chief Complaint :   Discuss Dementia    Weight Measured :   172 lb(Converted to: 172 lb 0 oz, 78.018 kg)    Height Measured :   64 in(Converted to: 5 ft 4 in, 162.56 cm)    Body Mass Index :   29.52 kg/m2 (HI)    Body Surface Area :   1.88 m2   Systolic Blood Pressure :   138 mmHg (HI)    Diastolic Blood Pressure :   64 mmHg   Mean Arterial Pressure :   89 mmHg   Peripheral Pulse Rate :   84 bpm   BP Site :   Right arm   BP Method :   Manual   HR Method :   Electronic   Temperature Tympanic :   97.8 DegF(Converted to: 36.6 DegC)  (LOW)    Oxygen Saturation :   98 %   Race :      Languages :   English   Yumiko Escamilla - 10/5/2020 3:22 PM CDT   Health Status   Allergies Verified? :   Yes   Medication History Verified? :   Yes   Medical History Verified? :   Yes   Pre-Visit Planning Status :   Completed   Yumiko Escamilla - 10/5/2020 3:22 PM CDT   Consents   Consent for Immunization Exchange :   Consent Granted   Consent for Immunizations to Providers :   Consent Granted   Yumiko Escamilla - 10/5/2020 3:22 PM CDT   Problems   (As Of: 10/5/2020 3:27:56 PM CDT)   Problems(Active)    Allergic rhinitis (SNOMED CT  :038235541 )  Name of Problem:   Allergic rhinitis ; Recorder:   Hilario Whiting MD; Confirmation:   Confirmed ; Classification:   Medical ; Code:   [id number] ; Contributor System:   PowerChart ; Last Updated:   5/9/2017 10:51 AM CDT ; Life Cycle Date:   7/26/2010 ; Life Cycle Status:   Active ; Responsible Provider:   Hilario Whiting MD; Vocabulary:   SNOMED CT        Anticoagulated (SNOMED CT  :479525310 )  Name of Problem:   Anticoagulated ; Recorder:   Jennifer Astudillo RN; Confirmation:   Confirmed ; Classification:   Medical ; Code:   [id number] ; Contributor System:   [employer] ; Last Updated:   5/21/2020 1:55 PM CDT ; Life Cycle Date:   5/21/2020 ;  Life Cycle Status:   Active ; Vocabulary:   SNOMED CT        Benign positional vertigo (SNOMED CT  :177700667 )  Name of Problem:   Benign positional vertigo ; Recorder:   Leonarda Padilla; Confirmation:   Confirmed ; Classification:   Medical ; Code:   [id number] ; Contributor System:   PowerChart ; Last Updated:   5/9/2017 10:52 AM CDT ; Life Cycle Date:   3/7/2011 ; Life Cycle Status:   Active ; Vocabulary:   SNOMED CT        Bilateral hearing loss (SNOMED CT  :067377169 )  Name of Problem:   Bilateral hearing loss ; Recorder:   Hilario Whiting MD; Confirmation:   Confirmed ; Classification:   Medical ; Code:   [id number] ; Contributor System:   PowerChart ; Last Updated:   5/9/2017 10:52 AM CDT ; Life Cycle Date:   3/28/2011 ; Life Cycle Status:   Active ; Responsible Provider:   Hilario Whiting MD; Vocabulary:   SNOMED CT        Carotid artery plaque (SNOMED CT  :392342689 )  Name of Problem:   Carotid artery plaque ; Recorder:   Hilario Whiting MD; Confirmation:   Confirmed ; Classification:   Medical ; Code:   552507006 ; Contributor System:   PowerChart ; Last Updated:   11/24/2015 9:49 AM CST ; Life Cycle Date:   11/24/2015 ; Life Cycle Status:   Active ; Responsible Provider:   Hilario Whiting MD; Vocabulary:   SNOMED CT        Diabetic peripheral neuropathy (SNOMED CT  :0142698946 )  Name of Problem:   Diabetic peripheral neuropathy ; Recorder:   Hilario Whiting MD; Confirmation:   Confirmed ; Classification:   Medical ; Code:   [id number] ; Contributor System:   PowerChart ; Last Updated:   2/7/2019 10:01 AM CST ; Life Cycle Date:   2/7/2019 ; Life Cycle Status:   Active ; Responsible Provider:   Hilario Whiting MD; Vocabulary:   SNOMED CT        Dyslipidemia, goal LDL below 100 (SNOMED CT  :39814917 )  Name of Problem:   Dyslipidemia, goal LDL below 100 ; Recorder:   Vaishali Gannon CMA; Confirmation:   Confirmed ; Classification:   Medical ; Code:   75634441 ; Contributor System:   PowerChart ; Last  Updated:   5/9/2017 10:52 AM CDT ; Life Cycle Date:   1/12/2010 ; Life Cycle Status:   Active ; Vocabulary:   SNOMED CT        Frailty (SNOMED CT  :085257686 )  Name of Problem:   Frailty ; Recorder:   Hilario Whiting MD; Confirmation:   Confirmed ; Classification:   Medical ; Code:   [id number] ; Contributor System:   PowerChart ; Last Updated:   5/4/2018 8:34 AM CDT ; Life Cycle Date:   5/4/2018 ; Life Cycle Status:   Active ; Responsible Provider:   Hilario Whiting MD; Vocabulary:   SNOMED CT        History of mitral valve insufficiency (SNOMED CT  :979280782 )  Name of Problem:   History of mitral valve insufficiency ; Recorder:   Hilario Whiting MD; Confirmation:   Confirmed ; Classification:   Medical ; Code:   680233709 ; Contributor System:   PowerChart ; Last Updated:   3/24/2014 9:22 AM CDT ; Life Cycle Date:   10/27/2011 ; Life Cycle Status:   Active ; Responsible Provider:   Hilario Whiting MD; Vocabulary:   SNOMED CT   ; Comments:        10/27/2011 11:22 AM - Hilario Whiting MD  Mild by Echo in 2009      Hypothyroidism (acquired) (SNOMED CT  :757077794 )  Name of Problem:   Hypothyroidism (acquired) ; Recorder:   Vaishali Gannon CMA; Confirmation:   Confirmed ; Classification:   Medical ; Code:   315525591 ; Contributor System:   PowerChart ; Last Updated:   3/22/2014 10:29 AM CDT ; Life Cycle Date:   1/12/2010 ; Life Cycle Status:   Active ; Vocabulary:   SNOMED CT        Long term (current) use of anticoagulants (SNOMED CT  :098177155 )  Name of Problem:   Long term (current) use of anticoagulants ; Recorder:   Macrina Benitez CMA; Confirmation:   Confirmed ; Classification:   Medical ; Code:   617111799 ; Contributor System:   PowerChart ; Last Updated:   5/15/2018 6:31 AM CDT ; Life Cycle Status:   Active ; Vocabulary:   SNOMED CT        Obese (SNOMED CT  :1497330227 )  Name of Problem:   Obese ; Recorder:   SYSTEM; Confirmation:   Probable ; Classification:   Medical ; Code:   5293467074 ;  Contributor System:   PowerChart ; Last Updated:   5/9/2017 10:53 AM CDT ; Life Cycle Date:   3/4/2011 ; Life Cycle Status:   Active ; Vocabulary:   SNOMED CT        Osteoarthritis (SNOMED CT  :0134757269 )  Name of Problem:   Osteoarthritis ; Recorder:   Vaishali Gannon CMA; Confirmation:   Confirmed ; Classification:   Medical ; Code:   4268226360 ; Contributor System:   PowerChart ; Last Updated:   5/9/2017 10:53 AM CDT ; Life Cycle Date:   1/12/2010 ; Life Cycle Status:   Active ; Vocabulary:   SNOMED CT        Paroxysmal atrial fibrillation (SNOMED CT  :968817525 )  Name of Problem:   Paroxysmal atrial fibrillation ; Recorder:   Vaishali Gannon CMA; Confirmation:   Confirmed ; Classification:   Medical ; Code:   292861295 ; Contributor System:   PowerChart ; Last Updated:   12/31/2013 8:38 AM CST ; Life Cycle Date:   1/12/2010 ; Life Cycle Status:   Active ; Vocabulary:   SNOMED CT        S/P placement of cardiac pacemaker (SNOMED CT  :549469643 )  Name of Problem:   S/P placement of cardiac pacemaker ; Recorder:   Hilario Whiting MD; Confirmation:   Confirmed ; Classification:   Medical ; Code:   066701707 ; Contributor System:   PowerChart ; Last Updated:   5/9/2017 10:53 AM CDT ; Life Cycle Date:   10/27/2011 ; Life Cycle Status:   Active ; Responsible Provider:   Hilario Whiting MD; Vocabulary:   SNOMED CT        Seborrheic dermatitis (SNOMED CT  :93097633 )  Name of Problem:   Seborrheic dermatitis ; Recorder:   Hilario Whiting MD; Confirmation:   Confirmed ; Classification:   Medical ; Code:   30023602 ; Contributor System:   PowerChart ; Last Updated:   4/26/2016 10:28 AM CDT ; Life Cycle Date:   4/26/2016 ; Life Cycle Status:   Active ; Responsible Provider:   Hilario Whiting MD; Vocabulary:   SNOMED CT        Seborrheic dermatitis of scalp (SNOMED CT  :222829401 )  Name of Problem:   Seborrheic dermatitis of scalp ; Recorder:   Hilario Whiting MD; Confirmation:   Confirmed ; Classification:    Medical ; Code:   333345222 ; Contributor System:   PowerChart ; Last Updated:   1/10/2020 3:06 PM CST ; Life Cycle Date:   1/10/2020 ; Life Cycle Status:   Active ; Responsible Provider:   Hilario Whiting MD; Vocabulary:   SNOMED CT        Statin intolerance (SNOMED CT  :14119321 )  Name of Problem:   Statin intolerance ; Recorder:   Hilario Whiting MD; Confirmation:   Confirmed ; Classification:   Medical ; Code:   18695589 ; Contributor System:   PowerChart ; Last Updated:   5/9/2017 10:53 AM CDT ; Life Cycle Date:   3/14/2011 ; Life Cycle Status:   Active ; Responsible Provider:   Hilario Whiting MD; Vocabulary:   SNOMED CT        Symptomatic varicose veins (SNOMED CT  :915673998 )  Name of Problem:   Symptomatic varicose veins ; Recorder:   Hilario Whiting MD; Confirmation:   Confirmed ; Classification:   Medical ; Code:   429827108 ; Contributor System:   PowerChart ; Last Updated:   8/15/2014 2:17 PM CDT ; Life Cycle Date:   7/3/2014 ; Life Cycle Status:   Active ; Responsible Provider:   Hilario Whiting MD; Vocabulary:   SNOMED CT        Tinnitus (SNOMED CT  :504734326 )  Name of Problem:   Tinnitus ; Recorder:   Hilario Whiting MD; Confirmation:   Confirmed ; Classification:   Medical ; Code:   853862324 ; Contributor System:   PowerChart ; Last Updated:   5/9/2017 10:53 AM CDT ; Life Cycle Date:   3/28/2011 ; Life Cycle Status:   Active ; Responsible Provider:   Hilario Whiting MD; Vocabulary:   SNOMED CT        Type 2 diabetes mellitus with other circulatory complications (SNOMED CT  :764693315 )  Name of Problem:   Type 2 diabetes mellitus with other circulatory complications ; Onset Date:   1/1/1999 ; Recorder:   Vaishali Gannon CMA; Confirmation:   Confirmed ; Classification:   Medical ; Code:   625904049 ; Contributor System:   PowerChart ; Last Updated:   5/15/2018 6:31 AM CDT ; Life Cycle Status:   Active ; Vocabulary:   SNOMED CT        White coat syndrome with hypertension (SNOMED  CT  :9372610138 )  Name of Problem:   White coat syndrome with hypertension ; Recorder:   Vaishali Gannon CMA; Confirmation:   Confirmed ; Classification:   Medical ; Code:   8542011576 ; Contributor System:   PowerChart ; Last Updated:   8/14/2018 9:25 AM CDT ; Life Cycle Status:   Active ; Vocabulary:   SNOMED CT          Meds / Allergies   (As Of: 10/5/2020 3:27:56 PM CDT)   Allergies (Active)   Ancef  Estimated Onset Date:   Unspecified ; Reactions:   Diarrhea ; Created By:   Chloe Bruce; Reaction Status:   Active ; Category:   Drug ; Substance:   Ancef ; Type:   Allergy ; Updated By:   Chloe Bruce; Reviewed Date:   10/5/2020 3:25 PM CDT      Calan  Estimated Onset Date:   Unspecified ; Created By:   Sravanthi Dietz; Reaction Status:   Active ; Category:   Drug ; Substance:   Calan ; Updated By:   Sravanthi Dietz; Source:   Paper Chart ; Reviewed Date:   10/5/2020 3:25 PM CDT      ciprofloxacin  Estimated Onset Date:   Unspecified ; Created By:   Cherise Christiansen MA; Reaction Status:   Active ; Category:   Drug ; Substance:   ciprofloxacin ; Type:   Allergy ; Updated By:   Cherise Christiansen MA; Reviewed Date:   10/5/2020 3:25 PM CDT      phenobarbital  Estimated Onset Date:   Unspecified ; Reactions:   tremors ; Created By:   Chloe Bruce; Reaction Status:   Active ; Category:   Drug ; Substance:   phenobarbital ; Type:   Allergy ; Updated By:   Chloe Bruce; Source:   Paper Chart ; Reviewed Date:   10/5/2020 3:25 PM CDT      probiotic  Estimated Onset Date:   Unspecified ; Reactions:   Rash.. ; Created By:   Sari Turner CMA; Reaction Status:   Active ; Category:   Drug ; Substance:   probiotic ; Type:   Allergy ; Updated By:   Sari Turner CMA; Reviewed Date:   10/5/2020 3:25 PM CDT      simvastatin  Estimated Onset Date:   Unspecified ; Reactions:   GI upset ; Created By:   Sravanthi Dietz; Reaction Status:   Active ; Category:   Drug ; Substance:   simvastatin ; Type:   Allergy  ; Updated By:   Sravanthi Dietz; Source:   Paper Chart ; Reviewed Date:   10/5/2020 3:25 PM CDT      Vicodin  Estimated Onset Date:   Unspecified ; Reactions:   rash ; Created By:   Mis Donaldson MA; Reaction Status:   Active ; Category:   Drug ; Substance:   Vicodin ; Type:   Allergy ; Severity:   Moderate ; Updated By:   Mis Donaldson MA; Reviewed Date:   10/5/2020 3:25 PM CDT        Medication List   (As Of: 10/5/2020 3:27:56 PM CDT)   Prescription/Discharge Order    fluticasone nasal  :   fluticasone nasal ; Status:   Prescribed ; Ordered As Mnemonic:   Flonase 50 mcg/inh nasal spray ; Simple Display Line:   2 spray(s), nasal, daily, 1 EA, 11 Refill(s) ; Ordering Provider:   Shimon Nesbitt MD; Catalog Code:   fluticasone nasal ; Order Dt/Tm:   7/21/2020 11:28:21 AM CDT          warfarin  :   warfarin ; Status:   Prescribed ; Ordered As Mnemonic:   warfarin 5 mg oral tablet ; Simple Display Line:   See Instructions, TAKE 1 AND 1/2 TABLETS BY MOUTH FRIDAYS AND 1 TABLET EVERY OTHER DAY, 180 tab(s), 1 Refill(s) ; Ordering Provider:   Shimon Nesbitt MD; Catalog Code:   warfarin ; Order Dt/Tm:   7/21/2020 11:28:22 AM CDT          levothyroxine  :   levothyroxine ; Status:   Prescribed ; Ordered As Mnemonic:   levothyroxine 125 mcg (0.125 mg) oral tablet ; Simple Display Line:   1 tab(s), Oral, daily, 90 tab(s), 1 Refill(s) ; Ordering Provider:   Shimon Nesbitt MD; Catalog Code:   levothyroxine ; Order Dt/Tm:   7/21/2020 11:28:22 AM CDT          metoprolol  :   metoprolol ; Status:   Prescribed ; Ordered As Mnemonic:   Metoprolol Succinate  mg oral tablet, extended release ; Simple Display Line:   100 mg, 1 tab(s), Oral, daily, 90 tab(s), 1 Refill(s) ; Ordering Provider:   Shimon Nesbitt MD; Catalog Code:   metoprolol ; Order Dt/Tm:   7/21/2020 11:28:23 AM CDT          Miscellaneous Rx Supply  :   Miscellaneous Rx Supply ; Status:   Prescribed ; Ordered As Mnemonic:   ACCU-CHECK GUIDE TEST STRIPS  ; Simple Display Line:   See Instructions, TEST BLOOD SUGAR DAILY, 100 EA, 11 Refill(s) ; Ordering Provider:   Shimon Nesbitt MD; Catalog Code:   Miscellaneous Rx Supply ; Order Dt/Tm:   7/21/2020 8:47:37 AM CDT          Miscellaneous Rx Supply  :   Miscellaneous Rx Supply ; Status:   Prescribed ; Ordered As Mnemonic:   ACCU-CHECK GUIDE LANCETS ; Simple Display Line:   See Instructions, TEST BLOOD SUGAR DAILY, 100 EA, 11 Refill(s) ; Ordering Provider:   Shimon Nesbitt MD; Catalog Code:   Miscellaneous Rx Supply ; Order Dt/Tm:   7/21/2020 8:47:38 AM CDT          amoxicillin  :   amoxicillin ; Status:   Prescribed ; Ordered As Mnemonic:   amoxicillin 500 mg oral tablet ; Simple Display Line:   See Instructions, 4 tab(s) Oral 1 hour before dental procedure, 4 EA, 1 Refill(s) ; Ordering Provider:   Beatriz Petit; Catalog Code:   amoxicillin ; Order Dt/Tm:   5/7/2020 2:05:13 PM CDT            Home Meds    lisinopril  :   lisinopril ; Status:   Documented ; Ordered As Mnemonic:   lisinopril 20 mg oral tablet ; Simple Display Line:   20 mg, 1 tab(s), Oral, daily, 0 Refill(s) ; Catalog Code:   lisinopril ; Order Dt/Tm:   10/5/2020 3:26:04 PM CDT          acetaminophen  :   acetaminophen ; Status:   Documented ; Ordered As Mnemonic:   acetaminophen ; Simple Display Line:   0 Refill(s) ; Catalog Code:   acetaminophen ; Order Dt/Tm:   5/14/2019 1:44:43 PM CDT            ID Risk Screen   Recent Travel History :   No recent travel   Family Member Travel History :   No recent travel   Other Exposure to Infectious Disease :   Unknown   Shannan/Yumiko PENA - 10/5/2020 3:22 PM CDT

## 2022-02-15 NOTE — PROGRESS NOTES
Chief Complaint    Hospital discharge f/u from [facility] on 10/9/20  History of Present Illness       Jovita is here with her daughter for hospital follow-up.  She was hospitalized October 8 and 9 for benign paroxysmal positional vertigo.  She has somewhat of a problem with chronic vertiginous symptoms.  She has dementia and a slums score was 14 of 30.  She was started on Aricept not all that long ago but her daughter thinks that her poor appetite and complains of nausea are related to this medication.  Daughter notes progression of dementia.  She is not sure if the power of  for healthcare was activated.  Review of Systems       No falls, bleeding, chest pain, dyspnea, edema, headache.  Physical Exam   Vitals & Measurements    T: 96.7  F (Tympanic)  HR: 83 (Peripheral)  BP: 132/74  SpO2: 97%     HT: 64 in  WT: 167 lb  BMI: 28.66        Patient appears comfortable.  Alert and oriented to person and place.  Eyes normal.  Chest clear.  Cardiac exam is irregular.  Pacemaker present.  Extremities have trace edema.  Able to give a cogent history.  Assessment/Plan       Anticoagulated (Z79.01)          Stable       Benign positional vertigo (H81.10)          Improved with therapy.       Dementia (F03.90)          Progressive.  Alzheimer's versus vascular.  Will discontinue Aricept due to associated side effects.  Patient's daughter will call us next week if symptoms not improved.  Will activate power of  for healthcare.       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)          Stable       White coat syndrome with hypertension (I10)          Improved  Patient Information     Name:GERHARD HOWE      Address:      [address] [place] [address] [phone]     Sex:Female     YOB: 1929     [phone]     Emergency Contact:GENTRY PARK     MRN:[id number]     FIN:[id number]     Location:[facility]     Date of Service:10/26/2020      Primary Care Physician:       Hilario Whiting MD, [phone]      Attending Physician:       Hilario Whiting MD, [phone]  Problem List/Past Medical History    Ongoing     Allergic rhinitis     Anticoagulated     Benign positional vertigo     Bilateral hearing loss     Carotid artery plaque     Dementia     Diabetic peripheral neuropathy     Dyslipidemia, goal LDL below 100     Frailty     History of mitral valve insufficiency       Comments: Mild by Echo in 2009     Hypothyroidism (acquired)     Long term (current) use of anticoagulants     Obese     Osteoarthritis     Paroxysmal atrial fibrillation     S/P placement of cardiac pacemaker     Seborrheic dermatitis     Seborrheic dermatitis of scalp     Statin intolerance     Symptomatic varicose veins     Tinnitus     Type 2 diabetes mellitus with other circulatory complications     White coat syndrome with hypertension    Historical     Inpatient stay       Comments: @[facility], [place] - Atrial fibrillation with an episode of hypotension     Inpatient stay       Comments: @[facility], [place] - Vertigo     Inpatient stay       Comments: @[place], [place] - Postoperative surgical complication involving subcutaneous tissue     Inpatient stay       Comments: [facility], [place] - Benign paroxysmal positional vertigo.  Procedure/Surgical History     Replacement of pacemaker pulse generator (05/30/2019)      Comments: Implanted left pectoral.      Medtronic [id number]      [id number].     Phacoemulsification of cataract with intraocular lens implantation (03/27/2018)      Comments: Left..     Colonoscopy (05/23/2012)      Comments: Unremarkable.     LAVH-BSO (04/06/2012)     Hysteroscopy, D&C, polypectomy (11/04/2011)     Implantation of heart pacemaker (2009)      Replacement of right knee joint (11/2008)     Replacement of left knee joint (12/10/2007)     Flexible sigmoidoscopy (06/13/2006)     Cholecystectomy (1951)     Repair of umbilical hernia (1949)     Appendectomy (1947)     Extracapsular cataract extraction and insertion of intraocular lens      Comments: Right..     Plastic repair of rotator cuff of shoulder     Pregnancy      Comments: full term X 3.  Medications    ACCU-CHECK GUIDE LANCETS, See Instructions, 11 refills    ACCU-CHECK GUIDE TEST STRIPS, See Instructions, 11 refills    acetaminophen    amoxicillin 500 mg oral tablet, See Instructions, 1 refills    Calcium with vitamin D, 1 tab, Oral, daily    Daily Multiple Vitamins, 1 tab(s), Oral, daily    Fish Oil, 1 tab, Oral, daily    Flonase 50 mcg/inh nasal spray, 2 spray(s), Nasal, daily, 11 refills    levothyroxine 125 mcg (0.125 mg) oral tablet, 1 tab(s), Oral, daily, 1 refills    lisinopril 20 mg oral tablet, 20 mg= 1 tab(s), Oral, daily    Metoprolol Succinate  mg oral tablet, extended release, 100 mg= 1 tab(s), Oral, daily, 1 refills    PreserVision, 1 cap(s), Oral, bid    Vitamin C, 500 mg, Oral, daily    Vitamin D3, [employer] Unit, Oral, daily    warfarin 5 mg oral tablet, See Instructions, 1 refills    Zofran 4 mg oral tablet, 4 mg= 1 tab(s), Oral, q8 hrs  Allergies    Vicodin (rash)    Ancef (Diarrhea)    Calan    ciprofloxacin    phenobarbital (tremors)    probiotic (Rash..)    simvastatin (GI upset)  Social History    Smoking Status     Never smoker     Alcohol - Denies Alcohol Use      Never     Employment/School      Retired     Home/Environment      Marital status: .     Substance Abuse - Denies Substance Abuse     Tobacco - Denies Tobacco Use  Family History    CABG - Coronary artery bypass graft: Mother.    CAD - Coronary artery disease: Mother and Father.    Diabetes mellitus: Mother.    Gout: Mother and Brother.    Hypertension: Mother.    Leukemia: Father.    MI -  Myocardial infarction: Brother.    MS - Multiple sclerosis: Sister.    Parkinson's disease: Sister.    Valvular heart disease: Brother.  Immunizations      Vaccine Date Status          influenza virus vaccine, inactivated 10/05/2020 Given          zoster vaccine, inactivated 12/16/2019 Recorded          zoster vaccine, inactivated 10/15/2019 Recorded          influenza virus vaccine, inactivated 09/05/2019 Given          influenza virus vaccine, inactivated 10/10/2018 Given          influenza virus vaccine, inactivated 09/01/2017 Given          influenza virus vaccine, inactivated 01/24/2017 Given          ZOS, shingles 11/24/2015 Recorded              Comments : [11/24/2015] Freemans          influenza virus vaccine, inactivated 10/07/2015 Given          pneumococcal (PCV13) 03/19/2015 Given          influenza virus vaccine, inactivated 10/09/2014 Given          influenza virus vaccine, inactivated 10/11/2013 Given          influenza virus vaccine, inactivated 10/09/2012 Given          Td 01/11/2012 Given          influenza virus vaccine, inactivated 10/08/2011 Given          influenza virus vaccine, inactivated 10/07/2010 Given          influenza, H1N1, inactivated 12/18/2009 Recorded          pneumococcal (PPSV23) 04/24/2006 Recorded          pneumococcal (PPSV23) 01/08/2001 Recorded  Lab Results          Lab Results (Last 4 results within 90 days)           Hgb A1c: 6.6 High (09/21/20 09:35:00)          Protime: 27.6 High [12  - 14.6] (09/21/20 09:53:00)          INR: 2.5 High (09/21/20 09:53:00)          INR: 2.1 High (08/24/20 09:39:00)          INR TR: 2.3 (10/21/20 14:08:00)          Prothrombin Time: 25.1 High [10.5  - 13.1] (08/24/20 09:39:00)

## 2022-02-15 NOTE — RESULTS
Anticoagulation Therapy Entered On:  2/26/2020 3:24 PM CST    Performed On:  2/26/2020 3:21 PM CST by Jennifer Astudillo RN               Warfarin Management   Week 1 Sunday Dose :   5    Week 1 Monday Dose :   5    Week 1 Tuesday Dose :   5    Week 1 Wednesday Dose :   5    Week 1 Thursday Dose :   5    Week 1 Friday Dose :   7.5    Week 1 Saturday Dose :   5    Week 1 Dose Total :   37.5    Week 2 Sunday Dose :   5    Week 2 Monday Dose :   5    Week 2 Tuesday Dose :   5    Week 2 Wednesday Dose :   5    Week 2 Thursday Dose :   5    Week 2 Friday Dose :   7.5    Week 2 Saturday Dose :   5    Week 2 Dose Total :   37.5    Planned Duration :   Indefinite   Indication :   Atrial fibrillation   Warfarin Management Comments :   [facility]  [address], [address]  [phone]  Capillary Specimen     International Normalization Ratio :   1.3    INR Range :   2.0 - 3.0   INR Therapeutic Range :   No   Warfarin Abnormal Findings :   bad rash on legs from new ointment she tried is on loratidine   Jennifer Astudillo RN - 2/26/2020 3:22 PM CST   Warfarin Management and Results Grid   Signs of Thrombolic :   No   Signs of Warfarin Intolerance :   No   Changes in Diet or Alcohol Intake :   No   Changes in Medication or Antibiotics :   Yes   Unusual Bleeding or Bruising :   No   Rectal Bleeding or Black Stools :   No   Vitamin K Food Handout :   No   Jennifer Astudillo RN - 2/26/2020 3:22 PM CST   Anticoagulation Recheck :   1 week   Warfarin Physician :   Hilario Whiting MD   Warfarin Special Instructions :   INR = 1.3 per POC Patient is to stay on 7.5mg warfarin on Fri and 5mg the rest of the days of the week Recheck INR in 1 week per [facility] Patients daughter advised in call.   Jennifer Astudillo RN - 2/26/2020 3:22 PM CST

## 2022-02-15 NOTE — NURSING NOTE
Anticoagulation Therapy Management Entered On:  9/21/2020 10:27 AM CDT    Performed On:  9/21/2020 10:26 AM CDT by Madiha Britt RN               Anticoagulation Visit Assessment   Type of Visit - Anticoagulation :   Telephone   Anticoagulation Indication :   Atrial fibrillation   Anticoagulant Duration :   Undetermined   Anticoagulation Medication Verified :   Yes   Patient Preferred Contact Method :   Cell   Madiha Britt RN - 9/21/2020 10:26 AM CDT   Anticoagulation Patient Assessment Grid   Change in Medications :   Yes   (Comment: Starting Lisinopril per Dr. Cuadra [facility] [Madiha Britt RN - 9/21/2020 10:26 AM CDT] )   Madiha Britt RN - 9/21/2020 10:26 AM CDT   Patient on Warfarin :   Yes   Madiha Britt RN - 9/21/2020 10:26 AM CDT   Warfarin   Anticoagulant INR Goal Lower :   2    Anticoagulant INR Goal Upper :   3    Sunday :   7.5 mg   Monday :   5 mg   Tuesday :   7.5 mg   Wednesday :   5 mg   Thursday :   7.5 mg   Friday :   5 mg   Saturday :   5 mg   Total Dose :   42.5 mg   Warfarin Pt Reported Previous Week Dose :    Sun Mon Tues Wed Thurs Fri Sat Weekly Total Dose   Week 1 7.5 mg 5 mg 7.5 mg 5 mg 7.5 mg 5 mg 5 mg 42.5 mg   Week 2  mg  mg  mg  mg  mg  mg  mg  mg   Week 3  mg  mg  mg  mg  mg  mg  mg  mg   Week 4  mg  mg  mg  mg  mg  mg  mg  mg         Patient is taking single or multiple strength tablet(s) :   Single strength tab(s)   One Tab Strength :   5 mg tab   Sunday :   7.5 mg   Monday :   5 mg   Tuesday :   7.5 mg   Wednesday :   5 mg   Thursday :   7.5 mg   Friday :   5 mg   Saturday :   5 mg   Week 1 Total Dose :   42.5 mg   Sunday :   1.5 tab(s)   Monday :   1 tab(s)   Tuesday :   1.5 tab(s)   Wednesday :   1 tab(s)   Thursday :   1.5 tab(s)   Friday :   1 tab(s)   Saturday :   1 tab(s)   Patient Instructions :   INR = 2.5 per [facility]. Per protocol, continue Warfarin 7.5mg Sun, Tues, Thurs and 5mg ROW. Recheck INR in 4 weeks. Advised daughter by phone.     Madiha Britt RN - 9/21/2020 10:26 AM CDT

## 2022-02-15 NOTE — PROGRESS NOTES
Patient:   GERHARD HOWE            MRN: [id number]            FIN: [id number]               Age:   88 years     Sex:  Female     :  11/10/1929   Associated Diagnoses:   White coat hypertension; Paroxysmal atrial fibrillation; Bilateral hearing loss; Hypothyroidism (acquired); Diabetes mellitus type II, controlled; S/P placement of cardiac pacemaker; Statin intolerance; Carotid artery plaque   Author:   Hilario Whiting MD      Visit Information   Visit type:  Scheduled follow-up.    Accompanied by:  No one.    Source of history:  Self.    History limitation:  None.       Chief Complaint   2017 3:13 PM CST   review lab results      History of Present Illness   This patient has been well.  She has changed her diet and lost some weight.  She is not troubled by palpitations.  No bleeding.  Tolerating low-dose statin.            Review of Systems   Constitutional:  No weakness, No fatigue.    Eye:  No recent visual problem.    Respiratory:  No shortness of breath, No cough.    Cardiovascular:  No chest pain, No palpitations, No peripheral edema.    Gastrointestinal:  No nausea, No vomiting, No diarrhea.    Genitourinary:  No dysuria, No hematuria.    Hematology/Lymphatics:  No bruising tendency, No bleeding tendency.    Endocrine:  Negative except as documented in history of present illness.    Musculoskeletal:  Negative.    Neurologic:  Alert and oriented X4, No abnormal balance, No confusion, No numbness, No tingling, No headache.       Health Status   Allergies:    Allergic Reactions (Selected)  Moderate  Vicodin (Rash)  Severity Not Documented  Ancef (No reactions were documented)  Calan (No reactions were documented)  Ciprofloxacin (No reactions were documented)  Phenobarbital (No reactions were documented)  Probiotic (Rash..)  Simvastatin (Gi upset)   Medications:  (Selected)   Prescriptions  Prescribed  Flonase 50 mcg/inh nasal spray: 2 spray(s), nasal, daily, # 1 EA, 11 Refill(s), Type: Maintenance,  Pharmacy: [facility] [id number], 2 spray(s) nasal daily  Metoprolol Succinate  mg oral tablet, extended release: 1 tab(s) ( 100 mg ), po, daily, # 90 tab(s), 1 Refill(s), Type: Maintenance, Pharmacy: [facility] [id number], To replace Atenolol Rx, 1 tab(s) po daily  digoxin 125 mcg (0.125 mg) oral tablet: 1 tab(s) ( 125 mcg ), po, daily, # 90 tab(s), 3 Refill(s), Type: Maintenance, Pharmacy: [facility] [id number], 1 tab(s) po daily  levothyroxine 125 mcg (0.125 mg) oral tablet: 1 tab(s) ( 125 mcg ), po, daily, # 90 tab(s), 3 Refill(s), Type: Soft Stop, Pharmacy: [facility] [id number], 1 tab(s) po daily,x90 day(s)  pravastatin 10 mg oral tablet: 1 tab(s) ( 10 mg ), PO, Daily, # 90 tab(s), 3 Refill(s), Type: Maintenance, Pharmacy: [facility] [id number], 1 tab(s) po daily  warfarin 5 mg oral tablet: 1 tab(s) ( 5 mg ), po, daily, # 90 tab(s), 3 Refill(s), Type: Soft Stop, Pharmacy: [facility] [id number], 1 tab(s) po daily,x90 day(s)   Problem list:    All Problems  White coat hypertension / SNOMED CT 0829586542 / Confirmed  Symptomatic varicose veins / SNOMED CT 535599793 / Confirmed  Diabetes mellitus type II, controlled / SNOMED CT 856595429 / Confirmed  Tinnitus / SNOMED CT 947537150 / Confirmed  Seborrheic dermatitis / SNOMED CT 02243153 / Confirmed  Paroxysmal atrial fibrillation / SNOMED CT 178968685 / Confirmed  Osteoarthritis / SNOMED CT 1015328938 / Confirmed  Obese / SNOMED CT 3812665023 / Probable  HTN, goal below 140/90 / SNOMED CT 7364563863 / Confirmed  Dyslipidemia, goal LDL below 100 / SNOMED CT 14720811 / Confirmed  History of mitral valve insufficiency / SNOMED CT 542382893 / Confirmed  S/P placement of cardiac pacemaker / SNOMED CT 422788950 / Confirmed  Long-term (current) use of anticoagulants, INR goal 2.0-3.0 / SNOMED CT 655165446 / Confirmed  White coat hypertension / SNOMED CT 8564937011 / Confirmed  Statin intolerance / SNOMED CT 37746346 /  Confirmed  Rhinitis, chronic / SNOMED CT 368194986 / Confirmed  Carotid artery plaque / SNOMED CT 125127500 / Confirmed  Bilateral hearing loss / SNOMED CT 228931507 / Confirmed  Benign positional vertigo / SNOMED CT 811147878 / Confirmed  Allergic rhinitis / SNOMED CT 397022599 / Confirmed  Hypothyroidism (acquired) / SNOMED CT 753290047 / Confirmed  Resolved: *Hospitalized@[email or web address] - Vertigo  Resolved: *Hospitalized@[email or web address] - Atrial fibrillation with an episode of hypotension  Canceled: Hypertension / SNOMED CT 0736746902  Canceled: Diabetic Neuropathy, Type II Diabetes Mellitus / ICD-9-.60      Histories   Past Medical History:    Active  Diabetes mellitus type II, controlled (876347763): Onset on 1999 at 69 years.  Osteoarthritis (4929467628)  Dyslipidemia, goal LDL below 100 (27283747)  Paroxysmal atrial fibrillation (044819227)  Hypothyroidism (acquired) (159255744)  White coat hypertension (5444550182)  Allergic rhinitis (465469052)  Obese (7362631787)  Benign positional vertigo (559116114)  Statin intolerance (82443327)  Long-term (current) use of anticoagulants, INR goal 2.0-3.0 (605981618)  Rhinitis, chronic (737157310)  Bilateral hearing loss (048279813)  Tinnitus (537591564)  White coat hypertension (1413213784)  S/P placement of cardiac pacemaker (053196658)  HTN, goal below 140/90 (0033241126)  Resolved  *Hospitalized@[email or web address]igo: Onset on 2012 at 83 years.  Resolved.  *Hospitalized@[email or web address] - Atrial fibrillation with an episode of hypotension: Onset on 2010 at 80 years.  Resolved.   Family History:    Leukemia  Father ()  Gout  Brother  Mother ()  Diabetes mellitus  Mother ()  CA - Lung cancer  Spouse ()  Hypertension  Mother ()  Parkinson's disease  Sister  Valvular heart disease  Brother ()  MS - Multiple sclerosis  Sister  CABG - Coronary artery bypass graft  Mother ()  MI - Myocardial infarction  Brother ()  CAD -  Coronary artery disease  Father ()  Mother ()     Procedure history:    Colonoscopy (164480240) on 2012 at 82 Years.  Comments:  2012 8:59 AM - Henok GANDARA, Hilario KIM-BSO on 2012 at 82 Years.  Hysteroscopy, D&C, polypectomy on 2011 at 81 Years.  Implantation of heart pacemaker (7367310127) in  at 80 Years.  Replacement of right knee joint (8538104918) in the month of 2008 at 78 Years.  Replacement of left knee joint (2078721637) on 12/10/2007 at 78 Years.  Flexible sigmoidoscopy (12220074) on 2006 at 76 Years.  Cholecystectomy (30332150) in 1951 at 22 Years.  Repair of umbilical hernia (94620136) in 1949 at 20 Years.  Appendectomy (996815027) in 1947 at 18 Years.  Plastic repair of rotator cuff of shoulder (602385440).  Pregnancy (712185149).  Comments:  2010 9:08 AM - Vaishali Cortés  full term X 3   Social History:        Alcohol Assessment: Denies Alcohol Use            Never      Tobacco Assessment: Denies Tobacco Use      Employment and Education Assessment            Retired      Home and Environment Assessment            Marital status: .        Physical Examination   Vital Signs   2017 3:27 PM CST Systolic Blood Pressure 154 mmHg  HI    Diastolic Blood Pressure 82 mmHg    Mean Arterial Pressure 106 mmHg    BP Site Right arm    BP Method Manual   2017 3:13 PM CST Peripheral Pulse Rate 78 bpm    Systolic Blood Pressure 178 mmHg  HI    Diastolic Blood Pressure 92 mmHg  HI    Mean Arterial Pressure 121 mmHg    BP Site Right arm      Measurements from flowsheet : Measurements   2017 3:13 PM CST Height Measured - Standard 64 in    Weight Measured - Standard 154 lb    BSA 1.77 m2    Body Mass Index 26.43 kg/m2      General:  Alert and oriented, No acute distress.    Eye:  Normal conjunctiva.    HENT:  Normocephalic, Normal hearing, Oral mucosa is moist, No pharyngeal erythema.    Neck:  Supple, Non-tender, No carotid  bruit, No lymphadenopathy, No thyromegaly.    Respiratory:  Lungs are clear to auscultation, Respirations are non-labored.    Cardiovascular:  Normal rate, Regular rhythm, No murmur, No gallop, Normal peripheral perfusion, No edema.    Musculoskeletal:  Normal gait.    Integumentary:  No pallor.    Feet:  Normal by visual exam, Normal pulses, Sensation intact, By vibration, absent monofilament.    Neurologic:  No focal deficits.    Cognition and Speech:  Speech clear and coherent, Functional cognition intact.    Psychiatric:  Cooperative, Appropriate mood & affect.       Review / Management   Results review:  Lab results   11/24/2017 1:00 PM CST Sodium Level 139 mmol/L    Potassium Level 4.6 mmol/L    Chloride Level 103 mmol/L    CO2 Level 28 mmol/L    Glucose Level 145 mg/dL  HI    BUN 18 mg/dL    Creatinine Level 0.80 mg/dL    BUN/Creat Ratio NOT APPLICABLE    eGFR 66 mL/min/1.73m2    eGFR African American 76 mL/min/1.73m2    Calcium Level 9.1 mg/dL    Hgb A1c 6.5  HI    U Microalbumin 1.8 mg/dL    Ur Creatinine 190 mg/dL    Ur Microalbumin/Creatinine Ratio 9   11/17/2017 12:09 PM CST INR 1.9   11/2/2017 2:14 PM CDT PT 16.4  HI    INR 1.6  HI   10/4/2017 2:25 PM CDT PT 27.0  HI    INR 2.7  HI   9/11/2017 9:10 AM CDT INR 1.7   .       Impression and Plan   Diagnosis     White coat hypertension (JBC09-AI R03.0).     Paroxysmal atrial fibrillation (TAQ58-LM I48.0).     Bilateral hearing loss (NSC25-PC H91.93).     Hypothyroidism (acquired) (GUV64-YU E03.9).     Diabetes mellitus type II, controlled (HGM86-LZ E11.9).     S/P placement of cardiac pacemaker (OFA59-FK Z95.0).     Statin intolerance (LBW31-OL Z88.9).     Carotid artery plaque (XGP99-VU I65.29).     Course:  Progressing as expected.    Orders     Orders (Selected)   Outpatient Orders  Ordered  Return to Clinic (Request): RFV: BP check with CSS  Return to Clinic (Request): RFV: Cardiology follow-up with Dr. Cuadra, Return in 6 months or sooner if  needed  Return to Clinic (Request): RFV: Diabetes, Return in 6 months, Instructions: aft4er lab  Return to Clinic (Request): Return in 4 mo w/ D. Silverio bring meter and log  Return to Office (Request): RFV: Hgb A1c q3-6. BMP, CBC, TSH, lipids, BENITO q12..

## 2022-02-15 NOTE — CARE COORDINATION
"[facility] ED Discharge Note   Sources of Information:  [X ] Patient, family member, or caregiver (Please list):  [X ] Hospital discharge summary  [ ] Hospital fax  [ ] List of recent hospitalizations or ED visits  [ ] Other:     Discharged From: [facility]   Discharge Date: 07/10/2017    Diagnosis/Problem: Fall pain in Knee and shoulder     Medication Changes: [ ] Yes [ X] No   Medication List Updated: [ ] Yes [ ] No N/A    Needs Referral or Lab: [ ] Yes [X ] No    Recommenations for Outpatient Provider:    Needs Follow-up Appointment:  Was told she did not need to make a follow up appointment unless her pain did not improve.      Appointment Made With: None   Date:  I talked to Rebecca this morning and she is \"getting along okay\"  She is still having pain in her shoulder and knee but using ice and she is doing okay.  She was told she did not need to make an appontment unless things did not start to improve each day.  Shew as very concerned because she has has this rotator cuff repaired in the past and als has had a new knee for the one she fell on.   They did not do imaging in the emergency room so she is concerned about this as well.    She will plan to come in and see Dr. Whitign if she does not feel better by the end of the week for sure.  "

## 2022-02-15 NOTE — NURSING NOTE
Phone Message    PCP:   Samuel      Time of Call:  1546       Person Calling:  pt's daughter, Collette  Phone number:  [phone]    Reason for call:  daughter was wondering what labs pt is due for  Returned call at: 1645    Note:   LM for pt's daughter that INR is due in 4 wks and CDM labs do in 5 months    Last office visit and reason:  _    Transferred to: _    Called daughter back, pt is also due for hgb a1c.  Will schedule lab appt in 4wks.

## 2022-02-15 NOTE — LETTER
(Inserted Image. Unable to display)       August 08, 2019      GERHARD CARLEY  [address]  [place], [place] [id number]          Dear GERHARD,      Thank you for selecting [facility] for your healthcare needs.     Our records indicate you are due for the following services:    Non-Fasting Labs    If you had your labs done at another facility or with Direct Access Lab Testing at [facility], please bring in a copy of the results to your next visit, mail a copy, or drop off a copy of your results to your Healthcare Provider.    Diabetic Exam ~ Please bring your glucose meter and/or your blood glucose diary to your appointment.    You are due for lab work and an office visit; please schedule the lab appointment 1 week before the office visit.  This will assure all results are available to discuss with your Healthcare Provider during your visit.    **It is very helpful if you bring your medication bottles to your appointment.  This assures we have all of your current medications, including strength and dosing information, documented accurately in your medical record.    To schedule an appointment or if you have further questions, please contact your primary clinic:   [facility]       [phone]   [facility]       [phone]              [facility]     [phone]    Powered by [employer] and [employer]    Sincerely,    Hilario Whiting MD, FACP

## 2022-02-15 NOTE — NURSING NOTE
Comprehensive Intake Entered On:  9/5/2019 4:50 PM CDT    Performed On:  9/5/2019 4:45 PM CDT by Yumiko Escamilla               Summary   Chief Complaint :   Dm check   Weight Measured :   150 lb(Converted to: 150 lb 0 oz, 68.04 kg)    Height Measured :   64 in(Converted to: 5 ft 4 in, 162.56 cm)    Body Mass Index :   25.74 kg/m2 (HI)    Body Surface Area :   1.75 m2   Systolic Blood Pressure :   130 mmHg   Diastolic Blood Pressure :   78 mmHg   Mean Arterial Pressure :   95 mmHg   Peripheral Pulse Rate :   64 bpm   BP Site :   Right arm   Pulse Site :   Radial artery   BP Method :   Manual   HR Method :   Manual   Temperature Tympanic :   97.8 DegF(Converted to: 36.6 DegC)  (LOW)    Race :      Languages :   English   Yumiko Escamilla - 9/5/2019 4:45 PM CDT   Health Status   Allergies Verified? :   Yes   Medication History Verified? :   Yes   Medical History Verified? :   Yes   Pre-Visit Planning Status :   Completed   Tobacco Use? :   Never smoker   Yumiko Escamilla - 9/5/2019 4:45 PM CDT   Consents   Consent for Immunization Exchange :   Consent Granted   Consent for Immunizations to Providers :   Consent Granted   Yumiko Escamilla - 9/5/2019 4:45 PM CDT   Meds / Allergies   (As Of: 9/5/2019 4:50:04 PM CDT)   Allergies (Active)   Ancef  Estimated Onset Date:   Unspecified ; Reactions:   Diarrhea ; Created By:   Chloe Bruce; Reaction Status:   Active ; Category:   Drug ; Substance:   Ancef ; Type:   Allergy ; Updated By:   Chloe Bruce; Reviewed Date:   9/5/2019 4:47 PM CDT      Calan  Estimated Onset Date:   Unspecified ; Created By:   Vaishali Cortés; Reaction Status:   Active ; Substance:   Calan ; Updated By:   Vaishali oCrtés; Source:   Paper Chart ; Reviewed Date:   9/5/2019 4:47 PM CDT      ciprofloxacin  Estimated Onset Date:   Unspecified ; Created By:   Cherise Christiansen MA; Reaction Status:   Active ; Category:   Drug ; Substance:   ciprofloxacin ; Type:   Allergy  ; Updated By:   Cherise Christiansen MA; Reviewed Date:   9/5/2019 4:47 PM CDT      phenobarbital  Estimated Onset Date:   Unspecified ; Reactions:   tremors ; Created By:   Chloe Bruce; Reaction Status:   Active ; Category:   Drug ; Substance:   phenobarbital ; Type:   Allergy ; Updated By:   Chloe Bruce; Source:   Paper Chart ; Reviewed Date:   9/5/2019 4:47 PM CDT      probiotic  Estimated Onset Date:   Unspecified ; Reactions:   Rash.. ; Created By:   Sari Turner CMA; Reaction Status:   Active ; Category:   Drug ; Substance:   probiotic ; Type:   Allergy ; Updated By:   Sari Turner CMA; Reviewed Date:   9/5/2019 4:47 PM CDT      simvastatin  Estimated Onset Date:   Unspecified ; Reactions:   GI upset ; Created By:   Vaishali Cortés; Reaction Status:   Active ; Substance:   simvastatin ; Type:   Allergy ; Updated By:   Vaishali Cortés; Source:   Paper Chart ; Reviewed Date:   9/5/2019 4:47 PM CDT      Vicodin  Estimated Onset Date:   Unspecified ; Reactions:   rash ; Created By:   Mis Donaldson MA; Reaction Status:   Active ; Category:   Drug ; Substance:   Vicodin ; Type:   Allergy ; Severity:   Moderate ; Updated By:   Mis Donaldson MA; Reviewed Date:   9/5/2019 4:47 PM CDT        Medication List   (As Of: 9/5/2019 4:50:04 PM CDT)   Prescription/Discharge Order    warfarin  :   warfarin ; Status:   Prescribed ; Ordered As Mnemonic:   warfarin 5 mg oral tablet ; Simple Display Line:   See Instructions, 1.5 tabs Fridays and one every other day., 100 EA, 3 Refill(s) ; Ordering Provider:   Hilario Whiting MD; Catalog Code:   warfarin ; Order Dt/Tm:   2/7/2019 9:57:05 AM          pravastatin  :   pravastatin ; Status:   Prescribed ; Ordered As Mnemonic:   pravastatin 10 mg oral tablet ; Simple Display Line:   10 mg, 1 tab(s), PO, Daily, 90 tab(s), 3 Refill(s) ; Ordering Provider:   Hilario Whiting MD; Catalog Code:   pravastatin ; Order Dt/Tm:   2/7/2019 9:57:02 AM          metoprolol  :    metoprolol ; Status:   Prescribed ; Ordered As Mnemonic:   Metoprolol Succinate  mg oral tablet, extended release ; Simple Display Line:   See Instructions, TAKE ONE TABLET BY MOUTH EVERY DAY, 90 tab(s), 3 Refill(s) ; Ordering Provider:   Hilario Whiting MD; Catalog Code:   metoprolol ; Order Dt/Tm:   2/7/2019 9:56:57 AM          levothyroxine  :   levothyroxine ; Status:   Prescribed ; Ordered As Mnemonic:   levothyroxine 125 mcg (0.125 mg) oral tablet ; Simple Display Line:   125 mcg, 1 tab(s), po, daily, for 90 day(s), 90 tab(s), 3 Refill(s) ; Ordering Provider:   Hilario Whiting MD; Catalog Code:   levothyroxine ; Order Dt/Tm:   2/7/2019 9:55:38 AM          fluticasone nasal  :   fluticasone nasal ; Status:   Prescribed ; Ordered As Mnemonic:   Flonase 50 mcg/inh nasal spray ; Simple Display Line:   2 spray(s), nasal, daily, 1 EA, 11 Refill(s) ; Ordering Provider:   Hilario Whiting MD; Catalog Code:   fluticasone nasal ; Order Dt/Tm:   2/7/2019 9:55:34 AM          digoxin  :   digoxin ; Status:   Prescribed ; Ordered As Mnemonic:   digoxin 125 mcg (0.125 mg) oral tablet ; Simple Display Line:   125 mcg, 1 tab(s), po, daily, 90 tab(s), 3 Refill(s) ; Ordering Provider:   Hilario Whiting MD; Catalog Code:   digoxin ; Order Dt/Tm:   2/7/2019 9:55:28 AM            Home Meds    acetaminophen  :   acetaminophen ; Status:   Documented ; Ordered As Mnemonic:   acetaminophen ; Simple Display Line:   0 Refill(s) ; Catalog Code:   acetaminophen ; Order Dt/Tm:   5/14/2019 1:44:43 PM

## 2022-02-15 NOTE — LETTER
(Inserted Image. Unable to display)       [address] [place] [address]  August 12, 2021      GERHARD HOWE      [address] [place] [address]        Dear GERHARD,    Thank you for selecting [facility] for your healthcare needs.  Below you will find the results of your recent tests done at our clinic.     Results are acceptable.      Result Name Current Result Previous Result Reference Range   Hgb A1c ((H)) 7.3 8/11/2021 ((H)) 7.6 2/8/2021  - <5.7   TSH (mIU/L) ((H)) 5.56 8/11/2021 ((H)) 6.52 2/8/2021 0.40 - 4.50       Please contact me or my assistant at [phone] if you have any questions.     Sincerely,        Hilario Whiting MD

## 2022-02-15 NOTE — RESULTS
Anticoagulation Therapy Entered On:  6/13/2019 3:43 PM CDT    Performed On:  6/13/2019 3:41 PM CDT by Madiha Britt RN               Warfarin Management   Week 1 Sunday Dose :   5    Week 1 Monday Dose :   5    Week 1 Tuesday Dose :   5    Week 1 Wednesday Dose :   5    Week 1 Thursday Dose :   5    Week 1 Friday Dose :   7.5    Week 1 Saturday Dose :   5    Week 1 Dose Total :   37.5    Week 2 Sunday Dose :   5    Week 2 Monday Dose :   5    Week 2 Tuesday Dose :   5    Week 2 Wednesday Dose :   5    Week 2 Thursday Dose :   5    Week 2 Friday Dose :   7.5    Week 2 Saturday Dose :   5    Week 2 Dose Total :   37.5    Planned Duration :   Indefinite   Indication :   Atrial fibrillation   Warfarin Management Comments :   Lab test performed by:  [facility]  [address] [place] [address]  Phone # [phone]  Fax# [phone]  Capillary   International Normalization Ratio :   1.8    INR Range :   2.0 - 3.0   INR Therapeutic Range :   No   Madiha Britt RN - 6/13/2019 3:42 PM CDT   Warfarin Management and Results Grid   Signs of Thrombolic :   No   Signs of Warfarin Intolerance :   No   Changes in Diet or Alcohol Intake :   No   Changes in Medication or Antibiotics :   No   Unusual Bleeding or Bruising :   No   Rectal Bleeding or Black Stools :   No   Vitamin K Food Handout :   No   Heart Valve Replacement :   No   Madiha Britt RN - 6/13/2019 3:42 PM CDT   Anticoagulation Recheck :   2 weeks   Warfarin Special Instructions :   Patient to continue Warfarin 7.5mg Fridays and 5mg ROW. Recheck INR in 2 weeks per JDL. Patient advised via phone.    Madiha Britt RN - 6/13/2019 3:42 PM CDT

## 2022-02-15 NOTE — TELEPHONE ENCOUNTER
---------------------  From: Karen Adair CMA   To: INR Pool ( [id number]);     Sent: 6/16/2020 11:31:28 AM CDT  Subject: Fasting labs/Appt FYI     PCP:   [id number]      Time of Call:  1122       Person Calling:  Collette, daughter  Phone number:  [phone]    Returned call at: 1125    Note:   Patient told daughter she received a call regarding an appt. Informed daughter there is no documentation of such call. Did let her know patient is due for fasting labs and DM check. Daughter would like to complete INR and fasting labs same day. INR scheduled for 6/23/20    Last office visit and reason:  5/26/20 INR---------------------  From: Jennifer Astudillo RN (INR Pool ( [id number]))   To: Karen Adair CMA;     Sent: 6/16/2020 2:38:17 PM CDT  Subject: RE: Fasting labs/Appt FYI     She had lipids and A1c on 3/24/2020. I don't see another pending order except INR?

## 2022-02-15 NOTE — LETTER
(Inserted Image. Unable to display)   July 12, 2021    GERHARDAARON HOWE  [address] [place] [address]            Dear GERHARD,      Thank you for selecting [facility] for your healthcare needs.    Our records indicate you are due for the following services:     Follow-up office visit.    (FYI   Regarding office visits: In some instances, a video visit or telephone visit may be offered as an option.)      To schedule an appointment or if you have further questions, please contact your clinic at [phone].      Powered by [employer]    Sincerely,    Hilario Whiting MD, FACP

## 2022-02-15 NOTE — RESULTS
Anticoagulation Therapy Entered On:  6/28/2019 3:39 PM CDT    Performed On:  6/28/2019 2:35 PM CDT by Ava Velasquez RN               Warfarin Management   Week 1 Sunday Dose :   5    Week 1 Monday Dose :   5    Week 1 Tuesday Dose :   5    Week 1 Wednesday Dose :   5    Week 1 Thursday Dose :   5    Week 1 Friday Dose :   7.5    Week 1 Saturday Dose :   5    Week 1 Dose Total :   37.5    Week 2 Sunday Dose :   5    Week 2 Monday Dose :   5    Week 2 Tuesday Dose :   5    Week 2 Wednesday Dose :   5    Week 2 Thursday Dose :   5    Week 2 Friday Dose :   7.5    Week 2 Saturday Dose :   5    Week 2 Dose Total :   37.5    Planned Duration :   Indefinite   Indication :   Atrial fibrillation   Warfarin Management Comments :   Lab test performed by:  [facility]  [address] [place] [address]  Phone # [phone]  Fax# [phone]  Capillary   International Normalization Ratio :   1.9    INR Range :   2.0 - 3.0   INR Therapeutic Range :   No   Warfarin Abnormal Findings :   She accidently took todays and yesterdays dose last night   Ava Velasquez RN - 6/28/2019 3:36 PM CDT   Warfarin Management and Results Grid   Signs of Thrombolic :   No   Signs of Warfarin Intolerance :   No   Changes in Diet or Alcohol Intake :   No   Changes in Medication or Antibiotics :   No   Unusual Bleeding or Bruising :   No   Rectal Bleeding or Black Stools :   No   Vitamin K Food Handout :   No   Heart Valve Replacement :   No   Ava Velasquez RN - 6/28/2019 3:36 PM CDT   Anticoagulation Recheck :   2 weeks   Warfarin Special Instructions :   Per JDL continue warfairn 7.5 mg Fridays and 5 mg ROW; recheck 2 weeks; take today's dose. Pt notified by phone.   Ava Velasquez RN - 6/28/2019 3:36 PM CDT

## 2022-02-15 NOTE — NURSING NOTE
Anticoagulation Therapy Entered On:  3/24/2020 11:09 AM CDT    Performed On:  3/24/2020 11:03 AM CDT by Jennifer Astudillo RN               Warfarin Management   Week 1 Sunday Dose :   7.5    Week 1 Monday Dose :   5    Week 1 Tuesday Dose :   7.5    Week 1 Wednesday Dose :   5    Week 1 Thursday Dose :   7.5    Week 1 Friday Dose :   5    Week 1 Saturday Dose :   5    Week 1 Dose Total :   42.5    Planned Duration :   Indefinite   Indication :   Atrial fibrillation   INR Range :   2.0 - 3.0   INR Therapeutic Range :   No   Warfarin Abnormal Findings :   none   Jennifer Astudillo RN - 3/24/2020 11:03 AM CDT   Warfarin Management and Results Grid   Signs of Thrombolic :   No   Signs of Warfarin Intolerance :   No   Changes in Diet or Alcohol Intake :   No   Changes in Medication or Antibiotics :   No   Unusual Bleeding or Bruising :   No   Rectal Bleeding or Black Stools :   No   Vitamin K Food Handout :   No   Heart Valve Replacement :   Yes   Jennifer Astudillo RN - 3/24/2020 11:03 AM CDT   Anticoagulation Recheck :   10 days   Warfarin Physician :   Hilario Whiting MD   Warfarin Special Instructions :   INR = 1.8 per [facility] POC on 3/24/2020 Patient is to increase warfarin to 7.5mg on Tues,  Thurs, and Sun and 5mg the rest of the days of the week. Recheck INR in 10 days. Daughter advised via call.   Jennifer Astudillo RN - 3/24/2020 11:03 AM CDT

## 2022-02-15 NOTE — NURSING NOTE
Patient called at 9:16 because she received a recall letter for BP recheck and couldn't remember the directions it contained.  I called back at 10:01 and told her to disregard the letter as she was in this week for an INR and her BP was excellent.  Patient voiced understanding.

## 2022-02-15 NOTE — NURSING NOTE
Comprehensive Intake Entered On:  12/4/2021 8:13 AM CST    Performed On:  12/4/2021 8:08 AM CST by Itzel Ramos CMA               Summary   Chief Complaint :   c/o cough, SOB   Advance Directive :   Yes   Weight Measured :   190.2 lb(Converted to: 190 lb 3 oz, 86.273 kg)    Height Measured :   64 in(Converted to: 5 ft 4 in, 162.56 cm)    Body Mass Index :   32.64 kg/m2 (HI)    Body Surface Area :   1.97 m2   Systolic Blood Pressure :   178 mmHg (HI)    Diastolic Blood Pressure :   115 mmHg (HI)    Mean Arterial Pressure :   136 mmHg   Peripheral Pulse Rate :   91 bpm   BP Site :   Right arm   BP Method :   Electronic   HR Method :   Electronic   Oxygen Saturation :   93 % (LOW)    Race :      Languages :   English   Itzel Ramos CMA - 12/4/2021 8:08 AM CST   Health Status   Allergies Verified? :   Yes   Medication History Verified? :   Yes   Medical History Verified? :   Yes   Tobacco Use? :   Never smoker   Itzel Ramos CMA - 12/4/2021 8:08 AM CST   Consents   Consent for Immunization Exchange :   Consent Granted   Consent for Immunizations to Providers :   Consent Granted   Itzel Ramos CMA - 12/4/2021 8:08 AM CST   Meds / Allergies   (As Of: 12/4/2021 8:13:27 AM CST)   Allergies (Active)   Ancef  Estimated Onset Date:   Unspecified ; Reactions:   Diarrhea ; Created By:   Chloe Bruce; Reaction Status:   Active ; Category:   Drug ; Substance:   Ancef ; Type:   Allergy ; Updated By:   Chloe Bruce; Reviewed Date:   12/4/2021 8:13 AM CST      Calan  Estimated Onset Date:   Unspecified ; Created By:   Sravanthi Dietz; Reaction Status:   Active ; Category:   Drug ; Substance:   Calan ; Updated By:   Sravanthi Dietz; Source:   Paper Chart ; Reviewed Date:   12/4/2021 8:13 AM CST      ciprofloxacin  Estimated Onset Date:   Unspecified ; Created By:   Cherise Christiansen MA; Reaction Status:   Active ; Category:   Drug ; Substance:   ciprofloxacin ; Type:   Allergy ; Updated By:   Kuldip PENA  Cherise SEAMAN; Reviewed Date:   12/4/2021 8:13 AM CST      phenobarbital  Estimated Onset Date:   Unspecified ; Reactions:   tremors ; Created By:   Chloe Bruce; Reaction Status:   Active ; Category:   Drug ; Substance:   phenobarbital ; Type:   Allergy ; Updated By:   Chloe Bruce; Source:   Paper Chart ; Reviewed Date:   12/4/2021 8:13 AM CST      probiotic  Estimated Onset Date:   Unspecified ; Reactions:   Rash.. ; Created By:   Sari Turner CMA; Reaction Status:   Active ; Category:   Drug ; Substance:   probiotic ; Type:   Allergy ; Updated By:   Sari Turner CMA; Reviewed Date:   12/4/2021 8:13 AM CST      simvastatin  Estimated Onset Date:   Unspecified ; Reactions:   GI upset ; Created By:   Sravanthi Dietz; Reaction Status:   Active ; Category:   Drug ; Substance:   simvastatin ; Type:   Allergy ; Updated By:   Sravanthi Dietz; Source:   Paper Chart ; Reviewed Date:   12/4/2021 8:13 AM CST      Vicodin  Estimated Onset Date:   Unspecified ; Reactions:   rash ; Created By:   Mis Donaldson MA; Reaction Status:   Active ; Category:   Drug ; Substance:   Vicodin ; Type:   Allergy ; Severity:   Moderate ; Updated By:   Mis Donaldson MA; Reviewed Date:   12/4/2021 8:13 AM CST        Medication List   (As Of: 12/4/2021 8:13:27 AM CST)   Prescription/Discharge Order    warfarin  :   warfarin ; Status:   Prescribed ; Ordered As Mnemonic:   warfarin 5 mg oral tablet ; Simple Display Line:   See Instructions, 7.5mg Sun/Tues/Thurs and 5mg rest of days, 120 EA, 3 Refill(s) ; Ordering Provider:   Hilario Whiting MD; Catalog Code:   warfarin ; Order Dt/Tm:   8/17/2021 2:34:13 PM CDT          Miscellaneous Rx Supply  :   Miscellaneous Rx Supply ; Status:   Prescribed ; Ordered As Mnemonic:   ACCU-CHECK GUIDE TEST STRIPS ; Simple Display Line:   See Instructions, TEST BLOOD SUGAR DAILY, 100 EA, 11 Refill(s) ; Ordering Provider:   Shimon Nesbitt MD; Catalog Code:   Miscellaneous Rx Supply ; Order Dt/Tm:    7/21/2020 8:47:37 AM CDT          Miscellaneous Rx Supply  :   Miscellaneous Rx Supply ; Status:   Prescribed ; Ordered As Mnemonic:   ACCU-CHECK GUIDE LANCETS ; Simple Display Line:   See Instructions, TEST BLOOD SUGAR DAILY, 100 EA, 11 Refill(s) ; Ordering Provider:   Shimon Nesbitt MD; Catalog Code:   Miscellaneous Rx Supply ; Order Dt/Tm:   7/21/2020 8:47:38 AM CDT          QUEtiapine  :   QUEtiapine ; Status:   Prescribed ; Ordered As Mnemonic:   SEROquel 25 mg oral tablet ; Simple Display Line:   25 mg, 1 tab(s), Oral, qhs, 90 tab(s), 3 Refill(s) ; Ordering Provider:   Hilario Whiting MD; Catalog Code:   QUEtiapine ; Order Dt/Tm:   8/17/2021 2:33:46 PM CDT          metoprolol  :   metoprolol ; Status:   Prescribed ; Ordered As Mnemonic:   Metoprolol Succinate  mg oral tablet, extended release ; Simple Display Line:   100 mg, 1 tab(s), Oral, daily, 90 tab(s), 3 Refill(s) ; Ordering Provider:   Hilario Whiting MD; Catalog Code:   metoprolol ; Order Dt/Tm:   8/17/2021 2:33:17 PM CDT          citalopram  :   citalopram ; Status:   Prescribed ; Ordered As Mnemonic:   citalopram 10 mg oral tablet ; Simple Display Line:   10 mg, 1 tab(s), Oral, daily, 90 tab(s), 3 Refill(s) ; Ordering Provider:   Hilario Whiting MD; Catalog Code:   citalopram ; Order Dt/Tm:   8/17/2021 2:32:52 PM CDT          levothyroxine  :   levothyroxine ; Status:   Prescribed ; Ordered As Mnemonic:   levothyroxine 137 mcg (0.137 mg) oral capsule ; Simple Display Line:   137 mcg, 1 cap(s), Oral, daily, 90 cap(s), 3 Refill(s) ; Ordering Provider:   Hilario Whiting MD; Catalog Code:   levothyroxine ; Order Dt/Tm:   8/17/2021 2:35:45 PM CDT            Home Meds    Miscellaneous Prescription  :   Miscellaneous Prescription ; Status:   Documented ; Ordered As Mnemonic:   Calcium with vitamin D ; Simple Display Line:   1 tab, Oral, daily, 0 Refill(s) ; Catalog Code:   Miscellaneous Prescription ; Order Dt/Tm:   10/26/2020 3:08:29 PM CDT           omega-3 polyunsaturated fatty acids  :   omega-3 polyunsaturated fatty acids ; Status:   Documented ; Ordered As Mnemonic:   Fish Oil ; Simple Display Line:   1 tab, Oral, daily, 0 Refill(s) ; Catalog Code:   omega-3 polyunsaturated fatty acids ; Order Dt/Tm:   10/26/2020 3:10:12 PM CDT          multivitamin  :   multivitamin ; Status:   Documented ; Ordered As Mnemonic:   Daily Multiple Vitamins ; Simple Display Line:   1 tab(s), Oral, daily, 0 Refill(s) ; Catalog Code:   multivitamin ; Order Dt/Tm:   10/26/2020 3:09:47 PM CDT          multivitamin with minerals  :   multivitamin with minerals ; Status:   Documented ; Ordered As Mnemonic:   PreserVision ; Simple Display Line:   1 cap(s), Oral, bid, 0 Refill(s) ; Catalog Code:   multivitamin with minerals ; Order Dt/Tm:   10/26/2020 3:10:54 PM CDT          acetaminophen  :   acetaminophen ; Status:   Documented ; Ordered As Mnemonic:   acetaminophen ; Simple Display Line:   0 Refill(s) ; Catalog Code:   acetaminophen ; Order Dt/Tm:   5/14/2019 1:44:43 PM CDT          ascorbic acid  :   ascorbic acid ; Status:   Documented ; Ordered As Mnemonic:   Vitamin C ; Simple Display Line:   500 mg, Oral, daily, 0 Refill(s) ; Catalog Code:   ascorbic acid ; Order Dt/Tm:   10/26/2020 3:10:37 PM CDT          cholecalciferol  :   cholecalciferol ; Status:   Documented ; Ordered As Mnemonic:   Vitamin D3 ; Simple Display Line:   2,000 International Unit, Oral, daily, 0 Refill(s) ; Catalog Code:   cholecalciferol ; Order Dt/Tm:   10/26/2020 3:10:48 PM CDT

## 2022-02-15 NOTE — NURSING NOTE
Diabetes Eye Testing Entered On:  8/13/2021 1:47 PM CDT    Performed On:  7/29/2021 1:47 PM CDT by Deandra Sanchez               Diabetes Eye Testing   Retinopathy Present TR :   Yes   Deandra Sanchez - 8/13/2021 1:47 PM CDT

## 2022-02-15 NOTE — NURSING NOTE
Quick Intake Entered On:  2/22/2019 1:45 PM CST    Performed On:  2/22/2019 1:43 PM CST by Eva LARA, Ava HIGUERA               Summary   Chief Complaint :   Bp   Systolic Blood Pressure :   154 mmHg (HI)    Diastolic Blood Pressure :   84 mmHg (HI)    Mean Arterial Pressure :   107 mmHg   Peripheral Pulse Rate :   76 bpm   BP Site :   Right arm   Pulse Site :   Radial artery   BP Method :   Manual   HR Method :   Manual   Race :      Languages :   English   Ava Velasquez RN - 2/22/2019 1:43 PM CST   More Vitals   Systolic Blood Pressure Repeat :   148 mmHg   Diastolic Blood Pressure Repeat :   82 mmHg   BP Site Repeat :   Right arm   Ava Velasquez RN - 2/22/2019 1:43 PM CST

## 2022-02-15 NOTE — TELEPHONE ENCOUNTER
---------------------  From: Madiha Gillette CMA   Sent: 12/3/2021 4:13:07 PM CST  [facility]     Pt was [facility] at [facility] today per Dr. Nesbitt. 02 Sat=unable to obtain today. Pt resides in [facility]-will notify [facility] if positive.

## 2022-02-15 NOTE — NURSING NOTE
Comprehensive Intake Entered On:  12/3/2021 7:38 AM CST    Performed On:  12/3/2021 7:33 AM CST by Gosia Benítez CMA               Summary   Chief Complaint :   Verbal consent given for telephone call. Cough that is worsening - keeps up at night. No fevers. Has more of a runny nose than usual.     Advance Directive :   Yes   Race :      Languages :   English   Gosia Benítez CMA - 12/3/2021 7:33 AM CST   Health Status   Allergies Verified? :   Yes   Medication History Verified? :   Yes   Pre-Visit Planning Status :   Not completed   Gosia Benítez CMA - 12/3/2021 7:33 AM CST   Meds / Allergies   (As Of: 12/3/2021 7:38:44 AM CST)   Allergies (Active)   Ancef  Estimated Onset Date:   Unspecified ; Reactions:   Diarrhea ; Created By:   Chloe Bruce; Reaction Status:   Active ; Category:   Drug ; Substance:   Ancef ; Type:   Allergy ; Updated By:   Chloe Bruce; Reviewed Date:   8/17/2021 2:09 PM CDT      Calan  Estimated Onset Date:   Unspecified ; Created By:   Sravanthi Dietz; Reaction Status:   Active ; Category:   Drug ; Substance:   Calan ; Updated By:   Sravanthi Dietz; Source:   Paper Chart ; Reviewed Date:   8/17/2021 2:09 PM CDT      ciprofloxacin  Estimated Onset Date:   Unspecified ; Created By:   Chreise Christiansen MA; Reaction Status:   Active ; Category:   Drug ; Substance:   ciprofloxacin ; Type:   Allergy ; Updated By:   Cherise Christiansen MA; Reviewed Date:   8/17/2021 2:09 PM CDT      phenobarbital  Estimated Onset Date:   Unspecified ; Reactions:   tremors ; Created By:   Chloe Bruce; Reaction Status:   Active ; Category:   Drug ; Substance:   phenobarbital ; Type:   Allergy ; Updated By:   Chloe Bruce; Source:   Paper Chart ; Reviewed Date:   8/17/2021 2:09 PM CDT      probiotic  Estimated Onset Date:   Unspecified ; Reactions:   Rash.. ; Created By:   Sari Turner CMA; Reaction Status:   Active ; Category:   Drug ; Substance:   probiotic ; Type:   Allergy ; Updated  By:   Sari Turner CMA; Reviewed Date:   8/17/2021 2:09 PM CDT      simvastatin  Estimated Onset Date:   Unspecified ; Reactions:   GI upset ; Created By:   Sravanthi Dietz; Reaction Status:   Active ; Category:   Drug ; Substance:   simvastatin ; Type:   Allergy ; Updated By:   Sravanthi Dietz; Source:   Paper Chart ; Reviewed Date:   8/17/2021 2:09 PM CDT      Vicodin  Estimated Onset Date:   Unspecified ; Reactions:   rash ; Created By:   Mis Donaldson MA; Reaction Status:   Active ; Category:   Drug ; Substance:   Vicodin ; Type:   Allergy ; Severity:   Moderate ; Updated By:   Mis Donaldson MA; Reviewed Date:   8/17/2021 2:09 PM CDT        Medication List   (As Of: 12/3/2021 7:38:44 AM CST)   Prescription/Discharge Order    citalopram  :   citalopram ; Status:   Prescribed ; Ordered As Mnemonic:   citalopram 10 mg oral tablet ; Simple Display Line:   10 mg, 1 tab(s), Oral, daily, 90 tab(s), 3 Refill(s) ; Ordering Provider:   Hilario Whiting MD; Catalog Code:   citalopram ; Order Dt/Tm:   8/17/2021 2:32:52 PM CDT          levothyroxine  :   levothyroxine ; Status:   Prescribed ; Ordered As Mnemonic:   levothyroxine 137 mcg (0.137 mg) oral capsule ; Simple Display Line:   137 mcg, 1 cap(s), Oral, daily, 90 cap(s), 3 Refill(s) ; Ordering Provider:   Hilario Whiting MD; Catalog Code:   levothyroxine ; Order Dt/Tm:   8/17/2021 2:35:45 PM CDT          metoprolol  :   metoprolol ; Status:   Prescribed ; Ordered As Mnemonic:   Metoprolol Succinate  mg oral tablet, extended release ; Simple Display Line:   100 mg, 1 tab(s), Oral, daily, 90 tab(s), 3 Refill(s) ; Ordering Provider:   Hilario Whiting MD; Catalog Code:   metoprolol ; Order Dt/Tm:   8/17/2021 2:33:17 PM CDT          Miscellaneous Rx Supply  :   Miscellaneous Rx Supply ; Status:   Prescribed ; Ordered As Mnemonic:   ACCU-CHECK GUIDE LANCETS ; Simple Display Line:   See Instructions, TEST BLOOD SUGAR DAILY, 100 EA, 11 Refill(s) ; Ordering Provider:    Shimon Nesbitt MD; Catalog Code:   Miscellaneous Rx Supply ; Order Dt/Tm:   7/21/2020 8:47:38 AM CDT          Miscellaneous Rx Supply  :   Miscellaneous Rx Supply ; Status:   Prescribed ; Ordered As Mnemonic:   ACCU-CHECK GUIDE TEST STRIPS ; Simple Display Line:   See Instructions, TEST BLOOD SUGAR DAILY, 100 EA, 11 Refill(s) ; Ordering Provider:   Shimon Nesbitt MD; Catalog Code:   Miscellaneous Rx Supply ; Order Dt/Tm:   7/21/2020 8:47:37 AM CDT          QUEtiapine  :   QUEtiapine ; Status:   Prescribed ; Ordered As Mnemonic:   SEROquel 25 mg oral tablet ; Simple Display Line:   25 mg, 1 tab(s), Oral, qhs, 90 tab(s), 3 Refill(s) ; Ordering Provider:   Hilario Whiting MD; Catalog Code:   QUEtiapine ; Order Dt/Tm:   8/17/2021 2:33:46 PM CDT          warfarin  :   warfarin ; Status:   Prescribed ; Ordered As Mnemonic:   warfarin 5 mg oral tablet ; Simple Display Line:   See Instructions, 7.5mg Sun/Tues/Thurs and 5mg rest of days, 120 EA, 3 Refill(s) ; Ordering Provider:   Hilario Whiting MD; Catalog Code:   warfarin ; Order Dt/Tm:   8/17/2021 2:34:13 PM CDT            Home Meds    acetaminophen  :   acetaminophen ; Status:   Documented ; Ordered As Mnemonic:   acetaminophen ; Simple Display Line:   0 Refill(s) ; Catalog Code:   acetaminophen ; Order Dt/Tm:   5/14/2019 1:44:43 PM CDT          ascorbic acid  :   ascorbic acid ; Status:   Documented ; Ordered As Mnemonic:   Vitamin C ; Simple Display Line:   500 mg, Oral, daily, 0 Refill(s) ; Catalog Code:   ascorbic acid ; Order Dt/Tm:   10/26/2020 3:10:37 PM CDT          cholecalciferol  :   cholecalciferol ; Status:   Documented ; Ordered As Mnemonic:   Vitamin D3 ; Simple Display Line:   2,000 International Unit, Oral, daily, 0 Refill(s) ; Catalog Code:   cholecalciferol ; Order Dt/Tm:   10/26/2020 3:10:48 PM CDT          Miscellaneous Prescription  :   Miscellaneous Prescription ; Status:   Documented ; Ordered As Mnemonic:   Calcium with vitamin D ; Simple  Display Line:   1 tab, Oral, daily, 0 Refill(s) ; Catalog Code:   Miscellaneous Prescription ; Order Dt/Tm:   10/26/2020 3:08:29 PM CDT          multivitamin  :   multivitamin ; Status:   Documented ; Ordered As Mnemonic:   Daily Multiple Vitamins ; Simple Display Line:   1 tab(s), Oral, daily, 0 Refill(s) ; Catalog Code:   multivitamin ; Order Dt/Tm:   10/26/2020 3:09:47 PM CDT          multivitamin with minerals  :   multivitamin with minerals ; Status:   Documented ; Ordered As Mnemonic:   PreserVision ; Simple Display Line:   1 cap(s), Oral, bid, 0 Refill(s) ; Catalog Code:   multivitamin with minerals ; Order Dt/Tm:   10/26/2020 3:10:54 PM CDT          omega-3 polyunsaturated fatty acids  :   omega-3 polyunsaturated fatty acids ; Status:   Documented ; Ordered As Mnemonic:   Fish Oil ; Simple Display Line:   1 tab, Oral, daily, 0 Refill(s) ; Catalog Code:   omega-3 polyunsaturated fatty acids ; Order Dt/Tm:   10/26/2020 3:10:12 PM CDT

## 2022-02-15 NOTE — PROGRESS NOTES
Patient:   GERHARD HOWE            MRN: [id number]            FIN: [id number]               Age:   89 years     Sex:  Female     :  11/10/1929   Associated Diagnoses:   Type 2 diabetes mellitus with other circulatory complications; Carotid artery plaque; Symptomatic varicose veins; Diabetic peripheral neuropathy; Hypothyroidism (acquired); Long term (current) use of anticoagulants; White coat syndrome with hypertension   Author:   Hilario Whiting MD      Visit Information   Visit type:  Scheduled follow-up.    Accompanied by:  Family member.    Source of history:  Self, Family member.    History limitation:  None.       Chief Complaint   2019 4:45 PM CDT     Dm check      History of Present Illness    The patient is here with her daughter for medical follow up.  She has generally been well.  No hypoglycemia.  She tells me that she is working with the chiropractor for her low back and hip pain and is doing better.  No diabetic retinopathy.  No bleeding.          Review of Systems   Constitutional:  No weakness, No fatigue, No decreased activity.    Eye:  No recent visual problem.    Respiratory:  No shortness of breath, No cough.    Cardiovascular:  No chest pain, No peripheral edema.    Gastrointestinal:  No nausea, No vomiting, No diarrhea.    Genitourinary:  No dysuria, No hematuria.    Hematology/Lymphatics:  Bruising tendency, No bleeding tendency.    Endocrine:  Negative except as documented in history of present illness.    Musculoskeletal:  Negative except as documented in history of present illness.    Neurologic:  Negative except as documented in history of present illness.       Health Status   Allergies:    Allergic Reactions (Selected)  Moderate  Vicodin (Rash)  Severity Not Documented  Ancef (Diarrhea)  Calan (No reactions were documented)  Ciprofloxacin (No reactions were documented)  Phenobarbital (Tremors)  Probiotic (Rash..)  Simvastatin (Gi upset)   Medications:  (Selected)    Prescriptions  Prescribed  Flonase 50 mcg/inh nasal spray: = 2 spray(s), nasal, daily, # 1 EA, 11 Refill(s), Type: Maintenance, Pharmacy: [id number], 2 spray(s) Nasal daily  Metoprolol Succinate  mg oral tablet, extended release: See Instructions, Instructions: TAKE ONE TABLET BY MOUTH EVERY DAY, # 90 tab(s), 3 Refill(s), Type: Soft Stop, Pharmacy: [id number], TAKE ONE TABLET BY MOUTH EVERY DAY  digoxin 125 mcg (0.125 mg) oral tablet: = 1 tab(s) ( 125 mcg ), po, daily, # 90 tab(s), 3 Refill(s), Type: Maintenance, Pharmacy: [facility], 1 tab(s) Oral daily  levothyroxine 125 mcg (0.125 mg) oral tablet: = 1 tab(s) ( 125 mcg ), po, daily, # 90 tab(s), 3 Refill(s), Type: Soft Stop, Pharmacy: [facility], 1 tab(s) Oral daily,x90 day(s)  pravastatin 10 mg oral tablet: = 1 tab(s) ( 10 mg ), PO, Daily, # 90 tab(s), 3 Refill(s), Type: Maintenance, Pharmacy: [facility], 1 tab(s) Oral daily  warfarin 5 mg oral tablet: See Instructions, Instructions: 1.5 tabs Fridays and one every other day., # 100 EA, 3 Refill(s), Type: Soft Stop, Pharmacy: [facility] [address], 1.5 tabs Fridays and one every other day.  Documented Medications  Documented  acetaminophen: 0 Refill(s), Type: Maintenance   Problem list:    All Problems  Allergic rhinitis / SNOMED CT 430106251 / Confirmed  Benign positional vertigo / SNOMED CT 375507697 / Confirmed  Bilateral hearing loss / SNOMED CT 042055284 / Confirmed  Carotid artery plaque / SNOMED CT 863212869 / Confirmed  Diabetic peripheral neuropathy / SNOMED CT 1823859431 / Confirmed  Dyslipidemia, goal LDL below 100 / SNOMED CT 30476072 / Confirmed  Frailty / SNOMED CT 593634869 / Confirmed  History of mitral valve insufficiency / SNOMED CT 815072521 / Confirmed  Hypothyroidism (acquired) / SNOMED CT 263773122 / Confirmed  Long term (current) use of anticoagulants / SNOMED CT 964007310 / Confirmed  Obese / SNOMED  CT 8285321508 / Probable  Osteoarthritis / SNOMED CT 7531947675 / Confirmed  Paroxysmal atrial fibrillation / SNOMED CT 950892562 / Confirmed  S/P placement of cardiac pacemaker / SNOMED CT 586397435 / Confirmed  Seborrheic dermatitis / SNOMED CT 50651942 / Confirmed  Statin intolerance / SNOMED CT 36341840 / Confirmed  Symptomatic varicose veins / SNOMED CT 077910692 / Confirmed  Tinnitus / SNOMED CT 123987349 / Confirmed  Type 2 diabetes mellitus with other circulatory complications / SNOMED CT 556576766 / Confirmed  White coat syndrome with hypertension / SNOMED CT 1047592327 / Confirmed  Resolved: Inpatient stay / SNOMED CT 860374927  Resolved: Inpatient stay / SNOMED CT 345733244  Resolved: Inpatient stay / SNOMED CT 852017254  Canceled: Diabetic Neuropathy, Type II Diabetes Mellitus / ICD-9-.60  Canceled: Hypertension / SNOMED CT 9246312872  Canceled: Rhinitis, chronic / SNOMED CT 748249291  Canceled: White coat hypertension / SNOMED CT 3720773038  Canceled: White coat syndrome with hypertension / SNOMED CT 3366009772      Histories   Past Medical History:    Active  Type 2 diabetes mellitus with other circulatory complications (001200371): Onset on 1/1/1999 at 69 years.  Osteoarthritis (1740702754)  Dyslipidemia, goal LDL below 100 (01291262)  Paroxysmal atrial fibrillation (417743985)  Hypothyroidism (acquired) (365841056)  Allergic rhinitis (933604704)  Obese (5165648567)  Benign positional vertigo (713263382)  Statin intolerance (62719936)  Long term (current) use of anticoagulants (131563597)  Bilateral hearing loss (531511858)  Tinnitus (366128874)  S/P placement of cardiac pacemaker (977172699)  White coat syndrome with hypertension (2079611200)  Resolved  Inpatient stay (915494717): Onset on 5/30/2019 at 89 years.  Resolved on 5/31/2019 at 89 years.  Comments:  7/9/2019 CDT 6:55 AM CDT - Leonarda Padilla  @[place], [place] - Postoperative surgical complication involving subcutaneous  tissue  Inpatient stay (360521384): Onset on 2012 at 83 years.  Resolved.  Comments:  2019 CDT 6:54 AM CDT - Leonarda Padilla  @[facility] [place], [place] - Vertigo  Inpatient stay (330733418): Onset on 2010 at 80 years.  Resolved.  Comments:  2019 CDT 6:53 AM CDT - Leonarda Padilla  @[facility] [place], [place] - Atrial fibrillation with an episode of hypotension   Family History:    Leukemia  Father ()  Gout  Brother  Mother ()  Diabetes mellitus  Mother ()  Hypertension  Mother ()  Parkinson's disease  Sister  Valvular heart disease  Brother ()  MS - Multiple sclerosis  Sister  CABG - Coronary artery bypass graft  Mother ()  MI - Myocardial infarction  Brother ()  CAD - Coronary artery disease  Father ()  Mother ()     Procedure history:    Replacement of pacemaker pulse generator (6836292) on 2019 at 89 Years.  Comments:  2019 10:01 AM CDT - Aida Contreras  Implanted left pectoral.   [employer]onic [id number]  [id number]  Phacoemulsification of cataract with intraocular lens implantation (4677390079) on 3/27/2018 at 88 Years.  Comments:  2018 9:31 AM CDT - Lucy Dawson  Left.  Colonoscopy (008028977) on 2012 at 82 Years.  Comments:  2012 8:59 AM CDT - Henok GANDARA, Hilario KIM-BSO on 2012 at 82 Years.  Hysteroscopy, D&C, polypectomy on 2011 at 81 Years.  Implantation of heart pacemaker (0828638582) in  at 80 Years.  Replacement of right knee joint (3819055402) in the month of 2008 at 78 Years.  Replacement of left knee joint (1426055544) on 12/10/2007 at 78 Years.  Flexible sigmoidoscopy (24438273) on 2006 at 76 Years.  Cholecystectomy (80145058) in 1951 at 22 Years.  Repair of umbilical hernia (22389154) in 1949 at 20 Years.  Appendectomy (863651727) in 1947 at 18 Years.  Plastic repair of rotator cuff of shoulder (465595708).  Pregnancy (702458168).  Comments:  2010  9:08 AM CDT - Vaishali Cortés  full term X 3  Extracapsular cataract extraction and insertion of intraocular lens (457411943).  Comments:  5/11/2018 1:14 PM CDT - Lucy Dawson.   Social History:        Alcohol Assessment: Denies Alcohol Use            Never      Tobacco Assessment: Denies Tobacco Use      Substance Abuse Assessment: Denies Substance Abuse      Employment and Education Assessment            Retired      Home and Environment Assessment            Marital status: .        Physical Examination   Vital Signs   9/5/2019 4:45 PM CDT Temperature Tympanic 97.8 DegF  LOW    Peripheral Pulse Rate 64 bpm    Pulse Site Radial artery    HR Method Manual    Systolic Blood Pressure 130 mmHg    Diastolic Blood Pressure 78 mmHg    Mean Arterial Pressure 95 mmHg    BP Site Right arm    BP Method Manual      Measurements from flowsheet : Measurements   9/5/2019 4:45 PM CDT Height Measured - Standard 64 in    Weight Measured - Standard 150 lb    BSA 1.75 m2    Body Mass Index 25.74 kg/m2  HI      General:  Alert and oriented, No acute distress.    Eye:  Normal conjunctiva.    HENT:  Normocephalic, Normal hearing.    Neck:  Supple, Non-tender, No carotid bruit, No lymphadenopathy, No thyromegaly.    Respiratory:  Lungs are clear to auscultation, Respirations are non-labored.    Cardiovascular:  Normal rate, Regular rhythm, No murmur, No gallop, Normal peripheral perfusion, No edema, Pacemaker.    Musculoskeletal:  Walks with walker.    Integumentary:  No pallor.    Feet:  Normal by visual exam, Normal pulses, absent monofilament, and decreased vibration.    Neurologic:  Normal motor function, Cranial Nerves II-XII are grossly intact, Normal Romberg.    Cognition and Speech:  Speech clear and coherent, Functional cognition intact.    Psychiatric:  Cooperative, Appropriate mood & affect.       Review / Management   Results review:  Lab results   8/16/2019 10:35 AM CDT Hgb A1c 6.1  HI    TSH 2.38 mIU/L    7/30/2019 8:19 AM CDT INR 2.2   6/28/2019 2:35 PM CDT INR 1.9   6/13/2019 3:41 PM CDT INR 1.8   6/6/2019 11:39 AM CDT INR 1.4   .       Impression and Plan   Diagnosis     Type 2 diabetes mellitus with other circulatory complications (SOD00-YU E11.59).     Carotid artery plaque (FOR92-HE I65.29).     Symptomatic varicose veins (NVY15-XE I83.899).     Diabetic peripheral neuropathy (OSY15-TH E11.42).     Hypothyroidism (acquired) (GIT17-JZ E03.9).     Long term (current) use of anticoagulants (YJP92-FJ Z79.01).     White coat syndrome with hypertension (OAI99-GL I10).     Course:  Progressing as expected.    Patient Instructions:       Counseled: Patient, Family, Regarding medications, Diet, Activity, Verbalized understanding.    Orders     Orders (Selected)   Outpatient Orders  Ordered  Return to Clinic (Request): RFV: Cardio FU with Dr Cuadra, Return in 1 year  Return to Clinic (Request): Return in 4 mo w/ DKhadijah Sawyer bring meter and log  Order  RTC (Request): RFV: Wellness, diabetes, Return in 6 months, Instructions: lab before visit  Return to Office (Request): RFV: Hgb A1c q3-6. BMP, CBC, TSH, lipids, BENITO q12..

## 2022-02-15 NOTE — TELEPHONE ENCOUNTER
---------------------  From: Madiha Britt RN   Sent: 10/12/2020 8:29:21 AM CDT  Subject: INR - Home Health     Received call from [facility] with [facility]. Pt has been admitted to home care following hospital stay last week for BPPV.  Mariann states that pt's INR was checked in Hospital and it was 2.0. She has continued previous dose. Returned call to [facility] and advised INR be rechecked in 1 week by [facility]. Asked her to please call back with any questions/concerns.

## 2022-02-15 NOTE — PROGRESS NOTES
Patient:   GERHARD HOWE            MRN: [id number]            FIN: [id number]               Age:   88 years     Sex:  Female     :  11/10/1929   Associated Diagnoses:   Allergic rhinitis; Carotid artery plaque; Dyslipidemia, goal LDL below 100; Frailty; History of mitral valve insufficiency; HTN, goal below 140/90; Hypothyroidism (acquired); Long term (current) use of anticoagulants; Osteoarthritis; Paroxysmal atrial fibrillation; S/P placement of cardiac pacemaker; Statin intolerance; Type 2 diabetes mellitus with other circulatory complications   Author:   Hilario Whiting MD      Visit Information   Visit type:  Scheduled follow-up.    Accompanied by:  Family member.    Source of history:  Self, Family member.    History limitation:  None.       Chief Complaint   2018 2:43 PM CDT     follow up labs, hip better      History of Present Illness   The patient is here for follow-up.  She has been well.  She has intermittent palpitations.  She is tolerating the low-dose statin. There are no hyper- or hypoglycemic symptoms.    Generally she is satisfied with her life.  She has had no bleeding.         Review of Systems   Constitutional:  No weakness, No fatigue.    Eye:  No recent visual problem.    Respiratory:  No shortness of breath, No cough.    Cardiovascular:  No chest pain, No palpitations, No peripheral edema.    Gastrointestinal:  No nausea, No vomiting, No diarrhea.    Genitourinary:  No dysuria, No hematuria.    Hematology/Lymphatics:  No bruising tendency, No bleeding tendency.    Endocrine:  Negative except as documented in history of present illness.    Musculoskeletal:  Negative.    Neurologic:  Alert and oriented X4, No abnormal balance, No confusion, No numbness, No tingling, No headache.       Health Status   Allergies:    Allergic Reactions (Selected)  Moderate  Vicodin (Rash)  Severity Not Documented  Ancef (No reactions were documented)  Calan (No reactions were documented)  Ciprofloxacin (No  reactions were documented)  Phenobarbital (No reactions were documented)  Probiotic (Rash..)  Simvastatin (Gi upset)   Medications:  (Selected)   Prescriptions  Prescribed  Flonase 50 mcg/inh nasal spray: 2 spray(s), nasal, daily, # 1 EA, 11 Refill(s), Type: Maintenance, Pharmacy: [facility] [id number], 2 spray(s) nasal daily  Metoprolol Succinate  mg oral tablet, extended release: See Instructions, Instructions: TAKE ONE TABLET BY MOUTH EVERY DAY, # 90 tab(s), 3 Refill(s), Type: Soft Stop, Pharmacy: [facility] 84385, TAKE ONE TABLET BY MOUTH EVERY DAY  digoxin 125 mcg (0.125 mg) oral tablet: 1 tab(s) ( 125 mcg ), po, daily, # 90 tab(s), 3 Refill(s), Type: Maintenance, Pharmacy: [facility] 58760, 1 tab(s) po daily  levothyroxine 125 mcg (0.125 mg) oral tablet: 1 tab(s) ( 125 mcg ), po, daily, # 90 tab(s), 3 Refill(s), Type: Soft Stop, Pharmacy: [facility]36, 1 tab(s) po daily,x90 day(s)  pravastatin 10 mg oral tablet: 1 tab(s) ( 10 mg ), PO, Daily, # 90 tab(s), 3 Refill(s), Type: Maintenance, Pharmacy: [facility] 55826, 1 tab(s) po daily  warfarin 5 mg oral tablet: 1 tab(s) ( 5 mg ), po, daily, # 90 tab(s), 3 Refill(s), Type: Soft Stop, Pharmacy: [facility] 25861, 1 tab(s) po daily,x90 day(s)   Problem list:    All Problems  Allergic rhinitis / SNOMED CT 067621723 / Confirmed  Benign positional vertigo / SNOMED CT 371019941 / Confirmed  Bilateral hearing loss / SNOMED CT 949711041 / Confirmed  Carotid artery plaque / SNOMED CT 098877045 / Confirmed  Dyslipidemia, goal LDL below 100 / SNOMED CT 86296064 / Confirmed  Frailty / SNOMED CT 673758169 / Confirmed  History of mitral valve insufficiency / SNOMED CT 911917778 / Confirmed  HTN, goal below 140/90 / SNOMED CT 0485744715 / Confirmed  Hypothyroidism (acquired) / SNOMED CT 567051561 / Confirmed  Long term (current) use of anticoagulants / SNOMED CT 599018025 / Confirmed  Obese / SNOMED CT 7601042009 /  Probable  Osteoarthritis / SNOMED CT 3022857048 / Confirmed  Paroxysmal atrial fibrillation / SNOMED CT 775004693 / Confirmed  S/P placement of cardiac pacemaker / SNOMED CT 723148290 / Confirmed  Seborrheic dermatitis / SNOMED CT 42950473 / Confirmed  Statin intolerance / SNOMED CT 95010713 / Confirmed  Symptomatic varicose veins / SNOMED CT 731731710 / Confirmed  Tinnitus / SNOMED CT 507127685 / Confirmed  Type 2 diabetes mellitus with other circulatory complications / SNOMED CT 805682782 / Confirmed  Resolved: *Hospitalized@[facility] [place] - Atrial fibrillation with an episode of hypotension  Resolved: *Hospitalized@[facility] [place] - Vertigo  Canceled: Diabetic Neuropathy, Type II Diabetes Mellitus / ICD-9-.60  Canceled: Hypertension / SNOMED CT 8041066975  Canceled: Rhinitis, chronic / SNOMED CT 121239010  Canceled: White coat hypertension / SNOMED CT 6877644668  Canceled: White coat syndrome with hypertension / SNOMED CT 7468242209      Histories   Past Medical History:    Active  Type 2 diabetes mellitus with other circulatory complications (): Onset on 1999 at 69 years.  Osteoarthritis (8766364489)  Dyslipidemia, goal LDL below 100 (61558831)  Paroxysmal atrial fibrillation (915718204)  Hypothyroidism (acquired) (184385698)  Allergic rhinitis (228738904)  Obese (8592840172)  Benign positional vertigo (420568423)  Statin intolerance (89909332)  Long term (current) use of anticoagulants (906620378)  Bilateral hearing loss (606102157)  Tinnitus (583376444)  S/P placement of cardiac pacemaker (364375802)  HTN, goal below 140/90 (4403005233)  Resolved  *Hospitalized@[facility] [place] - Vertigo: Onset on 2012 at 83 years.  Resolved.  *Hospitalized@[facility] [place] - Atrial fibrillation with an episode of hypotension: Onset on 2010 at 80 years.  Resolved.   Family History:    Leukemia  Father ()  Gout  Brother  Mother ()  Diabetes mellitus  Mother ()  CA - Lung cancer  Spouse ()  Hypertension  Mother  ()  Parkinson's disease  Sister  Valvular heart disease  Brother ()  MS - Multiple sclerosis  Sister  CABG - Coronary artery bypass graft  Mother ()  MI - Myocardial infarction  Brother ()  CAD - Coronary artery disease  Father ()  Mother ()     Procedure history:    Phacoemulsification of cataract with intraocular lens implantation (7621029878) on 3/27/2018 at 88 Years.  Comments:  2018 9:31 AM - SamirLucy mcdonald.  Colonoscopy (515509282) on 2012 at 82 Years.  Comments:  2012 8:59 AM - Henok GANDARA, Hilario KIM-BSO on 2012 at 82 Years.  Hysteroscopy, D&C, polypectomy on 2011 at 81 Years.  Implantation of heart pacemaker (3809715292) in  at 80 Years.  Replacement of right knee joint (6247959891) in the month of 2008 at 78 Years.  Replacement of left knee joint (2475413848) on 12/10/2007 at 78 Years.  Flexible sigmoidoscopy (46748741) on 2006 at 76 Years.  Cholecystectomy (14103753) in 1951 at 22 Years.  Repair of umbilical hernia (67632018) in 1949 at 20 Years.  Appendectomy (457272560) in 1947 at 18 Years.  Plastic repair of rotator cuff of shoulder (143069956).  Pregnancy (343968226).  Comments:  2010 9:08 AM - Vaishali Cortés  full term X 3  Extracapsular cataract extraction and insertion of intraocular lens (173867189).  Comments:  2018 1:14 PM - Samir  Lucy Wilkinson.   Social History:        Alcohol Assessment: Denies Alcohol Use            Never      Tobacco Assessment: Denies Tobacco Use      Employment and Education Assessment            Retired      Home and Environment Assessment            Marital status: .        Physical Examination   Vital Signs   2018 2:43 PM CDT Peripheral Pulse Rate 75 bpm    Systolic Blood Pressure 125 mmHg    Diastolic Blood Pressure 69 mmHg    Mean Arterial Pressure 88 mmHg    BP Site Right arm    BP Method Electronic      Measurements from flowsheet :  Measurements   7/9/2018 2:43 PM CDT Height Measured - Standard 64 in    Weight Measured - Standard 152 lb    BSA 1.76 m2    Body Mass Index 26.09 kg/m2  HI      General:  Alert and oriented, No acute distress.    Eye:  Normal conjunctiva.    HENT:  Normocephalic, Normal hearing, Oral mucosa is moist, No pharyngeal erythema.    Neck:  Supple, Non-tender, No carotid bruit, No lymphadenopathy, No thyromegaly.    Respiratory:  Lungs are clear to auscultation, Respirations are non-labored.    Cardiovascular:  Normal rate, Regular rhythm, No murmur, No gallop, Normal peripheral perfusion, No edema.    Musculoskeletal:  Normal gait.    Integumentary:  No pallor.    Feet:  Normal by visual exam, Normal pulses, Sensation intact, By vibration, absent monofilament.    Neurologic:  No focal deficits.    Cognition and Speech:  Speech clear and coherent, Functional cognition intact.    Psychiatric:  Cooperative, Appropriate mood & affect.       Review / Management   Results review:  Lab results   6/22/2018 11:03 AM CDT Protime 18.5     INR 1.6    6/22/2018 10:58 AM CDT Hgb A1c 6.8  HI    6/6/2018 3:17 PM CDT INR 1.7    5/9/2018 6:48 PM CDT UA pH 6.0     UA Specific Gravity < OR = 1.005     UA Glucose NEGATIVE     UA Bilirubin NEGATIVE     UA Ketones NEGATIVE     Urine Occult Blood NEGATIVE     UA Protein NEGATIVE     UA Nitrite NEGATIVE     UA Leukocyte Esterase NEGATIVE    5/4/2018 8:53 AM CDT INR 2.9    4/9/2018 11:05 AM CDT INR 2.0    3/22/2018 9:33 AM CDT Sodium Level 141 mmol/L     Potassium Level 4.6 mmol/L     Chloride Level 104 mmol/L     CO2 Level 27 mmol/L     Glucose Level 155 mg/dL  HI     BUN 18 mg/dL     Creatinine Level 0.84 mg/dL     BUN/Creat Ratio NOT APPLICABLE     eGFR 62 mL/min/1.73m2     eGFR African American 72 mL/min/1.73m2     Calcium Level 9.3 mg/dL     Hgb A1c 7.0  HI     Cholesterol 195 mg/dL     Non-HDL Cholesterol 146  HI     HDL 49 mg/dL  LOW     Cholesterol/HDL Ratio 4.0       HI      Triglyceride 158 mg/dL  HI     TSH 1.35 mIU/L     WBC 5.2     RBC 4.27     Hgb 13.0 gm/dL     Hct 37.9 %     MCV 88.8 fL     MCH 30.4 pg     MCHC 34.3 gm/dL     RDW 11.7 %     Platelet 159     MPV 10.6 fL     PT 21.3  HI     INR 2.1  HI     Test in Question - Test(s) Ordered BMP A1C     Misc Test CPT Code 74173 496     Misc Test Client Contact DESHAUNBABATUNDE MCKEONMON [facility] (Modified)    Misc Test Comment See comment     Misc Test Comment See comment    3/13/2018 3:43 PM CDT INR 2.5    2/26/2018 9:33 AM CST INR 2.2    .       Impression and Plan   Diagnosis     Allergic rhinitis (HLY05-QX J30.9).     Carotid artery plaque (PSA32-KM I65.29).     Dyslipidemia, goal LDL below 100 (WTH45-PQ E78.5).     Frailty (WUO73-EX R54).     History of mitral valve insufficiency (ICO53-ZG Z86.79).     HTN, goal below 140/90 (XZM80-HA I10).     Hypothyroidism (acquired) (GSU86-MF E03.9).     Long term (current) use of anticoagulants (ODB55-HY Z79.01).     Osteoarthritis (GAF45-OP M19.90).     Paroxysmal atrial fibrillation (AWC86-WZ I48.0).     S/P placement of cardiac pacemaker (PFG26-DN Z95.0).     Statin intolerance (IAX44-GD Z88.9).     Type 2 diabetes mellitus with other circulatory complications (SNP37-LH E11.59).     Course:  Progressing as expected.    Patient Instructions:       Counseled: Patient, Family, Regarding medications, Diet, Activity, Verbalized understanding.    Orders     Orders (Selected)   Outpatient Orders  Ordered  RTC (Request): Return in 6 months, Instructions: lab before visit  Return to Clinic (Request): RFV: Cardiology follow-up with Dr. Cuadra, Return in 1 year  Return to Clinic (Request): RFV: wellness, Return in 12 months  Return to Clinic (Request): Return in 4 mo w/ D. Sawyer bring meter and log  Return to Clinic (Request): Return in 4 mo w/ D. Sawyer bring meter and log.

## 2022-02-15 NOTE — PROGRESS NOTES
Patient:   GERHARD HOWE            MRN: [id number]            FIN: [id number]               Age:   89 years     Sex:  Female     :  11/10/1929   Associated Diagnoses:   Drooping of mouth   Author:   Shimon Nesbitt MD      Visit Information      Date of Service: 2021 11:57 am  Performing Location: [facility]  Encounter#: [id number]      Primary Care Provider (PCP):  Hilario Whiting MD    NPI# [id number]      Referring Provider:  Shimon Nesbitt MD    NPI# [id number]      Chief Complaint   2021 12:01 PM CDT   Patient presents for lip drooping with chewing her lip was tension on the left side, but loose and drooping on the right side of her lips x 2 days seemed like the left side of her liip was drooping when she woke up.        History of Present Illness   Patient is in today with her daughter with complaints of drooping from side of her mouth.  Daughter notes that for the last 2 days when patient would eat she may have some drooping over the left corner of her mouth.  Is not there today.  She has  no other neurologic symptoms other her dementia which is stable.         Review of Systems   Constitutional:  Negative except as documented in history of present illness.    Ear/Nose/Mouth/Throat:  Negative except as documented in history of present illness.    Neurologic:  Negative except as documented in history of present illness.       Health Status   Allergies:    Allergic Reactions (Selected)  Moderate  Vicodin (Rash)  Severity Not Documented  Ancef (Diarrhea)  Calan (No reactions were documented)  Ciprofloxacin (No reactions were documented)  Phenobarbital (Tremors)  Probiotic (Rash..)  Simvastatin (Gi upset)   Medications:  (Selected)   Prescriptions  Prescribed  ACCU-CHECK GUIDE LANCETS: ACCU-CHECK GUIDE LANCETS, See Instructions, Instructions: TEST BLOOD SUGAR DAILY, Supply, # 100 EA, 11 Refill(s), Type: Maintenance, Pharmacy: [facility] #[id number], TEST BLOOD SUGAR  DAILY, 64, in, 03/17/20 8:54:00 CDT, Height Measured, 164, lb,...  ACCU-CHECK GUIDE TEST STRIPS: ACCU-CHECK GUIDE TEST STRIPS, See Instructions, Instructions: TEST BLOOD SUGAR DAILY, Supply, # 100 EA, 11 Refill(s), Type: Maintenance, Pharmacy: [facility] #[id number], TEST BLOOD SUGAR DAILY, 64, in, 03/17/20 8:54:00 CDT, Height Measured, 164,...  Flonase 50 mcg/inh nasal spray: = 2 spray(s), nasal, daily, # 1 EA, 11 Refill(s), Type: Maintenance, Pharmacy: [id number], 2 spray(s) Nasal daily, 64, in, 07/21/20 8:52:00 CDT, Height Measured, 168.6, lb, 07/21/20 8:52:00 CDT, Weight Measured  Metoprolol Succinate  mg oral tablet, extended release: = 1 tab(s) ( 100 mg ), Oral, daily, # 90 tab(s), 0 Refill(s), Type: Soft Stop, Pharmacy: [facility], 1 tab(s) Oral daily, 64, in, 05/24/21 13:40:00 CDT, Height Measured, 186.4, lb, 05/24/21 13:40:00 CDT, Weight Measured  SEROquel 25 mg oral tablet: = 1 tab(s) ( 25 mg ), Oral, qhs, # 30 tab(s), 5 Refill(s), Type: Maintenance, Pharmacy: [facility], 1 tab(s) Oral qhs, 64, in, 05/07/21 12:59:00 CDT, Height Measured, 186.2, lb, 05/07/21 12:59:00 CDT, Weight Measured  citalopram 10 mg oral tablet: = 1 tab(s) ( 10 mg ), Oral, daily, # 30 tab(s), 11 Refill(s), Type: Maintenance, Pharmacy: [facility], 1 tab(s) Oral daily, 64, in, 04/08/21 14:21:00 CDT, Height Measured, 182.9, lb, 04/08/21 13:59:00 CDT, Weight Measured  levothyroxine 125 mcg (0.125 mg) oral tablet: = 1 tab(s), Oral, daily, # 90 tab(s), 3 Refill(s), Type: Maintenance, [id number], 1 tab(s) Oral daily, 64, in, 10/26/20 15:01:00 CDT, Height Measured, 167, lb, 10/26/20 15:01:00 CDT, Weight Measured  warfarin 5 mg oral tablet: See Instructions, Instructions: 7.5mg Sun/Tues/Thurs and 5mg rest of days, # 90 tab(s), 1 Refill(s), Type: Maintenance, Pharmacy: [facility], 7.5mg  Sun/Tues/Thurs and 5mg rest of days, 64, in, 04/08/21 14:21:00 CDT, Height Marcela...  Suspended  Aricept 5 mg oral tablet: = 1 tab(s) ( 5 mg ), Oral, hs, # 30 tab(s), 5 Refill(s), Type: Maintenance, [id number], 1 tab(s) Oral hs, 64, in, 10/05/20 15:22:00 CDT, Height Measured, 172, lb, 10/05/20 15:22:00 CDT, Weight Measured  Documented Medications  Documented  Calcium with vitamin D: Calcium with vitamin D, 1 tab, Oral, daily, 0 Refill(s), Type: Maintenance  Daily Multiple Vitamins: 1 tab(s), Oral, daily, 0 Refill(s), Type: Maintenance  Fish Oil: 1 tab, Oral, daily, 0 Refill(s), Type: Maintenance  PreserVision: 1 cap(s), Oral, bid, 0 Refill(s), Type: Maintenance  Vitamin C: ( 500 mg ), Oral, daily, 0 Refill(s), Type: Maintenance  Vitamin D3: ( 2,000 International Unit ), Oral, daily, 0 Refill(s), Type: Maintenance  acetaminophen: 0 Refill(s), Type: Maintenance   Problem list:    All Problems (Selected)  Statin intolerance / SNOMED CT 59918745 / Confirmed  Dyslipidemia, goal LDL below 100 / SNOMED CT 50511827 / Confirmed  Seborrheic dermatitis / SNOMED CT 06570885 / Confirmed  Anxiety / SNOMED CT 68482940 / Confirmed  Long term (current) use of anticoagulants / SNOMED CT 375569492 / Confirmed  Anticoagulated / SNOMED CT 293612455 / Confirmed  Carotid artery plaque / SNOMED CT 943683057 / Confirmed  Paroxysmal atrial fibrillation / SNOMED CT 979517947 / Confirmed  History of mitral valve insufficiency / SNOMED CT 887873494 / Confirmed  Frailty / SNOMED CT 333806058 / Confirmed  Chronic dementia with behavioral disturbance / SNOMED CT 9200803096 / Confirmed  Diabetic peripheral neuropathy / SNOMED CT 6738224157 / Confirmed  Obese / SNOMED CT 6263302625 / Probable  S/P placement of cardiac pacemaker / SNOMED CT 510133461 / Confirmed  Seborrheic dermatitis of scalp / SNOMED CT 381534688 / Confirmed  Type 2 diabetes mellitus with other circulatory complications / SNOMED CT 120776331 /  Confirmed  Hypothyroidism (acquired) / SNOMED CT 325597514 / Confirmed  Benign positional vertigo / SNOMED CT 315906114 / Confirmed  Osteoarthritis / SNOMED CT 1886544853 / Confirmed  Bilateral hearing loss / SNOMED CT 443501467 / Confirmed  White coat syndrome with hypertension / SNOMED CT 0989155331 / Confirmed  Symptomatic varicose veins / SNOMED CT 657955879 / Confirmed  Allergic rhinitis / SNOMED CT 559721405 / Confirmed  Tinnitus / SNOMED CT 961927097 / Confirmed      Histories   Past Medical History:    Active  Type 2 diabetes mellitus with other circulatory complications (475578713): Onset on 1/1/1999 at 69 years.  Osteoarthritis (2113887084)  Dyslipidemia, goal LDL below 100 (65177211)  Paroxysmal atrial fibrillation (306858751)  Hypothyroidism (acquired) (467267770)  Allergic rhinitis (805613638)  Obese (5511860029)  Benign positional vertigo (684370593)  Statin intolerance (77087907)  Long term (current) use of anticoagulants (908321495)  Bilateral hearing loss (605939375)  Tinnitus (553308898)  S/P placement of cardiac pacemaker (214882317)  White coat syndrome with hypertension (5010692520)  Resolved  Inpatient stay (277309572): Onset on 10/8/2020 at 89 years.  Resolved on 10/9/2020 at 89 years.  Comments:  10/12/2020 CDT 1:56 PM CDT - [facility], [place] - Benign paroxysmal positional vertigo.  Inpatient stay (362696083): Onset on 5/30/2019 at 89 years.  Resolved on 5/31/2019 at 89 years.  Comments:  7/9/2019 CDT 6:55 AM Leonarda Flores  @[place], [place] - Postoperative surgical complication involving subcutaneous tissue  Inpatient stay (717148771): Onset on 12/12/2012 at 83 years.  Resolved.  Comments:  7/9/2019 CDT 6:54 AM Leonarda Flores  @[facility], [place] - Vertigo  Inpatient stay ([id number]): Onset on 8/25/2010 at 80 years.  Resolved.  Comments:  7/9/2019 CDT 6:53 AM Leonarda Flores  @[facility], [place] - Atrial  fibrillation with an episode of hypotension   Family History:    Leukemia  Father ()  Gout  Brother  Mother ()  Diabetes mellitus  Mother ()  Hypertension  Mother ()  Parkinson's disease  Sister  Valvular heart disease  Brother ()  MS - Multiple sclerosis  Sister  CABG - Coronary artery bypass graft  Mother ()  MI - Myocardial infarction  Brother ()  CAD - Coronary artery disease  Father ()  Mother ()     Procedure history:    Replacement of pacemaker pulse generator (SNOMED CT 0764502) on 2019 at 89 Years.  Comments:  2019 10:01 AM CDT - Aida Contreras  Implanted left pectoral.   Medtronic [id number]  [id number]  Phacoemulsification of cataract with intraocular lens implantation (SNOMED CT 8627185054) performed by Andrew Llanes MD on 3/27/2018 at 88 Years.  Comments:  2018 9:31 AM CDT - Samir Lucy  Left.  Colonoscopy (SNOMED CT 848775150) on 2012 at 82 Years.  Comments:  2012 8:59 AM CDT - Hilario Whiting MD-BSO performed by Nba Alcaraz MD on 2012 at 82 Years.  Hysteroscopy, D&C, polypectomy performed by Nba Alcaraz MD on 2011 at 81 Years.  Implantation of heart pacemaker (SNOMED CT 0876752737) performed by Dr. Lewis Mejia in  at 80 Years.  Replacement of right knee joint (SNOMED CT 1430971536) in the month of 2008 at 78 Years.  Replacement of left knee joint (SNOMED CT 7512772252) on 12/10/2007 at 78 Years.  Flexible sigmoidoscopy (SNOMED CT 91373615) performed by Hilario Whiting MD on 2006 at 76 Years.  Cholecystectomy (SNOMED CT 81125711) in 1951 at 22 Years.  Repair of umbilical hernia (SNOMED CT 52808566) in 1949 at 20 Years.  Appendectomy (SNOMED CT 901071359) in 1947 at 18 Years.  Plastic repair of rotator cuff of shoulder (SNOMED CT 250993973).  Pregnancy (SNOMED CT 537600724).  Comments:  2010 9:08 AM CDT - Vaishali Cortés term X  3  Extracapsular cataract extraction and insertion of intraocular lens (SNOMED CT 484155438) performed by Andrew Llanes MD.  Comments:  5/11/2018 1:14 PM CDT - SamirLucy mcdonald.   Social History:        Electronic Cigarette/Vaping Assessment            Electronic Cigarette Use: Never.      Alcohol Assessment: Denies Alcohol Use            Never      Tobacco Assessment: Denies Tobacco Use            Never (less than 100 in lifetime)      Substance Abuse Assessment: Denies Substance Abuse      Employment and Education Assessment            Retired      Home and Environment Assessment            Marital status: .        Physical Examination   Measurements from flowsheet : Measurements   8/11/2021 12:01 PM CDT Height Measured - Standard 64 in    Weight Measured - Standard 188.1 lb    BSA 1.96 m2    Body Mass Index 32.28 kg/m2  HI      General:  No acute distress.    HENT:  No pharyngeal erythema.    Neck:  Supple, Non-tender.    Neurologic:  Alert, No focal deficits, Cranial Nerves II-XII are grossly intact, Normal deep tendon reflexes.       Impression and Plan   Diagnosis     Drooping of mouth ([id number] R29.810).     Course:  Improving.    Plan:  With patient is perfectly normal neurologic exam today other than her stable dementia we will go ahead and observe for now.  Discussed stroke symptoms with daughter  .

## 2022-02-15 NOTE — PROGRESS NOTES
Patient:   GERHARD HOWE            MRN: [id number]            FIN: [id number]               Age:   87 years     Sex:  Female     :  11/10/1929   Associated Diagnoses:   Diabetes Mellitus Type II, Controlled; Dyslipidemia, goal LDL below 100   Author:   Ashley Sawyer      Visit Information   Visit type:  Diabetes Self Management Education - last seen by RD 3/23/17.    Referral source:  Henok GANDARA, Hilario.       Chief Complaint   DM Type II controlled, Dyslipidemia (LDL goal <100)      History of Present Illness   Discussed nutrition, diabetes, and lifestyle management with pt.    Nutrition:  Pt is eating most evening meals at her daughters and has guests over frequently - all summer there has been different company.  Working on increasing fiber with whole grain bread.  Tries to eat fresh and organic foods.  Enjoys tuna, egg, whole grain, fruits, and some vegetables.  Minimal sweets - one small piece of chocolate/ day 2gm CHO.  has been eating more fruit over the summer and has noticed her glucose readings higher   Physical Activity: started walking! 10 - 15 min/ day   Stress:   low  Sleep: good   Support: family   BG Testing: am testing 14 day avg higher out of 13 readings is 152mg/dL stable (did not test 2 hr pp as recommended)  (130's - 150's in am)  DM related medication: none at this time, pt would like to avoid medication and continue to monitor A1C   Routine diabetes care: eye exam yearly and dentist exam 2x/ year, influenza vaccine q fall,  feet examined with MD, no open elayne/ skin issues, influenza vaccine today    Estimated Average Glucose (eAG) 152 mg/dL with A1C of 6.9% on 17      Review of Systems             Health Status   Allergies:    Allergic Reactions (Selected)  Moderate  Vicodin (Rash)  Severity Not Documented  Ancef (No reactions were documented)  Calan (No reactions were documented)  Ciprofloxacin (No reactions were documented)  Phenobarbital (No reactions were documented)  Probiotic  (Rash..)  Simvastatin (Gi upset)   Medications:  (Selected)   Prescriptions  Prescribed  Flonase 50 mcg/inh nasal spray: 2 spray(s), nasal, daily, # 1 EA, 11 Refill(s), Type: Maintenance, Pharmacy: [id number], 2 spray(s) nasal daily  Metoprolol Succinate  mg oral tablet, extended release: 1 tab(s) ( 100 mg ), po, daily, # 90 tab(s), 1 Refill(s), Type: Maintenance, Pharmacy: [id number], To replace Atenolol Rx, 1 tab(s) po daily  digoxin 125 mcg (0.125 mg) oral tablet: 1 tab(s) ( 125 mcg ), po, daily, # 90 tab(s), 3 Refill(s), Type: Maintenance, Pharmacy: [id number], 1 tab(s) po daily  levothyroxine 125 mcg (0.125 mg) oral tablet: 1 tab(s) ( 125 mcg ), po, daily, # 90 tab(s), 3 Refill(s), Type: Soft Stop, Pharmacy: [id number], 1 tab(s) po daily,x90 day(s)  pravastatin 10 mg oral tablet: 1 tab(s) ( 10 mg ), PO, Daily, # 90 tab(s), 3 Refill(s), Type: Maintenance, Pharmacy: [id number], 1 tab(s) po daily  warfarin 5 mg oral tablet: 1 tab(s) ( 5 mg ), po, daily, # 90 tab(s), 3 Refill(s), Type: Soft Stop, Pharmacy: [id number], 1 tab(s) po daily,x90 day(s)   Problem list:    All Problems  Hypothyroidism (acquired) / SNOMED CT 416182713 / Confirmed  Allergic rhinitis / SNOMED CT 065997575 / Confirmed  Benign positional vertigo / SNOMED CT 537041039 / Confirmed  Bilateral hearing loss / SNOMED CT 498719512 / Confirmed  Carotid artery plaque / SNOMED CT 896617734 / Confirmed  Rhinitis, chronic / SNOMED CT 841945027 / Confirmed  Statin intolerance / SNOMED CT 77388630 / Confirmed  White coat hypertension / SNOMED CT 4306315928 / Confirmed  Long-term (current) use of anticoagulants, INR goal 2.0-3.0 / SNOMED CT 181519998 / Confirmed  S/P placement of cardiac pacemaker / SNOMED CT 842930084 / Confirmed  History of mitral valve insufficiency / SNOMED CT 627402181 / Confirmed  Dyslipidemia, goal LDL below 100 / SNOMED CT 08728285 /  Confirmed  HTN, goal below 140/90 / SNOMED CT 4126363505 / Confirmed  Obese / SNOMED CT 3050457342 / Probable  Osteoarthritis / SNOMED CT 4231569176 / Confirmed  Paroxysmal atrial fibrillation / SNOMED CT 935729330 / Confirmed  Seborrheic dermatitis / SNOMED CT 10706752 / Confirmed  Tinnitus / SNOMED CT 714306600 / Confirmed  Diabetes mellitus type II, controlled / SNOMED CT 778040685 / Confirmed  Symptomatic varicose veins / SNOMED CT 637412511 / Confirmed  White coat hypertension / SNOMED CT 8388264690 / Confirmed  Resolved: *Hospitalized@[email or web address] - Atrial fibrillation with an episode of hypotension  Resolved: *Hospitalized@[email or web address] - Vertigo  Canceled: Diabetic Neuropathy, Type II Diabetes Mellitus / ICD-9-.60  Canceled: Hypertension / SNOMED CT 5739095494      Histories   Past Medical History:    Active  Diabetes mellitus type II, controlled (128574107): Onset on 1999 at 69 years.  Osteoarthritis (4900051512)  Dyslipidemia, goal LDL below 100 (90067413)  Paroxysmal atrial fibrillation (764105888)  Hypothyroidism (acquired) (644978367)  White coat hypertension (2132031125)  Allergic rhinitis (510437692)  Obese (2946718832)  Benign positional vertigo (450773626)  Statin intolerance (30935251)  Long-term (current) use of anticoagulants, INR goal 2.0-3.0 (024575353)  Rhinitis, chronic (882807646)  Bilateral hearing loss (445577379)  Tinnitus (540648748)  White coat hypertension (3272497957)  S/P placement of cardiac pacemaker (666130644)  HTN, goal below 140/90 (9636905171)  Resolved  *Hospitalized@[facility] - Vertigo: Onset on 2012 at 83 years.  Resolved.  *Hospitalized@[email or web address] - Atrial fibrillation with an episode of hypotension: Onset on 2010 at 80 years.  Resolved.   Family History:    Leukemia  Father ()  Gout  Brother  Mother ()  Diabetes mellitus  Mother ()  CA - Lung cancer  Spouse ()  Hypertension  Mother ()  Parkinson's disease  Sister  Valvular heart  disease  Brother ()  MS - Multiple sclerosis  Sister  CABG - Coronary artery bypass graft  Mother ()  MI - Myocardial infarction  Brother ()  CAD - Coronary artery disease  Father ()  Mother ()     Procedure history:    Colonoscopy (SNOMED CT 467851902) on 2012 at 82 Years.  Comments:  2012 8:59 AM - Hilario Whiting MD  Unremarkable  LAVH-BSO performed by Nba Alcaraz MD on 2012 at 82 Years.  Hysteroscopy, D&C, polypectomy performed by Nba Alcaraz MD on 2011 at 81 Years.  Implantation of heart pacemaker (SNOMED CT 2507191283) in  at 80 Years.  Replacement of right knee joint (SNOMED CT 0770927878) in the month of 2008 at 78 Years.  Replacement of left knee joint (SNOMED CT 8253798361) on 12/10/2007 at 78 Years.  Flexible sigmoidoscopy (SNOMED CT 83926918) performed by Hilario Whiting MD on 2006 at 76 Years.  Cholecystectomy (SNOMED CT 59350348) in 1951 at 22 Years.  Repair of umbilical hernia (SNOMED CT 02088170) in 1949 at 20 Years.  Appendectomy (SNOMED CT 256722761) in 1947 at 18 Years.  Plastic repair of rotator cuff of shoulder (SNOMED CT 167582361).  Pregnancy (SNOMED CT 780310136).  Comments:  2010 9:08 AM - Vaishali Cortés  full term X 3   Social History:        Alcohol Assessment: Denies Alcohol Use            Never      Tobacco Assessment: Denies Tobacco Use      Employment and Education Assessment            Retired      Home and Environment Assessment            Marital status: .        Physical Examination   Measurements from flowsheet : Measurements   2017 1:18 PM CDT Height Measured - Standard 64 in    Weight Measured - Standard 155 lb    BSA 1.78 m2    Body Mass Index 26.6 kg/m2         Health Maintenance      Recommendations     Pending (in the next year)        OverDue           DM - Foot Exam due  07/03/15  and every 1  year(s)        Due            DM - HgbA1c due  17  and every 3  month(s)            DM - Communication with Managing Provider due  09/01/17  and every 1  year(s)           Fall Risk Screen (Female) due  09/01/17  and every 1  year(s)           Osteoporosis Screen due  09/01/17  and every 2  year(s)        Near Due            DM - Eye Exam near due  10/05/17  and every 1  year(s)        Due In Future            DM - Microalbumin not due until  11/21/17  and every 1  year(s)           Influenza Vaccine not due until  01/24/18  and every 1  year(s)           Lipid Disorders Screen (Female) not due until  03/17/18  and every 1  year(s)           Depression Screen (Female) not due until  05/08/18  and every 1  year(s)           Type 2 Diabetes Mellitus Screen (Female) not due until  05/08/18  and every 1  year(s)           Body Mass Index Check (Female) not due until  06/08/18  and every 1  year(s)           Obesity Screen and Counseling (Female) not due until  06/08/18  and every 1  year(s)           Tobacco Use Screen (Female) not due until  06/08/18  and every 1  year(s)           High Blood Pressure Screen (Female) not due until  07/17/18  and every 1  year(s)     Satisfied (in the past 1 year)        Satisfied            Body Mass Index Check (Female) on  06/08/17.           Body Mass Index Check (Female) on  05/08/17.           Body Mass Index Check (Female) on  05/02/17.           Body Mass Index Check (Female) on  03/23/17.           Body Mass Index Check (Female) on  03/21/17.           Body Mass Index Check (Female) on  01/24/17.           DM - Eye Exam on  10/05/16.           DM - HgbA1c on  05/08/17.           DM - HgbA1c on  11/21/16.           DM - Microalbumin on  11/21/16.           DM - Microalbumin on  11/21/16.           Depression Screen (Female) on  05/08/17.           Depression Screen (Female) on  05/08/17.           Depression Screen (Female) on  05/08/17.           High Blood Pressure Screen (Female) on  07/17/17.           High Blood Pressure Screen (Female) on   06/19/17.           High Blood Pressure Screen (Female) on  06/08/17.           High Blood Pressure Screen (Female) on  05/12/17.           High Blood Pressure Screen (Female) on  05/08/17.           High Blood Pressure Screen (Female) on  05/02/17.           High Blood Pressure Screen (Female) on  05/02/17.           High Blood Pressure Screen (Female) on  04/27/17.           High Blood Pressure Screen (Female) on  04/13/17.           High Blood Pressure Screen (Female) on  03/28/17.           High Blood Pressure Screen (Female) on  03/28/17.           High Blood Pressure Screen (Female) on  03/21/17.           High Blood Pressure Screen (Female) on  03/21/17.           High Blood Pressure Screen (Female) on  02/17/17.           High Blood Pressure Screen (Female) on  01/24/17.           High Blood Pressure Screen (Female) on  01/23/17.           High Blood Pressure Screen (Female) on  10/24/16.           High Blood Pressure Screen (Female) on  09/20/16.           Influenza Vaccine on  01/24/17.           Lipid Disorders Screen (Female) on  03/17/17.           Lipid Disorders Screen (Female) on  03/17/17.           Lipid Disorders Screen (Female) on  03/17/17.           Lipid Disorders Screen (Female) on  03/17/17.           Obesity Screen and Counseling (Female) on  06/08/17.           Obesity Screen and Counseling (Female) on  05/08/17.           Obesity Screen and Counseling (Female) on  05/02/17.           Obesity Screen and Counseling (Female) on  03/28/17.           Obesity Screen and Counseling (Female) on  03/23/17.           Obesity Screen and Counseling (Female) on  03/21/17.           Obesity Screen and Counseling (Female) on  01/24/17.           Tobacco Use Screen (Female) on  06/08/17.           Tobacco Use Screen (Female) on  05/08/17.           Tobacco Use Screen (Female) on  03/21/17.           Tobacco Use Screen (Female) on  01/24/17.           Type 2 Diabetes Mellitus Screen (Female) on   05/08/17.           Type 2 Diabetes Mellitus Screen (Female) on  11/21/16.          Review / Management   Results review:  Lab results   8/14/2017 9:08 AM CDT INR 2.3   7/17/2017 8:58 AM CDT INR 2.0   6/19/2017 8:07 AM CDT INR 2.0   .       Impression and Plan   Diagnosis     Diabetes Mellitus Type II, Controlled (DBG02-BV E11.9).     Dyslipidemia, goal LDL below 100 (NPX26-UU E78.5).       Counseled: Patient,  Review of instruction on AADE7 Self Care Behaviors;   Healthy Eating - Reviewed carbohydrate controlled meal plan.  Diabetic plate method as a general rule, basic reading food labels, appropriate portion sizes, well balanced meals.  Patient is provided with snack and meal ideas to incorporate dietary recommendations, meal planning and preperation.  Reviewed education on dietary sources of omega 3 FA, soluble fiber 10gm/d ay to help reduce LDL cholesterol.    Being Active - Education on how exercise helps with : encouraged 15 walk BID   - lowering BG by increasing the muscles ability to take up and use glucose   - weight loss   - healthier heart (improve lipid profile)   - improve sleep, mood, energy   - decrease stress  Monitoring - Reviewed recommended testing schedule and blood glucose target levels.  Again encouraged to test pre/ 2 hr pp the largest meal of the day a couple times/ week for pt to understand how intake significantly affects glucose readings.  Highlighted new BG logbook to help pt understand testing schedule.    Taking Medication - Discussed potential need to add medications and pt does not want medication.  Pt is willing to continue to adjust diet and lifestyle.    Problem Solving -  medical support team assistance, resources provided (written); good control of stress  Healthy Coping - support of friends, family, and medical support team   Reducing Risks - Detailed education on potential complications associated with uncontrolled diabetes and prevention recommendations.  Recommendations  include: annual eye exam, good oral cares with brushing BID and flossing daily, routine dental apts, good foot care tips and shoewear - discussed monofilament test yearly.  Importance of adequate sleep and good control of BG to prevent developing complications related to uncontrolled DM including nephropathy, neuropathy, retinopathy, and cardiovascular disease.  Weight loss goal of 7% of current body weight pounds as a reasonable goal to help increase insulin sensitivity  Goals:   1.  Practice healthy stress management and get good quality sleep with the goal of 7-8 hours per night.    2.   Physical activity: Recommend increasing chair exercises, walking in place, stay active throughout the day  3.  Eat in a healthy way, per diabetic plate method.  Nutrition reference: Eat 3 meals a day; snacks are optional.  A meal is three or more food groups; make it colorful for better nutrition.  Incorporate TLC dietary heart healthy guidelines.  **continue to increasing fiber in diet, more vegeterian meals, fish 2+x/ week)  4.  Read handouts provided.  F/u 4 month  5.  Pt to monitor BG BID on 2 - 3 days/ week.  BG goals: Fasting and before meals 80 - 130 mg/dL, 2 hrs after the start of a meal 80 - 160 mg/dL.           Professional Services   Time spent with pt 60 min   champ OCASIO

## 2022-02-15 NOTE — NURSING NOTE
Anticoagulation Therapy Management Entered On:  10/15/2021 9:16 AM CDT    Performed On:  10/15/2021 9:15 AM CDT by Ava Velasquez RN               Anticoagulation Visit Assessment   Type of Visit - Anticoagulation :   Telephone   Anticoagulation Indication :   Atrial fibrillation   Anticoagulant Duration :   Undetermined   Anticoagulation Medication Verified :   Yes   Patient Preferred Contact Method :   Cell   Ava Velasquez RN - 10/15/2021 9:15 AM CDT   Anticoagulation Patient Assessment Grid   Change in Alcohol Consumption :   No   Change in Diet :   No   Change in Medications :   No   Diarrhea :   No   Rectal Bleeding :   No   Signs of Clotting :   No   Signs of Warfarin Intolerance :   No   Unusual Bleeding, Bruising :   No   Upcoming Procedures :   No   Vomiting :   No   Ava Velasquez RN - 10/15/2021 9:15 AM CDT   Patient on Warfarin :   Yes   Ava Velasquez RN - 10/15/2021 9:15 AM CDT   Warfarin   International Normalization Ratio TR :   1.8    Anticoagulant INR Goal Lower :   2    Anticoagulant INR Goal Upper :   3    Sunday :   7.5 mg   Monday :   5 mg   Tuesday :   7.5 mg   Wednesday :   5 mg   Thursday :   7.5 mg   Friday :   5 mg   Saturday :   5 mg   Total Dose :   42.5 mg   Warfarin Pt Reported Previous Week Dose :    Sun Mon Tues Wed Thurs Fri Sat Weekly Total Dose   Week 1 7.5 mg 5 mg 7.5 mg 5 mg 5 mg 5 mg 5 mg 40 mg   Week 2  mg  mg  mg  mg  mg  mg  mg  mg   Week 3  mg  mg  mg  mg  mg  mg  mg  mg   Week 4  mg  mg  mg  mg  mg  mg  mg  mg         Patient is taking single or multiple strength tablet(s) :   Single strength tab(s)   One Tab Strength :   5 mg tab   Sunday :   7.5 mg   Monday :   5 mg   Tuesday :   7.5 mg   Wednesday :   5 mg   Thursday :   5 mg   Friday :   5 mg   Saturday :   5 mg   Week 1 Total Dose :   40 mg   Sunday :   1.5 tab(s)   Monday :   1 tab(s)   Tuesday :   1.5 tab(s)   Wednesday :   1 tab(s)   Thursday :   1 tab(s)   Friday :   1 tab(s)   Saturday :   1 tab(s)    Warfarin Dosing Acknowledgement :   I have reviewed the patient's warfarin dosing schedule and confirmed its accuracy for today's visit.   Patient Instructions :   Home INR on 10/14/21 = 1.8. Per protocol stay on warfarin 7.5mg Sun/Tues/Thurs and 5mg ROW. Recheck INR in 1 week. Pt daughter notified by phone on 10/15/21     Ava Velasquez RN - 10/15/2021 9:15 AM CDT

## 2022-02-15 NOTE — RESULTS
Anticoagulation Therapy Entered On:  12/27/2019 11:30 AM CST    Performed On:  12/27/2019 11:25 AM CST by Jennifer Astudillo RN               Warfarin Management   Week 1 Sunday Dose :   5    Week 1 Monday Dose :   5    Week 1 Tuesday Dose :   5    Week 1 Wednesday Dose :   5    Week 1 Thursday Dose :   5    Week 1 Friday Dose :   7.5    Week 1 Saturday Dose :   5    Week 1 Dose Total :   37.5    Week 2 Sunday Dose :   5    Week 2 Monday Dose :   5    Week 2 Tuesday Dose :   5    Week 2 Wednesday Dose :   5    Week 2 Thursday Dose :   5    Week 2 Friday Dose :   7.5    Week 2 Saturday Dose :   5    Week 2 Dose Total :   37.5    Planned Duration :   Indefinite   Indication :   Atrial fibrillation   Warfarin Management Comments :   [facility] Office  [address], [address]  [phone]  Capillary Specimen     International Normalization Ratio :   2.0    INR Range :   2.0 - 3.0   INR Therapeutic Range :   Yes   Warfarin Abnormal Findings :   stopped digoxin and pravastatin   Jennifer Astudillo RN - 12/27/2019 11:25 AM CST   Warfarin Management and Results Grid   Signs of Thrombolic :   No   Signs of Warfarin Intolerance :   No   Changes in Diet or Alcohol Intake :   No   Changes in Medication or Antibiotics :   Yes   Unusual Bleeding or Bruising :   No   Rectal Bleeding or Black Stools :   No   Jennifer Astudillo RN - 12/27/2019 11:25 AM CST   Anticoagulation Recheck :   4 weeks   Warfarin Physician :   Hilario Whiting MD Special Instructions :   INR = 2.0 per POC Patient is to stay on 7.5mg warfarin on Fri and 5mg the rest of the days of the week Recheck INR in 4 weeks per protocol. Patient advised in office. Written directions given.   Jennifer Astudillo RN - 12/27/2019 11:25 AM CST

## 2022-02-15 NOTE — NURSING NOTE
Comprehensive Intake Entered On:  3/3/2020 2:06 PM CST    Performed On:  3/3/2020 2:00 PM CST by Yumiko Escamilla               Summary   Chief Complaint :   f/u on INR, c/o rash on both legs 2/17/20.   Weight Measured :   164.0 lb(Converted to: 164 lb 0 oz, 74.39 kg)    Height Measured :   64 in(Converted to: 5 ft 4 in, 162.56 cm)    Body Mass Index :   28.15 kg/m2 (HI)    Body Surface Area :   1.83 m2   Systolic Blood Pressure :   146 mmHg (HI)    Diastolic Blood Pressure :   64 mmHg   Mean Arterial Pressure :   91 mmHg   Peripheral Pulse Rate :   83 bpm   BP Site :   Right arm   BP Method :   Manual   HR Method :   Manual   Temperature Tympanic :   98.6 DegF(Converted to: 37.0 DegC)    Race :      Languages :   English   Yumiko Escamilla - 3/3/2020 2:00 PM CST   Health Status   Allergies Verified? :   Yes   Medication History Verified? :   Yes   Medical History Verified? :   Yes   Pre-Visit Planning Status :   Completed   Tobacco Use? :   Never smoker   Yumiko Escamilla - 3/3/2020 2:00 PM CST   Consents   Consent for Immunization Exchange :   Consent Granted   Consent for Immunizations to Providers :   Consent Granted   Yumiko Escamilla - 3/3/2020 2:00 PM CST   Meds / Allergies   (As Of: 3/3/2020 2:06:47 PM CST)   Allergies (Active)   Ancef  Estimated Onset Date:   Unspecified ; Reactions:   Diarrhea ; Created By:   Chloe Bruce; Reaction Status:   Active ; Category:   Drug ; Substance:   Ancef ; Type:   Allergy ; Updated By:   Chloe Bruce; Reviewed Date:   3/3/2020 2:04 PM CST      Calan  Estimated Onset Date:   Unspecified ; Created By:   Vaishali Cortés; Reaction Status:   Active ; Substance:   Calan ; Updated By:   Vaishali Cortés; Source:   Paper Chart ; Reviewed Date:   3/3/2020 2:04 PM CST      ciprofloxacin  Estimated Onset Date:   Unspecified ; Created By:   Cherise Christiansen MA; Reaction Status:   Active ; Category:   Drug ; Substance:   ciprofloxacin ; Type:    Allergy ; Updated By:   Cherise Christiansen MA; Reviewed Date:   3/3/2020 2:04 PM CST      phenobarbital  Estimated Onset Date:   Unspecified ; Reactions:   tremors ; Created By:   Chloe Bruce; Reaction Status:   Active ; Category:   Drug ; Substance:   phenobarbital ; Type:   Allergy ; Updated By:   Chloe Bruce; Source:   Paper Chart ; Reviewed Date:   3/3/2020 2:04 PM CST      probiotic  Estimated Onset Date:   Unspecified ; Reactions:   Rash.. ; Created By:   Sari Turner CMA; Reaction Status:   Active ; Category:   Drug ; Substance:   probiotic ; Type:   Allergy ; Updated By:   Sari Turner CMA; Reviewed Date:   3/3/2020 2:04 PM CST      simvastatin  Estimated Onset Date:   Unspecified ; Reactions:   GI upset ; Created By:   Vaishali Cortés; Reaction Status:   Active ; Substance:   simvastatin ; Type:   Allergy ; Updated By:   Vaishali Cortés; Source:   Paper Chart ; Reviewed Date:   3/3/2020 2:04 PM CST      Vicodin  Estimated Onset Date:   Unspecified ; Reactions:   rash ; Created By:   Mis Donaldson MA; Reaction Status:   Active ; Category:   Drug ; Substance:   Vicodin ; Type:   Allergy ; Severity:   Moderate ; Updated By:   Mis Donaldson MA; Reviewed Date:   3/3/2020 2:04 PM CST        Medication List   (As Of: 3/3/2020 2:06:47 PM CST)   Prescription/Discharge Order    predniSONE  :   predniSONE ; Status:   Processing ; Ordered As Mnemonic:   predniSONE 20 mg oral tablet ; Ordering Provider:   Hilario Whiting MD; Action Display:   Complete ; Catalog Code:   predniSONE ; Order Dt/Tm:   3/3/2020 2:04:47 PM CST          metoprolol  :   metoprolol ; Status:   Prescribed ; Ordered As Mnemonic:   Metoprolol Succinate  mg oral tablet, extended release ; Simple Display Line:   See Instructions, TAKE ONE TABLET BY MOUTH EVERY DAY, 90 tab(s), 1 Refill(s) ; Ordering Provider:   Hilario Whiting MD; Catalog Code:   metoprolol ; Order Dt/Tm:   2/25/2020 3:16:50 PM CST          Miscellaneous Rx  Supply  :   Miscellaneous Rx Supply ; Status:   Prescribed ; Ordered As Mnemonic:   ACCU-CHECK GUIDE LANCETS ; Simple Display Line:   See Instructions, TEST BLOOD SUGAR DAILY, 100 EA, 4 Refill(s) ; Ordering Provider:   Hilario Whiting MD; Catalog Code:   Miscellaneous Rx Supply ; Order Dt/Tm:   2/25/2020 3:13:49 PM CST          Miscellaneous Rx Supply  :   Miscellaneous Rx Supply ; Status:   Prescribed ; Ordered As Mnemonic:   ACCU-CHECK GUIDE METER ; Simple Display Line:   See Instructions, CHECK BLOOD SUGAR DAILY, 1 EA, 0 Refill(s) ; Ordering Provider:   Hilario Whiting MD; Catalog Code:   Miscellaneous Rx Supply ; Order Dt/Tm:   2/25/2020 3:13:05 PM CST          Miscellaneous Rx Supply  :   Miscellaneous Rx Supply ; Status:   Prescribed ; Ordered As Mnemonic:   ACCU-CHECK GUIDE TEST STRIPS ; Simple Display Line:   See Instructions, TEST BLOOD SUGAR DAILY, 100 EA, 4 Refill(s) ; Ordering Provider:   Hilario Whiting MD; Catalog Code:   Miscellaneous Rx Supply ; Order Dt/Tm:   2/25/2020 3:08:03 PM CST          Miscellaneous Rx Supply  :   Miscellaneous Rx Supply ; Status:   Prescribed ; Ordered As Mnemonic:   Glucose meter, test strips and lancets ; Simple Display Line:   See Instructions, check blood sugar daily; DX: E11.59, 100 EA, 4 Refill(s) ; Ordering Provider:   Hilario Whiting MD; Catalog Code:   Miscellaneous Rx Supply ; Order Dt/Tm:   2/24/2020 3:22:32 PM CST          loratadine  :   loratadine ; Status:   Prescribed ; Ordered As Mnemonic:   loratadine 10 mg oral tablet ; Simple Display Line:   10 mg, 1 tab(s), Oral, daily, for 14 day(s), 14 tab(s), 0 Refill(s) ; Ordering Provider:   Fatmata Leach MD; Catalog Code:   loratadine ; Order Dt/Tm:   2/22/2020 9:41:33 AM CST          warfarin  :   warfarin ; Status:   Prescribed ; Ordered As Mnemonic:   warfarin 5 mg oral tablet ; Simple Display Line:   See Instructions, TAKE 1 AND 1/2 TABLETS BY MOUTH FRIDAYS AND 1 TABLET EVERY OTHER DAY, 180 tab(s), 0  Refill(s) ; Ordering Provider:   Hilario Whiting MD; Catalog Code:   warfarin ; Order Dt/Tm:   2/10/2020 2:32:34 PM CST          levothyroxine  :   levothyroxine ; Status:   Prescribed ; Ordered As Mnemonic:   levothyroxine 125 mcg (0.125 mg) oral tablet ; Simple Display Line:   1 tab(s), Oral, daily, 90 tab(s), 0 Refill(s) ; Ordering Provider:   Hilario Whiting MD; Catalog Code:   levothyroxine ; Order Dt/Tm:   2/10/2020 2:31:39 PM CST          fluticasone nasal  :   fluticasone nasal ; Status:   Prescribed ; Ordered As Mnemonic:   Flonase 50 mcg/inh nasal spray ; Simple Display Line:   2 spray(s), nasal, daily, 1 EA, 11 Refill(s) ; Ordering Provider:   Hilario Whiting MD; Catalog Code:   fluticasone nasal ; Order Dt/Tm:   2/7/2019 9:55:34 AM CST          digoxin  :   digoxin ; Status:   Suspended ; Ordered As Mnemonic:   digoxin 125 mcg (0.125 mg) oral tablet ; Simple Display Line:   125 mcg, 1 tab(s), po, daily, 90 tab(s), 3 Refill(s) ; Ordering Provider:   Hilario Whiting MD; Catalog Code:   digoxin ; Order Dt/Tm:   2/7/2019 9:55:28 AM CST            Home Meds    acetaminophen  :   acetaminophen ; Status:   Documented ; Ordered As Mnemonic:   acetaminophen ; Simple Display Line:   0 Refill(s) ; Catalog Code:   acetaminophen ; Order Dt/Tm:   5/14/2019 1:44:43 PM CDT

## 2022-02-15 NOTE — NURSING NOTE
Comprehensive Intake Entered On:  5/7/2021 1:05 PM CDT    Performed On:  5/7/2021 12:59 PM CDT by Yumiko Escamilla               Summary   Chief Complaint :   Medical f/u    Advance Directive :   Yes   Weight Measured :   186.2 lb(Converted to: 186 lb 3 oz, 84.459 kg)    Height Measured :   64 in(Converted to: 5 ft 4 in, 162.56 cm)    Body Mass Index :   31.96 kg/m2 (HI)    Body Surface Area :   1.95 m2   Systolic Blood Pressure :   122 mmHg   Diastolic Blood Pressure :   62 mmHg   Mean Arterial Pressure :   82 mmHg   Peripheral Pulse Rate :   81 bpm   BP Site :   Right arm   BP Method :   Electronic   HR Method :   Electronic   Temperature Tympanic :   97.5 DegF(Converted to: 36.4 DegC)  (LOW)    Oxygen Saturation :   97 %   Race :      Languages :   English   Yumiko Escamilla - 5/7/2021 12:59 PM CDT   Health Status   Allergies Verified? :   Yes   Medication History Verified? :   Yes   Medical History Verified? :   Yes   Pre-Visit Planning Status :   Completed   Tobacco Use? :   Never smoker   Yumiko Escamilla - 5/7/2021 12:59 PM CDT   Consents   Consent for Immunization Exchange :   Consent Granted   Consent for Immunizations to Providers :   Consent Granted   Yumiko Escamilla - 5/7/2021 12:59 PM CDT   Meds / Allergies   (As Of: 5/7/2021 1:05:34 PM CDT)   Allergies (Active)   Ancef  Estimated Onset Date:   Unspecified ; Reactions:   Diarrhea ; Created By:   Chloe Bruce; Reaction Status:   Active ; Category:   Drug ; Substance:   Ancef ; Type:   Allergy ; Updated By:   Chloe Bruce; Reviewed Date:   5/7/2021 1:03 PM CDT      Calan  Estimated Onset Date:   Unspecified ; Created By:   Sravanthi Dietz; Reaction Status:   Active ; Category:   Drug ; Substance:   Calan ; Updated By:   Sravanthi Dietz; Source:   Paper Chart ; Reviewed Date:   5/7/2021 1:03 PM CDT      ciprofloxacin  Estimated Onset Date:   Unspecified ; Created By:   Cherise Christiansen MA; Reaction Status:   Active ; Category:    Drug ; Substance:   ciprofloxacin ; Type:   Allergy ; Updated By:   Cherise Christiansen MA; Reviewed Date:   5/7/2021 1:03 PM CDT      phenobarbital  Estimated Onset Date:   Unspecified ; Reactions:   tremors ; Created By:   Chloe Bruce; Reaction Status:   Active ; Category:   Drug ; Substance:   phenobarbital ; Type:   Allergy ; Updated By:   Chloe Bruce; Source:   Paper Chart ; Reviewed Date:   5/7/2021 1:03 PM CDT      probiotic  Estimated Onset Date:   Unspecified ; Reactions:   Rash.. ; Created By:   Sari Turner CMA; Reaction Status:   Active ; Category:   Drug ; Substance:   probiotic ; Type:   Allergy ; Updated By:   Sari Turner CMA; Reviewed Date:   5/7/2021 1:03 PM CDT      simvastatin  Estimated Onset Date:   Unspecified ; Reactions:   GI upset ; Created By:   Sravanthi Dietz; Reaction Status:   Active ; Category:   Drug ; Substance:   simvastatin ; Type:   Allergy ; Updated By:   Sravanthi Dietz; Source:   Paper Chart ; Reviewed Date:   5/7/2021 1:03 PM CDT      Vicodin  Estimated Onset Date:   Unspecified ; Reactions:   rash ; Created By:   Mis Donaldson MA; Reaction Status:   Active ; Category:   Drug ; Substance:   Vicodin ; Type:   Allergy ; Severity:   Moderate ; Updated By:   Mis Donaldson MA; Reviewed Date:   5/7/2021 1:03 PM CDT        Medication List   (As Of: 5/7/2021 1:05:35 PM CDT)   Prescription/Discharge Order    citalopram  :   citalopram ; Status:   Prescribed ; Ordered As Mnemonic:   citalopram 10 mg oral tablet ; Simple Display Line:   10 mg, 1 tab(s), Oral, daily, 30 tab(s), 11 Refill(s) ; Ordering Provider:   Hilario Whiting MD; Catalog Code:   citalopram ; Order Dt/Tm:   4/8/2021 2:27:40 PM CDT          donepezil  :   donepezil ; Status:   Suspended ; Ordered As Mnemonic:   Aricept 5 mg oral tablet ; Simple Display Line:   5 mg, 1 tab(s), Oral, hs, 30 tab(s), 5 Refill(s) ; Ordering Provider:   Hilario Whiting MD; Catalog Code:   donepezil ; Order Dt/Tm:   10/5/2020  3:59:19 PM CDT          fluticasone nasal  :   fluticasone nasal ; Status:   Prescribed ; Ordered As Mnemonic:   Flonase 50 mcg/inh nasal spray ; Simple Display Line:   2 spray(s), nasal, daily, 1 EA, 11 Refill(s) ; Ordering Provider:   Shimon Nesbitt MD; Catalog Code:   fluticasone nasal ; Order Dt/Tm:   7/21/2020 11:28:21 AM CDT          levothyroxine  :   levothyroxine ; Status:   Prescribed ; Ordered As Mnemonic:   levothyroxine 125 mcg (0.125 mg) oral tablet ; Simple Display Line:   1 tab(s), Oral, daily, 90 tab(s), 3 Refill(s) ; Ordering Provider:   Hilario Whiting MD; Catalog Code:   levothyroxine ; Order Dt/Tm:   12/28/2020 12:52:12 PM CST          metoprolol  :   metoprolol ; Status:   Prescribed ; Ordered As Mnemonic:   Metoprolol Succinate  mg oral tablet, extended release ; Simple Display Line:   100 mg, 1 tab(s), Oral, daily, 90 tab(s), 0 Refill(s) ; Ordering Provider:   Hilario Whiting MD; Catalog Code:   metoprolol ; Order Dt/Tm:   3/1/2021 11:17:09 AM CST          Miscellaneous Rx Supply  :   Miscellaneous Rx Supply ; Status:   Prescribed ; Ordered As Mnemonic:   ACCU-CHECK GUIDE LANCETS ; Simple Display Line:   See Instructions, TEST BLOOD SUGAR DAILY, 100 EA, 11 Refill(s) ; Ordering Provider:   Shimon Nesbitt MD; Catalog Code:   Miscellaneous Rx Supply ; Order Dt/Tm:   7/21/2020 8:47:38 AM CDT          Miscellaneous Rx Supply  :   Miscellaneous Rx Supply ; Status:   Prescribed ; Ordered As Mnemonic:   ACCU-CHECK GUIDE TEST STRIPS ; Simple Display Line:   See Instructions, TEST BLOOD SUGAR DAILY, 100 EA, 11 Refill(s) ; Ordering Provider:   Shimon Nesbitt MD; Catalog Code:   Miscellaneous Rx Supply ; Order Dt/Tm:   7/21/2020 8:47:37 AM CDT          QUEtiapine  :   QUEtiapine ; Status:   Prescribed ; Ordered As Mnemonic:   SEROquel 25 mg oral tablet ; Simple Display Line:   See Instructions, 0.5 tab(s) Oral qhs 30 day(s), 15 EA, 0 Refill(s) ; Ordering Provider:   Hilario Whiting MD;  Catalog Code:   QUEtiapine ; Order Dt/Tm:   4/8/2021 2:24:49 PM CDT          warfarin  :   warfarin ; Status:   Prescribed ; Ordered As Mnemonic:   warfarin 5 mg oral tablet ; Simple Display Line:   See Instructions, 7.5mg Sun/Tues/Thurs and 5mg rest of days, 90 tab(s), 1 Refill(s) ; Ordering Provider:   Hilario Whiting MD; Catalog Code:   warfarin ; Order Dt/Tm:   5/3/2021 3:42:11 PM CDT            Home Meds    acetaminophen  :   acetaminophen ; Status:   Documented ; Ordered As Mnemonic:   acetaminophen ; Simple Display Line:   0 Refill(s) ; Catalog Code:   acetaminophen ; Order Dt/Tm:   5/14/2019 1:44:43 PM CDT          ascorbic acid  :   ascorbic acid ; Status:   Documented ; Ordered As Mnemonic:   Vitamin C ; Simple Display Line:   500 mg, Oral, daily, 0 Refill(s) ; Catalog Code:   ascorbic acid ; Order Dt/Tm:   10/26/2020 3:10:37 PM CDT          cholecalciferol  :   cholecalciferol ; Status:   Documented ; Ordered As Mnemonic:   Vitamin D3 ; Simple Display Line:   2,000 International Unit, Oral, daily, 0 Refill(s) ; Catalog Code:   cholecalciferol ; Order Dt/Tm:   10/26/2020 3:10:48 PM CDT          lisinopril  :   lisinopril ; Status:   Documented ; Ordered As Mnemonic:   lisinopril 20 mg oral tablet ; Simple Display Line:   20 mg, 1 tab(s), Oral, daily, 0 Refill(s) ; Catalog Code:   lisinopril ; Order Dt/Tm:   10/5/2020 3:26:04 PM CDT          Miscellaneous Prescription  :   Miscellaneous Prescription ; Status:   Documented ; Ordered As Mnemonic:   Calcium with vitamin D ; Simple Display Line:   1 tab, Oral, daily, 0 Refill(s) ; Catalog Code:   Miscellaneous Prescription ; Order Dt/Tm:   10/26/2020 3:08:29 PM CDT          multivitamin  :   multivitamin ; Status:   Documented ; Ordered As Mnemonic:   Daily Multiple Vitamins ; Simple Display Line:   1 tab(s), Oral, daily, 0 Refill(s) ; Catalog Code:   multivitamin ; Order Dt/Tm:   10/26/2020 3:09:47 PM CDT          multivitamin with minerals  :   multivitamin  with minerals ; Status:   Documented ; Ordered As Mnemonic:   PreserVision ; Simple Display Line:   1 cap(s), Oral, bid, 0 Refill(s) ; Catalog Code:   multivitamin with minerals ; Order Dt/Tm:   10/26/2020 3:10:54 PM CDT          omega-3 polyunsaturated fatty acids  :   omega-3 polyunsaturated fatty acids ; Status:   Documented ; Ordered As Mnemonic:   Fish Oil ; Simple Display Line:   1 tab, Oral, daily, 0 Refill(s) ; Catalog Code:   omega-3 polyunsaturated fatty acids ; Order Dt/Tm:   10/26/2020 3:10:12 PM CDT            ID Risk Screen   Recent Travel History :   No recent travel   Family Member Travel History :   No recent travel   Other Exposure to Infectious Disease :   Unknown   COVID-19 Testing Status :   No COVID-19 test performed   Yumiko Escamilla M - 5/7/2021 12:59 PM CDT

## 2022-02-15 NOTE — PROGRESS NOTES
Patient:   GERHARD HOWE            MRN: [id number]            FIN: [id number]               Age:   88 years     Sex:  Female     :  11/10/1929   Associated Diagnoses:   Diabetes Mellitus Type II, Controlled; Dyslipidemia, goal LDL below 100   Author:   Ashley Sawyer      Visit Information   Visit type:  Diabetes Self Management Education - last seen by RD 18.    Referral source:  Henok GANDARA, Hilario.       Chief Complaint   DM Type II controlled, Dyslipidemia (LDL goal <100)      History of Present Illness   Discussed nutrition, diabetes, and lifestyle management with pt.    Nutrition:  Pt is eating most evening meals at her daughters and will go to the [facility] for the noon meal if it is something she likes.  Purchases fresh and organic foods.  AM is an egg and whole grain toast or oatmeal, Enjoys tuna, egg, whole grain, fruits, and some vegetables.  Minimal sweets - currently likes an elderberry supplement chewy 1x/ day.    Physical Activity:walking 20 min/ day around her house or occ outdoors with walker   Stress:   low  Sleep: good   Support: family   BG Testing: am testing 14 day avg 149 mg/dL -slightly higher (143 - 179 range in am)  DM related medication: none at this time, pt would like to avoid medication and continue to monitor A1C   Routine diabetes care: eye exam UTD with Dr. Llanes - did have both catarct removed within the last 90 days, and dentist exam 2x/ year, influenza vaccine q fall,  feet examined with MD, no open elayne/ skin issues, influenza vaccine yearly, pt did drop something on her R great toe and her toenail is black but growing out nicely - no redness or sign of infection - not painful   Estimated Average Glucose (eAG) 149 mg/dL with A1C of 6.8% on 18      Review of Systems             Health Status   Allergies:    Allergic Reactions (Selected)  Moderate  Vicodin (Rash)  Severity Not Documented  Ancef (No reactions were documented)  Calan (No reactions were  documented)  Ciprofloxacin (No reactions were documented)  Phenobarbital (No reactions were documented)  Probiotic (Rash..)  Simvastatin (Gi upset)   Medications:  (Selected)   Prescriptions  Prescribed  Flonase 50 mcg/inh nasal spray: 2 spray(s), nasal, daily, # 1 EA, 11 Refill(s), Type: Maintenance, Pharmacy: [facility] [id number], 2 spray(s) nasal daily  Metoprolol Succinate  mg oral tablet, extended release: See Instructions, Instructions: TAKE ONE TABLET BY MOUTH EVERY DAY, # 90 tab(s), 3 Refill(s), Type: Soft Stop, Pharmacy: [facility] [id number], TAKE ONE TABLET BY MOUTH EVERY DAY  digoxin 125 mcg (0.125 mg) oral tablet: 1 tab(s) ( 125 mcg ), po, daily, # 90 tab(s), 3 Refill(s), Type: Maintenance, Pharmacy: [facility] [id number], 1 tab(s) po daily  levothyroxine 125 mcg (0.125 mg) oral tablet: 1 tab(s) ( 125 mcg ), po, daily, # 90 tab(s), 3 Refill(s), Type: Soft Stop, Pharmacy: [facility] [id number], 1 tab(s) po daily,x90 day(s)  pravastatin 10 mg oral tablet: 1 tab(s) ( 10 mg ), PO, Daily, # 90 tab(s), 3 Refill(s), Type: Maintenance, Pharmacy: [facility] [id number], 1 tab(s) po daily  warfarin 5 mg oral tablet: 1 tab(s) ( 5 mg ), po, daily, # 90 tab(s), 3 Refill(s), Type: Soft Stop, Pharmacy: [facility] [id number], 1 tab(s) po daily,x90 day(s)   Problem list:    All Problems  Hypothyroidism (acquired) / SNOMED CT 624847514 / Confirmed  Allergic rhinitis / SNOMED CT 319966466 / Confirmed  Benign positional vertigo / SNOMED CT 972522577 / Confirmed  Bilateral hearing loss / SNOMED CT 655646980 / Confirmed  Carotid artery plaque / SNOMED CT 195765800 / Confirmed  Statin intolerance / SNOMED CT 62902532 / Confirmed  Long term (current) use of anticoagulants / SNOMED CT 022165051 / Confirmed  Frailty / SNOMED CT 574301990 / Confirmed  S/P placement of cardiac pacemaker / SNOMED CT 046833966 / Confirmed  History of mitral valve insufficiency / SNOMED CT 548230533 /  Confirmed  Dyslipidemia, goal LDL below 100 / SNOMED CT 07178612 / Confirmed  HTN, goal below 140/90 / SNOMED CT 7199350213 / Confirmed  Obese / SNOMED CT 8097223506 / Probable  Osteoarthritis / SNOMED CT 2308736131 / Confirmed  Paroxysmal atrial fibrillation / SNOMED CT 537154950 / Confirmed  Seborrheic dermatitis / SNOMED CT 66322703 / Confirmed  Tinnitus / SNOMED CT 548916326 / Confirmed  Type 2 diabetes mellitus with other circulatory complications / SNOMED CT 743635564 / Confirmed  Symptomatic varicose veins / SNOMED CT 199508323 / Confirmed  Resolved: *Hospitalized@[facility] - Atrial fibrillation with an episode of hypotension  Resolved: *Hospitalized@[facility] - Vertigo  Canceled: Rhinitis, chronic / SNOMED CT 177452005  Canceled: Diabetic Neuropathy, Type II Diabetes Mellitus / ICD-9-.60  Canceled: White coat hypertension / SNOMED CT 7384882577  Canceled: Hypertension / SNOMED CT 7862290107  Canceled: White coat syndrome with hypertension / SNOMED CT 2599163750      Histories   Past Medical History:    Active  Type 2 diabetes mellitus with other circulatory complications (532080970): Onset on 1999 at 69 years.  Osteoarthritis (1559370586)  Dyslipidemia, goal LDL below 100 (71009029)  Paroxysmal atrial fibrillation (822239304)  Hypothyroidism (acquired) (383322665)  Allergic rhinitis (447213481)  Obese (6845519256)  Benign positional vertigo (348601939)  Statin intolerance (57513920)  Long term (current) use of anticoagulants (329856593)  Bilateral hearing loss (127768445)  Tinnitus (720533804)  S/P placement of cardiac pacemaker (487960704)  HTN, goal below 140/90 (9227512801)  Resolved  *Hospitalized@[facility] - Vertigo: Onset on 2012 at 83 years.  Resolved.  *Hospitalized@[facility] Atrial fibrillation with an episode of hypotension: Onset on 2010 at 80 years.  Resolved.   Family History:    Leukemia  Father ()  Gout  Brother  Mother ()  Diabetes mellitus  Mother ()  CA - Lung  cancer  Spouse ()  Hypertension  Mother ()  Parkinson's disease  Sister  Valvular heart disease  Brother ()  MS - Multiple sclerosis  Sister  CABG - Coronary artery bypass graft  Mother ()  MI - Myocardial infarction  Brother ()  CAD - Coronary artery disease  Father ()  Mother ()     Procedure history:    Phacoemulsification of cataract with intraocular lens implantation (SNOMED CT 4848116754) performed by Andrew Llanes MD on 3/27/2018 at 88 Years.  Comments:  2018 9:31 AM - Lucy Dawson.  Colonoscopy (SNOMED CT 728253283) on 2012 at 82 Years.  Comments:  2012 8:59 AM - Hilario Whiting MD  [facility]-BSO performed by Nba Alcaraz MD on 2012 at 82 Years.  Hysteroscopy, D&C, polypectomy performed by Nba Alcaraz MD on 2011 at 81 Years.  Implantation of heart pacemaker (SNOMED CT 3808709739) in  at 80 Years.  Replacement of right knee joint (SNOMED CT 5275597476) in the month of 2008 at 78 Years.  Replacement of left knee joint (SNOMED CT 2045632525) on 12/10/2007 at 78 Years.  Flexible sigmoidoscopy (SNOMED CT 77376731) performed by Hilario Whiting MD on 2006 at 76 Years.  Cholecystectomy (SNOMED CT 28227009) in 1951 at 22 Years.  Repair of umbilical hernia (SNOMED CT 33918401) in 1949 at 20 Years.  Appendectomy (SNOMED CT 355482047) in 1947 at 18 Years.  Plastic repair of rotator cuff of shoulder (SNOMED CT 480973633).  Pregnancy (SNOMED CT 898852805).  Comments:  2010 9:08 AM - Vaishali Cortés  full term X 3  Extracapsular cataract extraction and insertion of intraocular lens (SNOMED CT 970180777) performed by Andrew Llanes MD.  Comments:  2018 1:14 PM - Lucy Dawson.   Social History:        Alcohol Assessment: Denies Alcohol Use            Never      Tobacco Assessment: Denies Tobacco Use      Employment and Education Assessment            Retired      Home and Environment  Assessment            Marital status: .        Physical Examination   Vital Signs   7/5/2018 9:16 AM CDT Systolic Blood Pressure 114 mmHg    Diastolic Blood Pressure 79 mmHg    Mean Arterial Pressure 91 mmHg      Measurements from flowsheet : Measurements   7/5/2018 9:16 AM CDT Height Measured - Standard 64 in    Weight Measured - Standard 150.75 lb    BSA 1.76 m2    Body Mass Index 25.87 kg/m2  HI         Health Maintenance      Recommendations     Pending (in the next year)        OverDue           Depression Screen (Female) due  05/08/18  and every 1  year(s)        Due            DM - Communication with Managing Provider due  07/05/18  and every 1  year(s)           Fall Risk Screen (Female) due  07/05/18  and every 1  year(s)           Osteoporosis Screen due  07/05/18  and every 2  year(s)        Near Due            Influenza Vaccine near due  09/01/18  and every 1  year(s)        Due In Future            DM - HgbA1c not due until  09/22/18  and every 3  month(s)           DM - Microalbumin not due until  11/24/18  and every 1  year(s)           DM - Eye Exam not due until  02/22/19  and every 1  year(s)           Lipid Disorders Screen (Female) not due until  03/22/19  and every 1  year(s)           DM - Foot Exam not due until  03/22/19  and every 1  year(s)           Tobacco Use Screen (Female) not due until  05/09/19  and every 1  year(s)           Type 2 Diabetes Mellitus Screen (Female) not due until  06/22/19  and every 1  year(s)     Satisfied (in the past 1 year)        Satisfied            Body Mass Index Check (Female) on  07/05/18.           Body Mass Index Check (Female) on  05/22/18.           Body Mass Index Check (Female) on  05/09/18.           Body Mass Index Check (Female) on  05/04/18.           Body Mass Index Check (Female) on  03/22/18.           Body Mass Index Check (Female) on  03/13/18.           Body Mass Index Check (Female) on  02/08/18.           Body Mass Index Check  (Female) on  11/30/17.           Body Mass Index Check (Female) on  09/01/17.           Body Mass Index Check (Female) on  08/29/17.           Body Mass Index Check (Female) on  08/22/17.           DM - Eye Exam on  02/22/18.           DM - Foot Exam on  03/22/18.           DM - Foot Exam on  11/30/17.           DM - HgbA1c on  06/22/18.           DM - HgbA1c on  03/22/18.           DM - HgbA1c on  11/24/17.           DM - Microalbumin on  11/24/17.           DM - Microalbumin on  11/24/17.           High Blood Pressure Screen (Female) on  07/05/18.           High Blood Pressure Screen (Female) on  06/22/18.           High Blood Pressure Screen (Female) on  06/22/18.           High Blood Pressure Screen (Female) on  06/06/18.           High Blood Pressure Screen (Female) on  05/22/18.           High Blood Pressure Screen (Female) on  05/09/18.           High Blood Pressure Screen (Female) on  05/09/18.           High Blood Pressure Screen (Female) on  05/04/18.           High Blood Pressure Screen (Female) on  04/09/18.           High Blood Pressure Screen (Female) on  03/22/18.           High Blood Pressure Screen (Female) on  03/13/18.           High Blood Pressure Screen (Female) on  02/26/18.           High Blood Pressure Screen (Female) on  01/29/18.           High Blood Pressure Screen (Female) on  11/30/17.           High Blood Pressure Screen (Female) on  11/30/17.           High Blood Pressure Screen (Female) on  11/24/17.           High Blood Pressure Screen (Female) on  11/24/17.           High Blood Pressure Screen (Female) on  11/17/17.           High Blood Pressure Screen (Female) on  10/04/17.           High Blood Pressure Screen (Female) on  09/11/17.           High Blood Pressure Screen (Female) on  08/29/17.           High Blood Pressure Screen (Female) on  08/22/17.           High Blood Pressure Screen (Female) on  08/14/17.           High Blood Pressure Screen (Female) on  07/17/17.            Influenza Vaccine on  09/01/17.           Lipid Disorders Screen (Female) on  03/22/18.           Lipid Disorders Screen (Female) on  03/22/18.           Lipid Disorders Screen (Female) on  03/22/18.           Lipid Disorders Screen (Female) on  03/22/18.           Obesity Screen and Counseling (Female) on  07/05/18.           Obesity Screen and Counseling (Female) on  05/22/18.           Obesity Screen and Counseling (Female) on  05/09/18.           Obesity Screen and Counseling (Female) on  05/04/18.           Obesity Screen and Counseling (Female) on  03/22/18.           Obesity Screen and Counseling (Female) on  03/13/18.           Obesity Screen and Counseling (Female) on  02/08/18.           Obesity Screen and Counseling (Female) on  11/30/17.           Obesity Screen and Counseling (Female) on  09/01/17.           Obesity Screen and Counseling (Female) on  08/29/17.           Obesity Screen and Counseling (Female) on  08/22/17.           Tobacco Use Screen (Female) on  05/09/18.           Tobacco Use Screen (Female) on  05/04/18.           Tobacco Use Screen (Female) on  03/22/18.           Tobacco Use Screen (Female) on  03/13/18.           Tobacco Use Screen (Female) on  11/30/17.           Tobacco Use Screen (Female) on  08/29/17.           Tobacco Use Screen (Female) on  08/22/17.           Type 2 Diabetes Mellitus Screen (Female) on  06/22/18.           Type 2 Diabetes Mellitus Screen (Female) on  03/22/18.           Type 2 Diabetes Mellitus Screen (Female) on  11/24/17.          Review / Management   Results review:  Lab results   6/22/2018 11:03 AM CDT Protime 18.5    INR 1.6   6/22/2018 10:58 AM CDT Hgb A1c 6.8  HI   6/6/2018 3:17 PM CDT INR 1.7   5/9/2018 6:48 PM CDT UA pH 6.0    UA Specific Gravity < OR = 1.005    UA Glucose NEGATIVE    UA Bilirubin NEGATIVE    UA Ketones NEGATIVE    U Occult Blood NEGATIVE    UA Protein NEGATIVE    UA Nitrite NEGATIVE    UA Leuk Est NEGATIVE   5/4/2018 8:53 AM CDT  INR 2.9   4/9/2018 11:05 AM CDT INR 2.0   .       Impression and Plan   Diagnosis     Diabetes Mellitus Type II, Controlled ([id number]-UZ E11.9).     Dyslipidemia, goal LDL below 100 (HBD60-OF E78.5).       Counseled: Patient,  Review of instruction on AADE7 Self Care Behaviors;   Healthy Eating - Reviewed carbohydrate controlled meal plan.  Diabetic plate method as a general rule, basic reading food labels, appropriate portion sizes, well balanced meals.  Patient is provided with snack and meal ideas to incorporate dietary recommendations, meal planning and preperation.  Reviewed education on dietary sources of omega 3 FA and soluble fiber 10gm/d ay to help maintain good LDL cholesterol.  Discussed ideas for snacks  Being Active - Education on how exercise helps with : encouraged 15 walk BID   - lowering BG by increasing the muscles ability to take up and use glucose   - weight loss   - healthier heart (improve lipid profile)   - improve sleep, mood, energy   - decrease stress  Monitoring - Reviewed recommended testing schedule and blood glucose target levels.  Again encouraged to test pre/ 2 hr pp the largest meal of the day a couple times/ week for pt to understand how intake significantly affects glucose readings.  Highlighted BG logbook again to help pt understand testing schedule.    Taking Medication - Discussed potential need to add medication in the future with the natural progression of diabetes and pt does not want medication.  Pt is willing to continue to adjust diet and lifestyle.    Problem Solving -  Pt believes she needs to avoid most greens because of warfarin - educational handout and discussed with pt.  List of foods provided with Vit K content - discussed keeping food intake stable.  medical support team assistance, resources provided (written); good control of stress  Healthy Coping - support of friends, family, and medical support team   Reducing Risks - Detailed education on potential  complications associated with uncontrolled diabetes and prevention recommendations.  Recommendations include: annual eye exam, good oral cares with brushing BID and flossing daily, routine dental apts, good foot care tips and shoewear - discussed monofilament test yearly.  Importance of adequate sleep and good control of BG to prevent developing complications related to uncontrolled DM including nephropathy, neuropathy, retinopathy, and cardiovascular disease.  Weight loss goal of 7 - 10% of current body weight pounds as a reasonable goal to help increase insulin sensitivity      Goals:   1.  Practice healthy stress management and get good quality sleep with the goal of 7-8 hours per night.    2.   Physical activity: Recommend increasing chair exercises, walking in place, stay active throughout the day  3.  Eat in a healthy way, per diabetic plate method.  Nutrition reference: Eat 3 meals a day; snacks are optional.  A meal is three or more food groups; make it colorful for better nutrition.  Incorporate TLC dietary heart healthy guidelines.  **continue to increasing fiber in diet, more vegeterian meals, fish 2+x/ week)  4.  Read handouts provided.  F/u 4 month  5.  Pt to monitor BG BID on 2 - 3 days/ week.  BG goals: Fasting and before meals 80 - 130 mg/dL, 2 hrs after the start of a meal 80 - 160 mg/dL.           Professional Services   Time spent with pt 60 min   champ OCASIO

## 2022-02-15 NOTE — TELEPHONE ENCOUNTER
---------------------  From: Jennifer Echevarria LPN (Phone Messages Pool ([id number]_[facility]))   Sent: 4/7/2021 4:16:00 PM CDT  Subject: refills     Phone Message    PCP:   [facility]      Time of Call:  4:00pm       Person Calling:  pt daughter Joanna  Phone number:      Returned call at: _    Note:   Joanna CARDENAS stating pt is going to be out of medication and has no refills.  Told Joanna per [facility] note pt was to follow up in 1 months (had medication changes).    Joanna transferred to scheduling.    Last office visit and reason:  _

## 2022-02-15 NOTE — NURSING NOTE
Anticoagulation Therapy Management Entered On:  11/11/2020 11:27 AM CST    Performed On:  11/11/2020 11:21 AM CST by Jennifer Astudillo RN               Anticoagulation Visit Assessment   Type of Visit - Anticoagulation :   Telephone   Anticoagulation Indication :   Atrial fibrillation   Anticoagulant Duration :   Undetermined   Anticoagulation Medication Verified :   Yes   Patient Preferred Contact Method :   Cell   Jennifer Astudillo RN - 11/11/2020 11:21 AM CST   Anticoagulation Patient Assessment Grid   Change in Alcohol Consumption :   No   Change in Diet :   Yes   (Comment: Stopped carnation instant breakfast [Jennifer Astudillo RN - 11/11/2020 11:21 AM CST] )   Change in Medications :   No   Diarrhea :   No   Rectal Bleeding :   No   Signs of Clotting :   No   Signs of Warfarin Intolerance :   No   Unusual Bleeding, Bruising :   No   Upcoming Procedures :   No   Vomiting :   No   Jennifer Astudillo RN - 11/11/2020 11:21 AM CST   Patient on Warfarin :   Yes   Patient on Other Anticoagulant :   No   Jennifer Astudillo RN - 11/11/2020 11:21 AM CST   Warfarin   International Normalization Ratio TR :   3.2    Anticoagulant INR Goal Lower :   2    Anticoagulant INR Goal Upper :   3    Information Given by :   Other: [facility]   Sunday :   7.5 mg   Monday :   5 mg   Tuesday :   7.5 mg   Wednesday :   5 mg   Thursday :   7.5 mg   Friday :   5 mg   Saturday :   5 mg   Total Dose :   42.5 mg   Warfarin Pt Reported Previous Week Dose :    Sun Mon Tues Wed Thurs Fri Sat Weekly Total Dose   Week 1 7.5 mg 5 mg 7.5 mg 5 mg 7.5 mg 5 mg 5 mg 42.5 mg   Week 2  mg  mg  mg  mg  mg  mg  mg  mg   Week 3  mg  mg  mg  mg  mg  mg  mg  mg   Week 4  mg  mg  mg  mg  mg  mg  mg  mg         Patient is taking single or multiple strength tablet(s) :   Single strength tab(s)   One Tab Strength :   5 mg tab   Sunday :   7.5 mg   Monday :   5 mg   Tuesday :   7.5 mg   Wednesday :   5 mg   Thursday :   7.5 mg   Friday :   5 mg    Saturday :   5 mg   Week 1 Total Dose :   42.5 mg   Sunday :   1.5 tab(s)   Monday :   1 tab(s)   Tuesday :   1.5 tab(s)   Wednesday :   1 tab(s)   Thursday :   1.5 tab(s)   Friday :   1 tab(s)   Saturday :   1 tab(s)   Patient Instructions :   INR = 3.2 per [facility] today. Patient is to Hold warfarin today then resume 7.5mg warfarin on Sun, Tues, and Thurs and 5mg the rest of the days of the week.  Recheck INR in 1 week per protocol. Home Health nurse Mariann advised via call.     Jennifer Astudillo RN - 11/11/2020 11:21 AM CST   Medication History   Medication List   (As Of: 11/11/2020 11:27:40 AM CST)   Prescription/Discharge Order    amoxicillin  :   amoxicillin ; Status:   Prescribed ; Ordered As Mnemonic:   amoxicillin 500 mg oral tablet ; Simple Display Line:   See Instructions, 4 tab(s) Oral 1 hour before dental procedure, 4 EA, 1 Refill(s) ; Ordering Provider:   Beatriz Petit; Catalog Code:   amoxicillin ; Order Dt/Tm:   5/7/2020 2:05:13 PM CDT          donepezil  :   donepezil ; Status:   Suspended ; Ordered As Mnemonic:   Aricept 5 mg oral tablet ; Simple Display Line:   5 mg, 1 tab(s), Oral, hs, 30 tab(s), 5 Refill(s) ; Ordering Provider:   Hilario Whiting MD; Catalog Code:   donepezil ; Order Dt/Tm:   10/5/2020 3:59:19 PM CDT          fluticasone nasal  :   fluticasone nasal ; Status:   Prescribed ; Ordered As Mnemonic:   Flonase 50 mcg/inh nasal spray ; Simple Display Line:   2 spray(s), nasal, daily, 1 EA, 11 Refill(s) ; Ordering Provider:   Shimon Nesbitt MD; Catalog Code:   fluticasone nasal ; Order Dt/Tm:   7/21/2020 11:28:21 AM CDT          levothyroxine  :   levothyroxine ; Status:   Prescribed ; Ordered As Mnemonic:   levothyroxine 125 mcg (0.125 mg) oral tablet ; Simple Display Line:   1 tab(s), Oral, daily, 90 tab(s), 1 Refill(s) ; Ordering Provider:   Shimon Nesbitt MD; Catalog Code:   levothyroxine ; Order Dt/Tm:   7/21/2020 11:28:22 AM CDT          metoprolol  :    metoprolol ; Status:   Prescribed ; Ordered As Mnemonic:   Metoprolol Succinate  mg oral tablet, extended release ; Simple Display Line:   100 mg, 1 tab(s), Oral, daily, 90 tab(s), 1 Refill(s) ; Ordering Provider:   Shimon Nesbitt MD; Catalog Code:   metoprolol ; Order Dt/Tm:   7/21/2020 11:28:23 AM CDT          Miscellaneous Rx Supply  :   Miscellaneous Rx Supply ; Status:   Prescribed ; Ordered As Mnemonic:   ACCU-CHECK GUIDE LANCETS ; Simple Display Line:   See Instructions, TEST BLOOD SUGAR DAILY, 100 EA, 11 Refill(s) ; Ordering Provider:   Shimon Nesbitt MD; Catalog Code:   Miscellaneous Rx Supply ; Order Dt/Tm:   7/21/2020 8:47:38 AM CDT          Miscellaneous Rx Supply  :   Miscellaneous Rx Supply ; Status:   Prescribed ; Ordered As Mnemonic:   ACCU-CHECK GUIDE TEST STRIPS ; Simple Display Line:   See Instructions, TEST BLOOD SUGAR DAILY, 100 EA, 11 Refill(s) ; Ordering Provider:   Shimon Nesbitt MD; Catalog Code:   Miscellaneous Rx Supply ; Order Dt/Tm:   7/21/2020 8:47:37 AM CDT          warfarin  :   warfarin ; Status:   Prescribed ; Ordered As Mnemonic:   warfarin 5 mg oral tablet ; Simple Display Line:   See Instructions, TAKE 1 AND 1/2 TABLETS BY MOUTH FRIDAYS AND 1 TABLET EVERY OTHER DAY, 180 tab(s), 1 Refill(s) ; Ordering Provider:   Shimon Nesbitt MD; Catalog Code:   warfarin ; Order Dt/Tm:   7/21/2020 11:28:22 AM CDT            Home Meds    acetaminophen  :   acetaminophen ; Status:   Documented ; Ordered As Mnemonic:   acetaminophen ; Simple Display Line:   0 Refill(s) ; Catalog Code:   acetaminophen ; Order Dt/Tm:   5/14/2019 1:44:43 PM CDT          ascorbic acid  :   ascorbic acid ; Status:   Documented ; Ordered As Mnemonic:   Vitamin C ; Simple Display Line:   500 mg, Oral, daily, 0 Refill(s) ; Catalog Code:   ascorbic acid ; Order Dt/Tm:   10/26/2020 3:10:37 PM CDT          cholecalciferol  :   cholecalciferol ; Status:   Documented ; Ordered As Mnemonic:   Vitamin D3 ;  Simple Display Line:   2,000 International Unit, Oral, daily, 0 Refill(s) ; Catalog Code:   cholecalciferol ; Order Dt/Tm:   10/26/2020 3:10:48 PM CDT          lisinopril  :   lisinopril ; Status:   Documented ; Ordered As Mnemonic:   lisinopril 20 mg oral tablet ; Simple Display Line:   20 mg, 1 tab(s), Oral, daily, 0 Refill(s) ; Catalog Code:   lisinopril ; Order Dt/Tm:   10/5/2020 3:26:04 PM CDT          melatonin  :   melatonin ; Status:   Documented ; Ordered As Mnemonic:   melatonin 3 mg oral tablet ; Simple Display Line:   3 mg, 1 tab(s), Oral, hs, 0 Refill(s) ; Catalog Code:   melatonin ; Order Dt/Tm:   10/29/2020 10:23:22 AM CDT          Miscellaneous Prescription  :   Miscellaneous Prescription ; Status:   Documented ; Ordered As Mnemonic:   Calcium with vitamin D ; Simple Display Line:   1 tab, Oral, daily, 0 Refill(s) ; Catalog Code:   Miscellaneous Prescription ; Order Dt/Tm:   10/26/2020 3:08:29 PM CDT          multivitamin  :   multivitamin ; Status:   Documented ; Ordered As Mnemonic:   Daily Multiple Vitamins ; Simple Display Line:   1 tab(s), Oral, daily, 0 Refill(s) ; Catalog Code:   multivitamin ; Order Dt/Tm:   10/26/2020 3:09:47 PM CDT          multivitamin with minerals  :   multivitamin with minerals ; Status:   Documented ; Ordered As Mnemonic:   PreserVision ; Simple Display Line:   1 cap(s), Oral, bid, 0 Refill(s) ; Catalog Code:   multivitamin with minerals ; Order Dt/Tm:   10/26/2020 3:10:54 PM CDT          omega-3 polyunsaturated fatty acids  :   omega-3 polyunsaturated fatty acids ; Status:   Documented ; Ordered As Mnemonic:   Fish Oil ; Simple Display Line:   1 tab, Oral, daily, 0 Refill(s) ; Catalog Code:   omega-3 polyunsaturated fatty acids ; Order Dt/Tm:   10/26/2020 3:10:12 PM CDT          ondansetron  :   ondansetron ; Status:   Documented ; Ordered As Mnemonic:   Zofran 4 mg oral tablet ; Simple Display Line:   4 mg, 1 tab(s), Oral, q8 hrs, 0 Refill(s) ; Catalog Code:    ondansetron ; Order Dt/Tm:   10/26/2020 3:07:28 PM CDT

## 2022-02-15 NOTE — TELEPHONE ENCOUNTER
---------------------  From: Ewa Gaffney RN (Phone Messages Pool ([id number]_[facility]))   Sent: 8/11/2021 9:59:12 AM CDT  Subject: drooping lip       PCP: NINFA      Time of Call: 9:30am       Person Calling:  Collettesarah  Phone number:  [phone]    Note: Collette called, stating her mother's lip is drooping, noticed the past 2 mornings. She stated pt is weaker and slower, but has not noticed any change in pt's mentation, speech, no leaning, no change in swallowing. Last week INR was 2.0. Collette inquired if pt having TIAs and should pt be seen in clinic.    Discussed sxs and since pt was coming in today for labs, will see a provider; was transferred to scheduling.     Last office visit and reason: 5/24/21 fall/rib pain  NCB

## 2022-02-15 NOTE — NURSING NOTE
Quick Intake Entered On:  7/23/2019 3:19 PM CDT    Performed On:  7/23/2019 3:18 PM CDT by Ashley Sawyer               Summary   Weight Measured :   146.2 lb(Converted to: 146 lb 3 oz, 66.32 kg)    Height Measured :   64 in(Converted to: 5 ft 4 in, 162.56 cm)    Body Mass Index :   25.09 kg/m2 (HI)    Body Surface Area :   1.73 m2   Race :      Languages :   English   Ashley Sawyer - 7/23/2019 3:18 PM CDT

## 2022-02-15 NOTE — PROGRESS NOTES
Patient:   GERHARD HOWE            MRN: [id number]            FIN: [id number]               Age:   88 years     Sex:  Female     :  11/10/1929   Associated Diagnoses:   None   Author:   Ashley Sawyer      Doctor: Henok   Patient Information  (Medicare and address information is on file)  Medicare HICN#: _  Address:_ City:_ State:_ Zip:_  Home Phone:_ Work Phone:_ Other Contact Phone:_    Diabetes self-management training (DSMT) and medical nutrition therapy (MNT) are individual and complementary services to improve diabetes care. For Medicare beneficiaries, both services can be ordered in the same year. Research indicates MNT combined with DSMT improves outcomes.    Diabetes Self-Managment Training (DSMT)  Medicare: 10 hours initial DSMT in 12 month period, plus 2 hours follow-up annually  *Check type of training services and number of hours requested:  _ Initial DSMT:  10 hours or _ no. hrs. requested  X Follow-up DSMT: X 2 hours or _ no. hrs. requested  _ Additional insulin training: _ no. hrs. requested    *Patients with special needs requiring individual DSMT  Check all special needs that apply:  _ Vision _ Hearing  _ Physical  _Cognitive Impairment  _ Language limitations X Other  No classes offered    *DSMT Content  X All ten content areas, as appropriate  _ Monitoring diabetes    _ Diabetes as disease process  _ Psychological adjustment _ Physical activity  _ Nutritional management  _ Goal setting, problem solving  _ Medications       _ Prevent, detect and treat acute complications  _ Preconception/pregnancy _ Prevent, detect and teat chronic complications     management or gestational     diabetes management    Medical Nutrition Therapy (MNT)  Medicare: 3 hours initial MNT in the first calendar year, plus two hours follow-up MNT annually. Additional MNT hours available for change in medical condition, treament and/or diagnosis.  *Check the type of MNT and/or number of additional hours requested:  _  Initial MNT:   X Annual follow-up MNT  _ Additional MNT services in the same calendar year, per RD recommendations  _ number of additional hours requested    Please specify change in medical condition, treatment and/or diagnosis      *Diagnosis  Please send recent labs for patient eligibility & outcomes monitoring  _ Type 1 uncontrolled  _ Type 1 controlled  _ Type 2 uncontrolled  X Type 2 controlled  _ Gestational diabetes _ Other _    Complications/Comorbidities  Check all that apply:  X Hypertension   X Dyslipidemia _ Stroke      _ Nephropathy  X Neuropathy    _ Retinopathy  _ Renal disease   _ CHD  _ PVD       _ Pregnancy  _ Non-healing wound _ Obesity    _ Mental/affective disorder     _ Other _    Current Diabetes Medications  Specify type, dose and frequency  Oral: _  Insulin:   Patient now uses: _ Pen _ Needle _ Pump    Patient Behavior Goals/Plan of Care    To maintain good blood sugar control via lifestyle.  Minimize the risk for hypoglycemia and reduce risks of developing complications related to uncontrolled diabetes.    Signature and UPIN #: (UPIN and NPI are on file)  Group/Practice name, address and phone: [facility], [address] [place] [address]  [id number] [phone]

## 2022-02-15 NOTE — NURSING NOTE
Anticoagulation Therapy Entered On:  4/23/2020 8:59 AM CDT    Performed On:  4/23/2020 8:58 AM CDT by Madiha Britt RN               Warfarin Management   Week 1 Sunday Dose :   7.5    Week 1 Monday Dose :   5    Week 1 Tuesday Dose :   7.5    Week 1 Wednesday Dose :   5    Week 1 Thursday Dose :   7.5    Week 1 Friday Dose :   5    Week 1 Saturday Dose :   5    Week 1 Dose Total :   42.5    Week 2 Sunday Dose :   7.5    Week 2 Monday Dose :   5    Week 2 Tuesday Dose :   7.5    Week 2 Wednesday Dose :   5    Week 2 Thursday Dose :   7.5    Week 2 Friday Dose :   5    Week 2 Saturday Dose :   5    Week 2 Dose Total :   42.5    Planned Duration :   Indefinite   Indication :   Atrial fibrillation   INR Range :   2.0 - 3.0   INR Therapeutic Range :   Yes   Anticoagulation Recheck :   4 weeks   Warfarin Special Instructions :   INR = 2.5 per [facility]. Continue Warfarin 7.5mg Tues, Thurs, Sun and 5mg ROW. Recheck INR in 4 weeks per protocol. Advised daughter by phone.    Madiha Britt RN - 4/23/2020 8:58 AM CDT

## 2022-02-15 NOTE — NURSING NOTE
Comprehensive Intake Entered On:  2021 2:10 PM CDT    Performed On:  2021 2:05 PM CDT by Yumiko Escamilla               Summary   Chief Complaint :   Patient is here today for Dementia f/u and medication refills    MarzenaMAYumiko - 2021 2:12 PM CDT     Advance Directive :   Yes   Weight Measured :   186.2 lb(Converted to: 186 lb 3 oz, 84.459 kg)    Height Measured :   64 in(Converted to: 5 ft 4 in, 162.56 cm)    Body Mass Index :   31.96 kg/m2 (HI)    Body Surface Area :   1.95 m2   Systolic Blood Pressure :   133 mmHg (HI)    Diastolic Blood Pressure :   83 mmHg (HI)    Mean Arterial Pressure :   100 mmHg   Peripheral Pulse Rate :   80 bpm   BP Site :   Right arm   BP Method :   Electronic   HR Method :   Electronic   Temperature Tympanic :   97.9 DegF(Converted to: 36.6 DegC)    Race :      Languages :   English   Yumiko Escamilla - 2021 2:05 PM CDT   Health Status   Allergies Verified? :   Yes   Medication History Verified? :   Yes   Medical History Verified? :   Yes   Pre-Visit Planning Status :   Completed   Tobacco Use? :   Never smoker   Yumiko Escamilla - 2021 2:05 PM CDT   Demographics   Last Name :   Arturo   Address :   [address] [place] [address]   First Name :   Rebecca Keyes Initial :   J   Responsible Party Date of Birth () :   11/10/1929 CST   City :   [place]   State :   [place]   Zip Code :   [address]   Contact Relationship to Patient (other) :   Patient   NinoanushkaMA Yumiko SEAMAN - 2021 2:05 PM CDT   Providers Grid   Provider Name :    Shraddha Lucas        Provider Specialty :    eye   Dental   Heart          Yumiko Escamilla 2021 2:05 PM CDT  Yumiko Escamilla - 2021 2:05 PM CDT  Yumiko Escamilla - 2021 2:05 PM CDT       Ancillary Services Grid   Name :    [facility]              Type of Service :    Pharmacy                Yumiko Escamilla - 2021 2:05 PM CDT         Consents   Consent for Immunization Exchange :   Consent  Granted   Consent for Immunizations to Providers :   Consent Granted   Shannan/Yumiko PENA - 8/17/2021 2:05 PM CDT   Meds / Allergies   (As Of: 8/17/2021 2:10:43 PM CDT)   Allergies (Active)   Ancef  Estimated Onset Date:   Unspecified ; Reactions:   Diarrhea ; Created By:   Chloe Bruce; Reaction Status:   Active ; Category:   Drug ; Substance:   Ancef ; Type:   Allergy ; Updated By:   Chloe Bruce; Reviewed Date:   8/17/2021 2:09 PM CDT      Calan  Estimated Onset Date:   Unspecified ; Created By:   Sravanthi Dietz; Reaction Status:   Active ; Category:   Drug ; Substance:   Calan ; Updated By:   Sravanthi Dietz; Source:   Paper Chart ; Reviewed Date:   8/17/2021 2:09 PM CDT      ciprofloxacin  Estimated Onset Date:   Unspecified ; Created By:   Cherise Christiansen MA; Reaction Status:   Active ; Category:   Drug ; Substance:   ciprofloxacin ; Type:   Allergy ; Updated By:   Cherise Christiansen MA; Reviewed Date:   8/17/2021 2:09 PM CDT      phenobarbital  Estimated Onset Date:   Unspecified ; Reactions:   tremors ; Created By:   Chloe Bruce; Reaction Status:   Active ; Category:   Drug ; Substance:   phenobarbital ; Type:   Allergy ; Updated By:   Chloe Bruce; Source:   Paper Chart ; Reviewed Date:   8/17/2021 2:09 PM CDT      probiotic  Estimated Onset Date:   Unspecified ; Reactions:   Rash.. ; Created By:   Sari Turner CMA; Reaction Status:   Active ; Category:   Drug ; Substance:   probiotic ; Type:   Allergy ; Updated By:   Sari Turner CMA; Reviewed Date:   8/17/2021 2:09 PM CDT      simvastatin  Estimated Onset Date:   Unspecified ; Reactions:   GI upset ; Created By:   Sravanthi Dietz; Reaction Status:   Active ; Category:   Drug ; Substance:   simvastatin ; Type:   Allergy ; Updated By:   Sravanthi Dietz; Source:   Paper Chart ; Reviewed Date:   8/17/2021 2:09 PM CDT      Vicodin  Estimated Onset Date:   Unspecified ; Reactions:   rash ; Created By:   Mis Donaldson MA; Reaction  Status:   Active ; Category:   Drug ; Substance:   Vicodin ; Type:   Allergy ; Severity:   Moderate ; Updated By:   Mis Donaldson MA; Reviewed Date:   8/17/2021 2:09 PM CDT        Medication List   (As Of: 8/17/2021 2:10:43 PM CDT)   Prescription/Discharge Order    warfarin  :   warfarin ; Status:   Prescribed ; Ordered As Mnemonic:   warfarin 5 mg oral tablet ; Simple Display Line:   See Instructions, 7.5mg Sun/Tues/Thurs and 5mg rest of days, 90 tab(s), 1 Refill(s) ; Ordering Provider:   Hilario Whiting MD; Catalog Code:   warfarin ; Order Dt/Tm:   5/3/2021 3:42:11 PM CDT          QUEtiapine  :   QUEtiapine ; Status:   Prescribed ; Ordered As Mnemonic:   SEROquel 25 mg oral tablet ; Simple Display Line:   25 mg, 1 tab(s), Oral, qhs, 30 tab(s), 5 Refill(s) ; Ordering Provider:   Hilario Whiting MD; Catalog Code:   QUEtiapine ; Order Dt/Tm:   5/7/2021 1:19:02 PM CDT          Miscellaneous Rx Supply  :   Miscellaneous Rx Supply ; Status:   Prescribed ; Ordered As Mnemonic:   ACCU-CHECK GUIDE TEST STRIPS ; Simple Display Line:   See Instructions, TEST BLOOD SUGAR DAILY, 100 EA, 11 Refill(s) ; Ordering Provider:   Shimon Nesbitt MD; Catalog Code:   Miscellaneous Rx Supply ; Order Dt/Tm:   7/21/2020 8:47:37 AM CDT          Miscellaneous Rx Supply  :   Miscellaneous Rx Supply ; Status:   Prescribed ; Ordered As Mnemonic:   ACCU-CHECK GUIDE LANCETS ; Simple Display Line:   See Instructions, TEST BLOOD SUGAR DAILY, 100 EA, 11 Refill(s) ; Ordering Provider:   Shimon Nesbitt MD; Catalog Code:   Miscellaneous Rx Supply ; Order Dt/Tm:   7/21/2020 8:47:38 AM CDT          metoprolol  :   metoprolol ; Status:   Prescribed ; Ordered As Mnemonic:   Metoprolol Succinate  mg oral tablet, extended release ; Simple Display Line:   100 mg, 1 tab(s), Oral, daily, 90 tab(s), 0 Refill(s) ; Ordering Provider:   Hilario Whiting MD; Catalog Code:   metoprolol ; Order Dt/Tm:   5/27/2021 4:05:37 PM CDT          levothyroxine  :    levothyroxine ; Status:   Prescribed ; Ordered As Mnemonic:   levothyroxine 125 mcg (0.125 mg) oral tablet ; Simple Display Line:   1 tab(s), Oral, daily, 90 tab(s), 3 Refill(s) ; Ordering Provider:   Hilario Whiting MD; Catalog Code:   levothyroxine ; Order Dt/Tm:   12/28/2020 12:52:12 PM CST          fluticasone nasal  :   fluticasone nasal ; Status:   Prescribed ; Ordered As Mnemonic:   Flonase 50 mcg/inh nasal spray ; Simple Display Line:   2 spray(s), nasal, daily, 1 EA, 11 Refill(s) ; Ordering Provider:   Shimon Nesbitt MD; Catalog Code:   fluticasone nasal ; Order Dt/Tm:   7/21/2020 11:28:21 AM CDT          donepezil  :   donepezil ; Status:   Suspended ; Ordered As Mnemonic:   Aricept 5 mg oral tablet ; Simple Display Line:   5 mg, 1 tab(s), Oral, hs, 30 tab(s), 5 Refill(s) ; Ordering Provider:   Hilario Whiting MD; Catalog Code:   donepezil ; Order Dt/Tm:   10/5/2020 3:59:19 PM CDT          citalopram  :   citalopram ; Status:   Prescribed ; Ordered As Mnemonic:   citalopram 10 mg oral tablet ; Simple Display Line:   10 mg, 1 tab(s), Oral, daily, 30 tab(s), 11 Refill(s) ; Ordering Provider:   Hilario Whiting MD; Catalog Code:   citalopram ; Order Dt/Tm:   4/8/2021 2:27:40 PM CDT            Home Meds    omega-3 polyunsaturated fatty acids  :   omega-3 polyunsaturated fatty acids ; Status:   Documented ; Ordered As Mnemonic:   Fish Oil ; Simple Display Line:   1 tab, Oral, daily, 0 Refill(s) ; Catalog Code:   omega-3 polyunsaturated fatty acids ; Order Dt/Tm:   10/26/2020 3:10:12 PM CDT          neomycin/polymyxin B/pramoxine topical  :   neomycin/polymyxin B/pramoxine topical ; Status:   Documented ; Ordered As Mnemonic:   neomycin/polymyxin B/pramoxine 3.5 mg-10,000 units-10 mg/g topical cream ; Simple Display Line:   1 kristin, Topical, bid, 15 gm, 0 Refill(s) ; Catalog Code:   neomycin/polymyxin B/pramoxine topical ; Order Dt/Tm:   8/17/2021 2:09:10 PM CDT          multivitamin with minerals  :    multivitamin with minerals ; Status:   Documented ; Ordered As Mnemonic:   PreserVision ; Simple Display Line:   1 cap(s), Oral, bid, 0 Refill(s) ; Catalog Code:   multivitamin with minerals ; Order Dt/Tm:   10/26/2020 3:10:54 PM CDT          multivitamin  :   multivitamin ; Status:   Documented ; Ordered As Mnemonic:   Daily Multiple Vitamins ; Simple Display Line:   1 tab(s), Oral, daily, 0 Refill(s) ; Catalog Code:   multivitamin ; Order Dt/Tm:   10/26/2020 3:09:47 PM CDT          Miscellaneous Prescription  :   Miscellaneous Prescription ; Status:   Documented ; Ordered As Mnemonic:   Calcium with vitamin D ; Simple Display Line:   1 tab, Oral, daily, 0 Refill(s) ; Catalog Code:   Miscellaneous Prescription ; Order Dt/Tm:   10/26/2020 3:08:29 PM CDT          cholecalciferol  :   cholecalciferol ; Status:   Documented ; Ordered As Mnemonic:   Vitamin D3 ; Simple Display Line:   2,000 International Unit, Oral, daily, 0 Refill(s) ; Catalog Code:   cholecalciferol ; Order Dt/Tm:   10/26/2020 3:10:48 PM CDT          ascorbic acid  :   ascorbic acid ; Status:   Documented ; Ordered As Mnemonic:   Vitamin C ; Simple Display Line:   500 mg, Oral, daily, 0 Refill(s) ; Catalog Code:   ascorbic acid ; Order Dt/Tm:   10/26/2020 3:10:37 PM CDT          acetaminophen  :   acetaminophen ; Status:   Documented ; Ordered As Mnemonic:   acetaminophen ; Simple Display Line:   0 Refill(s) ; Catalog Code:   acetaminophen ; Order Dt/Tm:   5/14/2019 1:44:43 PM CDT

## 2022-02-15 NOTE — NURSING NOTE
Comprehensive Intake Entered On:  2021 1:55 PM CST    Performed On:  2021 1:48 PM CST by Gosia Benítez CMA               Summary   Chief Complaint :   [facility] ED f/u  for tachypnea, wheezing, hypoxemia. COVID negative. Rx'd doxy 100mg BID x 5 days and Prednisone 20mg x 3 days. Stopped Prednisone yesterday d/t SE.    Advance Directive :   Yes   Ht/Wt Measurement Refused by Patient? :   Yes   Systolic Blood Pressure :   132 mmHg (HI)    Diastolic Blood Pressure :   86 mmHg (HI)    Mean Arterial Pressure :   101 mmHg   Peripheral Pulse Rate :   67 bpm   BP Site :   Right arm   Pulse Site :   Radial artery   BP Method :   Electronic   HR Method :   Electronic   Temperature Temporal :   97.6 DegF(Converted to: 36.4 DegC)    Oxygen Saturation :   98 %   Race :      Languages :   English   Gosia Benítez CMA - 2021 1:48 PM CST   Health Status   Allergies Verified? :   Yes   Medication History Verified? :   Yes   Pre-Visit Planning Status :   Completed   Gosia Benítez CMA - 2021 1:48 PM CST   Demographics   Last Name :   Arturo   Address :   [address] [place] [address]   First Name :   Rebecca Keyes Initial :   J   Responsible Party Date of Birth () :   11/10/1929 CST   City :   [place]   State :   [place]   Zip Code :   [address]   Contact Relationship to Patient (other) :   Patient   Jeyson VAZQUEZ Gosia - 2021 1:48 PM CST   Providers Grid   Provider Name :    Shraddha Lucas        Provider Specialty :    eye   Dental   Heart          Gosia Benítez CMA - 2021 1:48 PM CST  Gosia Benítez CMA - 2021 1:48 PM CST  Gosia Benítez CMA - 2021 1:48 PM CST       Ancillary Services Grid   Name :    [facility]              Type of Service :    Pharmacy                Gosia Benítez CMA - 2021 1:48 PM CST         Consents   Consent for Immunization Exchange :   Consent Granted   Consent for Immunizations to Providers :   Consent Granted   Gosia Benítez CMA - 2021  1:48 PM CST   Meds / Allergies   (As Of: 12/7/2021 1:55:42 PM CST)   Allergies (Active)   Ancef  Estimated Onset Date:   Unspecified ; Reactions:   Diarrhea ; Created By:   Chloe Bruce; Reaction Status:   Active ; Category:   Drug ; Substance:   Ancef ; Type:   Allergy ; Updated By:   Chloe Bruce; Reviewed Date:   12/4/2021 8:13 AM CST      Calan  Estimated Onset Date:   Unspecified ; Created By:   Sravanthi Dietz; Reaction Status:   Active ; Category:   Drug ; Substance:   Calan ; Updated By:   Sravanthi Dietz; Source:   Paper Chart ; Reviewed Date:   12/4/2021 8:13 AM CST      ciprofloxacin  Estimated Onset Date:   Unspecified ; Created By:   Cherise Christiansen MA; Reaction Status:   Active ; Category:   Drug ; Substance:   ciprofloxacin ; Type:   Allergy ; Updated By:   Cherise Christiansen MA; Reviewed Date:   12/4/2021 8:13 AM CST      phenobarbital  Estimated Onset Date:   Unspecified ; Reactions:   tremors ; Created By:   Chloe Bruce; Reaction Status:   Active ; Category:   Drug ; Substance:   phenobarbital ; Type:   Allergy ; Updated By:   Chloe Bruce; Source:   Paper Chart ; Reviewed Date:   12/4/2021 8:13 AM CST      probiotic  Estimated Onset Date:   Unspecified ; Reactions:   Rash.. ; Created By:   Sari Turner CMA; Reaction Status:   Active ; Category:   Drug ; Substance:   probiotic ; Type:   Allergy ; Updated By:   Sari Turner CMA; Reviewed Date:   12/4/2021 8:13 AM CST      simvastatin  Estimated Onset Date:   Unspecified ; Reactions:   GI upset ; Created By:   Sravanthi Dietz; Reaction Status:   Active ; Category:   Drug ; Substance:   simvastatin ; Type:   Allergy ; Updated By:   Sravanthi Dietz; Source:   Paper Chart ; Reviewed Date:   12/4/2021 8:13 AM CST      Vicodin  Estimated Onset Date:   Unspecified ; Reactions:   rash ; Created By:   Mis Donaldson MA; Reaction Status:   Active ; Category:   Drug ; Substance:   Vicodin ; Type:   Allergy ; Severity:   Moderate ;  Updated By:   Mis Donaldson MA; Reviewed Date:   12/4/2021 8:13 AM CST        Medication List   (As Of: 12/7/2021 1:55:42 PM CST)   Prescription/Discharge Order    citalopram  :   citalopram ; Status:   Prescribed ; Ordered As Mnemonic:   citalopram 10 mg oral tablet ; Simple Display Line:   10 mg, 1 tab(s), Oral, daily, 90 tab(s), 3 Refill(s) ; Ordering Provider:   Hilario Whiting MD; Catalog Code:   citalopram ; Order Dt/Tm:   8/17/2021 2:32:52 PM CDT          levothyroxine  :   levothyroxine ; Status:   Prescribed ; Ordered As Mnemonic:   levothyroxine 137 mcg (0.137 mg) oral capsule ; Simple Display Line:   137 mcg, 1 cap(s), Oral, daily, 90 cap(s), 3 Refill(s) ; Ordering Provider:   Hilario Whiting MD; Catalog Code:   levothyroxine ; Order Dt/Tm:   8/17/2021 2:35:45 PM CDT          metoprolol  :   metoprolol ; Status:   Prescribed ; Ordered As Mnemonic:   Metoprolol Succinate  mg oral tablet, extended release ; Simple Display Line:   100 mg, 1 tab(s), Oral, daily, 90 tab(s), 3 Refill(s) ; Ordering Provider:   Hilario Whiting MD; Catalog Code:   metoprolol ; Order Dt/Tm:   8/17/2021 2:33:17 PM CDT          Miscellaneous Rx Supply  :   Miscellaneous Rx Supply ; Status:   Prescribed ; Ordered As Mnemonic:   ACCU-CHECK GUIDE LANCETS ; Simple Display Line:   See Instructions, TEST BLOOD SUGAR DAILY, 100 EA, 11 Refill(s) ; Ordering Provider:   Shimon Nesbitt MD; Catalog Code:   Miscellaneous Rx Supply ; Order Dt/Tm:   7/21/2020 8:47:38 AM CDT          Miscellaneous Rx Supply  :   Miscellaneous Rx Supply ; Status:   Prescribed ; Ordered As Mnemonic:   ACCU-CHECK GUIDE TEST STRIPS ; Simple Display Line:   See Instructions, TEST BLOOD SUGAR DAILY, 100 EA, 11 Refill(s) ; Ordering Provider:   Shimon Nesbitt MD; Catalog Code:   Miscellaneous Rx Supply ; Order Dt/Tm:   7/21/2020 8:47:37 AM CDT          QUEtiapine  :   QUEtiapine ; Status:   Prescribed ; Ordered As Mnemonic:   SEROquel 25 mg oral tablet ; Simple  Display Line:   25 mg, 1 tab(s), Oral, qhs, 90 tab(s), 3 Refill(s) ; Ordering Provider:   Hilario Whiting MD; Catalog Code:   QUEtiapine ; Order Dt/Tm:   8/17/2021 2:33:46 PM CDT          warfarin  :   warfarin ; Status:   Prescribed ; Ordered As Mnemonic:   warfarin 5 mg oral tablet ; Simple Display Line:   See Instructions, 7.5mg Sun/Tues/Thurs and 5mg rest of days, 120 EA, 3 Refill(s) ; Ordering Provider:   Hilario Whiting MD; Catalog Code:   warfarin ; Order Dt/Tm:   8/17/2021 2:34:13 PM CDT            Home Meds    acetaminophen  :   acetaminophen ; Status:   Documented ; Ordered As Mnemonic:   acetaminophen ; Simple Display Line:   0 Refill(s) ; Catalog Code:   acetaminophen ; Order Dt/Tm:   5/14/2019 1:44:43 PM CDT          ascorbic acid  :   ascorbic acid ; Status:   Documented ; Ordered As Mnemonic:   Vitamin C ; Simple Display Line:   500 mg, Oral, daily, 0 Refill(s) ; Catalog Code:   ascorbic acid ; Order Dt/Tm:   10/26/2020 3:10:37 PM CDT          cholecalciferol  :   cholecalciferol ; Status:   Documented ; Ordered As Mnemonic:   Vitamin D3 ; Simple Display Line:   2,000 International Unit, Oral, daily, 0 Refill(s) ; Catalog Code:   cholecalciferol ; Order Dt/Tm:   10/26/2020 3:10:48 PM CDT          Miscellaneous Prescription  :   Miscellaneous Prescription ; Status:   Documented ; Ordered As Mnemonic:   Calcium with vitamin D ; Simple Display Line:   1 tab, Oral, daily, 0 Refill(s) ; Catalog Code:   Miscellaneous Prescription ; Order Dt/Tm:   10/26/2020 3:08:29 PM CDT          multivitamin  :   multivitamin ; Status:   Documented ; Ordered As Mnemonic:   Daily Multiple Vitamins ; Simple Display Line:   1 tab(s), Oral, daily, 0 Refill(s) ; Catalog Code:   multivitamin ; Order Dt/Tm:   10/26/2020 3:09:47 PM CDT          multivitamin with minerals  :   multivitamin with minerals ; Status:   Documented ; Ordered As Mnemonic:   PreserVision ; Simple Display Line:   1 cap(s), Oral, bid, 0 Refill(s) ; Catalog  Code:   multivitamin with minerals ; Order Dt/Tm:   10/26/2020 3:10:54 PM CDT          omega-3 polyunsaturated fatty acids  :   omega-3 polyunsaturated fatty acids ; Status:   Documented ; Ordered As Mnemonic:   Fish Oil ; Simple Display Line:   1 tab, Oral, daily, 0 Refill(s) ; Catalog Code:   omega-3 polyunsaturated fatty acids ; Order Dt/Tm:   10/26/2020 3:10:12 PM CDT

## 2022-02-15 NOTE — PROGRESS NOTES
Patient:   GERHARD HOWE            MRN: [id number]            FIN: [id number]               Age:   89 years     Sex:  Female     :  11/10/1929   Associated Diagnoses:   Fall in home; Rib pain on left side   Author:   Beatriz Petit      Visit Information      Date of Service: 2021 01:20 pm  Performing Location: [facility]  Encounter#: [id number]      Primary Care Provider (PCP):  Hilario Whiting MD    NPI# [id number]      Referring Provider:  Beatriz Petit    NPI# [id number]      Chief Complaint   2021 1:40 PM CDT    fell backwards 21 against a cupboard, was not seen at that time, having pain under left breast into the left side of back      History of Present Illness   here with granddaughter Belkis  On  she had an unwitnessed fall, her daughter came in the room and Gerhard was sitting on her bottom against a row of cupboards. She reports that she was not 'paying attention' while getting something in the cupboard and she fell backwards and into the cupboards. She did NOT hit her head or loose consciousness.  She hasn't really taken anything for pain in the last week except Tylenol once.  No breathing problems. She is on home INRs, they are checked weekly, there has been no blood or stool in the urine, she has had normal urine and stool      Health Status   Allergies:    Allergic Reactions (Selected)  Moderate  Vicodin (Rash)  Severity Not Documented  Ancef (Diarrhea)  Calan (No reactions were documented)  Ciprofloxacin (No reactions were documented)  Phenobarbital (Tremors)  Probiotic (Rash..)  Simvastatin (Gi upset)   Medications:  (Selected)   Prescriptions  Prescribed  ACCU-CHECK GUIDE LANCETS: ACCU-CHECK GUIDE LANCETS, See Instructions, Instructions: TEST BLOOD SUGAR DAILY, Supply, # 100 EA, 11 Refill(s), Type: Maintenance, Pharmacy: [id number], TEST BLOOD SUGAR DAILY, 64, in, 20 8:54:00 CDT, Height Measured, 164, lb,...  ACCU-CHECK GUIDE  TEST STRIPS: ACCU-CHECK GUIDE TEST STRIPS, See Instructions, Instructions: TEST BLOOD SUGAR DAILY, Supply, # 100 EA, 11 Refill(s), Type: Maintenance, Pharmacy: [facility] #[id number], TEST BLOOD SUGAR DAILY, 64, in, 03/17/20 8:54:00 CDT, Height Measured, 164,...  Flonase 50 mcg/inh nasal spray: = 2 spray(s), nasal, daily, # 1 EA, 11 Refill(s), Type: Maintenance, Pharmacy: [facility] #[id number], 2 spray(s) Nasal daily, 64, in, 07/21/20 8:52:00 CDT, Height Measured, 168.6, lb, 07/21/20 8:52:00 CDT, Weight Measured  Metoprolol Succinate  mg oral tablet, extended release: = 1 tab(s) ( 100 mg ), Oral, daily, # 90 tab(s), 0 Refill(s), Type: Soft Stop, Pharmacy: [facility], 1 tab(s) Oral daily, 64, in, 10/26/20 15:01:00 CDT, Height Measured, 167, lb, 10/26/20 15:01:00 CDT, Weight Measured  SEROquel 25 mg oral tablet: = 1 tab(s) ( 25 mg ), Oral, qhs, # 30 tab(s), 5 Refill(s), Type: Maintenance, Pharmacy: [facility], 1 tab(s) Oral qhs, 64, in, 05/07/21 12:59:00 CDT, Height Measured, 186.2, lb, 05/07/21 12:59:00 CDT, Weight Measured  citalopram 10 mg oral tablet: = 1 tab(s) ( 10 mg ), Oral, daily, # 30 tab(s), 11 Refill(s), Type: Maintenance, Pharmacy: [facility], 1 tab(s) Oral daily, 64, in, 04/08/21 14:21:00 CDT, Height Measured, 182.9, lb, 04/08/21 13:59:00 CDT, Weight Measured  levothyroxine 125 mcg (0.125 mg) oral tablet: = 1 tab(s), Oral, daily, # 90 tab(s), 3 Refill(s), Type: Maintenance, Pharmacy: [facility] #[id number], 1 tab(s) Oral daily, 64, in, 10/26/20 15:01:00 CDT, Height Measured, 167, lb, 10/26/20 15:01:00 CDT, Weight Measured  warfarin 5 mg oral tablet: See Instructions, Instructions: 7.5mg Sun/Tues/Thurs and 5mg rest of days, # 90 tab(s), 1 Refill(s), Type: Maintenance, Pharmacy: [facility], 7.5mg Sun/Tues/Thurs and 5mg rest of days, 64, in, 04/08/21 14:21:00 CDT, Height  Marcela...  Suspended  Aricept 5 mg oral tablet: = 1 tab(s) ( 5 mg ), Oral, hs, # 30 tab(s), 5 Refill(s), Type: Maintenance, Pharmacy: [facility] #[id number], 1 tab(s) Oral hs, 64, in, 10/05/20 15:22:00 CDT, Height Measured, 172, lb, 10/05/20 15:22:00 CDT, Weight Measured  Documented Medications  Documented  Calcium with vitamin D: Calcium with vitamin D, 1 tab, Oral, daily, 0 Refill(s), Type: Maintenance  Daily Multiple Vitamins: 1 tab(s), Oral, daily, 0 Refill(s), Type: Maintenance  Fish Oil: 1 tab, Oral, daily, 0 Refill(s), Type: Maintenance  PreserVision: 1 cap(s), Oral, bid, 0 Refill(s), Type: Maintenance  Vitamin C: ( 500 mg ), Oral, daily, 0 Refill(s), Type: Maintenance  Vitamin D3: ( 2,000 International Unit ), Oral, daily, 0 Refill(s), Type: Maintenance  acetaminophen: 0 Refill(s), Type: Maintenance,    Medications          *denotes recorded medication          *Calcium with vitamin D: 1 tab, Oral, daily, 0 Refill(s).          ACCU-CHECK GUIDE LANCETS: See Instructions, TEST BLOOD SUGAR DAILY, 100 EA, 11 Refill(s).          ACCU-CHECK GUIDE TEST STRIPS: See Instructions, TEST BLOOD SUGAR DAILY, 100 EA, 11 Refill(s).          SEROquel 25 mg oral tablet: 25 mg, 1 tab(s), Oral, qhs, 30 tab(s), 5 Refill(s).          *acetaminophen: 0 Refill(s).          *Vitamin C: 500 mg, Oral, daily, 0 Refill(s).          *Vitamin D3: 2,000 International Unit, Oral, daily, 0 Refill(s).          citalopram 10 mg oral tablet: 10 mg, 1 tab(s), Oral, daily, 30 tab(s), 11 Refill(s).          Flonase 50 mcg/inh nasal spray: 2 spray(s), nasal, daily, 1 EA, 11 Refill(s).          levothyroxine 125 mcg (0.125 mg) oral tablet: 1 tab(s), Oral, daily, 90 tab(s), 3 Refill(s).          Metoprolol Succinate  mg oral tablet, extended release: 100 mg, 1 tab(s), Oral, daily, 90 tab(s), 0 Refill(s).          *Daily Multiple Vitamins: 1 tab(s), Oral, daily, 0 Refill(s).          *PreserVision: 1 cap(s), Oral, bid, 0 Refill(s).           *Fish Oil: 1 tab, Oral, daily, 0 Refill(s).          warfarin 5 mg oral tablet: See Instructions, 7.5mg Sun/Tues/Thurs and 5mg rest of days, 90 tab(s), 1 Refill(s).       Problem list:    All Problems  Hypothyroidism (acquired) / SNOMED CT 056860003 / Confirmed  Allergic rhinitis / SNOMED CT 882884294 / Confirmed  Anxiety / SNOMED CT 14412001 / Confirmed  Benign positional vertigo / SNOMED CT 760085671 / Confirmed  Bilateral hearing loss / SNOMED CT 754092683 / Confirmed  Carotid artery plaque / SNOMED CT 317677743 / Confirmed  Chronic dementia with behavioral disturbance / SNOMED CT 7546260801 / Confirmed  Diabetic peripheral neuropathy / SNOMED CT 1467614782 / Confirmed  Statin intolerance / SNOMED CT 69384034 / Confirmed  Long term (current) use of anticoagulants / SNOMED CT 208264614 / Confirmed  Anticoagulated / SNOMED CT 742252291 / Confirmed  Frailty / SNOMED CT 942466544 / Confirmed  S/P placement of cardiac pacemaker / SNOMED CT 815696373 / Confirmed  History of mitral valve insufficiency / SNOMED CT 459997285 / Confirmed  Mild by Echo in 2009  Dyslipidemia, goal LDL below 100 / SNOMED CT 36935925 / Confirmed  White coat syndrome with hypertension / SNOMED CT 7108732069 / Confirmed  Obese / SNOMED CT 8909096593 / Probable  Osteoarthritis / SNOMED CT 6484195075 / Confirmed  Paroxysmal atrial fibrillation / SNOMED CT 279540147 / Confirmed  Seborrheic dermatitis / SNOMED CT 23272305 / Confirmed  Seborrheic dermatitis of scalp / SNOMED CT 163206070 / Confirmed  Tinnitus / SNOMED CT 785839259 / Confirmed  Type 2 diabetes mellitus with other circulatory complications / SNOMED CT 452273027 / Confirmed  Symptomatic varicose veins / SNOMED CT 844634313 / Confirmed  Resolved: Inpatient stay / SNOMED CT 947457925  @[facility], [place] - Atrial fibrillation with an episode of hypotension  Resolved: Inpatient stay / SNOMED CT 895121356  @[facility], [place] - Vertigo  Resolved: Inpatient stay /  SNOMED CT 089822675  @[place], [place] - Postoperative surgical complication involving subcutaneous tissue  Resolved: Inpatient stay / SNOMED CT 189786314  [facility], [place] - Benign paroxysmal positional vertigo.  Canceled: Rhinitis, chronic / SNOMED CT 898169635  Canceled: Diabetic Neuropathy, Type II Diabetes Mellitus / ICD-9-.60  Canceled: White coat hypertension / SNOMED CT 7357905460  Canceled: Hypertension / SNOMED CT 1233225768  Canceled: White coat syndrome with hypertension / SNOMED CT 9535422587      Histories   Past Medical History:    Active  Type 2 diabetes mellitus with other circulatory complications (303149787): Onset on 1/1/1999 at 69 years.  Osteoarthritis (3731784047)  Dyslipidemia, goal LDL below 100 (26172398)  Paroxysmal atrial fibrillation (605622029)  Hypothyroidism (acquired) (277828413)  Allergic rhinitis (642513477)  Obese (8281690060)  Benign positional vertigo (128956029)  Statin intolerance (21196926)  Long term (current) use of anticoagulants (375504121)  Bilateral hearing loss (525817163)  Tinnitus (710743486)  S/P placement of cardiac pacemaker (507105346)  White coat syndrome with hypertension (4649058534)  Resolved  Inpatient stay (284811653): Onset on 10/8/2020 at 89 years.  Resolved on 10/9/2020 at 89 years.  Comments:  10/12/2020 CDT 1:56 PM CDT - Lucy Dawson  [facility], [place] - Benign paroxysmal positional vertigo.  Inpatient stay (480540408): Onset on 5/30/2019 at 89 years.  Resolved on 5/31/2019 at 89 years.  Comments:  7/9/2019 CDT 6:55 AM CDT - Leonarda Padilla  @[place], [place] - Postoperative surgical complication involving subcutaneous tissue  Inpatient stay (296194401): Onset on 12/12/2012 at 83 years.  Resolved.  Comments:  7/9/2019 CDT 6:54 AM CDT - Leonarda Padilla  @[facility], [place] - Vertigo  Inpatient stay (007976723): Onset on 8/25/2010 at 80 years.  Resolved.  Comments:  7/9/2019 CDT 6:53 AM CDT  - Leonarda Padilla  @[facility], [place] - Atrial fibrillation with an episode of hypotension   Family History:    Leukemia  Father ()  Gout  Brother  Mother ()  Diabetes mellitus  Mother ()  Hypertension  Mother ()  Parkinson's disease  Sister  Valvular heart disease  Brother ()  MS - Multiple sclerosis  Sister  CABG - Coronary artery bypass graft  Mother ()  MI - Myocardial infarction  Brother ()  CAD - Coronary artery disease  Father ()  Mother ()     Procedure history:    Replacement of pacemaker pulse generator (SNOMED CT 2616516) on 2019 at 89 Years.  Comments:  2019 10:01 AM CDT - Aida Contreras  Implanted left pectoral.   Medtronic [id number]  [id number]  Phacoemulsification of cataract with intraocular lens implantation (SNOMED CT 6286114977) performed by Andrew Llanes MD on 3/27/2018 at 88 Years.  Comments:  2018 9:31 AM CDT - Samir  Lucy  Left.  Colonoscopy (SNOMED CT 738535110) on 2012 at 82 Years.  Comments:  2012 8:59 AM CDT - Hilario Whiting MD-BSO performed by Nba Alcaraz MD on 2012 at 82 Years.  Hysteroscopy, D&C, polypectomy performed by Nba Alcaraz MD on 2011 at 81 Years.  Implantation of heart pacemaker (SNOMED CT 4037417133) performed by Dr. Lewis Mejia in  at 80 Years.  Replacement of right knee joint (SNOMED CT 7930430579) in the month of 2008 at 78 Years.  Replacement of left knee joint (SNOMED CT 5611389305) on 12/10/2007 at 78 Years.  Flexible sigmoidoscopy (SNOMED CT 04855843) performed by Hilario Whiting MD on 2006 at 76 Years.  Cholecystectomy (SNOMED CT 96798346) in 1951 at 22 Years.  Repair of umbilical hernia (SNOMED CT 34860567) in 1949 at 20 Years.  Appendectomy (SNOMED CT 991196143) in 1947 at 18 Years.  Plastic repair of rotator cuff of shoulder (SNOMED CT 932856935).  Pregnancy (SNOMED CT 725767697).  Comments:  2010 9:08 AM  CDT - Vaishali Cortés  full term X 3  Extracapsular cataract extraction and insertion of intraocular lens (SNOMED CT 979256294) performed by Andrew Llanes MD.  Comments:  5/11/2018 1:14 PM CDT - Samir Lucy Wilkinson.   Social History:        Electronic Cigarette/Vaping Assessment            Electronic Cigarette Use: Never.      Alcohol Assessment: Denies Alcohol Use            Never      Tobacco Assessment: Denies Tobacco Use            Never (less than 100 in lifetime)      Substance Abuse Assessment: Denies Substance Abuse      Employment and Education Assessment            Retired      Home and Environment Assessment            Marital status: .        Physical Examination   VS/Measurements   General:  Alert and oriented, clear speech.    Neck:  Supple, Non-tender, No lymphadenopathy.    Respiratory:  Lungs are clear to auscultation, Respirations are non-labored, Breath sounds are equal.    Cardiovascular:  Normal rate, Regular rhythm.    Gastrointestinal:  Soft, Non-tender, Non-distended, No organomegaly.    Integumentary:  there is echymosis high left lateral flank, mildly tender with palpation here, no deformity. The majority of her tenderness is the left latteral lower ribs, this area also hurts when she stands up and sits down.       Review / Management   Radiology results   X-ray, awaiting radiology over read, no obvoius fracture or other abnormality to my eye      Impression and Plan   Diagnosis     Fall in home ([id number]-PR W19.XXXA).     Rib pain on left side ([id number] R07.81).     Plan:  soft tissue injury, slowly resolving, looking for underying rib fracture today, radiology read pending.    Patient Instructions:       Counseled: Patient, Regarding diagnosis, Regarding treatment, Regarding medications, Verbalized understanding, Counseled on symptomatic management. Return to clinic for re evaluation if worsening, simply not improving, or failure to resolve.   , tylenol/ice for pain. Watch  for fever/cough/SOB    called Derrekt, daughter, regarding negative rib fracture. WIll set her up with PT per request for help with coping with falls and prevention.    Orders     Orders (Selected)   Outpatient Orders  Ordered  Physical Therapy Consult (Request): Referred to: please call daughter Collet about appointment for fall prevention/coping with falls, Fall in home.

## 2022-02-15 NOTE — RESULTS
Anticoagulation Therapy Entered On:  1/24/2019 3:19 PM CST    Performed On:  1/24/2019 3:18 PM CST by Ava Velasquez RN               Warfarin Management   Week 1 Sunday Dose :   5    Week 1 Monday Dose :   5    Week 1 Tuesday Dose :   5    Week 1 Wednesday Dose :   5    Week 1 Thursday Dose :   5    Week 1 Friday Dose :   7.5    Week 1 Saturday Dose :   5    Week 1 Dose Total :   37.5    Week 2 Sunday Dose :   5    Week 2 Monday Dose :   5    Week 2 Tuesday Dose :   5    Week 2 Wednesday Dose :   5    Week 2 Thursday Dose :   5    Week 2 Friday Dose :   7.5    Week 2 Saturday Dose :   5    Week 2 Dose Total :   37.5    Planned Duration :   Indefinite   Indication :   Atrial fibrillation   Warfarin Management Comments :   Lab test performed by:  [facility] Office  [address] [place] [address]  Phone # [phone]  Fax# [phone]  Capillary   International Normalization Ratio :   2.1    INR Range :   2.0 - 3.0   INR Therapeutic Range :   Yes   Ava Velasquez RN - 1/24/2019 3:18 PM CST   Warfarin Management and Results Grid   Signs of Thrombolic :   No   Signs of Warfarin Intolerance :   No   Changes in Diet or Alcohol Intake :   No   Changes in Medication or Antibiotics :   No   Unusual Bleeding or Bruising :   No   Rectal Bleeding or Black Stools :   No   Vitamin K Food Handout :   No   Heart Valve Replacement :   No   Ava Velasquez RN - 1/24/2019 3:18 PM CST   Anticoagulation Recheck :   4 weeks   Warfarin Special Instructions :   Per protocol continue warfarin 7.5 mg Fridays and 5 mg ROW; recheck 4 weeks; notified in office; given written directions.   Ava Velasquez RN - 1/24/2019 3:18 PM CST

## 2022-02-15 NOTE — NURSING NOTE
Anticoagulation Therapy Management Entered On:  5/26/2020 8:55 AM CDT    Performed On:  5/26/2020 8:53 AM CDT by Ava Velasquez RN               Anticoagulation Visit Assessment   Anticoagulation Indication :   Atrial fibrillation   Anticoagulation Medication Verified :   Yes   Ava Velasquez RN - 5/26/2020 8:53 AM CDT   Anticoagulation Patient Assessment Grid   Change in Alcohol Consumption :   No   Change in Diet :   No   Change in Medications :   No   Diarrhea :   No   Rectal Bleeding :   No   Signs of Clotting :   No   Signs of Warfarin Intolerance :   No   Unusual Bleeding, Bruising :   No   Upcoming Procedures :   Yes   (Comment: Possible dental procedure 5/28/2020 [Ava Velasquez RN - 5/26/2020 8:53 AM CDT] )   Vomiting :   No   Ava Velasquez RN - 5/26/2020 8:53 AM CDT   Patient on Warfarin :   Yes   Ava Velasquez RN - 5/26/2020 8:53 AM CDT   Warfarin   Anticoagulant INR Goal Lower :   2    Anticoagulant INR Goal Upper :   3    Sunday :   7.5 mg   Monday :   5 mg   Tuesday :   7.5 mg   Wednesday :   5 mg   Thursday :   7.5 mg   Friday :   5 mg   Saturday :   5 mg   Total Dose :   42.5 mg   Warfarin Pt Reported Previous Week Dose :    Sun Mon Tues Wed Thurs Fri Sat Weekly Total Dose   Week 1                   Week 2                   Week 3                   Week 4                         Patient is taking single or multiple strength tablet(s) :   Single strength tab(s)   One Tab Strength :   5 mg tab   Sunday :   7.5 mg   Monday :   5 mg   Tuesday :   7.5 mg   Wednesday :   5 mg   Thursday :   7.5 mg   Friday :   5 mg   Saturday :   5 mg   Warfarin Wk 1 Total Dose :   42.5 mg   Sunday :   1.5 tab(s)   Monday :   1 tab(s)   Tuesday :   1.5 tab(s)   Wednesday :   1 tab(s)   Thursday :   1.5 tab(s)   Friday :   1 tab(s)   Saturday :   1 tab(s)   Patient Instructions :   [facility] POC INR = 2.5; per protocol continue warfarin 7.5 mg Sun/Tue/Thur and 5 mg ROW; recheck INR in 4 weeks; VM left on pt daughter  phone per request.      Eva LARA, Ava HIGUERA - 5/26/2020 8:53 AM CDT

## 2022-02-15 NOTE — PROGRESS NOTES
Patient:   GERHARD HOWE            MRN: [id number]            FIN: [id number]               Age:   89 years     Sex:  Female     :  11/10/1929   Associated Diagnoses:   None   Author:   Shimon Nesbitt MD      call [phone] ret then found ant in mouth this am no sxs  reassured

## 2022-02-15 NOTE — NURSING NOTE
Anticoagulation Therapy Management Entered On:  5/6/2021 3:24 PM CDT    Performed On:  5/6/2021 3:23 PM CDT by Madiha Britt RN               Anticoagulation Visit Assessment   Type of Visit - Anticoagulation :   Telephone   Anticoagulation Indication :   Atrial fibrillation   Anticoagulant Duration :   Undetermined   Anticoagulation Medication Verified :   Yes   Patient Preferred Contact Method :   Cell   Madiha Britt RN - 5/6/2021 3:23 PM CDT   Anticoagulation Patient Assessment Grid   Change in Alcohol Consumption :   No   Change in Diet :   No   Change in Medications :   No   Diarrhea :   No   Rectal Bleeding :   No   Signs of Clotting :   No   Signs of Warfarin Intolerance :   No   Unusual Bleeding, Bruising :   No   Upcoming Procedures :   No   Vomiting :   No   Madiha Britt RN - 5/6/2021 3:23 PM CDT   Patient on Warfarin :   Yes   Madiha Britt RN - 5/6/2021 3:23 PM CDT   Warfarin   International Normalization Ratio TR :   1.9    Anticoagulant INR Goal Lower :   2    Anticoagulant INR Goal Upper :   3    Sunday :   7.5 mg   Monday :   5 mg   Tuesday :   7.5 mg   Wednesday :   5 mg   Thursday :   7.5 mg   Friday :   5 mg   Saturday :   5 mg   Total Dose :   42.5 mg   Warfarin Pt Reported Previous Week Dose :    Sun Mon Tues Wed Thurs Fri Sat Weekly Total Dose   Week 1 7.5 mg 5 mg 7.5 mg 5 mg 7.5 mg 5 mg 5 mg 42.5 mg   Week 2  mg  mg  mg  mg  mg  mg  mg  mg   Week 3  mg  mg  mg  mg  mg  mg  mg  mg   Week 4  mg  mg  mg  mg  mg  mg  mg  mg         Patient is taking single or multiple strength tablet(s) :   Single strength tab(s)   One Tab Strength :   5 mg tab   Sunday :   7.5 mg   Monday :   5 mg   Tuesday :   7.5 mg   Wednesday :   5 mg   Thursday :   7.5 mg   Friday :   5 mg   Saturday :   5 mg   Week 1 Total Dose :   42.5 mg   Sunday :   1.5 tab(s)   Monday :   1 tab(s)   Tuesday :   1.5 tab(s)   Wednesday :   1 tab(s)   Thursday :   1.5 tab(s)   Friday :   1 tab(s)   Saturday :   1 tab(s)   Warfarin Dosing  Acknowledgement :   I have reviewed the patient's warfarin dosing schedule and confirmed its accuracy for today's visit.   Patient Instructions :   INR = 1.9 per mdINR. Per protocol, continue Warfarin 7.5mg Sun/Tues/Thurs and 5mg ROW. Recheck INR in 1 week. Advised daughter by phone.      Lorenza LARA, Madiha - 5/6/2021 3:23 PM CDT

## 2022-02-15 NOTE — NURSING NOTE
Pt left VM wondering when to come in for INR.  I called and informed her that she was due for INR last week on June 9th. She said she can come in Monday, June 19 so I transferred the call to the appt line.

## 2022-02-15 NOTE — NURSING NOTE
Quick Intake Entered On:  2/26/2020 3:06 PM CST    Performed On:  2/26/2020 3:06 PM CST by Jennifer Astudillo RN               Summary   Height Measured :   64 in(Converted to: 5 ft 4 in, 162.56 cm)    Systolic Blood Pressure :   176 mmHg (HI)    Diastolic Blood Pressure :   70 mmHg   Mean Arterial Pressure :   105 mmHg   Race :      Languages :   English   Jennifer Astudillo RN - 2/26/2020 3:06 PM CST

## 2022-02-15 NOTE — NURSING NOTE
Anticoagulation Therapy Entered On:  12/11/2019 2:28 PM CST    Performed On:  12/11/2019 2:26 PM CST by Jennifer Astudillo RN               Warfarin Management   Week 1 Sunday Dose :   5    Week 1 Monday Dose :   5    Week 1 Tuesday Dose :   5    Week 1 Wednesday Dose :   5    Week 1 Thursday Dose :   5    Week 1 Friday Dose :   7.5    Week 1 Saturday Dose :   5    Week 1 Dose Total :   37.5    Week 2 Sunday Dose :   5    Week 2 Monday Dose :   5    Week 2 Tuesday Dose :   5    Week 2 Wednesday Dose :   5    Week 2 Thursday Dose :   5    Week 2 Friday Dose :   7.5    Week 2 Saturday Dose :   5    Week 2 Dose Total :   37.5    Planned Duration :   Indefinite   Indication :   Atrial fibrillation   INR Range :   2.0 - 3.0   INR Therapeutic Range :   No   Anticoagulation Recheck :   2 weeks    Warfarin Physician :   Hilario Whiting MD Special Instructions :   INR = 1.9 per [facility] on 12/10/19 Patient is to stay on 7.5mg warfarin on Fri and 5mg the rest of the days of the week Recheck INR in 2 weeks per protocol. Daughter advised in call.    Jennifer Astudillo RN - 12/11/2019 2:26 PM CST

## 2022-02-15 NOTE — RESULTS
Anticoagulation Therapy Entered On:  3/8/2019 1:48 PM CST    Performed On:  3/8/2019 1:47 PM CST by Ava Velasquez RN               Warfarin Management   Week 1 Sunday Dose :   5    Week 1 Monday Dose :   5    Week 1 Tuesday Dose :   5    Week 1 Wednesday Dose :   5    Week 1 Thursday Dose :   5    Week 1 Friday Dose :   7.5    Week 1 Saturday Dose :   5    Week 1 Dose Total :   37.5    Week 2 Sunday Dose :   5    Week 2 Monday Dose :   5    Week 2 Tuesday Dose :   5    Week 2 Wednesday Dose :   5    Week 2 Thursday Dose :   5    Week 2 Friday Dose :   7.5    Week 2 Saturday Dose :   5    Week 2 Dose Total :   37.5    Planned Duration :   Indefinite   Indication :   Atrial fibrillation   Warfarin Management Comments :   Lab test performed by:  [facility] Office  [address] [id number]  Phone # [phone]  Fax# [phone]  Capillary   International Normalization Ratio :   1.6    INR Range :   2.0 - 3.0   INR Therapeutic Range :   No   Warfarin Abnormal Findings :   Ate brussel sprouts the last few days   Ava Velasquez RN - 3/8/2019 1:47 PM CST   Warfarin Management and Results Grid   Signs of Thrombolic :   No   Signs of Warfarin Intolerance :   No   Changes in Diet or Alcohol Intake :   Yes   Changes in Medication or Antibiotics :   No   Unusual Bleeding or Bruising :   No   Rectal Bleeding or Black Stools :   No   Vitamin K Food Handout :   No   Heart Valve Replacement :   No   Ava Velasquez RN - 3/8/2019 1:47 PM CST   Anticoagulation Recheck :   2 weeks   Warfarin Special Instructions :   Per [facility] continue warfarin 7.5 mg Fridays and 5 mg ROW; recheck 2 weeks; notified in office and given written directions.   Ava Velasquez RN - 3/8/2019 1:47 PM CST

## 2022-02-15 NOTE — NURSING NOTE
Quick Intake Entered On:  4/8/2021 2:22 PM CDT    Performed On:  4/8/2021 2:21 PM CDT by Hilario Whiting MD               Summary   Advance Directive :   Yes   Height Measured :   64 in(Converted to: 5 ft 4 in, 162.56 cm)    Systolic Blood Pressure :   134 mmHg (HI)    Diastolic Blood Pressure :   76 mmHg   Mean Arterial Pressure :   95 mmHg   BP Site :   Right arm   BP Method :   Manual   Race :      Languages :   English   Hilario Whiting MD - 4/8/2021 2:21 PM CDT

## 2022-02-15 NOTE — TELEPHONE ENCOUNTER
Order and pacemaker card is faxed to [facility] ([phone]) and they will contact patient's daughter Joanna ([phone]) to schedule.  Patient has a pacemaker so [facility] will not do.

## 2022-02-15 NOTE — TELEPHONE ENCOUNTER
---------------------  From: Jennifer Echevarria LPN (Phone Mess[facility] ([id number]))   To: Hilario Whiting MD;     Sent: 10/14/2020 4:12:22 PM CDT  Subject: confusion     Phone Message    PCP:   NINFA      Time of Call:  3:58pm       Person Calling:  Nanda Huddleston [place] Thorne  Phone number:  [phone]    Note:   Mariann LM stating pt's daughter feels like she was told that when pt was dischaged from the hospital there was a urine culture pending- they have not received results. Mariann says she did not see anything in Epic. Nothing in clinic chart on UC.    Mariann also says pt's daughter reports that since being discharged from the hospital pt is more confused than when she was in the hospital.    Please advise.    Last office visit and reason:  10/5/20 Office Visit Note---------------------  From: Hilario Whiting MD   To: [facility] ([id number] - [place]);     Sent: 10/14/2020 4:28:37 PM CDT  Subject: RE: confusion     There is no urine culture. OK to get one.LVM for Mariann that it ok for Urine culture.

## 2022-02-15 NOTE — NURSING NOTE
Comprehensive Intake Entered On:  4/8/2021 2:05 PM CDT    Performed On:  4/8/2021 1:59 PM CDT by Yumiko Escamilla               Summary   Chief Complaint :   Medication f/u    Advance Directive :   Yes   Weight Measured :   182.9 lb(Converted to: 182 lb 14 oz, 82.962 kg)    Height Measured :   64 in(Converted to: 5 ft 4 in, 162.56 cm)    Body Mass Index :   31.39 kg/m2 (HI)    Body Surface Area :   1.93 m2   Systolic Blood Pressure :   136 mmHg (HI)    Diastolic Blood Pressure :   82 mmHg (HI)    Mean Arterial Pressure :   100 mmHg   Peripheral Pulse Rate :   92 bpm   BP Site :   Right arm   BP Method :   Electronic   HR Method :   Electronic   Temperature Tympanic :   97.5 DegF(Converted to: 36.4 DegC)  (LOW)    Race :      Languages :   English   Yumiko Escamilla - 4/8/2021 1:59 PM CDT   Health Status   Allergies Verified? :   Yes   Medication History Verified? :   Yes   Medical History Verified? :   Yes   Pre-Visit Planning Status :   Completed   Tobacco Use? :   Never smoker   Yumiko Escamilla - 4/8/2021 1:59 PM CDT   Consents   Consent for Immunization Exchange :   Consent Granted   Consent for Immunizations to Providers :   Consent Granted   Yumiko Escamilla - 4/8/2021 1:59 PM CDT   Meds / Allergies   (As Of: 4/8/2021 2:05:04 PM CDT)   Allergies (Active)   Ancef  Estimated Onset Date:   Unspecified ; Reactions:   Diarrhea ; Created By:   Chloe Bruce; Reaction Status:   Active ; Category:   Drug ; Substance:   Ancef ; Type:   Allergy ; Updated By:   Chloe Bruce; Reviewed Date:   3/4/2021 2:38 PM CST      Calan  Estimated Onset Date:   Unspecified ; Created By:   Sravanthi Dietz; Reaction Status:   Active ; Category:   Drug ; Substance:   Calan ; Updated By:   Sravanthi Dietz; Source:   Paper Chart ; Reviewed Date:   3/4/2021 2:38 PM CST      ciprofloxacin  Estimated Onset Date:   Unspecified ; Created By:   Cherise Christiansen MA; Reaction Status:   Active ; Category:   Drug ;  Substance:   ciprofloxacin ; Type:   Allergy ; Updated By:   Cherise Christiansen MA; Reviewed Date:   3/4/2021 2:38 PM CST      phenobarbital  Estimated Onset Date:   Unspecified ; Reactions:   tremors ; Created By:   Chloe Bruce; Reaction Status:   Active ; Category:   Drug ; Substance:   phenobarbital ; Type:   Allergy ; Updated By:   Chloe Bruce; Source:   Paper Chart ; Reviewed Date:   3/4/2021 2:38 PM CST      probiotic  Estimated Onset Date:   Unspecified ; Reactions:   Rash.. ; Created By:   Sari Turner CMA; Reaction Status:   Active ; Category:   Drug ; Substance:   probiotic ; Type:   Allergy ; Updated By:   Sari Turner CMA; Reviewed Date:   3/4/2021 2:38 PM CST      simvastatin  Estimated Onset Date:   Unspecified ; Reactions:   GI upset ; Created By:   Sravanthi Dietz; Reaction Status:   Active ; Category:   Drug ; Substance:   simvastatin ; Type:   Allergy ; Updated By:   Sravanthi Dietz; Source:   Paper Chart ; Reviewed Date:   3/4/2021 2:38 PM CST      Vicodin  Estimated Onset Date:   Unspecified ; Reactions:   rash ; Created By:   Mis Donaldson MA; Reaction Status:   Active ; Category:   Drug ; Substance:   Vicodin ; Type:   Allergy ; Severity:   Moderate ; Updated By:   Mis Donaldson MA; Reviewed Date:   3/4/2021 2:38 PM CST        Medication List   (As Of: 4/8/2021 2:05:04 PM CDT)   Prescription/Discharge Order    citalopram  :   citalopram ; Status:   Prescribed ; Ordered As Mnemonic:   citalopram 10 mg oral tablet ; Simple Display Line:   10 mg, 1 tab(s), Oral, daily, 30 tab(s), 1 Refill(s) ; Ordering Provider:   Hilario Whiting MD; Catalog Code:   citalopram ; Order Dt/Tm:   3/4/2021 2:55:58 PM CST          donepezil  :   donepezil ; Status:   Suspended ; Ordered As Mnemonic:   Aricept 5 mg oral tablet ; Simple Display Line:   5 mg, 1 tab(s), Oral, hs, 30 tab(s), 5 Refill(s) ; Ordering Provider:   Hilario Whiting MD; Catalog Code:   donepezil ; Order Dt/Tm:   10/5/2020 3:59:19 PM  CDT          fluticasone nasal  :   fluticasone nasal ; Status:   Prescribed ; Ordered As Mnemonic:   Flonase 50 mcg/inh nasal spray ; Simple Display Line:   2 spray(s), nasal, daily, 1 EA, 11 Refill(s) ; Ordering Provider:   Shimon Nesbitt MD; Catalog Code:   fluticasone nasal ; Order Dt/Tm:   7/21/2020 11:28:21 AM CDT          levothyroxine  :   levothyroxine ; Status:   Prescribed ; Ordered As Mnemonic:   levothyroxine 125 mcg (0.125 mg) oral tablet ; Simple Display Line:   1 tab(s), Oral, daily, 90 tab(s), 3 Refill(s) ; Ordering Provider:   Hilario Whiting MD; Catalog Code:   levothyroxine ; Order Dt/Tm:   12/28/2020 12:52:12 PM CST          metoprolol  :   metoprolol ; Status:   Prescribed ; Ordered As Mnemonic:   Metoprolol Succinate  mg oral tablet, extended release ; Simple Display Line:   100 mg, 1 tab(s), Oral, daily, 90 tab(s), 0 Refill(s) ; Ordering Provider:   Hilario Whiting MD; Catalog Code:   metoprolol ; Order Dt/Tm:   3/1/2021 11:17:09 AM CST          Miscellaneous Rx Supply  :   Miscellaneous Rx Supply ; Status:   Prescribed ; Ordered As Mnemonic:   ACCU-CHECK GUIDE LANCETS ; Simple Display Line:   See Instructions, TEST BLOOD SUGAR DAILY, 100 EA, 11 Refill(s) ; Ordering Provider:   Shimon Nesbitt MD; Catalog Code:   Miscellaneous Rx Supply ; Order Dt/Tm:   7/21/2020 8:47:38 AM CDT          Miscellaneous Rx Supply  :   Miscellaneous Rx Supply ; Status:   Prescribed ; Ordered As Mnemonic:   ACCU-CHECK GUIDE TEST STRIPS ; Simple Display Line:   See Instructions, TEST BLOOD SUGAR DAILY, 100 EA, 11 Refill(s) ; Ordering Provider:   Shimon Nesbitt MD; Catalog Code:   Miscellaneous Rx Supply ; Order Dt/Tm:   7/21/2020 8:47:37 AM CDT          QUEtiapine  :   QUEtiapine ; Status:   Prescribed ; Ordered As Mnemonic:   SEROquel 25 mg oral tablet ; Simple Display Line:   25 mg, 1 tab(s), Oral, qhs, for 30 day(s), 30 tab(s), 1 Refill(s) ; Ordering Provider:   Hilario Whiting MD; Catalog Code:    QUEtiapine ; Order Dt/Tm:   3/4/2021 2:55:03 PM CST          warfarin  :   warfarin ; Status:   Prescribed ; Ordered As Mnemonic:   warfarin 5 mg oral tablet ; Simple Display Line:   See Instructions, TAKE 1 AND 1/2 TABLETS BY MOUTH FRIDAYS AND 1 TABLET EVERY OTHER DAY, 180 tab(s), 1 Refill(s) ; Ordering Provider:   Shimon Nesbitt MD; Catalog Code:   warfarin ; Order Dt/Tm:   7/21/2020 11:28:22 AM CDT            Home Meds    acetaminophen  :   acetaminophen ; Status:   Documented ; Ordered As Mnemonic:   acetaminophen ; Simple Display Line:   0 Refill(s) ; Catalog Code:   acetaminophen ; Order Dt/Tm:   5/14/2019 1:44:43 PM CDT          ascorbic acid  :   ascorbic acid ; Status:   Documented ; Ordered As Mnemonic:   Vitamin C ; Simple Display Line:   500 mg, Oral, daily, 0 Refill(s) ; Catalog Code:   ascorbic acid ; Order Dt/Tm:   10/26/2020 3:10:37 PM CDT          cholecalciferol  :   cholecalciferol ; Status:   Documented ; Ordered As Mnemonic:   Vitamin D3 ; Simple Display Line:   2,000 International Unit, Oral, daily, 0 Refill(s) ; Catalog Code:   cholecalciferol ; Order Dt/Tm:   10/26/2020 3:10:48 PM CDT          lisinopril  :   lisinopril ; Status:   Documented ; Ordered As Mnemonic:   lisinopril 20 mg oral tablet ; Simple Display Line:   20 mg, 1 tab(s), Oral, daily, 0 Refill(s) ; Catalog Code:   lisinopril ; Order Dt/Tm:   10/5/2020 3:26:04 PM CDT          Miscellaneous Prescription  :   Miscellaneous Prescription ; Status:   Documented ; Ordered As Mnemonic:   Calcium with vitamin D ; Simple Display Line:   1 tab, Oral, daily, 0 Refill(s) ; Catalog Code:   Miscellaneous Prescription ; Order Dt/Tm:   10/26/2020 3:08:29 PM CDT          multivitamin  :   multivitamin ; Status:   Documented ; Ordered As Mnemonic:   Daily Multiple Vitamins ; Simple Display Line:   1 tab(s), Oral, daily, 0 Refill(s) ; Catalog Code:   multivitamin ; Order Dt/Tm:   10/26/2020 3:09:47 PM CDT          multivitamin with minerals   :   multivitamin with minerals ; Status:   Documented ; Ordered As Mnemonic:   PreserVision ; Simple Display Line:   1 cap(s), Oral, bid, 0 Refill(s) ; Catalog Code:   multivitamin with minerals ; Order Dt/Tm:   10/26/2020 3:10:54 PM CDT          omega-3 polyunsaturated fatty acids  :   omega-3 polyunsaturated fatty acids ; Status:   Documented ; Ordered As Mnemonic:   Fish Oil ; Simple Display Line:   1 tab, Oral, daily, 0 Refill(s) ; Catalog Code:   omega-3 polyunsaturated fatty acids ; Order Dt/Tm:   10/26/2020 3:10:12 PM CDT            ID Risk Screen   Recent Travel History :   No recent travel   Family Member Travel History :   No recent travel   Other Exposure to Infectious Disease :   Unknown   COVID-19 Testing Status :   No COVID-19 test performed   Yumiko Escamilla M - 4/8/2021 1:59 PM CDT

## 2022-02-15 NOTE — LETTER
(Inserted Image. Unable to display)       [address] [place] [address]    August 19, 2021        GERHARD HOWE  [address] [place] [address]        Dear GERHARD,      Thank you for selecting [facility] for your healthcare needs.       CT negative for bleeding, stroke, or tumor.          Please contact me or my assistant at [phone] you have any questions or concerns.     Sincerely,        Hilario Whiting MD

## 2022-02-15 NOTE — LETTER
(Inserted Image. Unable to display)         October 29, 2020        GERHARD HOWE  [address] [place] [id number]        Dear GERHARD,    Thank you for selecting [facility] for your healthcare needs.    Our records indicate you are due for the following services:     Urine labs ~ Please be prepared to leave a urine specimen for evaluation     (FYI   Regarding office visits: In some instances, a video visit or telephone visit may be offered as an option.)    To schedule an appointment or if you have further questions, please contact your clinic at [phone].    Powered by [employer]    Sincerely,    Hilario Whiting MD, FACP

## 2022-02-15 NOTE — PROGRESS NOTES
Patient:   GERHARD HOWE            MRN: [id number]            FIN: [id number]               Age:   89 years     Sex:  Female     :  11/10/1929   Associated Diagnoses:   Benign positional vertigo; Carotid artery plaque; Dyslipidemia, goal LDL below 100; Hypothyroidism (acquired); Paroxysmal atrial fibrillation; Type 2 diabetes mellitus with other circulatory complications; White coat syndrome with hypertension   Author:   Shimon Nesbitt MD      Visit Information      Date of Service: 2020 08:36 am  Performing Location: [facility]  Encounter#: [id number]      Primary Care Provider (PCP):  Hilario Whiting MD    NPI# [id number]      Referring Provider:  Shimon Nesbitt MD    NPI# [id number]      Chief Complaint   2020 8:52 AM CDT    Chronic disease visit        History of Present Illness   Patient is here for 6-month follow-up she sees Dr. Gan he is out of clinic the hospitalist right now.  She is here with her daughter.  Overall she is been doing well blood sugars usually run between 101 130 they check them most days.  He is on her metoprolol for blood pressure levothyroxine for hypothyroidism he is anticoagulated with Coumadin.  Arthritis is controlled.         Review of Systems   Constitutional:  Negative except as documented in history of present illness.    Eye:  Negative.    Ear/Nose/Mouth/Throat:  Negative.    Respiratory:  Negative.    Cardiovascular:  Negative.    Gastrointestinal:  Negative.    Genitourinary:  Negative.    Musculoskeletal:  Negative.    Integumentary:  Negative.    Neurologic:  Negative.       Health Status   Allergies:    Allergic Reactions (Selected)  Moderate  Vicodin (Rash)  Severity Not Documented  Ancef (Diarrhea)  Calan (No reactions were documented)  Ciprofloxacin (No reactions were documented)  Phenobarbital (Tremors)  Probiotic (Rash..)  Simvastatin (Gi upset)   Medications:  (Selected)   Prescriptions  Prescribed  ACCU-CHECK GUIDE LANCETS:  ACCU-CHECK GUIDE LANCETS, See Instructions, Instructions: TEST BLOOD SUGAR DAILY, Supply, # 100 EA, 11 Refill(s), Type: Maintenance, [id number], TEST BLOOD SUGAR DAILY, 64, in, 03/17/20 8:54:00 CDT, Height Measured, 164, lb,...  ACCU-CHECK GUIDE TEST STRIPS: ACCU-CHECK GUIDE TEST STRIPS, See Instructions, Instructions: TEST BLOOD SUGAR DAILY, Supply, # 100 EA, 11 Refill(s), Type: Maintenance, [id number], TEST BLOOD SUGAR DAILY, 64, in, 03/17/20 8:54:00 CDT, Height Measured, 164,...  Flonase 50 mcg/inh nasal spray: = 2 spray(s), nasal, daily, # 1 EA, 11 Refill(s), Type: Maintenance, Pharm[place] [facility] [id number], 2 spray(s) Nasal daily  Metoprolol Succinate  mg oral tablet, extended release: See Instructions, Instructions: TAKE ONE TABLET BY MOUTH EVERY DAY, # 90 tab(s), 1 Refill(s), Type: Soft Stop, [id number], TAKE ONE TABLET BY MOUTH EVERY DAY  amoxicillin 500 mg oral tablet: See Instructions, Instructions: 4 tab(s) Oral 1 hour before dental procedure, # 4 EA, 1 Refill(s), Type: Maintenance, Pharmacy[facility] [facility] #[id number], 4 tab(s) Oral 1 hour before dental procedure  levothyroxine 125 mcg (0.125 mg) oral tablet: = 1 tab(s), Oral, daily, # 60 tab(s), 0 Refill(s), Type: Maintenance, [id number], Due visit in June, 1 tab(s) Oral daily  warfarin 5 mg oral tablet: See Instructions, Instructions: TAKE 1 AND 1/2 TABLETS BY MOUTH FRIDAYS AND 1 TABLET EVERY OTHER DAY, # 180 tab(s), 0 Refill(s), Type: Maintenance, [id number]  Documented Medications  Documented  acetaminophen: 0 Refill(s), Type: Maintenance   Problem list:    All Problems (Selected)  Hypothyroidism (acquired) / SNOMED CT 704550502 / Confirmed  Allergic rhinitis / SNOMED CT 457811037 / Confirmed  Benign positional vertigo / SNOMED CT 551319806 / Confirmed  Bilateral hearing loss / SNOMED CT 487917624 /  Confirmed  Carotid artery plaque / SNOMED CT 764048585 / Confirmed  Diabetic peripheral neuropathy / SNOMED CT 0782495834 / Confirmed  Statin intolerance / SNOMED CT 71774093 / Confirmed  Long term (current) use of anticoagulants / SNOMED CT 067442053 / Confirmed  Anticoagulated / SNOMED CT 712010179 / Confirmed  Frailty / SNOMED CT 673546893 / Confirmed  S/P placement of cardiac pacemaker / SNOMED CT 391227541 / Confirmed  History of mitral valve insufficiency / SNOMED CT 520746851 / Confirmed  Dyslipidemia, goal LDL below 100 / SNOMED CT 48266910 / Confirmed  White coat syndrome with hypertension / SNOMED CT 1897737891 / Confirmed  Obese / SNOMED CT 9678349265 / Probable  Osteoarthritis / SNOMED CT 7692386484 / Confirmed  Paroxysmal atrial fibrillation / SNOMED CT 673122881 / Confirmed  Seborrheic dermatitis / SNOMED CT 58116574 / Confirmed  Seborrheic dermatitis of scalp / SNOMED CT 953226776 / Confirmed  Tinnitus / SNOMED CT 756452934 / Confirmed  Type 2 diabetes mellitus with other circulatory complications / SNOMED CT 682529642 / Confirmed  Symptomatic varicose veins / SNOMED CT 652509217 / Confirmed      Histories   Past Medical History:    Active  Type 2 diabetes mellitus with other circulatory complications (197761014): Onset on 1/1/1999 at 69 years.  Osteoarthritis (6529810644)  Dyslipidemia, goal LDL below 100 (78339346)  Paroxysmal atrial fibrillation (949880629)  Hypothyroidism (acquired) (076163993)  Allergic rhinitis (645843280)  Obese (8614640107)  Benign positional vertigo (166338564)  Statin intolerance (07025429)  Long term (current) use of anticoagulants (650503841)  Bilateral hearing loss (669368061)  Tinnitus (420455887)  S/P placement of cardiac pacemaker (534739041)  White coat syndrome with hypertension (0583539621)  Resolved  Inpatient stay (463467305): Onset on 5/30/2019 at 89 years.  Resolved on 5/31/2019 at 89 years.  Comments:  7/9/2019 CDT 6:55 AM CDT - Leonarda Padilla  @[facility],  [place], [place] - Postoperative surgical complication involving subcutaneous tissue  Inpatient stay (236567000): Onset on 2012 at 83 years.  Resolved.  Comments:  2019 CDT 6:54 AM CDT - Leonarda Padilla  @[place], [place] - Vertigo  Inpatient stay (831110006): Onset on 2010 at 80 years.  Resolved.  Comments:  2019 CDT 6:53 AM CDT - Leonarda Padilla  @[facility], [place] - Atrial fibrillation with an episode of hypotension   Family History:    Leukemia  Father ()  Gout  Brother  Mother ()  Diabetes mellitus  Mother ()  Hypertension  Mother ()  Parkinson's disease  Sister  Valvular heart disease  Brother ()  MS - Multiple sclerosis  Sister  CABG - Coronary artery bypass graft  Mother ()  MI - Myocardial infarction  Brother ()  CAD - Coronary artery disease  Father ()  Mother ()     Procedure history:    Replacement of pacemaker pulse generator (SNOMED CT 7571919) on 2019 at 89 Years.  Comments:  2019 10:01 AM CDT - Aida Contreras  Implanted left pectoral.   Medtronic [id number]  [id number]  Phacoemulsification of cataract with intraocular lens implantation (SNOMED CT 7988962507) performed by Andrew Llanes MD on 3/27/2018 at 88 Years.  Comments:  2018 9:31 AM CDT - Lucy Dawson  Left.  Colonoscopy (SNOMED CT 025854319) on 2012 at 82 Years.  Comments:  2012 8:59 AM CDT - Hilario Whiting MD-BSO performed by Nba Alcaraz MD on 2012 at 82 Years.  Hysteroscopy, D&C, polypectomy performed by Nba Alcaraz MD on 2011 at 81 Years.  Implantation of heart pacemaker (SNOMED CT 7998269091) performed by Dr. Lewis Mejia in  at 80 Years.  Replacement of right knee joint (SNOMED CT 7811111210) in the month of 2008 at 78 Years.  Replacement of left knee joint (SNOMED CT 6775451761) on 12/10/2007 at 78 Years.  Flexible sigmoidoscopy (SNOMED CT 86046939) performed by  Hilario Whiting MD on 6/13/2006 at 76 Years.  Cholecystectomy (SNOMED CT 91246121) in 1951 at 22 Years.  Repair of umbilical hernia (SNOMED CT 62489868) in 1949 at 20 Years.  Appendectomy (SNOMED CT 295416871) in 1947 at 18 Years.  Plastic repair of rotator cuff of shoulder (SNOMED CT 689177146).  Pregnancy (SNOMED CT 721237182).  Comments:  4/6/2010 9:08 AM CDT - Vaishali Cortés  full term X 3  Extracapsular cataract extraction and insertion of intraocular lens (SNOMED CT 267549103) performed by Andrew Llanes MD.  Comments:  5/11/2018 1:14 PM CDT - Lucy Dawson.   Social History:        Alcohol Assessment: Denies Alcohol Use            Never      Tobacco Assessment: Denies Tobacco Use      Substance Abuse Assessment: Denies Substance Abuse      Employment and Education Assessment            Retired      Home and Environment Assessment            Marital status: .        Physical Examination   Vital Signs   7/21/2020 8:52 AM CDT Temperature Tympanic 97.0 DegF  LOW    Peripheral Pulse Rate 69 bpm    HR Method Electronic    Systolic Blood Pressure 132 mmHg  HI    Diastolic Blood Pressure 78 mmHg    Mean Arterial Pressure 96 mmHg    BP Method Electronic      General:  Alert and oriented, No acute distress.    Eye:  Pupils are equal, round and reactive to light, Extraocular movements are intact.    Neck:  Supple, Non-tender, No carotid bruit, No lymphadenopathy.    Respiratory:  Lungs are clear to auscultation, Respirations are non-labored.    Cardiovascular:  Normal rate, Regular rhythm, No edema.    Gastrointestinal:  Soft, Non-tender.    Musculoskeletal:  degenerative changes noted.    Integumentary:  No rash.    Neurologic:  Alert, Oriented, Cranial Nerves II-XII are grossly intact.       Impression and Plan   Diagnosis     Benign positional vertigo (MOL75-PZ H81.10).     Carotid artery plaque (JOQ23-QP I65.29).     Dyslipidemia, goal LDL below 100 (JRQ37-SR E78.5).     Hypothyroidism  (acquired) (BHN55-UL E03.9).     Paroxysmal atrial fibrillation (KLM37-UN I48.0).     Type 2 diabetes mellitus with other circulatory complications (NCU53-LQ E11.59).     White coat syndrome with hypertension (VAV84-NM I10).     Course:  Progressing as expected.    Plan:  Overall doing well continues to live independently although still thinking about a move to the nursing home but reluctant right now with the current pandemic.  She had 2 falls in the last 6 months both were simple she got her feet tangled up in her walker no injury.  She is able to get up on her own.  She has occasional episodes of her dizziness which are stable.  No episodes that she knows of of her atrial fibrillation for which she is on Coumadin.  She please plan on seeing Dr. Gan back in 6 months  .    Patient Instructions:       Counseled: Patient, Family, Regarding diagnosis, Regarding treatment, Regarding medications, Activity.

## 2022-02-15 NOTE — TELEPHONE ENCOUNTER
---------------------  From: Ava Velasquez RN   To: Hilario Whiting MD;     Sent: 4/3/2020 8:55:22 AM CDT  Subject: Med Error - FYI     I spoke with pt daughter, Collette. She said patient took today's Metoprolol and Levothyroxine dose last night.  I told her to hold today's doses of those meds. Watch for s/s of hypotension, etc.    She also said patient took today's dose of warfarin last night (combining 2 days of dosing).  Patient's POC INR this AM = 1.8. I advised patient ok to take tonight's lower warfarin dose as usual, watch for s/s of bleeding, etc.---------------------  From: Hilario Whiting MD   To: Ava Velasquez RN;     Sent: 4/3/2020 9:40:40 AM CDT  Subject: RE: Med Error - FYI     Agree

## 2022-02-15 NOTE — PROGRESS NOTES
Patient:   GERHARD HOWE            MRN: [id number]            FIN: [id number]               Age:   89 years     Sex:  Female     :  11/10/1929   Associated Diagnoses:   Diabetes Mellitus Type II, Controlled; Dyslipidemia, goal LDL below 100   Author:   Ashley Sawyer      Visit Information   Visit type:  Medical Nutrition Therapy for Diabetes Management .    Accompanied by:  Family member.    Referral source:  Hilario Whiting MD.       Chief Complaint   DM Type II controlled, Dyslipidemia (LDL goal <100)      History of Present Illness   Discussed nutrition, diabetes, and lifestyle management with pt.    Nutrition:  Pt has started having meals delivered from the [facility] Mon, Wed, Friday.  Pt will have 1 meals/ week at her daughters who will also provide leftovers.  Purchases fresh and organic foods.  AM is mainly oatmeal and occ an egg and whole grain toast, Enjoys tuna, egg, whole grain, fruits, and some vegetables.  Minimal sweets    Physical Activity: walking 20 min/ day around her house with walker, has given up driving   Stress:   low  Sleep: good   Support: family   BG Testing: am testing 14 day avg 140 mg/dL out of 29 readings  DM related medication: none at this time, pt would like to avoid medication and continue to monitor A1C   Routine diabetes care: eye exam UTD with Dr. Llanes 18 - new eye exam form given today, and dentist exam 2x/ year, influenza vaccine q fall,  feet examined with MD, no open elayne/ skin issues, influenza vaccine yearly  Estimated Average Glucose (eAG) 160 mg/dL with A1C of 7.2% on 18      Review of Systems             Health Status   Allergies:    Allergic Reactions (Selected)  Moderate  Vicodin (Rash)  Severity Not Documented  Ancef (Diarrhea)  Calan (No reactions were documented)  Ciprofloxacin (No reactions were documented)  Phenobarbital (Tremors)  Probiotic (Rash..)  Simvastatin (Gi upset)   Medications:  (Selected)   Prescriptions  Prescribed  Flonase 50  mcg/inh nasal spray: = 2 spray(s), nasal, daily, # 1 EA, 11 Refill(s), Type: Maintenance, Pharmacy: [id number], 2 spray(s) Nasal daily  Metoprolol Succinate  mg oral tablet, extended release: See Instructions, Instructions: TAKE ONE TABLET BY MOUTH EVERY DAY, # 90 tab(s), 3 Refill(s), Type: Soft Stop, Pharmacy: [id number], TAKE ONE TABLET BY MOUTH EVERY DAY  digoxin 125 mcg (0.125 mg) oral tablet: = 1 tab(s) ( 125 mcg ), po, daily, # 90 tab(s), 3 Refill(s), Type: Maintenance, Pharmacy: [id number], 1 tab(s) Oral daily  levothyroxine 125 mcg (0.125 mg) oral tablet: = 1 tab(s) ( 125 mcg ), po, daily, # 90 tab(s), 3 Refill(s), Type: Soft Stop, Pharmacy: [id number], 1 tab(s) Oral daily,x90 day(s)  pravastatin 10 mg oral tablet: = 1 tab(s) ( 10 mg ), PO, Daily, # 90 tab(s), 3 Refill(s), Type: Maintenance, Pharmacy: [id number], 1 tab(s) Oral daily  warfarin 5 mg oral tablet: See Instructions, Instructions: 1.5 tabs Fridays and one every other day., # 100 EA, 3 Refill(s), Type: Soft Stop, Pharmacy: [id number], 1.5 tabs Fridays and one every other day.,    Medications          *denotes recorded medication          digoxin 125 mcg (0.125 mg) oral tablet: 125 mcg, 1 tab(s), po, daily, 90 tab(s), 3 Refill(s).          Flonase 50 mcg/inh nasal spray: 2 spray(s), nasal, daily, 1 EA, 11 Refill(s).          levothyroxine 125 mcg (0.125 mg) oral tablet: 125 mcg, 1 tab(s), po, daily, for 90 day(s), 90 tab(s), 3 Refill(s).          Metoprolol Succinate  mg oral tablet, extended release: See Instructions, TAKE ONE TABLET BY MOUTH EVERY DAY, 90 tab(s), 3 Refill(s).          pravastatin 10 mg oral tablet: 10 mg, 1 tab(s), PO, Daily, 90 tab(s), 3 Refill(s).          warfarin 5 mg oral tablet: See Instructions, 1.5 tabs Fridays and one every other day., 100 EA, 3 Refill(s).     Problem list:    All Problems  Hypothyroidism (acquired)  / SNOMED CT 710874663 / Confirmed  Allergic rhinitis / SNOMED CT 285704187 / Confirmed  Benign positional vertigo / SNOMED CT 788866540 / Confirmed  Bilateral hearing loss / SNOMED CT 148329531 / Confirmed  Carotid artery plaque / SNOMED CT 085461611 / Confirmed  Diabetic peripheral neuropathy / SNOMED CT 9470530197 / Confirmed  Statin intolerance / SNOMED CT 35730147 / Confirmed  Long term (current) use of anticoagulants / SNOMED CT 292145839 / Confirmed  Frailty / SNOMED CT 538475643 / Confirmed  S/P placement of cardiac pacemaker / SNOMED CT 109463265 / Confirmed  History of mitral valve insufficiency / SNOMED CT 458610366 / Confirmed  Dyslipidemia, goal LDL below 100 / SNOMED CT 13059559 / Confirmed  White coat syndrome with hypertension / SNOMED CT 2841618551 / Confirmed  Obese / SNOMED CT 3389074679 / Probable  Osteoarthritis / SNOMED CT 1218719958 / Confirmed  Paroxysmal atrial fibrillation / SNOMED CT 374536474 / Confirmed  Seborrheic dermatitis / SNOMED CT 58767303 / Confirmed  Tinnitus / SNOMED CT 098424043 / Confirmed  Type 2 diabetes mellitus with other circulatory complications / SNOMED CT 303231638 / Confirmed  Symptomatic varicose veins / SNOMED CT 707642746 / Confirmed  Resolved: *Hospitalized@[facility] - Atrial fibrillation with an episode of hypotension  Resolved: *Hospitalized@[facility] - Vertigo  Canceled: Rhinitis, chronic / SNOMED CT 899213837  Canceled: Diabetic Neuropathy, Type II Diabetes Mellitus / ICD-9-.60  Canceled: White coat hypertension / SNOMED CT 4601533354  Canceled: Hypertension / SNOMED CT 8525911101  Canceled: White coat syndrome with hypertension / SNOMED CT 6937810325      Histories   Past Medical History:    Active  Type 2 diabetes mellitus with other circulatory complications (197761014): Onset on 1/1/1999 at 69 years.  Osteoarthritis (5130572741)  Dyslipidemia, goal LDL below 100 (83946373)  Paroxysmal atrial fibrillation (027637708)  Hypothyroidism (acquired)  (101151423)  Allergic rhinitis (355760733)  Obese (8551816117)  Benign positional vertigo (891445150)  Statin intolerance (19309168)  Long term (current) use of anticoagulants (217873790)  Bilateral hearing loss (443281845)  Tinnitus (923646885)  S/P placement of cardiac pacemaker (585548093)  White coat syndrome with hypertension (8811053046)  Resolved  *Hospitalized@[facility] - Vertigo: Onset on 2012 at 83 years.  Resolved.  *Hospitalized@[facility] - Atrial fibrillation with an episode of hypotension: Onset on 2010 at 80 years.  Resolved.   Family History:    Leukemia  Father ()  Gout  Brother  Mother ()  Diabetes mellitus  Mother ()  CA - Lung cancer  Spouse ()  Hypertension  Mother ()  Parkinson's disease  Sister  Valvular heart disease  Brother ()  MS - Multiple sclerosis  Sister  CABG - Coronary artery bypass graft  Mother ()  MI - Myocardial infarction  Brother ()  CAD - Coronary artery disease  Father ()  Mother ()     Procedure history:    Phacoemulsification of cataract with intraocular lens implantation (SNOMED CT 5198985547) performed by Andrew Llanes MD on 3/27/2018 at 88 Years.  Comments:  2018 9:31 AM CDT - Samir Lucy  Left.  Colonoscopy ([facility]ji CT 671361474) on 2012 at 82 Years.  Comments:  2012 8:59 AM CDT - Hilario Whiting MD  [facility] performed by Nba Alcaraz MD on 2012 at 82 Years.  Hysteroscopy, D&C, polypectomy performed by Nba Alcaraz MD on 2011 at 81 Years.  Implantation of heart pacemaker (SNOMED CT 3199155089) performed by Dr. Lewis Mejia in  at 80 Years.  Replacement of right knee joint (SNOMED CT 6927359759) in the month of 2008 at 78 Years.  Replacement of left knee joint (SNOMED CT 2793614708) on 12/10/2007 at 78 Years.  Flexible sigmoidoscopy (SNOMED CT 67293837) performed by Hilario Whiting MD on 2006 at 76 Years.  Cholecystectomy (SNOMED CT  84894129) in 1951 at 22 Years.  Repair of umbilical hernia (SNOMED CT 45046080) in 1949 at 20 Years.  Appendectomy (SNOMED CT 673947301) in 1947 at 18 Years.  Plastic repair of rotator cuff of shoulder (SNOMED CT 945058750).  Pregnancy (SNOMED CT 622840064).  Comments:  4/6/2010 9:08 AM CDT - Vaishali Cortés  full term X 3  Extracapsular cataract extraction and insertion of intraocular lens (SNOMED CT 717003838) performed by Andrew Llanes MD.  Comments:  5/11/2018 1:14 PM CDT - Lucy Dawson.   Social History:        Alcohol Assessment: Denies Alcohol Use            Never      Tobacco Assessment: Denies Tobacco Use      Substance Abuse Assessment: Denies Substance Abuse      Employment and Education Assessment            Retired      Home and Environment Assessment            Marital status: .      Physical Examination   Measurements from flowsheet : Measurements   3/28/2019 11:11 AM CDT Height Measured - Standard 64 in    Weight Measured - Standard 149.8 lb    BSA 1.75 m2    Body Mass Index 25.71 kg/m2  HI         Health Maintenance      Recommendations     Pending (in the next year)        OverDue           Depression Screen (Female) due  05/08/18  and every 1  year(s)        Due            DM - Communication with Managing Provider due  03/29/19  and every 1  year(s)           Fall Risk Screen (Female) due  03/29/19  and every 1  year(s)           Osteoporosis Screen due  03/29/19  and every 2  year(s)        Due In Future            DM - Eye Exam not due until  06/22/19  and every 1  year(s)           DM - HgbA1c not due until  06/28/19  and every 3  month(s)           DM - Foot Exam not due until  07/09/19  and every 1  year(s)           Influenza Vaccine not due until  10/10/19  and every 1  year(s)           DM - Microalbumin not due until  12/24/19  and every 1  year(s)           Tobacco Use Screen (Female) not due until  02/07/20  and every 1  year(s)           High Blood Pressure  Screen (Female) not due until  03/08/20  and every 1  year(s)           Lipid Disorders Screen (Female) not due until  03/28/20  and every 1  year(s)           Type 2 Diabetes Mellitus Screen (Female) not due until  03/28/20  and every 1  year(s)           Body Mass Index Check (Female) not due until  03/28/20  and every 1  year(s)           Obesity Screen and Counseling (Female) not due until  03/28/20  and every 1  year(s)     Satisfied (in the past 1 year)        Satisfied            Body Mass Index Check (Female) on  03/28/19.           Body Mass Index Check (Female) on  11/08/18.           Body Mass Index Check (Female) on  08/14/18.           Body Mass Index Check (Female) on  07/09/18.           Body Mass Index Check (Female) on  07/05/18.           Body Mass Index Check (Female) on  05/22/18.           Body Mass Index Check (Female) on  05/09/18.           Body Mass Index Check (Female) on  05/04/18.           DM - Eye Exam on  06/22/18.           DM - Foot Exam on  07/09/18.           DM - HgbA1c on  03/28/19.           DM - HgbA1c on  12/24/18.           DM - HgbA1c on  06/22/18.           DM - Microalbumin on  12/24/18.           DM - Microalbumin on  12/24/18.           High Blood Pressure Screen (Female) on  03/08/19.           High Blood Pressure Screen (Female) on  02/22/19.           High Blood Pressure Screen (Female) on  02/22/19.           High Blood Pressure Screen (Female) on  02/07/19.           High Blood Pressure Screen (Female) on  01/24/19.           High Blood Pressure Screen (Female) on  01/09/19.           High Blood Pressure Screen (Female) on  01/09/19.           High Blood Pressure Screen (Female) on  12/24/18.           High Blood Pressure Screen (Female) on  12/10/18.           High Blood Pressure Screen (Female) on  11/09/18.           High Blood Pressure Screen (Female) on  10/10/18.           High Blood Pressure Screen (Female) on  09/26/18.           High Blood Pressure Screen  (Female) on  08/28/18.           High Blood Pressure Screen (Female) on  08/14/18.           High Blood Pressure Screen (Female) on  08/14/18.           High Blood Pressure Screen (Female) on  08/14/18.           High Blood Pressure Screen (Female) on  07/17/18.           High Blood Pressure Screen (Female) on  07/09/18.           High Blood Pressure Screen (Female) on  07/05/18.           High Blood Pressure Screen (Female) on  06/22/18.           High Blood Pressure Screen (Female) on  06/22/18.           High Blood Pressure Screen (Female) on  06/06/18.           High Blood Pressure Screen (Female) on  05/22/18.           High Blood Pressure Screen (Female) on  05/09/18.           High Blood Pressure Screen (Female) on  05/09/18.           High Blood Pressure Screen (Female) on  05/04/18.           High Blood Pressure Screen (Female) on  04/09/18.           Influenza Vaccine on  10/10/18.           Lipid Disorders Screen (Female) on  03/28/19.           Lipid Disorders Screen (Female) on  03/28/19.           Lipid Disorders Screen (Female) on  03/28/19.           Lipid Disorders Screen (Female) on  03/28/19.           Obesity Screen and Counseling (Female) on  03/28/19.           Obesity Screen and Counseling (Female) on  02/07/19.           Obesity Screen and Counseling (Female) on  11/08/18.           Obesity Screen and Counseling (Female) on  08/14/18.           Obesity Screen and Counseling (Female) on  07/09/18.           Obesity Screen and Counseling (Female) on  07/05/18.           Obesity Screen and Counseling (Female) on  05/22/18.           Obesity Screen and Counseling (Female) on  05/09/18.           Obesity Screen and Counseling (Female) on  05/04/18.           Tobacco Use Screen (Female) on  02/07/19.           Tobacco Use Screen (Female) on  08/14/18.           Tobacco Use Screen (Female) on  05/09/18.           Tobacco Use Screen (Female) on  05/04/18.           Type 2 Diabetes Mellitus Screen  (Female) on  03/28/19.           Type 2 Diabetes Mellitus Screen (Female) on  12/24/18.           Type 2 Diabetes Mellitus Screen (Female) on  06/22/18.      Review / Management   Results review:  Lab results   3/28/2019 9:06 AM CDT Sodium Level 137 mmol/L    Potassium Level 4.4 mmol/L    Chloride Level 101 mmol/L    CO2 Level 26 mmol/L    Glucose Level 141 mg/dL  HI    BUN 25 mg/dL    Creatinine 0.80 mg/dL    BUN/Creat Ratio NOT APPLICABLE    eGFR 65 mL/min/1.73m2    eGFR African American 76 mL/min/1.73m2    Calcium Level 9.3 mg/dL    Hgb A1c 6.7  HI    Cholesterol 165 mg/dL    Non-    HDL 41 mg/dL  LOW    Chol/HDL Ratio 4.0    LDL 99    Triglyceride 150 mg/dL  HI    WBC 5.9    RBC 4.53    Hgb 13.4 gm/dL    Hct 39.8 %    MCV 87.9 fL    MCH 29.6 pg    MCHC 33.7 gm/dL    RDW 12.0 %    Platelet 204    MPV 11.0 fL    PT 20.1  HI    INR 2.0  HI   3/8/2019 1:47 PM CST INR 1.6   2/22/2019 1:41 PM CST INR 1.9   1/24/2019 3:18 PM CST INR 2.1   1/9/2019 1:50 PM CST INR 1.8   12/24/2018 10:14 AM CST Protime 20.9    INR 1.9   12/24/2018 10:05 AM CST Hgb A1c 7.2  HI    U Microalbumin 0.5 mg/dL    Ur Creatinine 84 mg/dL    Ur Microalb/Creat Ratio 6   .       Impression and Plan   Diagnosis     Diabetes Mellitus Type II, Controlled ([id number] E11.9).     Dyslipidemia, goal LDL below 100 ([id number] E78.5).       Counseled: Patient,  Review of instruction on AADE7 Self Care Behaviors;   Healthy Eating - New education for adult daughter who is helping pt with some meals.  Education on the basics of carbohydrates and a carb controlled meal plan.  Discussed importance of controlling carb intake as pt would like to stay off DM meds.  Pt's intake of carbs is higher in am most days and pt is recommended to have some protein at that time.  Continue to consume  high fiber and increase intake of nonstarchy vegetables.  Diabetic plate method as a general rule, basic reading food labels, appropriate portion sizes, well balanced meals.   Patient is provided with snack and meal ideas to incorporate dietary recommendations, meal planning and preparation  Reviewed education on dietary sources of omega 3 FA and soluble fiber 10gm/d ay to help maintain good LDL cholesterol.  Discussed ideas for snacks  Being Active - Education on how exercise helps with : encouraged 15 walk BID   - lowering BG by increasing the muscles ability to take up and use glucose   - weight loss   - healthier heart (improve lipid profile)   - improve sleep, mood, energy   - decrease stress  Monitoring - Reviewed recommended testing schedule and blood glucose target levels.  Again encouraged to test pre/ 2 hr pp the largest meal of the day a couple times/ week for pt to understand how intake significantly affects glucose readings.    Taking Medication - Discussed potential need to add medication in the future with the natural progression of diabetes and pt does not want medication.  Pt is willing to continue to adjust diet and lifestyle.    Problem Solving -  Pt believes she needs to avoid most greens because of warfarin - educational handout again discussed with pt.  List of foods provided with Vit K content - discussed keeping food intake stable.  medical support team assistance, resources provided (written); good control of stress  Healthy Coping - support of friends, family, and medical support team   Reducing Risks - Detailed education on potential complications associated with uncontrolled diabetes and prevention recommendations.  Recommendations include: annual eye exam, good oral cares with brushing BID and flossing daily, routine dental appointments, good foot care tips and shoe wear - discussed monofilament test yearly.  Importance of adequate sleep and good control of BG to prevent developing complications related to uncontrolled DM including nephropathy, neuropathy, retinopathy, and cardiovascular disease.  Weight loss goal of 7% of current body weight pounds as a  reasonable goal to help increase insulin sensitivity      Goals:   1.  Practice healthy stress management and get good quality sleep with the goal of 7-8 hours per night.    2.   Physical activity: Recommend increasing chair exercises, walking in place, stay active throughout the day  3.  Eat in a healthy way, per diabetic plate method.  Nutrition reference: Eat 3 meals a day; snacks are optional.  A meal is three or more food groups; make it colorful for better nutrition.  Incorporate TLC dietary heart healthy guidelines.  **continue to increasing fiber in diet, more vegeterian meals, fish 2+x/ week)  4.  Read handouts provided.  F/u 4 month  5.  Pt to monitor BG BID on 2 - 3 days/ week.  BG goals: Fasting and before meals 80 - 130 mg/dL, 2 hrs after the start of a meal 80 - 160 mg/dL.           Professional Services   Time spent with pt 60 min   champ OCASIO

## 2022-02-15 NOTE — RESULTS
Anticoagulation Therapy Entered On:  6/6/2019 1:42 PM CDT    Performed On:  6/6/2019 11:39 AM CDT by Ava Velasquez RN               Warfarin Management   Week 1 Sunday Dose :   5    Week 1 Monday Dose :   5    Week 1 Tuesday Dose :   5    Week 1 Wednesday Dose :   5    Week 1 Thursday Dose :   5    Week 1 Friday Dose :   7.5    Week 1 Saturday Dose :   5    Week 1 Dose Total :   37.5    Week 2 Sunday Dose :   5    Week 2 Monday Dose :   5    Week 2 Tuesday Dose :   5    Week 2 Wednesday Dose :   5    Week 2 Thursday Dose :   5    Week 2 Friday Dose :   7.5    Week 2 Saturday Dose :   5    Week 2 Dose Total :   37.5    Planned Duration :   Indefinite   Indication :   Atrial fibrillation   Warfarin Management Comments :   Lab test performed by:  [facility] Office  [address] [place] [id number]  Phone # [phone]  Fax# [phone]  Capillary   International Normalization Ratio :   1.4    INR Range :   2.0 - 3.0   INR Therapeutic Range :   No   Warfarin Abnormal Findings :   [facility] [employer] for pace maker replacement   Ava Velasquez RN - 6/6/2019 1:41 PM CDT   Warfarin Management and Results Grid   Signs of Thrombolic :   No   Signs of Warfarin Intolerance :   No   Changes in Diet or Alcohol Intake :   No   Changes in Medication or Antibiotics :   No   Unusual Bleeding or Bruising :   No   Rectal Bleeding or Black Stools :   No   Vitamin K Food Handout :   No   Heart Valve Replacement :   No   Ava Velasquez RN - 6/6/2019 1:41 PM CDT   Anticoagulation Recheck :   1 week   Warfarin Special Instructions :   Per JDL continue warfarin 7.5 mg Fridays and 5 mg ROW; recheck 1 week; LVM on personal home phone.   Ava Velasquez RN - 6/6/2019 1:41 PM CDT

## 2022-02-15 NOTE — PROGRESS NOTES
Chief Complaint    c/o hip pain, pt had physical therapy- hips are not getting better  History of Present Illness      Patient complains of bilateral lateral hip pain worse after physical activity that interferes with activities.  Does not hurt when sedentary.  No back pain.  No leg weakness.  Review of Systems      No chest pain, dyspnea, palpitations, bleeding.  Physical Exam   Vitals & Measurements    T: 97.2   F (Tympanic)  HR: 63(Peripheral)  BP: 134/88     HT: 64 in  WT: 147 lb  BMI: 25.23       Patient appears comfortable.  Alert and oriented.  Tender over the greater trochanteric bilaterally.  Hips have normal range of motion.  No groin tenderness.  Strength in the legs normal.  Patient walks with her walker well.  Assessment/Plan       Greater trochanteric bursitis of both hips (M70.61)        Orthopedics referral.          Ordered:          79827 office outpatient visit 15 minutes (Charge), Quantity: 1, Greater trochanteric bursitis of both hips          Referral (Request), 06/11/19 15:51:00 CDT, Referred to: Orthopaedics, Reason for referral: Bilateral hip pain, Greater trochanteric bursitis of both hips           Patient Information     Name:GERHARD HOWE      Address:      [address] [place] [id number]     Sex:Female     YOB: 1929     [phone]     Emergency Contact:GENTRY PARK     MRN:[id number]     FIN:[id number]     Location:[facility]     Date of Service:06/11/2019      Primary Care Physician:       Hilario Whiting MD, [phone]      Attending Physician:       Hilario Whiting MD, [phone]  Problem List/Past Medical History    Ongoing     Allergic rhinitis     Benign positional vertigo     Bilateral hearing loss     Carotid artery plaque     Diabetic peripheral neuropathy     Dyslipidemia, goal LDL below 100     Frailty     History of mitral valve insufficiency       Comments: Mild by Echo in 2009     Hypothyroidism  (acquired)     Long term (current) use of anticoagulants     Obese     Osteoarthritis     Paroxysmal atrial fibrillation     S/P placement of cardiac pacemaker     Seborrheic dermatitis     Statin intolerance     Symptomatic varicose veins     Tinnitus     Type 2 diabetes mellitus with other circulatory complications     White coat syndrome with hypertension    Historical     *Hospitalized@[facility] - Atrial fibrillation with an episode of hypotension     *Hospitalized@[facility] - Vertigo  Procedure/Surgical History     Replacement of pacemaker pulse generator (05/30/2019)      Comments: Implanted left pectoral.      Medtronic [id number]      [id number].     Phacoemulsification of cataract with intraocular lens implantation (03/27/2018)      Comments: Left..     Colonoscopy (05/23/2012)      Comments: Unremarkable.     LAVH-BSO (04/06/2012)     Hysteroscopy, D&C, polypectomy (11/04/2011)     Implantation of heart pacemaker (2009)     Replacement of right knee joint (11.2008)     Replacement of left knee joint (12/10/2007)     Flexible sigmoidoscopy (06/13/2006)     Cholecystectomy (1951)     Repair of umbilical hernia (1949)     Appendectomy (1947)     Extracapsular cataract extraction and insertion of intraocular lens      Comments: Right..     Plastic repair of rotator cuff of shoulder     Pregnancy      Comments: full term X 3.  Medications        pravastatin 10 mg oral tablet: 10 mg, 1 tab(s), PO, Daily, 90 tab(s), 3 Refill(s).        digoxin 125 mcg (0.125 mg) oral tablet: 125 mcg, 1 tab(s), po, daily, 90 tab(s), 3 Refill(s).        levothyroxine 125 mcg (0.125 mg) oral tablet: 125 mcg, 1 tab(s), po, daily, for 90 day(s), 90 tab(s), 3 Refill(s).        Metoprolol Succinate  mg oral tablet, extended release: See Instructions, TAKE ONE TABLET BY MOUTH EVERY DAY, 90 tab(s), 3 Refill(s).        warfarin 5 mg oral tablet: See Instructions, 1.5 tabs Fridays and one every other day., 100 EA, 3 Refill(s).        Flonase 50  mcg/inh nasal spray: 2 spray(s), nasal, daily, 1 EA, 11 Refill(s).        acetaminophen: 0 Refill(s).         Allergies    Vicodin (rash)    Ancef (Diarrhea)    Calan    ciprofloxacin    phenobarbital (tremors)    probiotic (Rash..)    simvastatin (GI upset)  Social History    Smoking Status - 06/11/2019     Never smoker     Alcohol - Denies Alcohol Use, 04/06/2010      Never, 05/09/2017     Employment/School      Retired, 10/27/2011     Home/Environment      Marital status: ., 10/27/2011     Substance Abuse - Denies Substance Abuse, 08/29/2018     Tobacco - Denies Tobacco Use, 04/06/2010  Family History    CABG - Coronary artery bypass graft: Mother.    CAD - Coronary artery disease: Mother and Father.    Diabetes mellitus: Mother.    Gout: Mother and Brother.    Hypertension: Mother.    Leukemia: Father.    MI - Myocardial infarction: Brother.    MS - Multiple sclerosis: Sister.    Parkinson's disease: Sister.    Valvular heart disease: Brother.  Immunizations      Vaccine Date Status Comments      influenza virus vaccine, inactivated 10/10/2018 Given      influenza virus vaccine, inactivated 09/01/2017 Given      influenza virus vaccine, inactivated 01/24/2017 Given      ZOS, shingles 11/24/2015 Recorded [11/24/2015] [facility]      influenza virus vaccine, inactivated 10/07/2015 Given      pneumococcal (PCV13) 03/19/2015 Given      influenza virus vaccine, inactivated 10/09/2014 Given      influenza virus vaccine, inactivated 10/11/2013 Given      influenza virus vaccine, inactivated 10/09/2012 Given      Td 01/11/2012 Given      influenza virus vaccine, inactivated 10/08/2011 Given      influenza virus vaccine, inactivated 10/07/2010 Given      influenza, H1N1, inactivated 12/18/2009 Recorded      pneumococcal (PPSV23) 04/24/2006 Recorded      pneumococcal (PPSV23) 01/08/2001 Recorded  Lab Results          Lab Results (Last 4 results within 90 days)           Sodium Level: 137 mmol/L [135 mmol/L - 146  mmol/L] (03/28/19 09:06:00)          Potassium Level: 4.4 mmol/L [3.5 mmol/L - 5.3 mmol/L] (03/28/19 09:06:00)          Chloride Level: 101 mmol/L [98 mmol/L - 110 mmol/L] (03/28/19 09:06:00)          CO2 Level: 26 mmol/L [20 mmol/L - 32 mmol/L] (03/28/19 09:06:00)          Glucose Level: 141 mg/dL High [65 mg/dL - 99 mg/dL] (03/28/19 09:06:00)          BUN: 25 mg/dL [7 mg/dL - 25 mg/dL] (03/28/19 09:06:00)          Creatinine Level: 0.8 mg/dL [0.6 mg/dL - 0.88 mg/dL] (03/28/19 09:06:00)          BUN/Creat Ratio: NOT APPLICABLE [6  - 22] (03/28/19 09:06:00)          eGFR: 65 mL/min/1.73m2 (03/28/19 09:06:00)          eGFR African American: 76 mL/min/1.73m2 (03/28/19 09:06:00)          Calcium Level: 9.3 mg/dL [8.6 mg/dL - 10.4 mg/dL] (03/28/19 09:06:00)          Hgb A1c: 6.7 High (03/28/19 09:06:00)          Cholesterol: 165 mg/dL (03/28/19 09:06:00)          Non-HDL Cholesterol: 124 (03/28/19 09:06:00)          HDL: 41 mg/dL Low (03/28/19 09:06:00)          Cholesterol/HDL Ratio: 4 (03/28/19 09:06:00)          LDL: 99 (03/28/19 09:06:00)          Triglyceride: 150 mg/dL High (03/28/19 09:06:00)          WBC: 5.9 [3.8  - 10.8] (03/28/19 09:06:00)          RBC: 4.53 [3.8  - 5.1] (03/28/19 09:06:00)          Hgb: 13.4 gm/dL [11.7 gm/dL - 15.5 gm/dL] (03/28/19 09:06:00)          Hct: 39.8 % [35 % - 45 %] (03/28/19 09:06:00)          MCV: 87.9 fL [80 fL - 100 fL] (03/28/19 09:06:00)          MCH: 29.6 pg [27 pg - 33 pg] (03/28/19 09:06:00)          MCHC: 33.7 gm/dL [32 gm/dL - 36 gm/dL] (03/28/19 09:06:00)          RDW: 12 % [11 % - 15 %] (03/28/19 09:06:00)          Platelet: 204 [140  - 400] (03/28/19 09:06:00)          MPV: 11 fL [7.5 fL - 12.5 fL] (03/28/19 09:06:00)          PT: 20.1 High [9  - 11.5] (03/28/19 09:06:00)          INR: 1.4 (06/06/19 11:39:00)          INR: 1.8 (05/23/19 15:33:00)          INR: 2.4 (04/22/19 14:57:00)          INR: 2 High (03/28/19 09:06:00)

## 2022-02-15 NOTE — NURSING NOTE
Anticoagulation Therapy Entered On:  4/14/2020 9:51 AM CDT    Performed On:  4/14/2020 9:46 AM CDT by Jennifer Astudillo RN               Warfarin Management   Week 1 Sunday Dose :   7.5    Week 1 Monday Dose :   5    Week 1 Tuesday Dose :   7.5    Week 1 Wednesday Dose :   5    Week 1 Thursday Dose :   7.5    Week 1 Friday Dose :   5    Week 1 Saturday Dose :   5    Week 1 Dose Total :   42.5    Week 2 Sunday Dose :   7.5    Week 2 Monday Dose :   5    Week 2 Tuesday Dose :   7.5    Week 2 Wednesday Dose :   5    Week 2 Thursday Dose :   7.5    Week 2 Friday Dose :   5    Week 2 Saturday Dose :   5    Week 2 Dose Total :   42.5    Planned Duration :   Indefinite   Indication :   Atrial fibrillation   INR Range :   2.0 - 3.0   INR Therapeutic Range :   Yes   Anticoagulation Recheck :   1 week   Warfarin Physician :   Hilario Whiting MD Special Instructions :   INR = 2.6 per [facility] POC on 4/14/20 Patient is to stay on 7.5mg warfarin on Tues, Thurs, Sun and 5mg the rest of the days of the week Recheck INR in 1 week per protocol. Patients daughter advised in call.   Jennifer Astudillo RN - 4/14/2020 9:46 AM CDT

## 2022-02-15 NOTE — PROGRESS NOTES
Chief Complaint    Patient is here today for Dementia f/u and medication refills  History of Present Illness       Rebecca Morris is a 89-year-old with type 2 diabetes, dementia, paroxysmal atrial fibrillation, hypothyroidism, peripheral neuropathy, anxiety, and chronic anticoagulation with warfarin who presents with her daughter today. They are here for regular follow-up daughter has noticed a little bit of droopiness on the side of her mother's mouth for a couple of weeks now it tends to come and go a bit. Eating or swallowing no other evidence of weakness or neurologic complaints. No headache. She was seen for a visit to have this evaluated August 11. No recent falls. She does not fall when she uses her walker but sometimes does forget and they are working on that.  Review of Systems       No fever, chills, headache, chest pain, dyspnea abdominal pain. No increased confusion or wandering.  Physical Exam   Vitals & Measurements    T: 97.9  F (Tympanic)  HR: 80 (Peripheral)  BP: 124/78     HT: 64 in  WT: 186.2 lb  BMI: 31.96        Patient appears comfortable. Alert and oriented to person and place. In good spirits. Chest clear. Cardiac exam is regular. Abdomen nontender. Trace edema bilaterally. On neurologic exam there may be just a subtle weakness or asymmetry about the mouth but cranial nerves otherwise normal. Strength normal.  Assessment/Plan       Anxiety (F41.9)          Stable         Ordered:          11248 office o/p est mod 30-39 min (Charge), Quantity: 1, Long term (current) use of anticoagulants  Anxiety  Diabetic peripheral neuropathy  Hypothyroidism (acquired)  Paroxysmal atrial fibrillation  Type 2 diabetes mellitus with other circulatory complications                Diabetic peripheral neuropathy (E11.42)          Unchanged         Ordered:          87230 office o/p est mod 30-39 min (Charge), Quantity: 1, Long term (current) use of anticoagulants  Anxiety  Diabetic peripheral neuropathy   Hypothyroidism (acquired)  Paroxysmal atrial fibrillation  Type 2 diabetes mellitus with other circulatory complications                Facial droop (R29.810)          We will get a CT scan given she is on warfarin and has a history of falls. I do not detect anything else on my neurologic exam other than perhaps a hint of asymmetry at the corners of the mouth.         Ordered:          CT Head w/o Contrast (Request), Facial droop          Review Orders for Potential Authorizations, 08/17/21 14:31:33 CDT                Hypothyroidism (acquired) (E03.9)          Stable         Ordered:          07292 office o/p est mod 30-39 min (Charge), Quantity: 1, Long term (current) use of anticoagulants  Anxiety  Diabetic peripheral neuropathy  Hypothyroidism (acquired)  Paroxysmal atrial fibrillation  Type 2 diabetes mellitus with other circulatory complications                Long term (current) use of anticoagulants (Z79.01)          No bleeding complications. We discussed falls has an indication for discontinuing the drug if they are problem.         Ordered:          52739 office o/p est mod 30-39 min (Charge), Quantity: 1, Long term (current) use of anticoagulants  Anxiety  Diabetic peripheral neuropathy  Hypothyroidism (acquired)  Paroxysmal atrial fibrillation  Type 2 diabetes mellitus with other circulatory complications                Paroxysmal atrial fibrillation (I48.0)          Unchanged         Ordered:          99368 office o/p est mod 30-39 min (Charge), Quantity: 1, Long term (current) use of anticoagulants  Anxiety  Diabetic peripheral neuropathy  Hypothyroidism (acquired)  Paroxysmal atrial fibrillation  Type 2 diabetes mellitus with other circulatory complications                Type 2 diabetes mellitus with other circulatory complications (E11.59)          Hemoglobin A1c of 7.3 on no medications.         Ordered:          12599 office o/p est mod 30-39 min (Charge), Quantity: 1, Long term  (current) use of anticoagulants  Anxiety  Diabetic peripheral neuropathy  Hypothyroidism (acquired)  Paroxysmal atrial fibrillation  Type 2 diabetes mellitus with other circulatory complications                Orders:         citalopram, = 1 tab(s) ( 10 mg ), Oral, daily, # 90 tab(s), 3 Refill(s), Type: Maintenance, Pharmacy: [facility], 1 tab(s) Oral daily, 64, in, 08/17/21 14:28:00 CDT, Height Measured, 186.2, lb, 08/17/21 14:05:00 CDT, Weight Measured, (Ordered)         levothyroxine, = 1 cap(s) ( 137 mcg ), Oral, daily, # 90 cap(s), 3 Refill(s), Type: Maintenance, Pharmacy: [facility], 1 cap(s) Oral daily, 64, in, 08/17/21 14:28:00 CDT, Height Measured, 186.2, lb, 08/17/21 14:05:00 CDT, Weight Measured, (Ordered)         levothyroxine, = 1 tab(s), Oral, daily, # 90 tab(s), 3 Refill(s), Type: Hard Stop, Pharmacy: [facility], 64, in, 08/17/21 14:28:00 CDT, Height Measured, 186.2, lb, 08/17/21 14:05:00 CDT, Weight Measured, (Completed)         metoprolol, = 1 tab(s) ( 100 mg ), Oral, daily, # 90 tab(s), 3 Refill(s), Type: Soft Stop, Pharmacy: [facility], 1 tab(s) Oral daily, 64, in, 08/17/21 14:28:00 CDT, Height Measured, 186.2, lb, 08/17/21 14:05:00 CDT, Weight Measured, (Ordered)         QUEtiapine, = 1 tab(s) ( 25 mg ), Oral, qhs, # 90 tab(s), 3 Refill(s), Type: Maintenance, Pharmacy: [facility], 1 tab(s) Oral qhs, 64, in, 08/17/21 14:28:00 CDT, Height Measured, 186.2, lb, 08/17/21 14:05:00 CDT, Weight Measured, (Ordered)         warfarin, See Instructions, Instructions: 7.5mg Sun/Tues/Thurs and 5mg rest of days, # 120 EA, 3 Refill(s), Type: Maintenance, Pharmacy: [facility], 7.5mg Sun/Tues/Thurs and 5mg rest of days, 64, in, 08/17/21 14:28:00 CDT, Height Measur..., (Ordered)         Return to Clinic (Request), RFV: Dementia, Return in 6 months, Instructions:  lab befoe visit         Return to Clinic (Request), RFV: 6 month f/up chronic diseases and medication renewal with TFS, Return in 6 months         Return to Clinic (Request), RFV: Annual BMP, CBC, TSH. Hgb A1c q6.  Patient Information     Name:GERHARD HOWE      Address:      [address] [place] [phone]     Sex:Female     YOB: 1929     [phone]     Emergency Contact:GENTRY PARK     MRN:[id number]     FIN:[id number]     Location:[facility]     Date of Service:08/17/2021      Primary Care Physician:       Hilario Whiting MD, [phone]      Attending Physician:       Hilario Whiting MD, [phone]  Problem List/Past Medical History    Ongoing     Allergic rhinitis     Anticoagulated     Anxiety     Benign positional vertigo     Bilateral hearing loss     Carotid artery plaque     Chronic dementia with behavioral disturbance     Diabetic peripheral neuropathy     Dyslipidemia, goal LDL below 100     Frailty     History of mitral valve insufficiency       Comments: Mild by Echo in 2009     Hypothyroidism (acquired)     Long term (current) use of anticoagulants     Obese     Osteoarthritis     Paroxysmal atrial fibrillation     S/P placement of cardiac pacemaker     Seborrheic dermatitis     Seborrheic dermatitis of scalp     Statin intolerance     Symptomatic varicose veins     Tinnitus     Type 2 diabetes mellitus with other circulatory complications     White coat syndrome with hypertension    Historical     Inpatient stay       Comments: @[place], [place] - Atrial fibrillation with an episode of hypotension     Inpatient stay       Comments: @[place], [place] - Vertigo     Inpatient stay       Comments: @[place], [place] - Postoperative surgical complication involving subcutaneous tissue     Inpatient stay       Comments: [place], [place] - Benign paroxysmal positional vertigo.  Procedure/Surgical History      Replacement of pacemaker pulse generator (05/30/2019)      Comments: Implanted left pectoral.      Medtronic [id number]      [id number].     Phacoemulsification of cataract with intraocular lens implantation (03/27/2018)      Comments: Left..     Colonoscopy (05/23/2012)      Comments: Unremarkable.     LAVH-BSO (04/06/2012)     Hysteroscopy, D&C, polypectomy (11/04/2011)     Implantation of heart pacemaker (2009)     Replacement of right knee joint (11/2008)     Replacement of left knee joint (12/10/2007)     Flexible sigmoidoscopy (06/13/2006)     Cholecystectomy (1951)     Repair of umbilical hernia (1949)     Appendectomy (1947)     Extracapsular cataract extraction and insertion of intraocular lens      Comments: Right..     Plastic repair of rotator cuff of shoulder     Pregnancy      Comments: full term X 3.  Medications    ACCU-CHECK GUIDE LANCETS, See Instructions, 11 refills    ACCU-CHECK GUIDE TEST STRIPS, See Instructions, 11 refills    acetaminophen    Calcium with vitamin D, 1 tab, Oral, daily    citalopram 10 mg oral tablet, 10 mg= 1 tab(s), Oral, daily, 3 refills    Daily Multiple Vitamins, 1 tab(s), Oral, daily    Fish Oil, 1 tab, Oral, daily    levothyroxine 137 mcg (0.137 mg) oral capsule, 137 mcg= 1 cap(s), Oral, daily, 3 refills    Metoprolol Succinate  mg oral tablet, extended release, 100 mg= 1 tab(s), Oral, daily, 3 refills    PreserVision, 1 cap(s), Oral, bid    SEROquel 25 mg oral tablet, 25 mg= 1 tab(s), Oral, qhs, 3 refills    Vitamin C, 500 mg, Oral, daily    Vitamin D3, [employer], Oral, daily    warfarin 5 mg oral tablet, See Instructions, 3 refills  Allergies    Vicodin (rash)    Ancef (Diarrhea)    Calan    ciprofloxacin    phenobarbital (tremors)    probiotic (Rash..)    simvastatin (GI upset)  Social History    Smoking Status     Never smoker     Alcohol - Denies Alcohol Use      Never     Electronic Cigarette/Vaping      Electronic Cigarette Use: Never.      Employment/School      Retired     Home/Environment      Marital status: .     Substance Abuse - Denies Substance Abuse     Tobacco - Denies Tobacco Use      Never (less than 100 in lifetime)  Family History    CABG - Coronary artery bypass graft: Mother.    CAD - Coronary artery disease: Mother and Father.    Diabetes mellitus: Mother.    Gout: Mother and Brother.    Hypertension: Mother.    Leukemia: Father.    MI - Myocardial infarction: Brother.    MS - Multiple sclerosis: Sister.    Parkinson's disease: Sister.    Valvular heart disease: Brother.  Lab Results          Lab Results (Last 4 results within 90 days)           Hgb A1c: 7.3 High (08/11/21 12:35:00)          TSH: 5.56 mIU/L High [0.4 mIU/L - 4.5 mIU/L] (08/11/21 12:35:00)          INR TR: 3.2 (06/17/21 10:55:00)  Immunizations          Scheduled Immunizations          Dose Date(s)          influenza virus vaccine, inactivated          10/11/2013          pneumococcal (PPSV23)          01/08/2001          Td          01/12/2012          zoster vaccine, inactivated          10/15/2019, 12/16/2019          Other Immunizations          influenza virus vaccine, inactivated          10/07/2010, 10/08/2011, 10/09/2012, 10/09/2014, 10/07/2015, 01/24/2017, 09/01/2017, 10/10/2018, 09/05/2019, 10/05/2020          pneumococcal (PPSV23)          04/24/2006          ZOS, shingles          11/24/2015          influenza, H1N1, inactivated          12/18/2009          pneumococcal (PCV13)          03/19/2015          SARS-CoV-2 (COVID-19) Moderna-1273          02/18/2021, 03/18/2021

## 2022-02-15 NOTE — NURSING NOTE
Anticoagulation Therapy Management Entered On:  6/23/2020 10:55 AM CDT    Performed On:  6/23/2020 10:49 AM CDT by Jennifer Astudillo RN               Anticoagulation Visit Assessment   Type of Visit - Anticoagulation :   Telephone   Anticoagulation Indication :   Atrial fibrillation   Anticoagulant Duration :   Undetermined   Anticoagulation Medication Verified :   Yes   Patient Preferred Contact Method :   Cell   (Comment: Daughter Collette [Jennifer Astudillo RN - 6/23/2020 10:49 AM CDT] )   Jennifer Astudillo RN - 6/23/2020 10:49 AM CDT   Anticoagulation Patient Assessment Grid   Change in Alcohol Consumption :   No   Change in Diet :   No   Change in Medications :   No   Diarrhea :   No   Rectal Bleeding :   No   Signs of Clotting :   No   Signs of Warfarin Intolerance :   No   Unusual Bleeding, Bruising :   Yes   (Comment: bruise to leg from fall [Jennifer Astudillo RN - 6/23/2020 10:49 AM CDT] )   Upcoming Procedures :   No   Vomiting :   No   Jennifer Astudillo RN - 6/23/2020 10:49 AM CDT   Patient on Warfarin :   Yes   Patient on Other Anticoagulant :   No   Jennifer Astudillo RN - 6/23/2020 10:49 AM CDT   Warfarin   Anticoagulant INR Goal Lower :   2    Anticoagulant INR Goal Upper :   3    Information Given by :   Other: Daughter collette   Sunday :   7.5 mg   Monday :   5 mg   Tuesday :   7.5 mg   Wednesday :   5 mg   Thursday :   7.5 mg   Friday :   5 mg   Saturday :   5 mg   Total Dose :   42.5 mg   Warfarin Pt Reported Previous Week Dose :    Sun Mon Tues Wed Thurs Fri Sat Weekly Total Dose   Week 1 7.5 mg 5 mg 7.5 mg 5 mg 7.5 mg 5 mg 5 mg 42.5 mg   Week 2  mg  mg  mg  mg  mg  mg  mg  mg   Week 3  mg  mg  mg  mg  mg  mg  mg  mg   Week 4  mg  mg  mg  mg  mg  mg  mg  mg         Patient is taking single or multiple strength tablet(s) :   Single strength tab(s)   One Tab Strength :   5 mg tab   Sunday :   7.5 mg   Monday :   5 mg   Tuesday :   7.5 mg   Wednesday :   5 mg   Thursday :   7.5 mg   Friday :   5 mg    Saturday :   5 mg   Week 1 Total Dose :   42.5 mg   Sunday :   1.5 tab(s)   Monday :   1 tab(s)   Tuesday :   1.5 tab(s)   Wednesday :   1 tab(s)   Thursday :   1.5 tab(s)   Friday :   1 tab(s)   Saturday :   1 tab(s)   Patient Instructions :   INR = 2.1 per [facility] today. Patient is to stay on 7.5mg warfarin on Tues, Thurs, Sun and 5mg the rest of the days of the week.  Recheck INR in 4 weeks per protocol. Message left on personalized cell of rashaun Astudillo JANE Jennifer - 6/23/2020 10:49 AM CDT   Medication History   Medication List   (As Of: 6/23/2020 10:55:07 AM CDT)   Prescription/Discharge Order    loratadine  :   loratadine ; Status:   Prescribed ; Ordered As Mnemonic:   loratadine 10 mg oral tablet ; Simple Display Line:   10 mg, 1 tab(s), Oral, daily, for 14 day(s), 14 tab(s), 0 Refill(s) ; Ordering Provider:   Fatmata Leach MD; Catalog Code:   loratadine ; Order Dt/Tm:   2/22/2020 9:41:33 AM CST          digoxin  :   digoxin ; Status:   Suspended ; Ordered As Mnemonic:   digoxin 125 mcg (0.125 mg) oral tablet ; Simple Display Line:   125 mcg, 1 tab(s), po, daily, 90 tab(s), 3 Refill(s) ; Ordering Provider:   Hilario Whiting MD; Catalog Code:   digoxin ; Order Dt/Tm:   2/7/2019 9:55:28 AM CST          levothyroxine  :   levothyroxine ; Status:   Prescribed ; Ordered As Mnemonic:   levothyroxine 125 mcg (0.125 mg) oral tablet ; Simple Display Line:   1 tab(s), Oral, daily, 60 tab(s), 0 Refill(s) ; Ordering Provider:   Hilario Whiting MD; Catalog Code:   levothyroxine ; Order Dt/Tm:   5/14/2020 3:13:44 PM CDT          fluticasone nasal  :   fluticasone nasal ; Status:   Prescribed ; Ordered As Mnemonic:   Flonase 50 mcg/inh nasal spray ; Simple Display Line:   2 spray(s), nasal, daily, 1 EA, 11 Refill(s) ; Ordering Provider:   Hilario Whiting MD; Catalog Code:   fluticasone nasal ; Order Dt/Tm:   2/7/2019 9:55:34 AM CST          Miscellaneous Rx Supply  :   Miscellaneous Rx Supply ; Status:    Prescribed ; Ordered As Mnemonic:   ACCU-CHECK GUIDE LANCETS ; Simple Display Line:   See Instructions, TEST BLOOD SUGAR DAILY, 100 EA, 4 Refill(s) ; Ordering Provider:   Hilario Whiting MD; Catalog Code:   Miscellaneous Rx Supply ; Order Dt/Tm:   2/25/2020 3:13:49 PM CST          Miscellaneous Rx Supply  :   Miscellaneous Rx Supply ; Status:   Prescribed ; Ordered As Mnemonic:   ACCU-CHECK GUIDE METER ; Simple Display Line:   See Instructions, CHECK BLOOD SUGAR DAILY, 1 EA, 0 Refill(s) ; Ordering Provider:   Hilario Whiting MD; Catalog Code:   Miscellaneous Rx Supply ; Order Dt/Tm:   2/25/2020 3:13:05 PM CST          Miscellaneous Rx Supply  :   Miscellaneous Rx Supply ; Status:   Prescribed ; Ordered As Mnemonic:   ACCU-CHECK GUIDE TEST STRIPS ; Simple Display Line:   See Instructions, TEST BLOOD SUGAR DAILY, 100 EA, 4 Refill(s) ; Ordering Provider:   Hilario Whiting MD; Catalog Code:   Miscellaneous Rx Supply ; Order Dt/Tm:   2/25/2020 3:08:03 PM CST          Miscellaneous Rx Supply  :   Miscellaneous Rx Supply ; Status:   Prescribed ; Ordered As Mnemonic:   Glucose meter, test strips and lancets ; Simple Display Line:   See Instructions, check blood sugar daily; DX: E11.59, 100 EA, 4 Refill(s) ; Ordering Provider:   Hilario Whiting MD; Catalog Code:   Miscellaneous Rx Supply ; Order Dt/Tm:   2/24/2020 3:22:32 PM CST          amoxicillin  :   amoxicillin ; Status:   Prescribed ; Ordered As Mnemonic:   amoxicillin 500 mg oral tablet ; Simple Display Line:   See Instructions, 4 tab(s) Oral 1 hour before dental procedure, 4 EA, 1 Refill(s) ; Ordering Provider:   Beatriz Petit; Catalog Code:   amoxicillin ; Order Dt/Tm:   5/7/2020 2:05:13 PM CDT          warfarin  :   warfarin ; Status:   Prescribed ; Ordered As Mnemonic:   warfarin 5 mg oral tablet ; Simple Display Line:   See Instructions, TAKE 1 AND 1/2 TABLETS BY MOUTH FRIDAYS AND 1 TABLET EVERY OTHER DAY, 180 tab(s), 0 Refill(s) ; Ordering Provider:    Hilario Whiting MD; Catalog Code:   warfarin ; Order Dt/Tm:   2/10/2020 2:32:34 PM CST          metoprolol  :   metoprolol ; Status:   Prescribed ; Ordered As Mnemonic:   Metoprolol Succinate  mg oral tablet, extended release ; Simple Display Line:   See Instructions, TAKE ONE TABLET BY MOUTH EVERY DAY, 90 tab(s), 1 Refill(s) ; Ordering Provider:   Hilario Whiting MD; Catalog Code:   metoprolol ; Order Dt/Tm:   2/25/2020 3:16:50 PM CST            Home Meds    acetaminophen  :   acetaminophen ; Status:   Documented ; Ordered As Mnemonic:   acetaminophen ; Simple Display Line:   0 Refill(s) ; Catalog Code:   acetaminophen ; Order Dt/Tm:   5/14/2019 1:44:43 PM CDT

## 2022-02-15 NOTE — NURSING NOTE
Quick Intake Entered On:  1/24/2019 2:55 PM CST    Performed On:  1/24/2019 2:54 PM CST by Ava Velasquez RN               Summary   Chief Complaint :   BP   Systolic Blood Pressure :   134 mmHg (HI)    Diastolic Blood Pressure :   62 mmHg   Mean Arterial Pressure :   86 mmHg   BP Site :   Right arm   BP Method :   Manual   Race :      Languages :   English   Ava Velasquez RN - 1/24/2019 2:54 PM CST

## 2022-02-15 NOTE — TELEPHONE ENCOUNTER
Entered by Yumiko Escamilla on July 22, 2020 10:27:12 AM CDT  ---------------------  From: Yumiko Escamilla   To: [facility] #[id number]    Sent: 7/22/2020 10:27:12 AM CDT  Subject: Medication Management     ** Not Approved: This was filled 7/21/20 **  fluticasone nasal (FLUTICASONE 50MCG NASAL SP (120) RX)  SHAKE LIQUID AND USE 2 SPRAYS IN EACH NOSTRIL DAILY  Qty:  48 gm        Days Supply:  90        Refills:  0          Substitutions Allowed     Route To Pharmacy - [facility] #[id number]   Note from Pharmacy:  **Patient requests 90 days supply**  Signed by Yumiko Escamilla            ------------------------------------------  From: [facility] #[id number]  To: Shimon Nesbitt MD  Sent: July 21, 2020 11:33:01 AM CDT  Subject: Medication Management  Due: July 21, 2020 12:29:28 PM CDT     ** On Hold Pending Signature **     Dispensed Drug: fluticasone nasal (fluticasone 50 mcg/inh nasal spray), SHAKE LIQUID AND USE 2 SPRAYS IN EACH NOSTRIL DAILY  Quantity: 48 gm  Days Supply: 90  Refills: 0  Substitutions Allowed  Notes from Pharmacy: **Patient requests 90 days supply**  ------------------------------------------

## 2022-02-15 NOTE — RESULTS
Anticoagulation Therapy Entered On:  11/20/2019 9:21 AM CST    Performed On:  11/20/2019 9:16 AM CST by Ava Velasquez RN               Warfarin Management   Week 1 Sunday Dose :   5    Week 1 Monday Dose :   5    Week 1 Tuesday Dose :   5    Week 1 Wednesday Dose :   5    Week 1 Thursday Dose :   5    Week 1 Friday Dose :   7.5    Week 1 Saturday Dose :   5    Week 1 Dose Total :   37.5    Week 2 Sunday Dose :   5    Week 2 Monday Dose :   5    Week 2 Tuesday Dose :   5    Week 2 Wednesday Dose :   5    Week 2 Thursday Dose :   5    Week 2 Friday Dose :   7.5    Week 2 Saturday Dose :   5    Week 2 Dose Total :   37.5    Planned Duration :   Indefinite   Indication :   Atrial fibrillation   Warfarin Management Comments :   Lab test performed by:  [facility] Office  [address] [place] [id number]  Phone # [phone]  Fax# [phone]  Capillary   International Normalization Ratio :   2.0    INR Range :   2.0 - 3.0   INR Therapeutic Range :   Yes   Ava Velasquez RN - 11/20/2019 9:20 AM CST   Warfarin Management and Results Grid   Signs of Thrombolic :   No   Signs of Warfarin Intolerance :   No   Changes in Diet or Alcohol Intake :   No   Changes in Medication or Antibiotics :   No   Unusual Bleeding or Bruising :   No   Rectal Bleeding or Black Stools :   No   Vitamin K Food Handout :   No   Heart Valve Replacement :   No   Ava Velasquez RN - 11/20/2019 9:20 AM CST   Anticoagulation Recheck :   4 weeks   Warfarin Special Instructions :   Per protocol continue warfarin 7.5 mg Fridays and 5 mg ROW; recheck INR in 4 weeks; pt notified in office and given written directions.   Ava Velasquez RN - 11/20/2019 9:20 AM CST

## 2022-02-15 NOTE — NURSING NOTE
Anticoagulation Therapy Management Entered On:  2/11/2021 2:28 PM CST    Performed On:  2/11/2021 2:24 PM CST by Jennifer Astudillo RN               Anticoagulation Visit Assessment   Type of Visit - Anticoagulation :   Telephone   Anticoagulation Indication :   Atrial fibrillation   Anticoagulant Duration :   Undetermined   Anticoagulation Medication Verified :   Yes   Patient Preferred Contact Method :   Cell   Jennifer Astudillo RN - 2/11/2021 2:24 PM CST   Anticoagulation Patient Assessment Grid   Change in Alcohol Consumption :   No   Change in Diet :   No   Change in Medications :   No   Diarrhea :   No   Rectal Bleeding :   No   Signs of Clotting :   No   Signs of Warfarin Intolerance :   No   Unusual Bleeding, Bruising :   No   Upcoming Procedures :   No   Vomiting :   No   Jennifer Astudillo RN - 2/11/2021 2:24 PM CST   Patient on Warfarin :   Yes   Patient on Other Anticoagulant :   No   Jennifer Astudillo RN - 2/11/2021 2:24 PM CST   Warfarin   International Normalization Ratio TR :   3.3    Anticoagulant INR Goal Lower :   2    Anticoagulant INR Goal Upper :   3    Information Given by :   Daughter   Sunday :   7.5 mg   Monday :   5 mg   Tuesday :   7.5 mg   Wednesday :   5 mg   Thursday :   7.5 mg   Friday :   5 mg   Saturday :   5 mg   Total Dose :   42.5 mg   Warfarin Pt Reported Previous Week Dose :    Sun Mon Tues Wed Thurs Fri Sat Weekly Total Dose   Week 1 7.5 mg 5 mg 7.5 mg 5 mg 7.5 mg 5 mg 5 mg 42.5 mg   Week 2  mg  mg  mg  mg  mg  mg  mg  mg   Week 3  mg  mg  mg  mg  mg  mg  mg  mg   Week 4  mg  mg  mg  mg  mg  mg  mg  mg         Patient is taking single or multiple strength tablet(s) :   Single strength tab(s)   One Tab Strength :   5 mg tab   Sunday :   7.5 mg   Monday :   5 mg   Tuesday :   7.5 mg   Wednesday :   5 mg   Thursday :   7.5 mg   Friday :   5 mg   Saturday :   5 mg   Week 1 Total Dose :   42.5 mg   Sunday :   1.5 tab(s)   Monday :   1 tab(s)   Tuesday :   1.5 tab(s)   Wednesday :   1  tab(s)   Thursday :   1.5 tab(s)   Friday :   1 tab(s)   Saturday :   1 tab(s)   Patient Instructions :   INR = 3.3 per [facility] today. Patient is to take 2.5mg one time today only then resume 7.5mg Sun, Tues, and Thurs and 5mg the rest of the days of the week. Recheck INR in 1 week per protocol Daughter Collette advised via call to home.     Jennifer Astudillo RN - 2/11/2021 2:24 PM CST   Medication History   Medication List   (As Of: 2/11/2021 2:28:45 PM CST)   Prescription/Discharge Order    levothyroxine  :   levothyroxine ; Status:   Prescribed ; Ordered As Mnemonic:   levothyroxine 125 mcg (0.125 mg) oral tablet ; Simple Display Line:   1 tab(s), Oral, daily, 90 tab(s), 3 Refill(s) ; Ordering Provider:   Hilario Whiting MD; Catalog Code:   levothyroxine ; Order Dt/Tm:   12/28/2020 12:52:12 PM CST          donepezil  :   donepezil ; Status:   Suspended ; Ordered As Mnemonic:   Aricept 5 mg oral tablet ; Simple Display Line:   5 mg, 1 tab(s), Oral, hs, 30 tab(s), 5 Refill(s) ; Ordering Provider:   Hilario Whiting MD; Catalog Code:   donepezil ; Order Dt/Tm:   10/5/2020 3:59:19 PM CDT          fluticasone nasal  :   fluticasone nasal ; Status:   Prescribed ; Ordered As Mnemonic:   Flonase 50 mcg/inh nasal spray ; Simple Display Line:   2 spray(s), nasal, daily, 1 EA, 11 Refill(s) ; Ordering Provider:   Shimon Nesbitt MD; Catalog Code:   fluticasone nasal ; Order Dt/Tm:   7/21/2020 11:28:21 AM CDT          metoprolol  :   metoprolol ; Status:   Prescribed ; Ordered As Mnemonic:   Metoprolol Succinate  mg oral tablet, extended release ; Simple Display Line:   100 mg, 1 tab(s), Oral, daily, 90 tab(s), 1 Refill(s) ; Ordering Provider:   Shimon Nesbitt MD; Catalog Code:   metoprolol ; Order Dt/Tm:   7/21/2020 11:28:23 AM CDT          warfarin  :   warfarin ; Status:   Prescribed ; Ordered As Mnemonic:   warfarin 5 mg oral tablet ; Simple Display Line:   See Instructions, TAKE 1 AND 1/2 TABLETS BY MOUTH  FRIDAYS AND 1 TABLET EVERY OTHER DAY, 180 tab(s), 1 Refill(s) ; Ordering Provider:   Shimon Nesbitt MD; Catalog Code:   warfarin ; Order Dt/Tm:   7/21/2020 11:28:22 AM CDT          Miscellaneous Rx Supply  :   Miscellaneous Rx Supply ; Status:   Prescribed ; Ordered As Mnemonic:   ACCU-CHECK GUIDE LANCETS ; Simple Display Line:   See Instructions, TEST BLOOD SUGAR DAILY, 100 EA, 11 Refill(s) ; Ordering Provider:   Shimon Nesbitt MD; Catalog Code:   Miscellaneous Rx Supply ; Order Dt/Tm:   7/21/2020 8:47:38 AM CDT          Miscellaneous Rx Supply  :   Miscellaneous Rx Supply ; Status:   Prescribed ; Ordered As Mnemonic:   ACCU-CHECK GUIDE TEST STRIPS ; Simple Display Line:   See Instructions, TEST BLOOD SUGAR DAILY, 100 EA, 11 Refill(s) ; Ordering Provider:   Shimon Nesibtt MD; Catalog Code:   Miscellaneous Rx Supply ; Order Dt/Tm:   7/21/2020 8:47:37 AM CDT          amoxicillin  :   amoxicillin ; Status:   Prescribed ; Ordered As Mnemonic:   amoxicillin 500 mg oral tablet ; Simple Display Line:   See Instructions, 4 tab(s) Oral 1 hour before dental procedure, 4 EA, 1 Refill(s) ; Ordering Provider:   Beatriz Petit; Catalog Code:   amoxicillin ; Order Dt/Tm:   5/7/2020 2:05:13 PM CDT            Home Meds    melatonin  :   melatonin ; Status:   Documented ; Ordered As Mnemonic:   melatonin 3 mg oral tablet ; Simple Display Line:   3 mg, 1 tab(s), Oral, hs, 0 Refill(s) ; Catalog Code:   melatonin ; Order Dt/Tm:   10/29/2020 10:23:22 AM CDT          ascorbic acid  :   ascorbic acid ; Status:   Documented ; Ordered As Mnemonic:   Vitamin C ; Simple Display Line:   500 mg, Oral, daily, 0 Refill(s) ; Catalog Code:   ascorbic acid ; Order Dt/Tm:   10/26/2020 3:10:37 PM CDT          cholecalciferol  :   cholecalciferol ; Status:   Documented ; Ordered As Mnemonic:   Vitamin D3 ; Simple Display Line:   2,000 International Unit, Oral, daily, 0 Refill(s) ; Catalog Code:   cholecalciferol ; Order Dt/Tm:    10/26/2020 3:10:48 PM CDT          multivitamin with minerals  :   multivitamin with minerals ; Status:   Documented ; Ordered As Mnemonic:   PreserVision ; Simple Display Line:   1 cap(s), Oral, bid, 0 Refill(s) ; Catalog Code:   multivitamin with minerals ; Order Dt/Tm:   10/26/2020 3:10:54 PM CDT          omega-3 polyunsaturated fatty acids  :   omega-3 polyunsaturated fatty acids ; Status:   Documented ; Ordered As Mnemonic:   Fish Oil ; Simple Display Line:   1 tab, Oral, daily, 0 Refill(s) ; Catalog Code:   omega-3 polyunsaturated fatty acids ; Order Dt/Tm:   10/26/2020 3:10:12 PM CDT          multivitamin  :   multivitamin ; Status:   Documented ; Ordered As Mnemonic:   Daily Multiple Vitamins ; Simple Display Line:   1 tab(s), Oral, daily, 0 Refill(s) ; Catalog Code:   multivitamin ; Order Dt/Tm:   10/26/2020 3:09:47 PM CDT          Miscellaneous Prescription  :   Miscellaneous Prescription ; Status:   Documented ; Ordered As Mnemonic:   Calcium with vitamin D ; Simple Display Line:   1 tab, Oral, daily, 0 Refill(s) ; Catalog Code:   Miscellaneous Prescription ; Order Dt/Tm:   10/26/2020 3:08:29 PM CDT          ondansetron  :   ondansetron ; Status:   Documented ; Ordered As Mnemonic:   Zofran 4 mg oral tablet ; Simple Display Line:   4 mg, 1 tab(s), Oral, q8 hrs, 0 Refill(s) ; Catalog Code:   ondansetron ; Order Dt/Tm:   10/26/2020 3:07:28 PM CDT          lisinopril  :   lisinopril ; Status:   Documented ; Ordered As Mnemonic:   lisinopril 20 mg oral tablet ; Simple Display Line:   20 mg, 1 tab(s), Oral, daily, 0 Refill(s) ; Catalog Code:   lisinopril ; Order Dt/Tm:   10/5/2020 3:26:04 PM CDT          acetaminophen  :   acetaminophen ; Status:   Documented ; Ordered As Mnemonic:   acetaminophen ; Simple Display Line:   0 Refill(s) ; Catalog Code:   acetaminophen ; Order Dt/Tm:   5/14/2019 1:44:43 PM CDT

## 2022-02-15 NOTE — NURSING NOTE
Quick Intake Entered On:  9/19/2019 2:33 PM CDT    Performed On:  9/19/2019 2:33 PM CDT by Ava Velasquez RN               Summary   Chief Complaint :   BP   Systolic Blood Pressure :   124 mmHg   Diastolic Blood Pressure :   70 mmHg   Mean Arterial Pressure :   88 mmHg   BP Site :   Right arm   BP Method :   Manual   Race :      Languages :   English   Ava Velasquez RN - 9/19/2019 2:33 PM CDT

## 2022-02-15 NOTE — TELEPHONE ENCOUNTER
Order is faxed to [facility] ([facility]) and they will contact patient's daughter to schedule.

## 2022-02-15 NOTE — LETTER
(Inserted Image. Unable to display)   March 26, 2020      GERHARDAARON HOWE      [address]  [place], [place] [id number]        Dear GERHARD,    Thank you for selecting [facility] (previously [facility]) for your healthcare needs.  Below you will find the results of your recent tests done at our clinic.     Cholesterol levels high. Diabetes controlled.      Result Name Current Result Previous Result Reference Range   Hgb A1c ((H)) 6.6 3/24/2020 ((H)) 6.9 12/10/2019  - <5.7   Cholesterol (mg/dL) ((H)) 244 3/24/2020  165 3/28/2019  - <200   Non-HDL Cholesterol ((H)) 189 3/24/2020  124 3/28/2019  - <130   HDL (mg/dL)  55 3/24/2020 ((L)) 41 3/28/2019 > OR = 50 -    Cholesterol/HDL Ratio  4.4 3/24/2020  4.0 3/28/2019  - <5.0   LDL ((H)) 159 3/24/2020  99 3/28/2019    Triglyceride (mg/dL) ((H)) 167 3/24/2020 ((H)) 150 3/28/2019  - <150   TSH (mIU/L)  3.34 3/24/2020  2.38 8/16/2019 0.40 - 4.50   U Microalbumin (mg/dL)  0.6 3/24/2020  0.5 12/24/2018 See Note: -    Ur Creatinine (mg/dL)  65 3/24/2020  84 12/24/2018 20 - 275   Ur Microalbumin/Creatinine Ratio  9 3/24/2020  6 12/24/2018  - <30       Please contact me or my assistant at [phone] if you have any questions.     Sincerely,        Hilario Whiting MD

## 2022-02-15 NOTE — NURSING NOTE
Anticoagulation Therapy Management Entered On:  10/28/2020 10:55 AM CDT    Performed On:  10/28/2020 10:50 AM CDT by Jennifer Astudillo RN               Anticoagulation Visit Assessment   Type of Visit - Anticoagulation :   Telephone   Anticoagulation Indication :   Atrial fibrillation   Anticoagulant Duration :   Undetermined   Anticoagulation Medication Verified :   Yes   Patient Preferred Contact Method :   Cell   eJnnifer Astudillo RN - 10/28/2020 10:50 AM CDT   Anticoagulation Patient Assessment Grid   Change in Alcohol Consumption :   No   Change in Diet :   Yes   (Comment: [employer] instant breakfast started 1 week ago 3/x week [Jennifer Astudillo RN - 10/28/2020 10:50 AM CDT] )   Change in Medications :   Yes   (Comment: holding Aricept [Jennifer Astudillo RN - 10/28/2020 10:50 AM CDT] )   Diarrhea :   No   Rectal Bleeding :   No   Signs of Clotting :   No   Signs of Warfarin Intolerance :   No   Unusual Bleeding, Bruising :   No   Upcoming Procedures :   No   Vomiting :   No   Jennifer Astudillo RN - 10/28/2020 10:50 AM CDT   Patient on Warfarin :   Yes   Patient on Other Anticoagulant :   No   Jennifer Astudillo RN - 10/28/2020 10:50 AM CDT   Warfarin   International Normalization Ratio TR :   2.0    Anticoagulant INR Goal Lower :   2    Anticoagulant INR Goal Upper :   3    Information Given by :   Other: [place]   Sunday :   7.5 mg   Monday :   5 mg   Tuesday :   7.5 mg   Wednesday :   5 mg   Thursday :   7.5 mg   Friday :   5 mg   Saturday :   5 mg   Total Dose :   42.5 mg   Warfarin Pt Reported Previous Week Dose :    Sun Mon Tues Wed Thurs Fri Sat Weekly Total Dose   Week 1 7.5 mg 5 mg 7.5 mg 5 mg 7.5 mg 5 mg 5 mg 42.5 mg   Week 2  mg  mg  mg  mg  mg  mg  mg  mg   Week 3  mg  mg  mg  mg  mg  mg  mg  mg   Week 4  mg  mg  mg  mg  mg  mg  mg  mg         Patient is taking single or multiple strength tablet(s) :   Single strength tab(s)   One Tab Strength :   5 mg tab   Sunday :   7.5 mg   Monday :    5 mg   Tuesday :   7.5 mg   Wednesday :   5 mg   Thursday :   7.5 mg   Friday :   5 mg   Saturday :   5 mg   Week 1 Total Dose :   42.5 mg   Sunday :   1.5 tab(s)   Monday :   1 tab(s)   Tuesday :   1.5 tab(s)   Wednesday :   1 tab(s)   Thursday :   1.5 tab(s)   Friday :   1 tab(s)   Saturday :   1 tab(s)   Patient Instructions :   INR = 2.0 per [facility] nurse today. Patient is to stay on 7.5mg warfarin on Tues, Thurs, Sun and 5mg the rest of the days of the week Recheck INR in 1 week per protocol. Home Health nurse advised via call.     Jennifer Astudillo RN - 10/28/2020 10:50 AM CDT

## 2022-02-15 NOTE — NURSING NOTE
Anticoagulation Therapy Management Entered On:  12/9/2021 3:33 PM CST    Performed On:  12/9/2021 3:30 PM CST by Jennifer Astudillo RN               Anticoagulation Visit Assessment   Type of Visit - Anticoagulation :   Telephone   Anticoagulation Indication :   Atrial fibrillation   Anticoagulant Duration :   Undetermined   Anticoagulation Medication Verified :   Yes   Patient Preferred Contact Method :   Cell   Jennifer Astudillo RN - 12/9/2021 3:30 PM CST   Anticoagulation Patient Assessment Grid   Change in Alcohol Consumption :   No   Change in Diet :   No   Change in Medications :   Yes   (Comment: completed doxycyclin today [Jennifer Astudillo RN - 12/9/2021 3:30 PM CST] )   Diarrhea :   No   Rectal Bleeding :   No   Signs of Clotting :   No   Signs of Warfarin Intolerance :   No   Unusual Bleeding, Bruising :   No   Upcoming Procedures :   No   Vomiting :   No   Jennifer Astudillo RN - 12/9/2021 3:30 PM CST   Patient on Warfarin :   Yes   Jennifer Astudillo RN - 12/9/2021 3:30 PM CST   Warfarin   International Normalization Ratio TR :   1.7    Anticoagulant INR Goal Lower :   2    Anticoagulant INR Goal Upper :   3    Information Given by :   Daughter   Sunday :   7.5 mg   Monday :   5 mg   Tuesday :   7.5 mg   Wednesday :   5 mg   Thursday :   7.5 mg   Friday :   5 mg   Saturday :   5 mg   Total Dose :   42.5 mg   Warfarin Pt Reported Previous Week Dose :    Sun Mon Tues Wed Thurs Fri Sat Weekly Total Dose   Week 1 7.5 mg 5 mg 7.5 mg 5 mg 7.5 mg 5 mg 5 mg 42.5 mg   Week 2  mg  mg  mg  mg  mg  mg  mg  mg   Week 3  mg  mg  mg  mg  mg  mg  mg  mg   Week 4  mg  mg  mg  mg  mg  mg  mg  mg         Patient is taking single or multiple strength tablet(s) :   Single strength tab(s)   One Tab Strength :   5 mg tab   Sunday :   7.5 mg   Monday :   5 mg   Tuesday :   7.5 mg   Wednesday :   5 mg   Thursday :   7.5 mg   Friday :   5 mg   Saturday :   7.5 mg   Week 1 Total Dose :   45 mg   Sunday :   1.5 tab(s)   Monday :   1  tab(s)   Tuesday :   1.5 tab(s)   Wednesday :   1 tab(s)   Thursday :   1.5 tab(s)   Friday :   1 tab(s)   Saturday :   1.5 tab(s)   Warfarin Dosing Acknowledgement :   I have reviewed the patient's warfarin dosing schedule and confirmed its accuracy for today's visit.   Patient Instructions :   INR = 1.7 per MDINR today. Patient is to increase her warfarin to 5mg on Mon, Wed, and Fri and 7.5mg the rest of the days of the week recheck INR in 1 week per proocol. Patient daughter advised in call. Increased dose 5.8% per week today.     Belinda Astudillo RNbeth - 12/9/2021 3:30 PM CST

## 2022-02-15 NOTE — TELEPHONE ENCOUNTER
---------------------  From: Gosia Benítez CMA ([facility] ([id number] - [place]))   To: Hilario Whiting MD;     Sent: 4/23/2019 2:59:14 PM CDT  Subject: Celebrex alternative     Fax received from [employer]  stating Celebrex not covered. Covered alternatives include: meloxicam, diclofenac, or ibuprofen. Please advise if should attempt PA or switch meds.Patients daughter called at 1515 to find out if PA is going to be done or if an alternative medication is going to be sent in.  They did not  medication today.  Please advise.---------------------  From: Hilario Whiting MD   To: [facility] ([id number]);     Sent: 4/23/2019 3:17:58 PM CDT  Subject: RE: Celebrex alternative     Discontinue. She may take Aleve OTC one BID for 7-10 days as needed for painPt's daughter notified.

## 2022-02-15 NOTE — TELEPHONE ENCOUNTER
---------------------  From: Ava Velasquez RN   Sent: 2/21/2020 2:45:14 PM CST  Subject: INR past due     Patient is 14 days past due for INR.  First letter has been printed from [employer] and sent to [facility] for scanning and mailing.

## 2022-02-15 NOTE — PROGRESS NOTES
Patient:   GERHARD HOWE            MRN: [id number]            FIN: [id number]               Age:   89 years     Sex:  Female     :  11/10/1929   Associated Diagnoses:   None   Author:   Ashley Sawyer      Doctor: Henok   Patient Information  (Medicare and address information is on file)  Medicare HICN#: _  Address:_ City:_ State:_ Zip:_  Home Phone:_ Work Phone:_ Other Contact Phone:_    Diabetes self-management training (DSMT) and medical nutrition therapy (MNT) are individual and complementary services to improve diabetes care. For Medicare beneficiaries, both services can be ordered in the same year. Research indicates MNT combined with DSMT improves outcomes.    Diabetes Self-Managment Training (DSMT)  Medicare: 10 hours initial DSMT in 12 month period, plus 2 hours follow-up annually  *Check type of training services and number of hours requested:  _ Initial DSMT:  10 hours or _ no. hrs. requested  X Follow-up DSMT: X 2 hours or _ no. hrs. requested  _ Additional insulin training: _ no. hrs. requested    *Patients with special needs requiring individual DSMT  Check all special needs that apply:  _ Vision _ Hearing  _ Physical  _Cognitive Impairment  _ Language limitations X Other  No classes offered    *DSMT Content  X All ten content areas, as appropriate  _ Monitoring diabetes    _ Diabetes as disease process  _ Psychological adjustment _ Physical activity  _ Nutritional management  _ Goal setting, problem solving  _ Medications       _ Prevent, detect and treat acute complications  _ Preconception/pregnancy _ Prevent, detect and teat chronic complications     management or gestational     diabetes management    Medical Nutrition Therapy (MNT)  Medicare: 3 hours initial MNT in the first calendar year, plus two hours follow-up MNT annually. Additional MNT hours available for change in medical condition, treament and/or diagnosis.  *Check the type of MNT and/or number of additional hours requested:  _  Initial MNT:   X Annual follow-up MNT  _ Additional MNT services in the same calendar year, per RD recommendations  _ number of additional hours requested    Please specify change in medical condition, treatment and/or diagnosis      *Diagnosis  Please send recent labs for patient eligibility & outcomes monitoring  _ Type 1 uncontrolled  _ Type 1 controlled  _ Type 2 uncontrolled  X Type 2 controlled  _ Gestational diabetes _ Other _    Complications/Comorbidities  Check all that apply:  X Hypertension   X Dyslipidemia _ Stroke      _ Nephropathy  X Neuropathy    _ Retinopathy  _ Renal disease   _ CHD  _ PVD       _ Pregnancy  _ Non-healing wound _ Obesity    _ Mental/affective disorder     _ Other _    Current Diabetes Medications  Specify type, dose and frequency  Oral: _  Insulin:   Patient now uses: _ Pen _ Needle _ Pump    Patient Behavior Goals/Plan of Care    To maintain good blood sugar control via lifestyle.  Minimize the risk for hypoglycemia and reduce risks of developing complications related to uncontrolled diabetes.    Signature and UPIN #: (UPIN and NPI are on file)  Group/Practice name, address and phone: [facility], [address] [place] [address]  [phone]

## 2022-02-15 NOTE — TELEPHONE ENCOUNTER
---------------------  From: Jennifer Astudillo RN   Sent: 3/23/2020 12:08:05 PM CDT  Subject: INR appointment     Daughter calls. They are advised to keep INR appointment for 3/24/20. Patient has no cough or fever.

## 2022-02-15 NOTE — NURSING NOTE
Quick Intake Entered On:  3/29/2019 11:19 AM CDT    Performed On:  3/28/2019 11:11 AM CDT by Ashley Sawyer               Summary   Weight Measured :   149.8 lb(Converted to: 149 lb 13 oz, 67.95 kg)    Height Measured :   64 in(Converted to: 5 ft 4 in, 162.56 cm)    Body Mass Index :   25.71 kg/m2 (HI)    Body Surface Area :   1.75 m2   Race :      Languages :   English   Ashley Sawyer - 3/29/2019 11:11 AM CDT

## 2022-02-15 NOTE — PROGRESS NOTES
Patient:   GERHARD HOWE            MRN: [id number]            FIN: [id number]               Age:   88 years     Sex:  Female     :  11/10/1929   Associated Diagnoses:   HTN, goal below 140/90; Paroxysmal atrial fibrillation; Diabetes mellitus type II, controlled; Cataract, bilateral; Hypothyroidism (acquired); Carotid artery plaque; Long-term (current) use of anticoagulants, INR goal 2.0-3.0; S/P placement of cardiac pacemaker; Allergic rhinitis   Author:   Hilario Whiting MD      Visit Information   Visit type:  Preoperative.    Accompanied by:  Family member.    Source of history:  Self, Family member, Medical record.    History limitation:  None.       Chief Complaint   3/22/2018 9:48 AM CDT    pre op - 3/27/18 at [facility]      History of Present Illness   Gerhard is an 88-year-old who is scheduled for cataract surgery.  She has a history  of atrial fibrillation with anticoagulation, controlled type 2 diabetes, hypertension,   hypothyroidism, carotid artery plaque, pacemaker, and allergic rhinitis.  She has  no history of congestive heart failure or  coronary artery disease.  There is no   snoring or sleep apnea.  There is no bleeding.  There are no problems with   anesthesia or sedation.  She has a very good functional capacity.           Review of Systems   Constitutional:  No weakness, No fatigue.    Eye:  Negative except as documented in history of present illness, No recent visual problem.    Respiratory:  No shortness of breath, No cough.    Cardiovascular:  No chest pain, No palpitations, No peripheral edema.    Gastrointestinal:  No nausea, No vomiting, No diarrhea.    Genitourinary:  No dysuria, No hematuria.    Hematology/Lymphatics:  No bruising tendency, No bleeding tendency.    Endocrine:  Negative except as documented in history of present illness.    Neurologic:  Alert and oriented X4, No abnormal balance, No confusion, No numbness, No tingling, No headache.       Health Status   Allergies:     Allergic Reactions (Selected)  Moderate  Vicodin (Rash)  Severity Not Documented  Ancef (No reactions were documented)  Calan (No reactions were documented)  Ciprofloxacin (No reactions were documented)  Phenobarbital (No reactions were documented)  Probiotic (Rash..)  Simvastatin (Gi upset)   Medications:  (Selected)   Prescriptions  Prescribed  Flonase 50 mcg/inh nasal spray: 2 spray(s), nasal, daily, # 1 EA, 11 Refill(s), Type: Maintenance, Pharmacy: [facility] [id number], 2 spray(s) nasal daily  Metoprolol Succinate  mg oral tablet, extended release: See Instructions, Instructions: TAKE ONE TABLET BY MOUTH EVERY DAY, # 90 tab(s), 3 Refill(s), Type: Soft Stop, Pharmacy: [facility] [id number], TAKE ONE TABLET BY MOUTH EVERY DAY  digoxin 125 mcg (0.125 mg) oral tablet: 1 tab(s) ( 125 mcg ), po, daily, # 90 tab(s), 3 Refill(s), Type: Maintenance, Pharmacy: [facility] [id number], 1 tab(s) po daily  levothyroxine 125 mcg (0.125 mg) oral tablet: 1 tab(s) ( 125 mcg ), po, daily, # 90 tab(s), 3 Refill(s), Type: Soft Stop, Pharmacy: [facility] [id number], 1 tab(s) po daily,x90 day(s)  pravastatin 10 mg oral tablet: 1 tab(s) ( 10 mg ), PO, Daily, # 90 tab(s), 3 Refill(s), Type: Maintenance, Pharmacy: [facility] [id number], 1 tab(s) po daily  warfarin 5 mg oral tablet: 1 tab(s) ( 5 mg ), po, daily, # 90 tab(s), 3 Refill(s), Type: Soft Stop, Pharmacy: [facility] [id number], 1 tab(s) po daily,x90 day(s)   Problem list:    All Problems  Hypothyroidism (acquired) / SNOMED CT 555419366 / Confirmed  Allergic rhinitis / SNOMED CT 428846318 / Confirmed  Benign positional vertigo / SNOMED CT 163821284 / Confirmed  Bilateral hearing loss / SNOMED CT 468077114 / Confirmed  Carotid artery plaque / SNOMED CT 458980458 / Confirmed  Statin intolerance / SNOMED CT 10706627 / Confirmed  White coat hypertension / SNOMED CT 6576897188 / Confirmed  Long-term (current) use of anticoagulants, INR goal  2.0-3.0 / SNOMED CT 900049461 / Confirmed  S/P placement of cardiac pacemaker / SNOMED CT 315832435 / Confirmed  History of mitral valve insufficiency / SNOMED CT 094205256 / Confirmed  Dyslipidemia, goal LDL below 100 / SNOMED CT 09573775 / Confirmed  HTN, goal below 140/90 / SNOMED CT 7554303329 / Confirmed  Obese / SNOMED CT 4276288694 / Probable  Osteoarthritis / SNOMED CT 0263957668 / Confirmed  Paroxysmal atrial fibrillation / SNOMED CT 064938625 / Confirmed  Seborrheic dermatitis / SNOMED CT 28107195 / Confirmed  Tinnitus / SNOMED CT 621770309 / Confirmed  Diabetes mellitus type II, controlled / SNOMED CT 934727401 / Confirmed  Symptomatic varicose veins / SNOMED CT 080914557 / Confirmed  White coat hypertension / SNOMED CT 4415086749 / Confirmed  Resolved: *Hospitalized@[facility] - Atrial fibrillation with an episode of hypotension  Resolved: *Hospitalized@[facility] - Vertigo  Canceled: Rhinitis, chronic / SNOMED CT 340511466  Canceled: Diabetic Neuropathy, Type II Diabetes Mellitus / ICD-9-.60  Canceled: Hypertension / SNOMED CT 0165018220      Histories   Past Medical History:    Active  Diabetes mellitus type II, controlled (892071512): Onset on 1/1/1999 at 69 years.  Osteoarthritis (4092174533)  Dyslipidemia, goal LDL below 100 (61045662)  Paroxysmal atrial fibrillation (305427886)  Hypothyroidism (acquired) (516607581)  White coat hypertension (2794353454)  Allergic rhinitis (646452349)  Obese (5351320170)  Benign positional vertigo (251282680)  Statin intolerance (25684903)  Long-term (current) use of anticoagulants, INR goal 2.0-3.0 (285648998)  Bilateral hearing loss (928928468)  Tinnitus (536803139)  White coat hypertension (9300754103)  S/P placement of cardiac pacemaker (494851966)  HTN, goal below 140/90 (8582296523)  Resolved  *Hospitalized@[facility] - Vertigo: Onset on 12/12/2012 at 83 years.  Resolved.  *Hospitalized@[facility] Atrial fibrillation with an episode of hypotension: Onset on 8/25/2010 at 80  years.  Resolved.   Family History:    Leukemia  Father ()  Gout  Brother  Mother ()  Diabetes mellitus  Mother ()  CA - Lung cancer  Spouse ()  Hypertension  Mother ()  Parkinson's disease  Sister  Valvular heart disease  Brother ()  MS - Multiple sclerosis  Sister  CABG - Coronary artery bypass graft  Mother ()  MI - Myocardial infarction  Brother ()  CAD - Coronary artery disease  Father ()  Mother ()     Procedure history:    Colonoscopy (662347238) on 2012 at 82 Years.  Comments:  2012 8:59 AM - Henok GANDARA, Hilario KIM-BSO on 2012 at 82 Years.  Hysteroscopy, D&C, polypectomy on 2011 at 81 Years.  Implantation of heart pacemaker (9736374581) in  at 80 Years.  Replacement of right knee joint (4014746068) in the month of 2008 at 78 Years.  Replacement of left knee joint (1442417595) on 12/10/2007 at 78 Years.  Flexible sigmoidoscopy (29643640) on 2006 at 76 Years.  Cholecystectomy (35595970) in 1951 at 22 Years.  Repair of umbilical hernia (02227149) in 1949 at 20 Years.  Appendectomy (992499904) in 1947 at 18 Years.  Plastic repair of rotator cuff of shoulder (559141269).  Pregnancy (183520901).  Comments:  2010 9:08 AM - Vaishali Cortés  full term X 3   Social History:        Alcohol Assessment: Denies Alcohol Use            Never      Tobacco Assessment: Denies Tobacco Use      Employment and Education Assessment            Retired      Home and Environment Assessment            Marital status: .        Physical Examination   Vital Signs   3/22/2018 9:48 AM CDT Peripheral Pulse Rate 82 bpm    Systolic Blood Pressure 145 mmHg  HI    Diastolic Blood Pressure 75 mmHg    Mean Arterial Pressure 98 mmHg    BP Site Left upper leg      Measurements from flowsheet : Measurements   3/22/2018 9:48 AM CDT Height Measured - Standard 64 in    Weight Measured - Standard 153 lb    BSA  1.77 m2    Body Mass Index 26.26 kg/m2  HI      General:  Alert and oriented, No acute distress.    Eye:  Normal conjunctiva.    Airway:       Mallampati classification: I (soft palate, fauces, uvula, pillars visible).    HENT:  Normocephalic, Normal hearing, Oral mucosa is moist, No pharyngeal erythema.    Neck:  Supple, Non-tender, No carotid bruit, No lymphadenopathy, No thyromegaly.    Respiratory:  Lungs are clear to auscultation, Respirations are non-labored.    Cardiovascular:  Normal rate, Regular rhythm, No murmur, No gallop, Normal peripheral perfusion, No edema.    Gastrointestinal:  Soft, Non-tender.    Musculoskeletal:  Normal gait.    Integumentary:  No pallor.    Feet:  Normal by visual exam, Normal pulses, Sensation intact, By vibration, absent monofilament.    Neurologic:  No focal deficits.    Cognition and Speech:  Speech clear and coherent, Functional cognition intact.    Psychiatric:  Cooperative, Appropriate mood & affect.       Review / Management   ECG interpretation:  Time  3/22/2018 10:20:00 AM, Rate  102  beats per minute, Atrial fib/flutt with occasional ventricular pacing.       Impression and Plan   Diagnosis     HTN, goal below 140/90 (CEY97-JW I10).     Paroxysmal atrial fibrillation (IEW72-XL I48.0).     Diabetes mellitus type II, controlled (XNO48-TF E11.9).     Cataract, bilateral (MBL89-DH H26.9).     Hypothyroidism (acquired) (VPA78-TL E03.9).     Carotid artery plaque (OVT39-QP I65.29).     Long-term (current) use of anticoagulants, INR goal 2.0-3.0 (SLK38-MA Z79.01).     S/P placement of cardiac pacemaker (PYZ29-DH Z95.0).     Allergic rhinitis (NAL01-CR J30.9).     Course:  Progressing as expected.    Orders     Orders (Selected)   Outpatient Orders  Ordered (Dispatched)  Basic Metabolic Panel* ([facility]): Specimen Type: Serum, Collection Date: 03/22/18 10:03:00 CDT  Hemoglobin A1c* ([facility]): Specimen Type: Blood, Collection Date: 03/22/18 10:04:00 CDT  Ordered (In Transit)  CBC  (h/h, RBC, indices, WBC, Plt)* ([facility]): Specimen Type: Blood, Collection Date: 03/22/18 7:00:00 CDT  Lipid panel with reflex to direct ldl* ([facility]): Specimen Type: Serum, Collection Date: 03/22/18 7:00:00 CDT  Prothrombin time-INR* ([facility]): Specimen Type: Blood, Collection Date: 03/22/18 7:00:00 CDT  TSH* ([facility]): Specimen Type: Serum, Collection Date: 03/22/18 7:00:00 CDT  Prescriptions  Prescribed  Flonase 50 mcg/inh nasal spray: 2 spray(s), nasal, daily, # 1 EA, 11 Refill(s), Type: Maintenance, Pharmacy: [facility] [id number], 2 spray(s) nasal daily  Metoprolol Succinate  mg oral tablet, extended release: See Instructions, Instructions: TAKE ONE TABLET BY MOUTH EVERY DAY, # 90 tab(s), 3 Refill(s), Type: Soft Stop, Pharmacy: [facility] [id number], TAKE ONE TABLET BY MOUTH EVERY DAY  digoxin 125 mcg (0.125 mg) oral tablet: 1 tab(s) ( 125 mcg ), po, daily, # 90 tab(s), 3 Refill(s), Type: Maintenance, Pharmacy: [facility] [id number], 1 tab(s) po daily  levothyroxine 125 mcg (0.125 mg) oral tablet: 1 tab(s) ( 125 mcg ), po, daily, # 90 tab(s), 3 Refill(s), Type: Soft Stop, Pharmacy: [facility] [id number], 1 tab(s) po daily,x90 day(s)  pravastatin 10 mg oral tablet: 1 tab(s) ( 10 mg ), PO, Daily, # 90 tab(s), 3 Refill(s), Type: Maintenance, Pharmacy: [facility] [id number], 1 tab(s) po daily  warfarin 5 mg oral tablet: 1 tab(s) ( 5 mg ), po, daily, # 90 tab(s), 3 Refill(s), Type: Soft Stop, Pharmacy: [id number], 1 tab(s) po daily,x90 day(s).     Hold Warfarin for 2-3 days before surgery..

## 2022-02-15 NOTE — PROGRESS NOTES
Chief Complaint    c/o cough, SOB  History of Present Illness      Chief complaint as above reviewed and confirmed with patient.  Pt presents to the clinic with concerns re: cough, sob.  She developed sx about 5 days ago, was around others over the thanksgiving holiday that have URI sx, including sinus infection, cough, but no known Covid 19.  she has had primary and booter Covid 19 vaccines.  She has had rhinorrhea, congestion, worsening cough with wheezing and SOB.  No chest pain.  Temp to 99.  Had a covid 19 test yesterday which has not returned.  Daugther has noted decreased energy, weakness, increased work of breathing, rapid breathing, and poor po intake at home.  Has had some applesauce, sips of water/juice, but small amounts.  voiding but less than normal. no heart racing dizziness or lightheaded but very inactive due to illness.  no hx of PE, CHF, COPD.  chronic edema unchanged, maybe slightly worse.   Review of Systems      Review of systems is negative with the exception of those noted in HPI   Physical Exam   Vitals & Measurements    HR: 91 (Peripheral)  BP: 178/115  SpO2: 93%     HT: 64 in  WT: 190.2 lb  BMI: 32.64           Vitals as above per nursing documentation           Constitutional : nad appears ill, able to answer questions but base line dementia, daughter assists with history.  Falls asleep during Neb in clinic.       Tachynea noted, increased work of breathing but able to talk in full sentances.  audible expiratory wheezing.  when specifically asked can cough but very weak effort of cough.          Ears: ears patent B, TMS intact, noninjected           Nose: nasal mucosa is non-edematous. no discharge           Throat: pharynx is nonerythematous, no tonsillar hypertrophy, no exudate           Neck: neck supple, no adenopathy, no thyromegaly, no rigidity           Lungs: lungs expiratory wheeze throughout, no rhonchi or rales.           Heart: heart RRR, nl S1, S2 no murmur           skin:   No rashes              Ext: 1+ edema B, no erythema, no warmth.       influenza test: negative rapid      CBC WNL      CXR: negative for acute process.       Pt given duoneb in clinic without significant improvement.             Assessment/Plan       1. Tachypnea (R06.82)         89 year old female with uri sx, with increased cough, productive, wtih associated tachypnea, increased work of breathing and having difficulty managing at home.  This likely is viral with pending covid 19 PCR, negative CXR, and exposures to other with uri, can not exclude other etiologies including CHF with superimposed uri, PE, though less likely given anticoagulation (though sub therapeutic), and influenza (despite negaitive rapid influenza and influenza vaccine).  given she is quite tachypneic in clinic, had no response to bronchodilator, and has borderline O2 sats down to 91 %, difficulty managing at home, low Po intake the last several days.   I recommended consideration of further evaluation and management in ED.  I did discuss with hospitalist and he defers consideration of admission for close observation.  I do think she would benefit from further evaluation which may include BNP, BNP, consideration of Chest CT, sepsis work up and possible ABG all out side the scope of urgent care setting.  Pt and daughter agreeable with plan.          Ordered:          CBC Automated w/ Auto Diff (Request), Cough  Fever  Tachypnea          Rapid Flu (Request), Priority: STAT, Cough  Fever  Tachypnea          Review Orders for Potential Authorizations, 12/04/21 8:24:16 CST          XR Chest 2 Views (CXR) (Request), Cough  Tachypnea  Fever                2. Cough (R05.9)         pt with         Ordered:          CBC Automated w/ Auto Diff (Request), Cough  Fever  Tachypnea          Rapid Flu (Request), Priority: STAT, Cough  Fever  Tachypnea          Review Orders for Potential Authorizations, 12/04/21 8:24:16 CST          XR Chest 2 Views  (CXR) (Request), Cough  Tachypnea  Fever                3. Fever (R50.9)         Ordered:          CBC Automated w/ Auto Diff (Request), Cough  Fever  Tachypnea          Rapid Flu (Request), Priority: STAT, Cough  Fever  Tachypnea          Review Orders for Potential Authorizations, 12/04/21 8:24:16 CST          XR Chest 2 Views (CXR) (Request), Cough  Tachypnea  Fever           Patient Information     Name:GERHARD HOWE      Address:      [address] [place] [id number]     Sex:Female     YOB: 1929     [phone]     Emergency Contact:GENTRY PARK     MRN:[id number]     FIN:[id number]     Location:[facility]     Date of Service:12/04/2021      Primary Care Physician:       Hilario Whiting MD, [phone]      Attending Physician:       Asia Brownlee PA-C, [phone]  Problem List/Past Medical History    Ongoing     Allergic rhinitis     Anticoagulated     Anxiety     Benign positional vertigo     Bilateral hearing loss     Carotid artery plaque     Chronic dementia with behavioral disturbance     Diabetic peripheral neuropathy     Dyslipidemia, goal LDL below 100     Frailty     History of mitral valve insufficiency       Comments: Mild by Echo in 2009     Hypothyroidism (acquired)     Long term (current) use of anticoagulants     Obese     Osteoarthritis     Paroxysmal atrial fibrillation     S/P placement of cardiac pacemaker     Seborrheic dermatitis     Seborrheic dermatitis of scalp     Statin intolerance     Symptomatic varicose veins     Tinnitus     Type 2 diabetes mellitus with other circulatory complications     White coat syndrome with hypertension    Historical     Inpatient stay       Comments: @[place], [place] - Atrial fibrillation with an episode of hypotension     Inpatient stay       Comments: @[place], [place] - Vertigo     Inpatient stay       Comments: @[place], [place] - Postoperative  surgical complication involving subcutaneous tissue     Inpatient stay       Comments: [facility], [place] - Benign paroxysmal positional vertigo.  Procedure/Surgical History     Replacement of pacemaker pulse generator (05/30/2019)      Comments: Implanted left pectoral.      Medtronic [id number]      [id number].     Phacoemulsification of cataract with intraocular lens implantation (03/27/2018)      Comments: Left..     Colonoscopy (05/23/2012)      Comments: Unremarkable.     LAVH-BSO (04/06/2012)     Hysteroscopy, D&C, polypectomy (11/04/2011)     Implantation of heart pacemaker (2009)     Replacement of right knee joint (11/2008)     Replacement of left knee joint (12/10/2007)     Flexible sigmoidoscopy (06/13/2006)     Cholecystectomy (1951)     Repair of umbilical hernia (1949)     Appendectomy (1947)     Extracapsular cataract extraction and insertion of intraocular lens      Comments: Right..     Plastic repair of rotator cuff of shoulder     Pregnancy      Comments: full term X 3.  Medications    ACCU-CHECK GUIDE LANCETS, See Instructions, 11 refills    ACCU-CHECK GUIDE TEST STRIPS, See Instructions, 11 refills    acetaminophen    Calcium with vitamin D, 1 tab, Oral, daily    citalopram 10 mg oral tablet, 10 mg= 1 tab(s), Oral, daily, 3 refills    Daily Multiple Vitamins, 1 tab(s), Oral, daily    Fish Oil, 1 tab, Oral, daily    levothyroxine 137 mcg (0.137 mg) oral capsule, 137 mcg= 1 cap(s), Oral, daily, 3 refills    Metoprolol Succinate  mg oral tablet, extended release, 100 mg= 1 tab(s), Oral, daily, 3 refills    PreserVision, 1 cap(s), Oral, bid    SEROquel 25 mg oral tablet, 25 mg= 1 tab(s), Oral, qhs, 3 refills    Vitamin C, 500 mg, Oral, daily    Vitamin D3, 2000 International Unit, Oral, daily    warfarin 5 mg oral tablet, See Instructions, 3 refills  Allergies    Vicodin (rash)    Ancef (Diarrhea)    Calan    ciprofloxacin    phenobarbital (tremors)    probiotic (Rash..)    simvastatin  (GI upset)  Social History    Smoking Status     Never smoker     Alcohol - Denies Alcohol Use      Never     Electronic Cigarette/Vaping      Electronic Cigarette Use: Never.     Employment/School      Retired     Home/Environment      Marital status: .     Substance Abuse - Denies Substance Abuse     Tobacco - Denies Tobacco Use      Never (less than 100 in lifetime)  Family History    CABG - Coronary artery bypass graft: Mother.    CAD - Coronary artery disease: Mother and Father.    Diabetes mellitus: Mother.    Gout: Mother and Brother.    Hypertension: Mother.    Leukemia: Father.    MI - Myocardial infarction: Brother.    MS - Multiple sclerosis: Sister.    Parkinson's disease: Sister.    Valvular heart disease: Brother.  Lab Results       Lab Results (Last 4 results within 90 days)        INR TR: 1.9 (11/24/21 14:34:00)       INR TR: 1.8 (10/15/21 09:15:00)  Immunizations       Scheduled Immunizations       Dose Date(s)       influenza virus vaccine, inactivated       10/11/2013, 09/23/2021       pneumococcal (PPSV23)       01/08/2001       SARS-CoV-2 (COVID-19) Moderna-1273       10/27/2021       Td       01/12/2012       zoster vaccine, inactivated       10/15/2019, 12/16/2019       Other Immunizations               influenza virus vaccine, inactivated       10/07/2010, 10/08/2011, 10/09/2012, 10/09/2014, 10/07/2015, 01/24/2017, 09/01/2017, 10/10/2018, 09/05/2019, 10/05/2020       pneumococcal (PPSV23)       04/24/2006       ZOS, shingles       11/24/2015       influenza, H1N1, inactivated       12/18/2009       pneumococcal (PCV13)       03/19/2015       SARS-CoV-2 (COVID-19) Moderna-1273       02/18/2021, 03/18/2021

## 2022-02-15 NOTE — PROGRESS NOTES
Patient:   GERHARD HOWE            MRN: [id number]            FIN: [id number]               Age:   89 years     Sex:  Female     :  11/10/1929   Associated Diagnoses:   S/P placement of cardiac pacemaker; Hypothyroidism (acquired); Carotid artery plaque; Falls; Diabetic peripheral neuropathy; Long term (current) use of anticoagulants; Paroxysmal atrial fibrillation; White coat syndrome with hypertension; Diabetes mellitus type II, controlled   Author:   Hilario Whiting MD      Visit Information   Visit type:  Scheduled follow-up.    Accompanied by:  Family member.    Source of history:  Self, Family member.    History limitation:  None.       Chief Complaint   2019 9:35 AM CST     HTN check.      History of Present Illness    The patient is here with her daughter for follow up visit.  She has had a couple of falls in the past year; one was tripping over a vacuum hose and another she took a step backwards and fell though neither with any serious injury.  She has had no hyper or hypoglycemia symptoms.  She gets occasional bouts of atrial fibrillation.     She brings her home blood pressure readings, which show excellent control of hypertension.  She will be 89 this year and is living on her own.     After her cataract surgery she feels her vision is not quite what she would like it to be and I recommended she follow up with Dr. Llanes.  She does have diabetic peripheral neuropathy.           Review of Systems   Constitutional:  No weakness, No fatigue, No decreased activity.    Eye:  Negative except as documented in history of present illness.    Respiratory:  No shortness of breath, No cough.    Cardiovascular:  No chest pain, No peripheral edema.    Gastrointestinal:  No nausea, No vomiting, No diarrhea.    Genitourinary:  No dysuria, No hematuria.    Hematology/Lymphatics:  No bruising tendency, No bleeding tendency.    Endocrine:  Negative except as documented in history of present illness.     Musculoskeletal:  Negative.    Neurologic:  Negative except as documented in history of present illness.       Health Status   Allergies:    Allergic Reactions (Selected)  Moderate  Vicodin (Rash)  Severity Not Documented  Ancef (Diarrhea)  Calan (No reactions were documented)  Ciprofloxacin (No reactions were documented)  Phenobarbital (Tremors)  Probiotic (Rash..)  Simvastatin (Gi upset)   Medications:  (Selected)   Prescriptions  Prescribed  Flonase 50 mcg/inh nasal spray: = 2 spray(s), nasal, daily, # 1 EA, 11 Refill(s), Type: Maintenance, Pharmacy: [facility] [id number], 2 spray(s) Nasal daily  Metoprolol Succinate  mg oral tablet, extended release: See Instructions, Instructions: TAKE ONE TABLET BY MOUTH EVERY DAY, # 90 tab(s), 3 Refill(s), Type: Soft Stop, Pharmacy: [facility] [address], TAKE ONE TABLET BY MOUTH EVERY DAY  digoxin 125 mcg (0.125 mg) oral tablet: = 1 tab(s) ( 125 mcg ), po, daily, # 90 tab(s), 3 Refill(s), Type: Maintenance, Pharmacy: [facility] [address], 1 tab(s) Oral daily  levothyroxine 125 mcg (0.125 mg) oral tablet: = 1 tab(s) ( 125 mcg ), po, daily, # 90 tab(s), 3 Refill(s), Type: Soft Stop, Pharmacy: [facility] [address], 1 tab(s) Oral daily,x90 day(s)  pravastatin 10 mg oral tablet: = 1 tab(s) ( 10 mg ), PO, Daily, # 90 tab(s), 3 Refill(s), Type: Maintenance, Pharmacy: [facility] [address], 1 tab(s) Oral daily  warfarin 5 mg oral tablet: See Instructions, Instructions: 1.5 tabs Fridays and one every other day., # 100 EA, 3 Refill(s), Type: Soft Stop, Pharmacy: [facility] [address], 1.5 tabs Fridays and one every other day.   Problem list:    All Problems  Allergic rhinitis / SNOMED CT 530516944 / Confirmed  Benign positional vertigo / SNOMED CT 590707910 / Confirmed  Bilateral hearing loss / SNOMED CT 069239143 / Confirmed  Carotid artery plaque / SNOMED CT 580655602 / Confirmed  Diabetic peripheral neuropathy / [facility] CT 7654511815 /  Confirmed  Dyslipidemia, goal LDL below 100 / SNOMED CT 95459406 / Confirmed  Frailty / SNOMED CT 691134545 / Confirmed  History of mitral valve insufficiency / SNOMED CT 407420283 / Confirmed  Hypothyroidism (acquired) / SNOMED CT 858954749 / Confirmed  Long term (current) use of anticoagulants / SNOMED CT 513949547 / Confirmed  Obese / SNOMED CT 5909971739 / Probable  Osteoarthritis / SNOMED CT 2275264600 / Confirmed  Paroxysmal atrial fibrillation / SNOMED CT 727769326 / Confirmed  S/P placement of cardiac pacemaker / SNOMED CT 454990723 / Confirmed  Seborrheic dermatitis / SNOMED CT 27179208 / Confirmed  Statin intolerance / SNOMED CT 56646717 / Confirmed  Symptomatic varicose veins / SNOMED CT 630094132 / Confirmed  Tinnitus / SNOMED CT 858198330 / Confirmed  Type 2 diabetes mellitus with other circulatory complications / SNOMED CT 814018078 / Confirmed  White coat syndrome with hypertension / SNOMED CT 5129168121 / Confirmed  Resolved: *Hospitalized@[facility] - Atrial fibrillation with an episode of hypotension  Resolved: *Hospitalized@[facility] - Vertigo  Canceled: Diabetic Neuropathy, Type II Diabetes Mellitus / ICD-9-.60  Canceled: Hypertension / SNOMED CT 4435735559  Canceled: Rhinitis, chronic / SNOMED CT 885556915  Canceled: White coat hypertension / SNOMED CT 1158780721  Canceled: White coat syndrome with hypertension / SNOMED CT 6367763800      Histories   Past Medical History:    Active  Type 2 diabetes mellitus with other circulatory complications (808343086): Onset on 1/1/1999 at 69 years.  Osteoarthritis (4282199683)  Dyslipidemia, goal LDL below 100 (62750824)  Paroxysmal atrial fibrillation (670347886)  Hypothyroidism (acquired) (043361075)  Allergic rhinitis (426829117)  Obese (2385962066)  Benign positional vertigo (583976623)  Statin intolerance (28292662)  Long term (current) use of anticoagulants (358824126)  Bilateral hearing loss (061334239)  Tinnitus (118481517)  S/P placement of cardiac  pacemaker (740467172)  White coat syndrome with hypertension (2497223222)  Resolved  *Hospitalized@[facility] - Vertigo: Onset on 2012 at 83 years.  Resolved.  *Hospitalized@[facility] - Atrial fibrillation with an episode of hypotension: Onset on 2010 at 80 years.  Resolved.   Family History:    Leukemia  Father ()  Gout  Brother  Mother ()  Diabetes mellitus  Mother ()  CA - Lung cancer  Spouse ()  Hypertension  Mother ()  Parkinson's disease  Sister  Valvular heart disease  Brother ()  MS - Multiple sclerosis  Sister  CABG - Coronary artery bypass graft  Mother ()  MI - Myocardial infarction  Brother ()  CAD - Coronary artery disease  Father ()  Mother ()     Procedure history:    Phacoemulsification of cataract with intraocular lens implantation (0299747435) on 3/27/2018 at 88 Years.  Comments:  2018 9:31 AM CDT - Lucy Dawson.  Colonoscopy (845535850) on 2012 at 82 Years.  Comments:  2012 8:59 AM CDT - Henok GANDARA, Hilario KIM-BSO on 2012 at 82 Years.  Hysteroscopy, D&C, polypectomy on 2011 at 81 Years.  Implantation of heart pacemaker (1151928373) in  at 80 Years.  Replacement of right knee joint (6080187192) in the month of 2008 at 78 Years.  Replacement of left knee joint (9588329709) on 12/10/2007 at 78 Years.  Flexible sigmoidoscopy (08735426) on 2006 at 76 Years.  Cholecystectomy (50969295) in 1951 at 22 Years.  Repair of umbilical hernia (68647205) in 1949 at 20 Years.  Appendectomy (955522003) in 1947 at 18 Years.  Plastic repair of rotator cuff of shoulder (007942117).  Pregnancy (051580170).  Comments:  2010 9:08 AM CDT - Vaishali Cortés  full term X 3  Extracapsular cataract extraction and insertion of intraocular lens (408189670).  Comments:  2018 1:14 PM CDT - Lucy Dawson.   Social History:        Alcohol Assessment: Denies Alcohol Use             Never      Tobacco Assessment: Denies Tobacco Use      Substance Abuse Assessment: Denies Substance Abuse      Employment and Education Assessment            Retired      Home and Environment Assessment            Marital status: .      Physical Examination   Vital Signs   2/7/2019 9:35 AM CST Temperature Tympanic 97.4 DegF  LOW    Peripheral Pulse Rate 74 bpm    HR Method Electronic    Systolic Blood Pressure 130 mmHg    Diastolic Blood Pressure 68 mmHg    Mean Arterial Pressure 89 mmHg    BP Site Right arm    BP Method Manual    Oxygen Saturation 98 %      Measurements from flowsheet : Measurements   2/7/2019 9:35 AM CST     Weight Measured - Standard                148.08 lb     General:  Alert and oriented, No acute distress.    Eye:  Normal conjunctiva.    HENT:  Normocephalic, Normal hearing.    Neck:  Supple, Non-tender, No carotid bruit, No lymphadenopathy, No thyromegaly.    Respiratory:  Lungs are clear to auscultation, Respirations are non-labored.    Cardiovascular:  Normal rate, Regular rhythm, No murmur, No gallop, Normal peripheral perfusion, No edema.    Musculoskeletal:  Normal gait.    Integumentary:  No pallor.    Feet:  Normal by visual exam, Normal pulses, absent monofilament, and decreased vibration.    Neurologic:  Normal motor function, Cranial Nerves II-XII are grossly intact, Normal Romberg.    Cognition and Speech:  Speech clear and coherent, Functional cognition intact.    Psychiatric:  Cooperative, Appropriate mood & affect.       Review / Management   Results review:  Lab results   1/24/2019 3:18 PM CST INR 2.1   1/9/2019 1:50 PM CST INR 1.8   12/24/2018 10:14 AM CST Protime 20.9    INR 1.9   12/24/2018 10:05 AM CST Hgb A1c 7.2  HI    U Microalbumin 0.5 mg/dL    Ur Creatinine 84 mg/dL    Ur Microalbumin/Creatinine Ratio 6   .       Impression and Plan   Diagnosis     S/P placement of cardiac pacemaker (ZGF49-RO Z95.0).     Hypothyroidism (acquired) (KFF66-YB E03.9).      Carotid artery plaque (SJB14-XG I65.29).     Falls (AUS24-NC W19.XXXA).     Diabetic peripheral neuropathy (XVR29-PG E11.42).     Long term (current) use of anticoagulants (TTJ32-XW Z79.01).     Paroxysmal atrial fibrillation (HPP82-NO I48.0).     White coat syndrome with hypertension (QEZ05-ZE I10).     Diabetes mellitus type II, controlled (JZM14-NH E11.9).     Course:  Progressing as expected.    Patient Instructions:       Counseled: Patient, Family, Regarding medications, Diet, Activity, Verbalized understanding.    Orders     Orders (Selected)   Outpatient Orders  Ordered  Physical Therapy (Request): Instructions: Eval and treat, Falls  RTC (Request): RFV: Wellness, diabetes, Return in 6 months, Instructions: lab before visit  Return to Clinic (Request): RFV: Cardiology follow-up with Dr. Cuadra, Return in 1 year  Return to Clinic (Request): Return in 4 mo w/ D. Sawyer bring meter and log  Return to Office (Request): RFV: Hgb A1c q3-6. BMP, CBC, TSH, lipids, BENITO q12.  Prescriptions  Prescribed  Flonase 50 mcg/inh nasal spray: = 2 spray(s), nasal, daily, # 1 EA, 11 Refill(s), Type: Maintenance, Pharmacy: [facility] [address], 2 spray(s) Nasal daily  Metoprolol Succinate  mg oral tablet, extended release: See Instructions, Instructions: TAKE ONE TABLET BY MOUTH EVERY DAY, # 90 tab(s), 3 Refill(s), Type: Soft Stop, Pharmacy: [facility] [address], TAKE ONE TABLET BY MOUTH EVERY DAY  digoxin 125 mcg (0.125 mg) oral tablet: = 1 tab(s) ( 125 mcg ), po, daily, # 90 tab(s), 3 Refill(s), Type: Maintenance, Pharmacy: [facility] [address], 1 tab(s) Oral daily  levothyroxine 125 mcg (0.125 mg) oral tablet: = 1 tab(s) ( 125 mcg ), po, daily, # 90 tab(s), 3 Refill(s), Type: Soft Stop, Pharmacy: [facility] [address], 1 tab(s) Oral daily,x90 day(s)  pravastatin 10 mg oral tablet: = 1 tab(s) ( 10 mg ), PO, Daily, # 90 tab(s), 3 Refill(s), Type: Maintenance, Pharmacy: [facility] [address], 1  tab(s) Oral daily  warfarin 5 mg oral tablet: See Instructions, Instructions: 1.5 tabs Fridays and one every other day., # 100 EA, 3 Refill(s), Type: Soft Stop, Pharmacy: [facility] [address], 1.5 tabs Fridays and one every other day..

## 2022-02-15 NOTE — TELEPHONE ENCOUNTER
---------------------  From: Jennifer Echevarria LPN (Phone Messages Pool ([id number]))   Sent: 8/27/2021 1:57:46 PM CDT  Subject: CT result     Phone Message    PCP:   NINFA      Time of Call:  1:54pm       Person Calling:  pt daughter Mj WARE on file    Note:   Joanna calling stating pt had CT of her head done over a week ago and they have not heard any results yet.     Informed Joanna result letter was mailed out 8/19- she has not received this yet. Verified address and it is correct.  Informed of negative CT result. She has no other questions.    Last office visit and reason:  8/17/21 Office Visit Note

## 2022-02-15 NOTE — LETTER
(Inserted Image. Unable to display)   October 28, 2020        GERHARDAARON HOWE  [address]  [place], [place] [id number]        Dear GERHARD,      Thank you for selecting [facility] for your healthcare needs.    Our records indicate you are due for the following services:     Non-Fasting Labs    If you had your labs done at another facility or with Direct Access Lab Testing at [facility], please bring in a copy of the results to your next visit, mail a copy, or drop off a copy of your results to your Healthcare Provider.    (FYI   Regarding office visits: In some instances, a video visit or telephone visit may be offered as an option.)    To schedule an appointment or if you have further questions, please contact your clinic at [phone].      Powered by [employer]    Sincerely,    Hilario Whiting M.D., [facility]

## 2022-02-15 NOTE — PROGRESS NOTES
Patient:   GERHARD HOWE            MRN: [id number]            FIN: [id number]               Age:   88 years     Sex:  Female     :  11/10/1929   Associated Diagnoses:   Diabetes Mellitus Type II, Controlled; Dyslipidemia, goal LDL below 100   Author:   Ashley Sawyer      Visit Information   Visit type:  Medical Nutrition Therapy for Diabetes Management .    Referral source:  Hilario Whiting MD.       Chief Complaint   DM Type II controlled, Dyslipidemia (LDL goal <100)      History of Present Illness   Discussed nutrition, diabetes, and lifestyle management with pt.    Nutrition:  Pt continues to eat most evening meals at her daughters and will have leftovers for the noon meal.  Purchases fresh and organic foods.  AM is an egg and whole grain toast or oatmeal, Enjoys tuna, egg, whole grain, fruits, and some vegetables.  Minimal sweets    Physical Activity:walking 20 min/ day around her house with walker or occ outdoors with walker   Stress:   low  Sleep: good   Support: family   BG Testing: am testing 14 day avg 160 mg/dL out of 13 readings - higher (150 - 203 range in am)   DM related medication: none at this time, pt would like to avoid medication and continue to monitor A1C   Routine diabetes care: eye exam UTD with Dr. Llanes 18, and dentist exam 2x/ year, influenza vaccine q fall,  feet examined with MD, no open elanye/ skin issues, influenza vaccine yearly, pt did drop something on her R great toe and her toenail growing out nicely - no sign of infection - not painful   Estimated Average Glucose (eAG) 149 mg/dL with A1C of 6.8% on 18      Review of Systems             Health Status   Allergies:    Allergic Reactions (Selected)  Moderate  Vicodin (Rash)  Severity Not Documented  Ancef (Diarrhea)  Calan (No reactions were documented)  Ciprofloxacin (No reactions were documented)  Phenobarbital (Tremors)  Probiotic (Rash..)  Simvastatin (Gi upset)   Medications:  (Selected)    Prescriptions  Prescribed  Flonase 50 mcg/inh nasal spray: 2 spray(s), nasal, daily, # 1 EA, 11 Refill(s), Type: Maintenance, Pharmacy: [id number], 2 spray(s) nasal daily  Metoprolol Succinate  mg oral tablet, extended release: See Instructions, Instructions: TAKE ONE TABLET BY MOUTH EVERY DAY, # 90 tab(s), 3 Refill(s), Type: Soft Stop, Pharmacy: [id number], TAKE ONE TABLET BY MOUTH EVERY DAY  digoxin 125 mcg (0.125 mg) oral tablet: 1 tab(s) ( 125 mcg ), po, daily, # 90 tab(s), 3 Refill(s), Type: Maintenance, Pharmacy: [id number], 1 tab(s) po daily  levothyroxine 125 mcg (0.125 mg) oral tablet: 1 tab(s) ( 125 mcg ), po, daily, # 90 tab(s), 3 Refill(s), Type: Soft Stop, Pharmacy: [facility] [id number], 1 tab(s) po daily,x90 day(s)  pravastatin 10 mg oral tablet: 1 tab(s) ( 10 mg ), PO, Daily, # 90 tab(s), 3 Refill(s), Type: Maintenance, Pharmacy: [facility] [id number], 1 tab(s) po daily  warfarin 5 mg oral tablet: 1 tab(s) ( 5 mg ), po, daily, # 90 tab(s), 3 Refill(s), Type: Soft Stop, Pharmacy: [id number], 1 tab(s) po daily,x90 day(s)   Problem list:    All Problems  Symptomatic varicose veins / SNOMED CT 028541059 / Confirmed  Type 2 diabetes mellitus with other circulatory complications / SNOMED CT 638301243 / Confirmed  Tinnitus / SNOMED CT 207259371 / Confirmed  Seborrheic dermatitis / SNOMED CT 06532901 / Confirmed  Paroxysmal atrial fibrillation / SNOMED CT 745958822 / Confirmed  Osteoarthritis / SNOMED CT 3154030004 / Confirmed  Obese / SNOMED CT 1858847971 / Probable  White coat syndrome with hypertension / SNOMED CT 5938980734 / Confirmed  Dyslipidemia, goal LDL below 100 / SNOMED CT 35819265 / Confirmed  History of mitral valve insufficiency / SNOMED CT 352877066 / Confirmed  S/P placement of cardiac pacemaker / SNOMED CT 027768596 / Confirmed  Frailty / SNOMED CT 624638629 / Confirmed  Long term (current) use of  anticoagulants / SNOMED CT 564785513 / Confirmed  Statin intolerance / SNOMED CT 96931657 / Confirmed  Carotid artery plaque / SNOMED CT 032835200 / Confirmed  Bilateral hearing loss / SNOMED CT 410020245 / Confirmed  Benign positional vertigo / SNOMED CT 889550144 / Confirmed  Allergic rhinitis / SNOMED CT 899417482 / Confirmed  Hypothyroidism (acquired) / SNOMED CT 949532117 / Confirmed  Resolved: *Hospitalized@[facility] - Vertigo  Resolved: *Hospitalized@[facility] - Atrial fibrillation with an episode of hypotension  Canceled: Hypertension / SNOMED CT 2947873385  Canceled: White coat syndrome with hypertension / SNOMED CT 6183582777  Canceled: White coat hypertension / SNOMED CT 4819554400  Canceled: Diabetic Neuropathy, Type II Diabetes Mellitus / ICD-9-.60  Canceled: Rhinitis, chronic / SNOMED CT 279990327      Histories   Past Medical History:    Active  Type 2 diabetes mellitus with other circulatory complications (502679777): Onset on 1999 at 69 years.  Osteoarthritis (5619177439)  Dyslipidemia, goal LDL below 100 (90880342)  Paroxysmal atrial fibrillation (805760535)  Hypothyroidism (acquired) (226448595)  Allergic rhinitis (824246766)  Obese (9120068422)  Benign positional vertigo (259897908)  Statin intolerance (08675935)  Long term (current) use of anticoagulants (269040357)  Bilateral hearing loss (655372357)  Tinnitus (304674694)  S/P placement of cardiac pacemaker (026825179)  White coat syndrome with hypertension (7110537226)  Resolved  *Hospitalized@[facility] - Vertigo: Onset on 2012 at 83 years.  Resolved.  *Hospitalized@[facility] - Atrial fibrillation with an episode of hypotension: Onset on 2010 at 80 years.  Resolved.   Family History:    Leukemia  Father ()  Gout  Brother  Mother ()  Diabetes mellitus  Mother ()  CA - Lung cancer  Spouse ()  Hypertension  Mother ()  Parkinson's disease  Sister  Valvular heart disease  Brother ()  MS - Multiple  sclerosis  Sister  CABG - Coronary artery bypass graft  Mother ()  MI - Myocardial infarction  Brother ()  CAD - Coronary artery disease  Father ()  Mother ()     Procedure history:    Phacoemulsification of cataract with intraocular lens implantation (SNOMED CT 3452223902) performed by Andrew Llanes MD on 3/27/2018 at 88 Years.  Comments:  2018 9:31 AM - Samir , Lucy  Left.  Colonoscopy (SNOMED CT 004747536) on 2012 at 82 Years.  Comments:  2012 8:59 AM - Hilario Whiting MD  Unremarkable  LAVH-BSO performed by Nba Alcaraz MD on 2012 at 82 Years.  Hysteroscopy, D&C, polypectomy performed by Nba Alcaraz MD on 2011 at 81 Years.  Implantation of heart pacemaker (SNOMED CT 4868106432) performed by Dr. Lewis Mejia in  at 80 Years.  Replacement of right knee joint (SNOMED CT 0182954279) in the month of 2008 at 78 Years.  Replacement of left knee joint (SNOMED CT 9112132100) on 12/10/2007 at 78 Years.  Flexible sigmoidoscopy (SNOMED CT 45728366) performed by Hilario Whiting MD on 2006 at 76 Years.  Cholecystectomy (SNOMED CT 42510906) in 1951 at 22 Years.  Repair of umbilical hernia (SNOMED CT 41530454) in 1949 at 20 Years.  Appendectomy (SNOMED CT 150980077) in 1947 at 18 Years.  Plastic repair of rotator cuff of shoulder (SNOMED CT 239055778).  Pregnancy (SNOMED CT 538472327).  Comments:  2010 9:08 AM - Vaishali Cortés  full term X 3  Extracapsular cataract extraction and insertion of intraocular lens (SNOMED CT 969830964) performed by Andrew Llanes MD.  Comments:  2018 1:14 PM - Lucy Dawson.   Social History:        Alcohol Assessment: Denies Alcohol Use            Never      Tobacco Assessment: Denies Tobacco Use      Substance Abuse Assessment: Denies Substance Abuse      Employment and Education Assessment            Retired      Home and Environment Assessment            Marital status: .        Physical  Examination   Measurements from flowsheet : Measurements   11/8/2018 10:56 AM CST Height Measured - Standard 64 in    Weight Measured - Standard 153.4 lb    BSA 1.77 m2    Body Mass Index 26.33 kg/m2  HI         Health Maintenance      Recommendations     Pending (in the next year)        OverDue           Depression Screen (Female) due  05/08/18  and every 1  year(s)           DM - HgbA1c due  09/22/18  and every 3  month(s)        Due            DM - Communication with Managing Provider due  11/08/18  and every 1  year(s)           Fall Risk Screen (Female) due  11/08/18  and every 1  year(s)           Osteoporosis Screen due  11/08/18  and every 2  year(s)        Near Due            DM - Microalbumin near due  11/24/18  and every 1  year(s)        Due In Future            Lipid Disorders Screen (Female) not due until  03/22/19  and every 1  year(s)           DM - Eye Exam not due until  06/22/19  and every 1  year(s)           Type 2 Diabetes Mellitus Screen (Female) not due until  06/22/19  and every 1  year(s)           DM - Foot Exam not due until  07/09/19  and every 1  year(s)           Tobacco Use Screen (Female) not due until  08/14/19  and every 1  year(s)           Influenza Vaccine not due until  10/10/19  and every 1  year(s)           High Blood Pressure Screen (Female) not due until  10/10/19  and every 1  year(s)     Satisfied (in the past 1 year)        Satisfied            Body Mass Index Check (Female) on  11/08/18.           Body Mass Index Check (Female) on  08/14/18.           Body Mass Index Check (Female) on  07/09/18.           Body Mass Index Check (Female) on  07/05/18.           Body Mass Index Check (Female) on  05/22/18.           Body Mass Index Check (Female) on  05/09/18.           Body Mass Index Check (Female) on  05/04/18.           Body Mass Index Check (Female) on  03/22/18.           Body Mass Index Check (Female) on  03/13/18.           Body Mass Index Check (Female) on   02/08/18.           Body Mass Index Check (Female) on  11/30/17.           DM - Eye Exam on  06/22/18.           DM - Eye Exam on  02/22/18.           DM - Foot Exam on  07/09/18.           DM - Foot Exam on  03/22/18.           DM - Foot Exam on  11/30/17.           DM - HgbA1c on  06/22/18.           DM - HgbA1c on  03/22/18.           DM - HgbA1c on  11/24/17.           DM - Microalbumin on  11/24/17.           DM - Microalbumin on  11/24/17.           High Blood Pressure Screen (Female) on  10/10/18.           High Blood Pressure Screen (Female) on  09/26/18.           High Blood Pressure Screen (Female) on  08/28/18.           High Blood Pressure Screen (Female) on  08/14/18.           High Blood Pressure Screen (Female) on  08/14/18.           High Blood Pressure Screen (Female) on  08/14/18.           High Blood Pressure Screen (Female) on  07/17/18.           High Blood Pressure Screen (Female) on  07/09/18.           High Blood Pressure Screen (Female) on  07/05/18.           High Blood Pressure Screen (Female) on  06/22/18.           High Blood Pressure Screen (Female) on  06/22/18.           High Blood Pressure Screen (Female) on  06/06/18.           High Blood Pressure Screen (Female) on  05/22/18.           High Blood Pressure Screen (Female) on  05/09/18.           High Blood Pressure Screen (Female) on  05/09/18.           High Blood Pressure Screen (Female) on  05/04/18.           High Blood Pressure Screen (Female) on  04/09/18.           High Blood Pressure Screen (Female) on  03/22/18.           High Blood Pressure Screen (Female) on  03/13/18.           High Blood Pressure Screen (Female) on  02/26/18.           High Blood Pressure Screen (Female) on  01/29/18.           High Blood Pressure Screen (Female) on  11/30/17.           High Blood Pressure Screen (Female) on  11/30/17.           High Blood Pressure Screen (Female) on  11/24/17.           High Blood Pressure Screen (Female) on   11/24/17.           High Blood Pressure Screen (Female) on  11/17/17.           Influenza Vaccine on  10/10/18.           Lipid Disorders Screen (Female) on  03/22/18.           Lipid Disorders Screen (Female) on  03/22/18.           Lipid Disorders Screen (Female) on  03/22/18.           Lipid Disorders Screen (Female) on  03/22/18.           Obesity Screen and Counseling (Female) on  11/08/18.           Obesity Screen and Counseling (Female) on  08/14/18.           Obesity Screen and Counseling (Female) on  07/09/18.           Obesity Screen and Counseling (Female) on  07/05/18.           Obesity Screen and Counseling (Female) on  05/22/18.           Obesity Screen and Counseling (Female) on  05/09/18.           Obesity Screen and Counseling (Female) on  05/04/18.           Obesity Screen and Counseling (Female) on  03/22/18.           Obesity Screen and Counseling (Female) on  03/13/18.           Obesity Screen and Counseling (Female) on  02/08/18.           Obesity Screen and Counseling (Female) on  11/30/17.           Tobacco Use Screen (Female) on  08/14/18.           Tobacco Use Screen (Female) on  05/09/18.           Tobacco Use Screen (Female) on  05/04/18.           Tobacco Use Screen (Female) on  03/22/18.           Tobacco Use Screen (Female) on  03/13/18.           Tobacco Use Screen (Female) on  11/30/17.           Type 2 Diabetes Mellitus Screen (Female) on  06/22/18.           Type 2 Diabetes Mellitus Screen (Female) on  03/22/18.           Type 2 Diabetes Mellitus Screen (Female) on  11/24/17.          Review / Management   Results review:  Lab results   10/10/2018 8:12 AM CDT INR 2.5   9/26/2018 9:15 AM CDT Protime 34.7    INR 3.5   8/28/2018 8:55 AM CDT INR 2.6   8/14/2018 8:25 AM CDT INR 2.2   7/31/2018 8:16 AM CDT INR 4.5   .       Impression and Plan   Diagnosis     Diabetes Mellitus Type II, Controlled (SHF43-XV E11.9).     Dyslipidemia, goal LDL below 100 ([id number]-PF E78.5).       Counseled:  Patient,  Review of instruction on AADE7 Self Care Behaviors;   Healthy Eating - Reviewed carbohydrate controlled meal plan.  Discussed importance of controlling carb intake if she would like to stay off DM meds.  Pt's intake of starches is higher and pt is recommended to continue consuming high fiber but smaller portion and increase intake of nonstarchy vegetables.  Diabetic plate method as a general rule, basic reading food labels, appropriate portion sizes, well balanced meals.  Patient is provided with snack and meal ideas to incorporate dietary recommendations, meal planning and preperation.  Reviewed education on dietary sources of omega 3 FA and soluble fiber 10gm/d ay to help maintain good LDL cholesterol.  Discussed ideas for snacks  Being Active - Education on how exercise helps with : encouraged 15 walk BID   - lowering BG by increasing the muscles ability to take up and use glucose   - weight loss   - healthier heart (improve lipid profile)   - improve sleep, mood, energy   - decrease stress  Monitoring - Reviewed recommended testing schedule and blood glucose target levels.  Again encouraged to test pre/ 2 hr pp the largest meal of the day a couple times/ week for pt to understand how intake significantly affects glucose readings.  Highlighted BG logbook again to help pt understand testing schedule.    Taking Medication - Discussed potential need to add medication in the future with the natural progression of diabetes and pt does not want medication.  Pt is willing to continue to adjust diet and lifestyle.    Problem Solving -  Pt believes she needs to avoid most greens because of warfarin - educational handout again discussed with pt.  List of foods provided with Vit K content - discussed keeping food intake stable.  medical support team assistance, resources provided (written); good control of stress  Healthy Coping - support of friends, family, and medical support team   Reducing Risks - Detailed  education on potential complications associated with uncontrolled diabetes and prevention recommendations.  Recommendations include: annual eye exam, good oral cares with brushing BID and flossing daily, routine dental apts, good foot care tips and shoewear - discussed monofilament test yearly.  Importance of adequate sleep and good control of BG to prevent developing complications related to uncontrolled DM including nephropathy, neuropathy, retinopathy, and cardiovascular disease.  Weight loss goal of 7% of current body weight pounds as a reasonable goal to help increase insulin sensitivity      Goals:   1.  Practice healthy stress management and get good quality sleep with the goal of 7-8 hours per night.    2.   Physical activity: Recommend increasing chair exercises, walking in place, stay active throughout the day  3.  Eat in a healthy way, per diabetic plate method.  Nutrition reference: Eat 3 meals a day; snacks are optional.  A meal is three or more food groups; make it colorful for better nutrition.  Incorporate TLC dietary heart healthy guidelines.  **continue to increasing fiber in diet, more vegeterian meals, fish 2+x/ week)  4.  Read handouts provided.  F/u 4 month  5.  Pt to monitor BG BID on 2 - 3 days/ week.  BG goals: Fasting and before meals 80 - 130 mg/dL, 2 hrs after the start of a meal 80 - 160 mg/dL.           Professional Services   Time spent with pt 60 min   champ OCASIO

## 2022-02-15 NOTE — PROGRESS NOTES
Patient:   GERHARD HOWE            MRN: [id number]            FIN: [id number]               Age:   89 years     Sex:  Female     :  11/10/1929   Associated Diagnoses:   Acute viral syndrome; Chronic dementia with behavioral disturbance   Author:   Shimon Nesbitt MD      Visit Information      Date of Service: 2021 01:40 pm  Performing Location: [facility]  Encounter#: [id number]      Primary Care Provider (PCP):  Hilario Whiting MD    NPI# [id number]      Referring Provider:  Shimon Nesbitt MD    NPI# [id number]      Chief Complaint   2021 1:48 PM CST    [facility] ED f/u  for tachypnea, wheezing, hypoxemia. COVID negative. Rx'd doxy 100mg BID x 5 days and Prednisone 20mg x 3 days. Stopped Prednisone yesterday d/t SE.        History of Present Illness   Is here for ER follow-up.  She was seen over the weekend with viral upper respiratory infection.  Discharged on steroids which she did not tolerate major dementia worse albuterol doxycycline.  She is doing better.  Still has some cough.  No fevers.  Her daughter is a caretaker has similar symptoms now.  Patient had negative Covid test x2 and negative cardiac work-up.         Review of Systems   Constitutional:  Negative except as documented in history of present illness.    Ear/Nose/Mouth/Throat:  Negative except as documented in history of present illness.    Respiratory:  Negative except as documented in history of present illness.    Cardiovascular:  Negative.    Neurologic:  Negative except as documented in history of present illness.       Health Status   Allergies:    Allergic Reactions (Selected)  Moderate  Vicodin (Rash)  Severity Not Documented  Ancef (Diarrhea)  Calan (No reactions were documented)  Ciprofloxacin (No reactions were documented)  Phenobarbital (Tremors)  Probiotic (Rash..)  Simvastatin (Gi upset)   Medications:  (Selected)   Prescriptions  Prescribed  ACCU-CHECK GUIDE LANCETS: ACCU-CHECK GUIDE LANCETS,  See Instructions, Instructions: TEST BLOOD SUGAR DAILY, Supply, # 100 EA, 11 Refill(s), Type: Maintenance, Pharmacy: [id number], TEST BLOOD SUGAR DAILY, 64, in, 03/17/20 8:54:00 CDT, Height Measured, 164, lb,...  ACCU-CHECK GUIDE TEST STRIPS: ACCU-CHECK GUIDE TEST STRIPS, See Instructions, Instructions: TEST BLOOD SUGAR DAILY, Supply, # 100 EA, 11 Refill(s), Type: Maintenance, Pharmacy: [facility] #[id number], TEST BLOOD SUGAR DAILY, 64, in, 03/17/20 8:54:00 CDT, Height Measured, 164,...  Metoprolol Succinate  mg oral tablet, extended release: = 1 tab(s) ( 100 mg ), Oral, daily, # 90 tab(s), 3 Refill(s), Type: Soft Stop, Pharmacy: [facility], 1 tab(s) Oral daily, 64, in, 08/17/21 14:28:00 CDT, Height Measured, 186.2, lb, 08/17/21 14:05:00 CDT, Weight Measured  SEROquel 25 mg oral tablet: = 1 tab(s) ( 25 mg ), Oral, qhs, # 90 tab(s), 3 Refill(s), Type: Maintenance, Pharmacy: [facility], 1 tab(s) Oral qhs, 64, in, 08/17/21 14:28:00 CDT, Height Measured, 186.2, lb, 08/17/21 14:05:00 CDT, Weight Measured  citalopram 10 mg oral tablet: = 1 tab(s) ( 10 mg ), Oral, daily, # 90 tab(s), 3 Refill(s), Type: Maintenance, Pharmacy: [facility], 1 tab(s) Oral daily, 64, in, 08/17/21 14:28:00 CDT, Height Measured, 186.2, lb, 08/17/21 14:05:00 CDT, Weight Measured  levothyroxine 137 mcg (0.137 mg) oral capsule: = 1 cap(s) ( 137 mcg ), Oral, daily, # 90 cap(s), 3 Refill(s), Type: Maintenance, Pharmacy: [facility], 1 cap(s) Oral daily, 64, in, 08/17/21 14:28:00 CDT, Height Measured, 186.2, lb, 08/17/21 14:05:00 CDT, Weight Measured  warfarin 5 mg oral tablet: See Instructions, Instructions: 7.5mg Sun/Tues/Thurs and 5mg rest of days, # 120 EA, 3 Refill(s), Type: Maintenance, Pharmacy: [facility], 7.5mg Sun/Tues/Thurs and 5mg rest of days, 64, in, 08/17/21 14:28:00 CDT, Height  Measur...  Documented Medications  Documented  Calcium with vitamin D: Calcium with vitamin D, 1 tab, Oral, daily, 0 Refill(s), Type: Maintenance  Daily Multiple Vitamins: 1 tab(s), Oral, daily, 0 Refill(s), Type: Maintenance  Fish Oil: 1 tab, Oral, daily, 0 Refill(s), Type: Maintenance  PreserVision: 1 cap(s), Oral, bid, 0 Refill(s), Type: Maintenance  Vitamin C: ( 500 mg ), Oral, daily, 0 Refill(s), Type: Maintenance  Vitamin D3: ( 2,000 International Unit ), Oral, daily, 0 Refill(s), Type: Maintenance  acetaminophen: 0 Refill(s), Type: Maintenance   Problem list:    All Problems (Selected)  Type 2 diabetes mellitus with other circulatory complications / SNOMED CT 338416016 / Confirmed  Osteoarthritis / SNOMED CT 0382651141 / Confirmed  Dyslipidemia, goal LDL below 100 / SNOMED CT 65619918 / Confirmed  Paroxysmal atrial fibrillation / SNOMED CT 105999162 / Confirmed  Hypothyroidism (acquired) / SNOMED CT 709343339 / Confirmed  Allergic rhinitis / SNOMED CT 465087570 / Confirmed  Obese / SNOMED CT 5433355508 / Probable  Benign positional vertigo / SNOMED CT 360143415 / Confirmed  Statin intolerance / SNOMED CT 91544078 / Confirmed  Long term (current) use of anticoagulants / SNOMED CT 310191331 / Confirmed  Bilateral hearing loss / SNOMED CT 602535779 / Confirmed  Tinnitus / SNOMED CT 010233723 / Confirmed  S/P placement of cardiac pacemaker / SNOMED CT 583183979 / Confirmed  History of mitral valve insufficiency / SNOMED CT 477854999 / Confirmed  White coat syndrome with hypertension / SNOMED CT 8592365424 / Confirmed  Symptomatic varicose veins / SNOMED CT 164297002 / Confirmed  Carotid artery plaque / SNOMED CT 233659352 / Confirmed  Seborrheic dermatitis / SNOMED CT 78429199 / Confirmed  Frailty / SNOMED CT 480312581 / Confirmed  Diabetic peripheral neuropathy / SNOMED CT 5863123959 / Confirmed  Seborrheic dermatitis of scalp / SNOMED CT 895666126 / Confirmed  Anticoagulated / SNOMED CT 897332239 /  Confirmed  Chronic dementia with behavioral disturbance / SNOMED CT 0095596181 / Confirmed  Anxiety / SNOMED CT 09488046 / Confirmed      Histories   Past Medical History:    Active  Type 2 diabetes mellitus with other circulatory complications (793492286): Onset on 1999 at 69 years.  Osteoarthritis (6548920239)  Dyslipidemia, goal LDL below 100 (49801942)  Paroxysmal atrial fibrillation (692354114)  Hypothyroidism (acquired) (983130603)  Allergic rhinitis (281219963)  Obese (2800450196)  Benign positional vertigo (660545590)  Statin intolerance (80158734)  Long term (current) use of anticoagulants (030637794)  Bilateral hearing loss (667695602)  Tinnitus (285326070)  S/P placement of cardiac pacemaker (858031678)  White coat syndrome with hypertension (4457143705)  Resolved  Inpatient stay (566441637): Onset on 10/8/2020 at 89 years.  Resolved on 10/9/2020 at 89 years.  Comments:  10/12/2020 CDT 1:56 PM CDT - [facility] [place] [place] - Benign paroxysmal positional vertigo.  Inpatient stay (473088960): Onset on 2019 at 89 years.  Resolved on 2019 at 89 years.  Comments:  2019 CDT 6:55 AM MEG - Leonarda Padilla  @[place], [place] - Postoperative surgical complication involving subcutaneous tissue  Inpatient stay (067305699): Onset on 2012 at 83 years.  Resolved.  Comments:  2019 CDT 6:54 AM Leonarda Flores  @[place], [place] - Vertigo  Inpatient stay (261974143): Onset on 2010 at 80 years.  Resolved.  Comments:  2019 CDT 6:53 AM Leonarda Flores  @[facility], [place] - Atrial fibrillation with an episode of hypotension   Family History:    Leukemia  Father ()  Gout  Brother  Mother ()  Diabetes mellitus  Mother ()  Hypertension  Mother ()  Parkinson's disease  Sister  Valvular heart disease  Brother ()  MS - Multiple sclerosis  Sister  CABG - Coronary artery bypass graft  Mother  ()  MI - Myocardial infarction  Brother ()  CAD - Coronary artery disease  Father ()  Mother ()     Procedure history:    Replacement of pacemaker pulse generator (SNOMED CT 2556222) on 2019 at 89 Years.  Comments:  2019 10:01 AM CDT - SylviaAida gallagher  Implanted left pectoral.   Medtronic [id number]  [id number]  Phacoemulsification of cataract with intraocular lens implantation (SNOMED CT 8627756673) performed by Andrew Llanes MD on 3/27/2018 at 88 Years.  Comments:  2018 9:31 AM CDT - Lucy Dawson  Left.  Colonoscopy (SNOMED CT 060271088) on 2012 at 82 Years.  Comments:  2012 8:59 AM CDT - Hilario Whiting MD  Unremarkable  [facility] performed by Nba Alcaraz MD on 2012 at 82 Years.  Hysteroscopy, D&C, polypectomy performed by Nba Alcaraz MD on 2011 at 81 Years.  Implantation of heart pacemaker (SNOMED CT 4015475032) performed by Dr. Lewis Mejia in  at 80 Years.  Replacement of right knee joint (SNOMED CT 3938149805) in the month of 2008 at 78 Years.  Replacement of left knee joint (SNOMED CT 4093884970) on 12/10/2007 at 78 Years.  Flexible sigmoidoscopy (SNOMED CT 56480623) performed by Hilario Whiting MD on 2006 at 76 Years.  Cholecystectomy (SNOMED CT 80405508) in 1951 at 22 Years.  Repair of umbilical hernia (SNOMED CT 57928997) in 1949 at 20 Years.  Appendectomy (SNOMED CT 963339375) in 1947 at 18 Years.  Plastic repair of rotator cuff of shoulder (SNOMED CT 825164732).  Pregnancy (SNOMED CT 472376959).  Comments:  2010 9:08 AM CDT - Vaishali Cortés  full term X 3  Extracapsular cataract extraction and insertion of intraocular lens (SNOMED CT 688577982) performed by Andrew Llanes MD.  Comments:  2018 1:14 PM CDT - Samir , Lucy  Right.   Social History:        Electronic Cigarette/Vaping Assessment            Electronic Cigarette Use: Never.      Alcohol Assessment: Denies Alcohol Use            Never       Tobacco Assessment: Denies Tobacco Use            Never (less than 100 in lifetime)      Substance Abuse Assessment: Denies Substance Abuse      Employment and Education Assessment            Retired      Home and Environment Assessment            Marital status: .        Physical Examination   Vital Signs   12/7/2021 1:48 PM CST Temperature Temporal 97.6 DegF    Peripheral Pulse Rate 67 bpm    Pulse Site Radial artery    HR Method Electronic    Systolic Blood Pressure 132 mmHg  HI    Diastolic Blood Pressure 86 mmHg  HI    Mean Arterial Pressure 101 mmHg    BP Site Right arm    BP Method Electronic    Oxygen Saturation 98 %      Measurements from flowsheet : Measurements   12/7/2021 1:48 PM CST    Ht/Wt Measurement Refused by Patient?     Yes     General:  No acute distress.    Neck:  Supple, Non-tender.    Respiratory:  Lungs are clear to auscultation, Respirations are non-labored.    Cardiovascular:  Normal rate, Regular rhythm.    Neurologic:  Alert, Cranial Nerves II-XII are grossly intact.       Impression and Plan   Diagnosis     Acute viral syndrome (DYP63-KR B34.9).     Acute viral syndrome (WBG58-PQ B34.9).     Chronic dementia with behavioral disturbance (NFR12-XV F03.91).     Plan:  Patient with viral syndrome non-Covid most likely will treat symptomatically at home yet.  She does have dementia and paroxysmal atrial for but no signs of heart failure.  .

## 2022-02-15 NOTE — TELEPHONE ENCOUNTER
---------------------  From: Karen Adair CMA   [id number]     Cc: INR Pool ( [id number]);      Sent: 6/22/2021 5:34:48 PM CDT  Subject: Fall/Head injury     Collette, patients daughter called @ 1725 stating patient had fallen in the kitchen @ 1720 and hit the back of her head on the fridge. No open wounds, blurred or double vision or confusion. Patient does c/o head pain radiating into the jaw. Being that patient is taking Warfarin, daughter was advised to take patient to the ED for evaluation. She agreed and states she could be there in 5 minutes. FYI    # for call back [phone]---------------------  From: Shannan/Yumiko PENA (Merlin Message Pool ([id number] [facility] [place]))   To: Hilario Whiting MD;     Sent: 6/22/2021 5:37:34 PM CDT  Subject: FW: Fall/Head injury---------------------  From: Hilario Whiting MD   To: [facility]lin Message Pool ([id number]_[place] [facility] [place]);     Sent: 6/22/2021 5:41:07 PM CDT  Subject: RE: Fall/Head injury     agreenoted

## 2022-02-15 NOTE — NURSING NOTE
Anticoagulation Therapy Management Entered On:  10/28/2021 9:59 AM CDT    Performed On:  10/28/2021 9:53 AM CDT by Jennifer Astudillo RN               Anticoagulation Visit Assessment   Type of Visit - Anticoagulation :   Telephone   Anticoagulation Indication :   Atrial fibrillation   Anticoagulant Duration :   Undetermined   Anticoagulation Medication Verified :   Yes   Patient Preferred Contact Method :   Cell   Jennifer Astudillo RN - 10/28/2021 9:53 AM CDT   Anticoagulation Patient Assessment Grid   Change in Alcohol Consumption :   No   Change in Diet :   No   Change in Medications :   No   Diarrhea :   No   Rectal Bleeding :   No   Signs of Clotting :   No   Signs of Warfarin Intolerance :   No   Unusual Bleeding, Bruising :   No   Upcoming Procedures :   No   Vomiting :   No   Jennifer Astudillo RN - 10/28/2021 9:53 AM CDT   Patient on Warfarin :   Yes   Jennifer Astudillo RN - 10/28/2021 9:53 AM CDT   Warfarin   Anticoagulant INR Goal Lower :   2    Anticoagulant INR Goal Upper :   3    Information Given by :   Daughter   (Comment: Collette [Jennifer Astudillo RN - 10/28/2021 9:53 AM CDT] )   Sunday :   7.5 mg   Monday :   5 mg   Tuesday :   7.5 mg   Wednesday :   5 mg   Thursday :   7.5 mg   Friday :   5 mg   Saturday :   5 mg   Total Dose :   42.5 mg   Warfarin Pt Reported Previous Week Dose :    Sun Mon Tues Wed Thurs Fri Sat Weekly Total Dose   Week 1 7.5 mg 5 mg 7.5 mg 5 mg 5 mg 5 mg 5 mg 40 mg   Week 2  mg  mg  mg  mg  mg  mg  mg  mg   Week 3  mg  mg  mg  mg  mg  mg  mg  mg   Week 4  mg  mg  mg  mg  mg  mg  mg  mg         Patient is taking single or multiple strength tablet(s) :   Single strength tab(s)   One Tab Strength :   5 mg tab   Sunday :   7.5 mg   Monday :   5 mg   Tuesday :   7.5 mg   Wednesday :   5 mg   Thursday :   7.5 mg   Friday :   5 mg   Saturday :   5 mg   Week 1 Total Dose :   42.5 mg   Sunday :   1.5 tab(s)   Monday :   1 tab(s)   Tuesday :   1.5 tab(s)   Wednesday :   1 tab(s)    Thursday :   1.5 tab(s)   Friday :   1 tab(s)   Saturday :   1 tab(s)   Warfarin Dosing Acknowledgement :   I have reviewed the patient's warfarin dosing schedule and confirmed its accuracy for today's visit.   Patient Instructions :   INR = 1.9 per MDINR today. Patient is to stay on 7.5mg warfarin on Sun, Tues and Thurs and 5mg the rest of the days of the week Recheck INR in 1 week. Daughter advised via call to home.     Baudilio LARA Jennifer - 10/28/2021 9:53 AM CDT

## 2022-02-15 NOTE — TELEPHONE ENCOUNTER
---------------------From: Leonarda Padilla To:  <[email or web address]>;   Sent: 6/13/2019 6:08:51 AM CDTSubject: General Message Patient: GERHARD HOWE; YOB: 1929 The attached Referral Note is for an appointment scheduled with Dr. Holland 6/17/19 at 2:45 in [place].

## 2022-02-15 NOTE — TELEPHONE ENCOUNTER
Collette, daughter, calls [email or web address] they prefer to have CT at [facility] not [place].  Order is sent to [facility] and they will contact daughter to schedule.

## 2022-02-15 NOTE — PROGRESS NOTES
Patient:   GERHARD HOWE            MRN: [id number]            FIN: [id number]               Age:   87 years     Sex:  Female     :  11/10/1929   Associated Diagnoses:   Hypothyroidism (acquired); Allergic Rhinitis; Carotid artery plaque; Diabetes Mellitus Type II, Controlled; Diabetic Neuropathy, Type II Diabetes Mellitus; Dyslipidemia, goal LDL below 100; White coat hypertension; History of mitral valve insufficiency; HTN, goal below 140/90; Long-Term (Current) Use of Anticoagulants, INR Goal 2.0-3.0; Symptomatic varicose veins; Statin intolerance; S/P placement of cardiac pacemaker; Paroxysmal atrial fibrillation; Medicare annual wellness visit, subsequent; Diabetes Mellitus without Mention of Complication, Type II or Unspecified Type, Not Stated as Uncontrolled   Author:   Hilario Whiting MD      Visit Information   Visit type:  Annual exam.    Accompanied by:  Family member.    Source of history:  Self, Family member, Medical record.    History limitation:  None.       Chief Complaint   2017 10:52 AM CDT    AWV      History of Present Illness   The patient is an 87-year-old who is here for a wellness visit.  Her leg pain has  resolved from last week.  An ultrasound was negative.  She will be seeing  Dr. Alix Buenrostro.      Health Risk Assessment Form  She notes some difficulty with hearing despite hearing aids.  She does not  vacuum any longer.  She cannot understand people on the phone.  Sometimes  she has right foot pain.  She has some dental pain.              Review of Systems   The only other positive is significant for an upper respiratory infection for the past   3 days; otherwise the review is negative.    PHQ-9 score:  1.      Her blood pressure readings at home are normal.            Health Status   Allergies:    Allergic Reactions (Selected)  Moderate  Vicodin (Rash)  Severity Not Documented  Calan (No reactions were documented)  Phenobarbital (No reactions were documented)  Probiotic  (Rash..)  Simvastatin (Gi upset)   Medications:  (Selected)   Prescriptions  Prescribed  atenolol 50 mg oral tablet: 1 tab(s) ( 50 mg ), po, daily, # 30 tab(s), 0 Refill(s), Type: Maintenance, Pharmacy: [id number], Please remind patient she is due for annual exam.  digoxin 125 mcg (0.125 mg) oral tablet: 1 tab(s) ( 125 mcg ), po, daily, # 90 tab(s), 3 Refill(s), Type: Maintenance, Pharmacy: [facility], 1 tab(s) po daily  levothyroxine 125 mcg (0.125 mg) oral tablet: See Instructions, Instructions: TAKE ONE TABLET BY MOUTH ONCE DAILY, # 90 unknown unit, Type: Soft Stop, Pharmacy: [facility], TAKE ONE TABLET BY MOUTH ONCE DAILY  pravastatin 10 mg oral tablet: 1 tab(s) ( 10 mg ), PO, Daily, # 90 tab(s), 3 Refill(s), Type: Maintenance, Pharmacy: [facility], 1 tab(s) po daily  warfarin 5 mg oral tablet: See Instructions, Instructions: TAKE ONE TABLET BY MOUTH ONCE DAILY, # 90 unknown unit, Type: Soft Stop, Pharmacy: [facility], TAKE ONE TABLET BY MOUTH ONCE DAILY  Suspended  Flonase 50 mcg/inh nasal spray: 2 spray(s), nasal, daily, # 1 EA, 11 Refill(s), Type: Maintenance, Pharmacy: [facility], 2 spray(s) nasal daily   Problem list:    All Problems  Hypothyroidism (acquired) / SNOMED CT 672292394 / Confirmed  Allergic Rhinitis / ICD-9-.9 / Confirmed  Benign Positional Vertigo / ICD-9-.11 / Confirmed  Bilateral Hearing Loss / ICD-9-.9 / Confirmed  Carotid artery plaque / SNOMED CT 522858224 / Confirmed  Diabetes Mellitus Type II, Controlled / ICD-9-.00 / Confirmed  Diabetic Neuropathy, Type II Diabetes Mellitus / ICD-9-.60 / Confirmed  Dyslipidemia, goal LDL below 100 / ICD-9-.4 / Confirmed  White coat hypertension / SNOMED CT 2780824404 / Confirmed  History of mitral valve insufficiency / SNOMED CT 895839401 / Confirmed  HTN, goal below 140/90 / ICD-9-.9 / Confirmed  Long-Term (Current) Use of Anticoagulants, INR Goal 2.0-3.0 / ICD-9-CM V58.61 /  Confirmed  Obese / ICD-9-.00 / Probable  Osteoarthritis / ICD-9-.90 / Confirmed  Paroxysmal atrial fibrillation / SNOMED CT 653992140 / Confirmed  Rhinitis, chronic / ICD-9-.0 / Confirmed  S/P placement of cardiac pacemaker / ICD-9-CM V45.01 / Confirmed  Seborrheic dermatitis / SNOMED CT 48912031 / Confirmed  Statin intolerance / ICD-9-.27 / Confirmed  Tinnitus / ICD-9-.30 / Confirmed  Symptomatic varicose veins / SNOMED CT 665277819 / Confirmed  White Coat Hypertension / ICD-9-.2 / Confirmed  Resolved: *Hospitalized@[facility] - Atrial fibrillation with an episode of hypotension  Resolved: *Hospitalized@[facility] - Vertigo  Canceled: Hypertension / SNOMED CT 3475358176      Histories   Past Medical History:    Active  Diabetes Mellitus Type II, Controlled (250.00): Onset on 1999 at 69 years.  Osteoarthritis (715.90)  Dyslipidemia, goal LDL below 100 (272.4)  Paroxysmal atrial fibrillation (980487390)  Diabetic Neuropathy, Type II Diabetes Mellitus (250.60)  Hypothyroidism (acquired) (123091741)  White Coat Hypertension (796.2)  Allergic Rhinitis (477.9)  Obese (278.00)  Benign Positional Vertigo (386.11)  Statin intolerance (995.27)  White coat hypertension (2676311470)  HTN, goal below 140/90 (401.9)  Resolved  *Hospitalized@[facility] - Vertigo: Onset on 2012 at 83 years.  Resolved.  *Hospitalized@[facility] - Atrial fibrillation with an episode of hypotension: Onset on 2010 at 80 years.  Resolved.   Family History:    Leukemia  Father ()  Gout  Brother  Mother ()  Diabetes mellitus  Mother ()  CA - Lung cancer  Spouse ()  Hypertension  Mother ()  Parkinson's disease  Sister  Valvular heart disease  Brother ()  MS - Multiple sclerosis  Sister  CABG - Coronary artery bypass graft  Mother ()  MI - Myocardial infarction  Brother ()  CAD - Coronary artery disease  Father ()  Mother ()     Procedure history:     Colonoscopy (892256927) on 5/23/2012 at 82 Years.  Comments:  5/23/2012 8:59 AM - Henok GANDARA, Hilario KIM-BSO on 4/6/2012 at 82 Years.  Hysteroscopy, D&C, polypectomy on 11/4/2011 at 81 Years.  Implantation of heart pacemaker (3483752533) in 2009 at 80 Years.  Replacement of right knee joint (2229719511) in the month of 11/2008 at 78 Years.  Replacement of left knee joint (1465180841) on 12/10/2007 at 78 Years.  Flexible sigmoidoscopy (08219970) on 6/13/2006 at 76 Years.  Cholecystectomy (45964855) in 1951 at 22 Years.  Repair of umbilical hernia (95842609) in 1949 at 20 Years.  Appendectomy (740154263) in 1947 at 18 Years.  Plastic repair of rotator cuff of shoulder (283832513).  Pregnancy (712193229).  Comments:  4/6/2010 9:08 AM - Vaishali Cortés  full term X 3   Social History:        Alcohol Assessment: Denies Alcohol Use      Tobacco Assessment: Denies Tobacco Use      Employment and Education Assessment            Retired      Home and Environment Assessment            Marital status: .        Physical Examination   Vital Signs   5/8/2017 10:52 AM CDT Peripheral Pulse Rate 80 bpm    Systolic Blood Pressure 173 mmHg  HI    Diastolic Blood Pressure 81 mmHg    Mean Arterial Pressure 112 mmHg      Measurements from flowsheet : Measurements   5/8/2017 10:52 AM CDT Height Measured - Standard 64 in    Weight Measured - Standard 155 lb    BSA 1.78 m2    Body Mass Index 26.6 kg/m2      General:  Alert and oriented, No acute distress.    Eye:  Normal conjunctiva.    HENT:  Normocephalic, Tympanic membranes are clear, Normal hearing, Oral mucosa is moist, No pharyngeal erythema, No sinus tenderness.    Neck:  Supple, Non-tender, No carotid bruit, No lymphadenopathy, No thyromegaly.    Respiratory:  Lungs are clear to auscultation, Respirations are non-labored.    Cardiovascular:  Normal rate, Regular rhythm, No gallop, Normal peripheral perfusion, varicose veins left.         Systolic  murmur: Location ( [place], Aortic area, Sash distribution ), Consistent with an ejection, Intensity ( Grade II ).         Edema: Bilateral, 1+, L>R.    Breast:  No mass, No tenderness, No discharge.    Gastrointestinal:  Soft, Non-tender, No organomegaly.    Genitourinary:  No costovertebral angle tenderness.    Musculoskeletal:  No deformity, Normal gait.    Feet:  Normal by visual exam, Normal pulses.         Circulation: Bilateral, Posterior tibial pulse, Dorsalis pedis pulse, palpable.         Sensory status: Bilateral, absent by monofilament. Vibration intact.    Neurologic:  Normal motor function, Cranial Nerves II-XII are grossly intact.    Cognition and Speech:  Oriented, Speech clear and coherent, Functional cognition intact.    Psychiatric:  Cooperative, Appropriate mood & affect.       Review / Management   Results review:  Lab results   4/27/2017 9:11 AM CDT INR 2.0   4/13/2017 9:14 AM CDT INR 1.8   3/17/2017 11:59 AM CDT Cholesterol 195 mg/dL    Non-HDL Cholesterol 149    HDL 46 mg/dL    Cholesterol/HDL Ratio 4.2        Triglyceride 137 mg/dL    TSH 1.46 mIU/L    WBC 4.4    RBC 4.53    Hgb 13.2 gm/dL    Hct 39.7 %    MCV 87.6 fL    MCH 29.1 pg    MCHC 33.2 gm/dL    RDW 12.8 %    Platelet 167    MPV 8.9 fL    PT 24.7  HI    INR 2.5  HI   2/17/2017 11:07 AM CST INR 2.7   .       Impression and Plan   Diagnosis     Hypothyroidism (acquired) (WUE32-AT E03.9).     Allergic Rhinitis (DZO04-TS J30.9).     Carotid artery plaque (OFB57-QW I65.29).     Diabetes Mellitus Type II, Controlled (EZO55-HA E11.9).     Diabetic Neuropathy, Type II Diabetes Mellitus (CSO24-YT E11.40).     Dyslipidemia, goal LDL below 100 (SVR04-CV E78.5).     White coat hypertension (DBR91-GD R03.0).     History of mitral valve insufficiency (JMS92-GS Z86.79).     HTN, goal below 140/90 (NDL52-XW I10).     Long-Term (Current) Use of Anticoagulants, INR Goal 2.0-3.0 (VDQ80-QZ Z79.01).     Symptomatic varicose veins (ECV08-AI I83.899).      Statin intolerance (IHC38-ZO Z88.9).     S/P placement of cardiac pacemaker (LZK62-MD Z95.0).     Paroxysmal atrial fibrillation (FLY95-KG I48.0).     Medicare annual wellness visit, subsequent (GCB20-FG Z00.00).     Diabetes Mellitus without Mention of Complication, Type II or Unspecified Type, Not Stated as Uncontrolled (FFX30-KK E11.9).     Course:  Progressing as expected.    Patient Instructions:       Counseled: Patient, Diet, Activity, Verbalized understanding.    Orders     Orders (Selected)   Outpatient Orders  Ordered  Hemoglobin A1c (Request): Diabetes Mellitus Type II, Controlled  Return to Clinic (Request): RFV: Cardiac follow up per Dr. Cuadra, Return in 1 year  Return to Clinic (Request): RFV: Diabetes, Return in 6 months, Instructions: aft4er lab  Return to Clinic (Request): RFV: wellness, Return in 12 months  Return to Clinic (Request): Return in 4 mo w/ D. Sawyer bring meter  Return to Office (Request): RFV: Hgb A1c q3-6. BMP, CBC, TSH, lipids, BENITO q12.  Prescriptions  Prescribed  atenolol 50 mg oral tablet: 1 tab(s) ( 50 mg ), po, daily, # 30 tab(s), 0 Refill(s), Type: Maintenance, Pharmacy: [facility] [id number], Please remind patient she is due for annual exam.  digoxin 125 mcg (0.125 mg) oral tablet: 1 tab(s) ( 125 mcg ), po, daily, # 90 tab(s), 3 Refill(s), Type: Maintenance, Pharmacy: [facility], 1 tab(s) po daily  levothyroxine 125 mcg (0.125 mg) oral tablet: See Instructions, Instructions: TAKE ONE TABLET BY MOUTH ONCE DAILY, # 90 unknown unit, Type: Soft Stop, Pharmacy: [facility], TAKE ONE TABLET BY MOUTH ONCE DAILY  pravastatin 10 mg oral tablet: 1 tab(s) ( 10 mg ), PO, Daily, # 90 tab(s), 3 Refill(s), Type: Maintenance, Pharmacy: [facility], 1 tab(s) po daily  warfarin 5 mg oral tablet: See Instructions, Instructions: TAKE ONE TABLET BY MOUTH ONCE DAILY, # 90 unknown unit, Type: Soft Stop, Pharmacy: [facility], TAKE ONE TABLET BY MOUTH ONCE DAILY  Suspended  Flonase 50  mcg/inh nasal spray: 2 spray(s), nasal, daily, # 1 EA, 11 Refill(s), Type: Maintenance, Pharmacy: [facility], 2 spray(s) nasal daily.     Annual Flu.

## 2022-02-15 NOTE — NURSING NOTE
Comprehensive Intake Entered On:  5/14/2019 1:46 PM CDT    Performed On:  5/14/2019 1:40 PM CDT by Carl PENA, Heather               Summary   Chief Complaint :   cardio f/u   Weight Measured :   151.6 lb(Converted to: 151 lb 10 oz, 68.76 kg)    Height Measured :   64 in(Converted to: 5 ft 4 in, 162.56 cm)    Body Mass Index :   26.02 kg/m2 (HI)    Body Surface Area :   1.76 m2   Systolic Blood Pressure :   146 mmHg (HI)    Diastolic Blood Pressure :   76 mmHg   Mean Arterial Pressure :   99 mmHg   Peripheral Pulse Rate :   72 bpm   BP Site :   Right arm   Pulse Site :   Radial artery   BP Method :   Manual   HR Method :   Manual   Race :      Languages :   English   Carl PENA, Heather - 5/14/2019 1:40 PM CDT   Health Status   Allergies Verified? :   Yes   Medication History Verified? :   Yes   Medical History Verified? :   Yes   Pre-Visit Planning Status :   Completed   Tobacco Use? :   Never smoker   Heather Henry MA - 5/14/2019 1:40 PM CDT   Consents   Consent for Immunization Exchange :   Consent Granted   Consent for Immunizations to Providers :   Consent Granted   Heather Henry MA - 5/14/2019 1:40 PM CDT   Meds / Allergies   (As Of: 5/14/2019 1:46:33 PM CDT)   Allergies (Active)   Ancef  Estimated Onset Date:   Unspecified ; Reactions:   Diarrhea ; Created By:   Chloe Bruce; Reaction Status:   Active ; Category:   Drug ; Substance:   Ancef ; Type:   Allergy ; Updated By:   Chloe Bruce; Reviewed Date:   2/7/2019 9:39 AM CST      Calan  Estimated Onset Date:   Unspecified ; Created By:   Vaishali Cortés; Reaction Status:   Active ; Substance:   Calan ; Updated By:   Vaishali Cortés; Source:   Paper Chart ; Reviewed Date:   2/7/2019 9:39 AM CST      ciprofloxacin  Estimated Onset Date:   Unspecified ; Created By:   Cherise Christiansen MA; Reaction Status:   Active ; Category:   Drug ; Substance:   ciprofloxacin ; Type:   Allergy ; Updated By:   Cherise Christiansen MA; Reviewed  Date:   2/7/2019 9:39 AM CST      phenobarbital  Estimated Onset Date:   Unspecified ; Reactions:   tremors ; Created By:   Chloe Bruce; Reaction Status:   Active ; Category:   Drug ; Substance:   phenobarbital ; Type:   Allergy ; Updated By:   Chloe Bruce; Source:   Paper Chart ; Reviewed Date:   2/7/2019 9:39 AM CST      probiotic  Estimated Onset Date:   Unspecified ; Reactions:   Rash.. ; Created By:   Sari Turner CMA; Reaction Status:   Active ; Category:   Drug ; Substance:   probiotic ; Type:   Allergy ; Updated By:   Sari Turner CMA; Reviewed Date:   2/7/2019 9:39 AM CST      simvastatin  Estimated Onset Date:   Unspecified ; Reactions:   GI upset ; Created By:   Vaishali Cortés; Reaction Status:   Active ; Substance:   simvastatin ; Type:   Allergy ; Updated By:   Vaishali Cortés; Source:   Paper Chart ; Reviewed Date:   2/7/2019 9:39 AM CST      Vicodin  Estimated Onset Date:   Unspecified ; Reactions:   rash ; Created By:   Mis Donaldson MA; Reaction Status:   Active ; Category:   Drug ; Substance:   Vicodin ; Type:   Allergy ; Severity:   Moderate ; Updated By:   Mis Donaldson MA; Reviewed Date:   2/7/2019 9:39 AM CST        Medication List   (As Of: 5/14/2019 1:46:33 PM CDT)   Prescription/Discharge Order    celecoxib  :   celecoxib ; Status:   Discontinued ; Ordered As Mnemonic:   CeleBREX 200 mg oral capsule ; Simple Display Line:   200 mg, 1 cap(s), Oral, daily, for 10 day(s), 10 cap(s), 0 Refill(s) ; Ordering Provider:   Hilario Whiting MD; Catalog Code:   celecoxib ; Order Dt/Tm:   4/23/2019 2:25:19 PM          digoxin  :   digoxin ; Status:   Prescribed ; Ordered As Mnemonic:   digoxin 125 mcg (0.125 mg) oral tablet ; Simple Display Line:   125 mcg, 1 tab(s), po, daily, 90 tab(s), 3 Refill(s) ; Ordering Provider:   Hilario Whiting MD; Catalog Code:   digoxin ; Order Dt/Tm:   2/7/2019 9:55:28 AM          fluticasone nasal  :   fluticasone nasal ; Status:   Prescribed ; Ordered  As Mnemonic:   Flonase 50 mcg/inh nasal spray ; Simple Display Line:   2 spray(s), nasal, daily, 1 EA, 11 Refill(s) ; Ordering Provider:   Hilario Whiting MD; Catalog Code:   fluticasone nasal ; Order Dt/Tm:   2/7/2019 9:55:34 AM          levothyroxine  :   levothyroxine ; Status:   Prescribed ; Ordered As Mnemonic:   levothyroxine 125 mcg (0.125 mg) oral tablet ; Simple Display Line:   125 mcg, 1 tab(s), po, daily, for 90 day(s), 90 tab(s), 3 Refill(s) ; Ordering Provider:   Hilario Whiting MD; Catalog Code:   levothyroxine ; Order Dt/Tm:   2/7/2019 9:55:38 AM          metoprolol  :   metoprolol ; Status:   Prescribed ; Ordered As Mnemonic:   Metoprolol Succinate  mg oral tablet, extended release ; Simple Display Line:   See Instructions, TAKE ONE TABLET BY MOUTH EVERY DAY, 90 tab(s), 3 Refill(s) ; Ordering Provider:   Hilario Whiting MD; Catalog Code:   metoprolol ; Order Dt/Tm:   2/7/2019 9:56:57 AM          pravastatin  :   pravastatin ; Status:   Prescribed ; Ordered As Mnemonic:   pravastatin 10 mg oral tablet ; Simple Display Line:   10 mg, 1 tab(s), PO, Daily, 90 tab(s), 3 Refill(s) ; Ordering Provider:   Hilario Whiting MD; Catalog Code:   pravastatin ; Order Dt/Tm:   2/7/2019 9:57:02 AM          warfarin  :   warfarin ; Status:   Prescribed ; Ordered As Mnemonic:   warfarin 5 mg oral tablet ; Simple Display Line:   See Instructions, 1.5 tabs Fridays and one every other day., 100 EA, 3 Refill(s) ; Ordering Provider:   Hilario Whiting MD; Catalog Code:   warfarin ; Order Dt/Tm:   2/7/2019 9:57:05 AM            Home Meds    acetaminophen  :   acetaminophen ; Status:   Documented ; Ordered As Mnemonic:   acetaminophen ; Simple Display Line:   0 Refill(s) ; Catalog Code:   acetaminophen ; Order Dt/Tm:   5/14/2019 1:44:43 PM            Social History   Social History   (As Of: 5/14/2019 1:46:33 PM CDT)   Alcohol:  Denies Alcohol Use      Never   (Last Updated: 5/9/2017 10:53:49 AM CDT by Deandra Mathews)           Tobacco:  Denies Tobacco Use      (Last Updated: 4/6/2010 9:07:56 AM CDT by Vaishali Gannon CMA )         Substance Abuse:  Denies Substance Abuse      (Last Updated: 8/29/2018 2:28:22 PM CDT by Chloe Bruce )         Employment and Education:        Retired   (Last Updated: 10/27/2011 10:22:36 AM CDT by Hilario Whiting MD)          Home and Environment:        Marital status: .   (Last Updated: 10/27/2011 10:22:36 AM CDT by Hilario Whiting MD)

## 2022-02-15 NOTE — NURSING NOTE
Quick Intake Entered On:  3/17/2020 8:54 AM CDT    Performed On:  3/17/2020 8:54 AM CDT by Ava Velasquez RN               Summary   Chief Complaint :   BP   Height Measured :   64 in(Converted to: 5 ft 4 in, 162.56 cm)    Systolic Blood Pressure :   154 mmHg (HI)    Diastolic Blood Pressure :   74 mmHg   Mean Arterial Pressure :   101 mmHg   BP Site :   Right arm   BP Method :   Manual   Race :      Languages :   English   Ava Velasquez RN - 3/17/2020 8:54 AM CDT

## 2022-02-15 NOTE — LETTER
(Inserted Image. Unable to display)     December 06, 2019      GERHARDAARON HOWE  [address]  [place], [place] [id number]          Dear GERHARD,      Thank you for selecting [facility] ([facility] for your healthcare needs.    Your Healthcare Provider has recommended that you schedule a diabetes management appointment with our Certified Diabetes Educator, sAhley Sawyer RD, CD, CDE.     Please bring your glucose meter and your blood glucose diary to your appointment.    Available services include:  - Education about diabetes with guidance and support   - Education on the 7 Self Care Behaviors for Diabetes Management:     Healthy Eating   portion control, label readings, carbohydrate recommendations, heart healthy guidelines, meal planning    Being Active - Physical activity guidelines     Monitoring   blood glucose targets, evaluation of blood glucose readings and lab results    Taking Medication   medication management    Problem Solving   discuss any concerns/ questions     Healthy Coping   disease progression and management    Reducing Risks   prevention strategies to help avoid potential complications related to uncontrolled diabetes  - Weight loss strategies    To schedule an appointment or if you have further questions, please contact your primary clinic:   [facility]       [phone]   [facility]       [phone]              [facility]     [phone]      Powered by [employer] and [employer]    Sincerely,    Providers at [facility]

## 2022-02-15 NOTE — NURSING NOTE
Quick Intake Entered On:  11/20/2019 9:21 AM CST    Performed On:  11/20/2019 9:21 AM CST by Ava Velasquez RN               Summary   Chief Complaint :   Recheck   Height Measured :   64 in(Converted to: 5 ft 4 in, 162.56 cm)    Systolic Blood Pressure :   142 mmHg (HI)    Diastolic Blood Pressure :   76 mmHg   Mean Arterial Pressure :   98 mmHg   BP Site :   Right arm   BP Method :   Manual   Race :      Languages :   English   Ava Velasquez RN - 11/20/2019 9:21 AM CST

## 2022-02-15 NOTE — NURSING NOTE
Anticoagulation Therapy Management Entered On:  12/2/2021 4:00 PM CST    Performed On:  12/2/2021 3:47 PM CST by Jennifer Astudillo RN               Anticoagulation Visit Assessment   Type of Visit - Anticoagulation :   Telephone   Anticoagulation Indication :   Atrial fibrillation   Anticoagulant Duration :   Undetermined   Anticoagulation Medication Verified :   Yes   Patient Preferred Contact Method :   Cell   Jennifer Astudillo RN - 12/2/2021 3:47 PM CST   Anticoagulation Patient Assessment Grid   Change in Alcohol Consumption :   No   Change in Diet :   No   Change in Medications :   Yes   (Comment: extra Emma C due to viral cold symptoms, slight cough [Jennifer Astudillo RN - 12/2/2021 3:47 PM CST] )   Diarrhea :   No   Rectal Bleeding :   No   Signs of Clotting :   No   Signs of Warfarin Intolerance :   No   Unusual Bleeding, Bruising :   No   Upcoming Procedures :   No   Vomiting :   No   Jennifer Astudillo RN - 12/2/2021 3:47 PM CST   Patient on Warfarin :   Yes   Jennifer Astudillo RN - 12/2/2021 3:47 PM CST   Warfarin   Anticoagulant INR Goal Lower :   2    Anticoagulant INR Goal Upper :   3    Information Given by :   Patient   Sunday :   7.5 mg   Monday :   5 mg   Tuesday :   7.5 mg   Wednesday :   5 mg   Thursday :   7.5 mg   Friday :   5 mg   Saturday :   5 mg   Total Dose :   42.5 mg   Warfarin Pt Reported Previous Week Dose :    Sun Mon Tues Wed Thurs Fri Sat Weekly Total Dose   Week 1 7.5 mg 5 mg 7.5 mg 5 mg 5 mg 5 mg 5 mg 40 mg   Week 2  mg  mg  mg  mg  mg  mg  mg  mg   Week 3  mg  mg  mg  mg  mg  mg  mg  mg   Week 4  mg  mg  mg  mg  mg  mg  mg  mg         Patient is taking single or multiple strength tablet(s) :   Single strength tab(s)   One Tab Strength :   5 mg tab   Sunday :   7.5 mg   Monday :   5 mg   Tuesday :   7.5 mg   Wednesday :   5 mg   Thursday :   7.5 mg   Friday :   5 mg   Saturday :   5 mg   Week 1 Total Dose :   42.5 mg   Sunday :   1.5 tab(s)   Monday :   1 tab(s)   Tuesday :   1.5  tab(s)   Wednesday :   1 tab(s)   Thursday :   1.5 tab(s)   Friday :   1 tab(s)   Saturday :   1 tab(s)   Warfarin Dosing Acknowledgement :   I have reviewed the patient's warfarin dosing schedule and confirmed its accuracy for today's visit.   Patient Instructions :   INR = 1.8 per MDINR today Patient is to stay on 7.5mg warfarin on Sun, Tues, and Thurs, and 5mg the rest of the days of the week.  Recheck INR in 1 week. Daughter advised via call.     Baudilio LARA Jennifer - 12/2/2021 3:47 PM CST

## 2022-02-15 NOTE — NURSING NOTE
Anticoagulation Therapy Management Entered On:  11/24/2021 2:36 PM CST    Performed On:  11/24/2021 2:34 PM CST by Ava Velasquez RN               Anticoagulation Visit Assessment   Type of Visit - Anticoagulation :   Telephone   Anticoagulation Indication :   Atrial fibrillation   Anticoagulant Duration :   Undetermined   Anticoagulation Medication Verified :   Yes   Patient Preferred Contact Method :   Cell   Ava Velasquez RN - 11/24/2021 2:34 PM CST   Anticoagulation Patient Assessment Grid   Change in Alcohol Consumption :   No   Change in Diet :   No   Change in Medications :   No   Diarrhea :   No   Rectal Bleeding :   No   Signs of Clotting :   No   Signs of Warfarin Intolerance :   No   Unusual Bleeding, Bruising :   No   Upcoming Procedures :   No   Vomiting :   No   Ava Velasquez RN - 11/24/2021 2:34 PM CST   Patient on Warfarin :   Yes   Ava Velsaquez RN - 11/24/2021 2:34 PM CST   Warfarin   International Normalization Ratio TR :   1.9    Anticoagulant INR Goal Lower :   2    Anticoagulant INR Goal Upper :   3    Sunday :   7.5 mg   Monday :   5 mg   Tuesday :   7.5 mg   Wednesday :   5 mg   Thursday :   7.5 mg   Friday :   5 mg   Saturday :   5 mg   Total Dose :   42.5 mg   Warfarin Pt Reported Previous Week Dose :    Sun Mon Tues Wed Thurs Fri Sat Weekly Total Dose   Week 1 7.5 mg 5 mg 7.5 mg 5 mg 5 mg 5 mg 5 mg 40 mg   Week 2  mg  mg  mg  mg  mg  mg  mg  mg   Week 3  mg  mg  mg  mg  mg  mg  mg  mg   Week 4  mg  mg  mg  mg  mg  mg  mg  mg         Patient is taking single or multiple strength tablet(s) :   Single strength tab(s)   One Tab Strength :   5 mg tab   Sunday :   7.5 mg   Monday :   5 mg   Tuesday :   7.5 mg   Wednesday :   5 mg   Thursday :   7.5 mg   Friday :   5 mg   Saturday :   5 mg   Week 1 Total Dose :   42.5 mg   Sunday :   1.5 tab(s)   Monday :   1 tab(s)   Tuesday :   1.5 tab(s)   Wednesday :   1 tab(s)   Thursday :   1.5 tab(s)   Friday :   1 tab(s)   Saturday :   1 tab(s)    Warfarin Dosing Acknowledgement :   I have reviewed the patient's warfarin dosing schedule and confirmed its accuracy for today's visit.   Patient Instructions :   Home INR = 1.9. Per protocol Continue warfarin 7.5mg Sun, Tues, Thurs, and 5mg the rest of the days.  Recheck INR in 1 week. Daughter notified by phone.     Eva LARA, Ava D - 11/24/2021 2:34 PM CST
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
## 2022-02-15 NOTE — TELEPHONE ENCOUNTER
---------------------  From: Madiha Britt RN (Phone Messages Pool ([id number]_[facility]))   To: CAL [facility] [employer] [facility] Message Pool ([id number]_[place] [facility]);     Sent: 7/25/2019 2:55:03 PM CDT  Subject: Phone Message     Phone Message    PCP:   NINFA      Time of Call:  1451       Person Calling:  Teresa - [employer]  Phone number:  [phone]   fax: [phone]    Returned call at: _    Note:   Teresa called stating they had received an order for the patient to have an MRI done. She states that Rebecca's pacemaker is not MRI compatible or safe. She states they need an alternative order for patient.     Last office visit and reason:  7-25-19---------------------  From: Shannan/Yumiko PENA ([facility] Message Pool ([id number]_[place] [facility]))   To: Hilario Whiting MD;     Sent: 7/25/2019 3:00:51 PM CDT  Subject: FW: Phone Message---------------------  From: Hilario Whiting MD   To: [facility] Message Pool ([id number]_[place] [facility]);     Sent: 7/25/2019 3:42:33 PM CDT  Subject: RE: Phone Message     Lumbar CTOder faxed to [facility]

## 2022-02-15 NOTE — LETTER
(Inserted Image. Unable to display)   September 22, 2020      GERHARD HOWE      [address] [place] [phone]        Dear GERHARD,    Thank you for selecting [facility] for your healthcare needs.  Below you will find the results of the recent tests done at our clinic.     We will discuss this at your next visit.      Result Name Current Result Previous Result Reference Range   Hgb A1c ((H)) 6.6 9/21/2020 ((H)) 6.6 3/24/2020  - <5.7       Please contact me or my assistant at [phone] if you have any questions.     Sincerely,        Shimon Nesbitt MD        What do your labs mean?  Below is a glossary of commonly ordered labs:  LDL   Bad Cholesterol   HDL   Good Cholesterol  AST/ALT   Liver Function   Cr/Creatinine   Kidney Function  Microalbumin   Kidney Function  BUN   Kidney Function  PSA   Prostate    TSH   Thyroid Hormone  HgbA1c   Diabetes Test   Hgb (Hemoglobin)   Red Blood Cells

## 2022-02-15 NOTE — NURSING NOTE
Anticoagulation Therapy Entered On:  3/3/2020 4:39 PM CST    Performed On:  3/3/2020 4:38 PM CST by Ava Velasquez RN               Warfarin Management   Week 1 Sunday Dose :   5    Week 1 Monday Dose :   5    Week 1 Tuesday Dose :   5    Week 1 Wednesday Dose :   5    Week 1 Thursday Dose :   5    Week 1 Friday Dose :   7.5    Week 1 Saturday Dose :   5    Week 1 Dose Total :   37.5    Week 2 Sunday Dose :   5    Week 2 Monday Dose :   5    Week 2 Tuesday Dose :   5    Week 2 Wednesday Dose :   5    Week 2 Thursday Dose :   5    Week 2 Friday Dose :   7.5    Week 2 Saturday Dose :   5    Week 2 Dose Total :   37.5    Planned Duration :   Indefinite   Indication :   Atrial fibrillation   Warfarin Management Comments :   Lab test performed by:  [facility] Office  [address] [id number]  Phone # [phone]  Fax# [phone]  Capillary   International Normalization Ratio :   1.6    INR Range :   2.0 - 3.0   INR Therapeutic Range :   No   Ava Velasquez RN - 3/3/2020 4:38 PM CST   Warfarin Management and Results Grid   Signs of Thrombolic :   No   Signs of Warfarin Intolerance :   No   Changes in Diet or Alcohol Intake :   No   Changes in Medication or Antibiotics :   No   Unusual Bleeding or Bruising :   No   Rectal Bleeding or Black Stools :   No   Vitamin K Food Handout :   No   Heart Valve Replacement :   No   Ava Velasquez RN - 3/3/2020 4:38 PM CST   Anticoagulation Recheck :   2 weeks   Warfarin Special Instructions :   Per [facility] continue warfarin 7.5 mg Fridays and 5 mg ROW; recheck INR in 2 weeks; pt daughter notified by phone.   Ava Velasquez RN - 3/3/2020 4:38 PM CST

## 2022-02-15 NOTE — LETTER
(Inserted Image. Unable to display)   August 19, 2019      GERHARD HOWE      [address]  [place], [place] [id number]        Dear GERHARD,    Thank you for selecting [facility] ([facility] for your healthcare needs.  Below you will find the results of your recent tests done at our clinic.     TSH normal too. Diabetes controlled.      Result Name Current Result Previous Result Reference Range   Hgb A1c ((H)) 6.1 8/16/2019 ((H)) 6.7 3/28/2019  - <5.7       Please contact me or my assistant at [phone] if you have any questions.     Sincerely,        Hilario Whiting MD

## 2022-02-15 NOTE — CARE COORDINATION
Pt presented on 5/9 for urine frequency. BP elevated at that time 183/98 and 162/92. Previous BPs run much lower. Pt was mailed a letter yesterday for a AWV with NINFA. BP can be rechecked at that time.    RBreslin CMA

## 2022-02-15 NOTE — NURSING NOTE
Comprehensive Intake Entered On:  7/25/2019 11:07 AM CDT    Performed On:  7/25/2019 11:00 AM CDT by Yumiko Escamilla               Summary   Chief Complaint :   Patient c/o trouble walking and hip pain x6 months or longer    Weight Measured :   146.0 lb(Converted to: 146 lb 0 oz, 66.22 kg)    Height Measured :   64 in(Converted to: 5 ft 4 in, 162.56 cm)    Body Mass Index :   25.06 kg/m2 (HI)    Body Surface Area :   1.73 m2   Systolic Blood Pressure :   130 mmHg   Diastolic Blood Pressure :   72 mmHg   Mean Arterial Pressure :   91 mmHg   Peripheral Pulse Rate :   62 bpm   BP Site :   Right arm   Pulse Site :   Radial artery   BP Method :   Manual   HR Method :   Manual   Temperature Tympanic :   97.3 DegF(Converted to: 36.3 DegC)  (LOW)    Race :      Languages :   English   Yumiko Escamilla - 7/25/2019 11:00 AM CDT   Health Status   Allergies Verified? :   Yes   Medication History Verified? :   Yes   Medical History Verified? :   Yes   Pre-Visit Planning Status :   Completed   Tobacco Use? :   Never smoker   Yumiko Escamilla - 7/25/2019 11:00 AM CDT   Consents   Consent for Immunization Exchange :   Consent Granted   Consent for Immunizations to Providers :   Consent Granted   Yumiko Escamilla - 7/25/2019 11:00 AM CDT   Meds / Allergies   (As Of: 7/25/2019 11:07:44 AM CDT)   Allergies (Active)   Ancef  Estimated Onset Date:   Unspecified ; Reactions:   Diarrhea ; Created By:   Chloe Bruce; Reaction Status:   Active ; Category:   Drug ; Substance:   Ancef ; Type:   Allergy ; Updated By:   Chloe Bruce; Reviewed Date:   7/25/2019 11:04 AM CDT      Calan  Estimated Onset Date:   Unspecified ; Created By:   Vaishali Gannon CMA; Reaction Status:   Active ; Substance:   Calan ; Updated By:   Vaishali Gannon CMA; Source:   Paper Chart ; Reviewed Date:   7/25/2019 11:04 AM CDT      ciprofloxacin  Estimated Onset Date:   Unspecified ; Created By:   Cherise Christiansen MA; Reaction Status:    Active ; Category:   Drug ; Substance:   ciprofloxacin ; Type:   Allergy ; Updated By:   Cherise Christiansen MA; Reviewed Date:   7/25/2019 11:04 AM CDT      phenobarbital  Estimated Onset Date:   Unspecified ; Reactions:   tremors ; Created By:   Chloe Bruce; Reaction Status:   Active ; Category:   Drug ; Substance:   phenobarbital ; Type:   Allergy ; Updated By:   Chloe Bruce; Source:   Paper Chart ; Reviewed Date:   7/25/2019 11:04 AM CDT      probiotic  Estimated Onset Date:   Unspecified ; Reactions:   Rash.. ; Created By:   Sari Turner CMA; Reaction Status:   Active ; Category:   Drug ; Substance:   probiotic ; Type:   Allergy ; Updated By:   Sari Turner CMA; Reviewed Date:   7/25/2019 11:04 AM CDT      simvastatin  Estimated Onset Date:   Unspecified ; Reactions:   GI upset ; Created By:   Vaishali Gannon CMA; Reaction Status:   Active ; Substance:   simvastatin ; Type:   Allergy ; Updated By:   Vaishali Gannon CMA; Source:   Paper Chart ; Reviewed Date:   7/25/2019 11:04 AM CDT      Vicodin  Estimated Onset Date:   Unspecified ; Reactions:   rash ; Created By:   Mis Donaldson MA; Reaction Status:   Active ; Category:   Drug ; Substance:   Vicodin ; Type:   Allergy ; Severity:   Moderate ; Updated By:   Mis Donaldson MA; Reviewed Date:   7/25/2019 11:04 AM CDT        Medication List   (As Of: 7/25/2019 11:07:44 AM CDT)   Prescription/Discharge Order    warfarin  :   warfarin ; Status:   Prescribed ; Ordered As Mnemonic:   warfarin 5 mg oral tablet ; Simple Display Line:   See Instructions, 1.5 tabs Fridays and one every other day., 100 EA, 3 Refill(s) ; Ordering Provider:   Hilario Whiting MD; Catalog Code:   warfarin ; Order Dt/Tm:   2/7/2019 9:57:05 AM          pravastatin  :   pravastatin ; Status:   Prescribed ; Ordered As Mnemonic:   pravastatin 10 mg oral tablet ; Simple Display Line:   10 mg, 1 tab(s), PO, Daily, 90 tab(s), 3 Refill(s) ; Ordering Provider:   Hilario Whiting MD;  Catalog Code:   pravastatin ; Order Dt/Tm:   2/7/2019 9:57:02 AM          metoprolol  :   metoprolol ; Status:   Prescribed ; Ordered As Mnemonic:   Metoprolol Succinate  mg oral tablet, extended release ; Simple Display Line:   See Instructions, TAKE ONE TABLET BY MOUTH EVERY DAY, 90 tab(s), 3 Refill(s) ; Ordering Provider:   Hilario Whiting MD; Catalog Code:   metoprolol ; Order Dt/Tm:   2/7/2019 9:56:57 AM          levothyroxine  :   levothyroxine ; Status:   Prescribed ; Ordered As Mnemonic:   levothyroxine 125 mcg (0.125 mg) oral tablet ; Simple Display Line:   125 mcg, 1 tab(s), po, daily, for 90 day(s), 90 tab(s), 3 Refill(s) ; Ordering Provider:   Hilario hWiting MD; Catalog Code:   levothyroxine ; Order Dt/Tm:   2/7/2019 9:55:38 AM          fluticasone nasal  :   fluticasone nasal ; Status:   Prescribed ; Ordered As Mnemonic:   Flonase 50 mcg/inh nasal spray ; Simple Display Line:   2 spray(s), nasal, daily, 1 EA, 11 Refill(s) ; Ordering Provider:   Hilario Whiting MD; Catalog Code:   fluticasone nasal ; Order Dt/Tm:   2/7/2019 9:55:34 AM          digoxin  :   digoxin ; Status:   Prescribed ; Ordered As Mnemonic:   digoxin 125 mcg (0.125 mg) oral tablet ; Simple Display Line:   125 mcg, 1 tab(s), po, daily, 90 tab(s), 3 Refill(s) ; Ordering Provider:   Hilario Whiting MD; Catalog Code:   digoxin ; Order Dt/Tm:   2/7/2019 9:55:28 AM            Home Meds    acetaminophen  :   acetaminophen ; Status:   Documented ; Ordered As Mnemonic:   acetaminophen ; Simple Display Line:   0 Refill(s) ; Catalog Code:   acetaminophen ; Order Dt/Tm:   5/14/2019 1:44:43 PM

## 2022-02-15 NOTE — CARE COORDINATION
Pt appears on [facility] chronic disease panel as out of parameters for BP.  Pt was seen by INR nurse 11/9/2018 BP was 158/76. pt to RTC 4 wks, INR nurse to check BP then.  Pt has RTC with PCP 1/2019 chronic disease visit.

## 2022-02-15 NOTE — NURSING NOTE
Comprehensive Intake Entered On:  7/21/2020 8:54 AM CDT    Performed On:  7/21/2020 8:52 AM CDT by Sravanthi Dietz               Summary   Chief Complaint :   Chronic disease visit   Weight Measured :   168.6 lb(Converted to: 168 lb 10 oz, 76.48 kg)    Height Measured :   64 in(Converted to: 5 ft 4 in, 162.56 cm)    Body Mass Index :   28.94 kg/m2 (HI)    Body Surface Area :   1.86 m2   Systolic Blood Pressure :   132 mmHg (HI)    Diastolic Blood Pressure :   78 mmHg   Mean Arterial Pressure :   96 mmHg   Peripheral Pulse Rate :   69 bpm   BP Method :   Electronic   HR Method :   Electronic   Temperature Tympanic :   97.0 DegF(Converted to: 36.1 DegC)  (LOW)    Race :      Languages :   English   Sravanthi Dietz - 7/21/2020 8:52 AM CDT   Health Status   Allergies Verified? :   Yes   Medication History Verified? :   Yes   Pre-Visit Planning Status :   Completed   Tobacco Use? :   Never smoker   Sravanthi Dietz - 7/21/2020 8:52 AM CDT   ID Risk Screen   Recent Travel History :   No recent travel   Family Member Travel History :   No recent travel   Other Exposure to Infectious Disease :   Unknown   Sravanthi Dietz - 7/21/2020 8:52 AM CDT

## 2022-02-15 NOTE — PROGRESS NOTES
Chief Complaint    f/u on INR, c/o rash on both legs 2/17/20.  History of Present Illness      Patient here for follow-up of a pruritic rash on her legs.  The rash began after a new topical was used that had came for, peppermint, and Meagan.  Slowly improved.       Reviewed her blood glucose readings on her monitor average is 171.  Review of Systems      Increasingly forgetful.  No headache, chest pain, dyspnea, edema, hypoglycemia.  No palpitations or bleeding.  His INR today.  Physical Exam   Vitals & Measurements    T: 98.6   F (Tympanic)  HR: 83(Peripheral)  BP: 146/64     HT: 64 in  WT: 164.0 lb  BMI: 28.15       Patient appears comfortable and in no distress.  Alert and oriented.  Chest clear.  Cardiac exam irregular.  Chronic bilateral 1-2+ edema.  Areas of eczematous rash on both legs.  Feet are not involved.  Pulses and sensation diminished in the feet.  Assessment/Plan       Contact dermatitis (L25.9)         Topical steroid.  If not better compression for venous stasis.         Ordered:          55411 office outpatient visit 25 minutes (Charge), Quantity: 1, Contact dermatitis  Type 2 diabetes mellitus with other circulatory complications  Long term (current) use of anticoagulants                Long term (current) use of anticoagulants (Z79.01)         INR.         Ordered:          47496 office outpatient visit 25 minutes (Charge), Quantity: 1, Contact dermatitis  Type 2 diabetes mellitus with other circulatory complications  Long term (current) use of anticoagulants                Type 2 diabetes mellitus with other circulatory complications (E11.59)         Stable.         Ordered:          [id number] office outpatient visit 25 minutes (Charge), Quantity: 1, Contact dermatitis  Type 2 diabetes mellitus with other circulatory complications  Long term (current) use of anticoagulants                Orders:         predniSONE, = 1 tab(s) ( 20 mg ), Oral, daily, x 7 day(s), # 7 tab(s), 0 Refill(s),  Type: Acute, Pharmacy: [id number], 1 tab(s) Oral daily,x7 day(s), (Completed)         triamcinolone topical, 1 kristin, Topical, bid, x 14 day(s), # 120 gm, 1 Refill(s), Type: Acute, Pharmacy: [facility] #[id number], D/C oral prednisone, 1 kristin Topical bid,x14 day(s), (Ordered)  Patient Information     Name:GERHARD HOWE      Address:      [address] [place] [address]      [place], [place] [id number]     Sex:Female     YOB: 1929     [phone]     Emergency Contact:GENTRY PARK     MRN:[id number]     FIN:[id number]     Location:[facility]     Date of Service:03/03/2020      Primary Care Physician:       Hilario Whiting MD, [phone]      Attending Physician:       Hilario Whiting MD, [phone]  Problem List/Past Medical History    Ongoing     Allergic rhinitis     Benign positional vertigo     Bilateral hearing loss     Carotid artery plaque     Diabetic peripheral neuropathy     Dyslipidemia, goal LDL below 100     Frailty     History of mitral valve insufficiency       Comments: Mild by Echo in 2009     Hypothyroidism (acquired)     Long term (current) use of anticoagulants     Obese     Osteoarthritis     Paroxysmal atrial fibrillation     S/P placement of cardiac pacemaker     Seborrheic dermatitis     Seborrheic dermatitis of scalp     Statin intolerance     Symptomatic varicose veins     Tinnitus     Type 2 diabetes mellitus with other circulatory complications     White coat syndrome with hypertension    Historical     Inpatient stay       Comments: @[place], [place] - Atrial fibrillation with an episode of hypotension     Inpatient stay       Comments: @[place], [place] - Vertigo     Inpatient stay       Comments: @[place], [place] - Postoperative surgical complication involving subcutaneous tissue  Procedure/Surgical History     Replacement of pacemaker pulse generator (05/30/2019)      Comments: [facility]  left pectoral.      [id number].     Phacoemulsification of cataract with intraocular lens implantation (03/27/2018)      Comments: Left..     Colonoscopy (05/23/2012)      Comments: Unremarkable.     LAVH-BSO (04/06/2012)     Hysteroscopy, D&C, polypectomy (11/04/2011)     Implantation of heart pacemaker (2009)     Replacement of right knee joint (11.2008)     Replacement of left knee joint (12/10/2007)     Flexible sigmoidoscopy (06/13/2006)     Cholecystectomy (1951)     Repair of umbilical hernia (1949)     Appendectomy (1947)     Extracapsular cataract extraction and insertion of intraocular lens      Comments: Right..     Plastic repair of rotator cuff of shoulder     Pregnancy      Comments: full term X 3.  Medications    ACCU-CHECK GUIDE LANCETS, See Instructions, 4 refills    ACCU-CHECK GUIDE METER, See Instructions    ACCU-CHECK GUIDE TEST STRIPS, See Instructions, 4 refills    acetaminophen    Flonase 50 mcg/inh nasal spray, 2 spray(s), Nasal, daily, 11 refills    Glucose meter, test strips and lancets, See Instructions, 4 refills    levothyroxine 125 mcg (0.125 mg) oral tablet, 1 tab(s), Oral, daily    loratadine 10 mg oral tablet, 10 mg= 1 tab(s), Oral, daily    Metoprolol Succinate  mg oral tablet, extended release, See Instructions, 1 refills    triamcinolone 0.1% topical cream, 1 kristin, Topical, bid, 1 refills    warfarin 5 mg oral tablet, See Instructions  Allergies    Vicodin (rash)    Ancef (Diarrhea)    Calan    ciprofloxacin    phenobarbital (tremors)    probiotic (Rash..)    simvastatin (GI upset)  Social History    Smoking Status - 03/03/2020     Never smoker     Alcohol - Denies Alcohol Use, 04/06/2010      Never, 05/09/2017     Employment/School      Retired, 10/27/2011     Home/Environment      Marital status: ., 10/27/2011     Substance Abuse - Denies Substance Abuse, 08/29/2018     Tobacco - Denies Tobacco Use, 04/06/2010  Family History    CABG - Coronary  artery bypass graft: Mother.    CAD - Coronary artery disease: Mother and Father.    Diabetes mellitus: Mother.    Gout: Mother and Brother.    Hypertension: Mother.    Leukemia: Father.    MI - Myocardial infarction: Brother.    MS - Multiple sclerosis: Sister.    Parkinson's disease: Sister.    Valvular heart disease: Brother.  Immunizations      Vaccine Date Status          zoster vaccine, inactivated 10/15/2019 Recorded          influenza virus vaccine, inactivated 09/05/2019 Given          influenza virus vaccine, inactivated 10/10/2018 Given          influenza virus vaccine, inactivated 09/01/2017 Given          influenza virus vaccine, inactivated 01/24/2017 Given          ZOS, shingles 11/24/2015 Recorded              Comments : [11/24/2015] Freemans          influenza virus vaccine, inactivated 10/07/2015 Given          pneumococcal (PCV13) 03/19/2015 Given          influenza virus vaccine, inactivated 10/09/2014 Given          influenza virus vaccine, inactivated 10/11/2013 Given          influenza virus vaccine, inactivated 10/09/2012 Given          Td 01/11/2012 Given          influenza virus vaccine, inactivated 10/08/2011 Given          influenza virus vaccine, inactivated 10/07/2010 Given          influenza, H1N1, inactivated 12/18/2009 Recorded          pneumococcal (PPSV23) 04/24/2006 Recorded          pneumococcal (PPSV23) 01/08/2001 Recorded  Lab Results          Lab Results (Last 4 results within 90 days)           Sodium Level: 137 mmol/L [135 mmol/L - 146 mmol/L] (12/10/19 11:42:00)          Potassium Level: 4.9 mmol/L [3.5 mmol/L - 5.3 mmol/L] (12/10/19 11:42:00)          Chloride Level: 101 mmol/L [98 mmol/L - 110 mmol/L] (12/10/19 11:42:00)          CO2 Level: 29 mmol/L [20 mmol/L - 32 mmol/L] (12/10/19 11:42:00)          Glucose Level: 126 mg/dL High [65 mg/dL - 99 mg/dL] (12/10/19 11:42:00)          BUN: 23 mg/dL [7 mg/dL - 25 mg/dL] (12/10/19 11:42:00)          Creatinine Level: 0.76  mg/dL [0.6 mg/dL - 0.88 mg/dL] (12/10/19 11:42:00)          BUN/Creat Ratio: NOT APPLICABLE [6  - 22] (12/10/19 11:42:00)          eGFR: 69 mL/min/1.73m2 (12/10/19 11:42:00)          eGFR African American: 80 mL/min/1.73m2 (12/10/19 11:42:00)          Calcium Level: 9.6 mg/dL [8.6 mg/dL - 10.4 mg/dL] (12/10/19 11:42:00)          Hgb A1c: 6.9 High (12/10/19 11:42:00)          WBC: 5.6 [3.8  - 10.8] (12/10/19 11:42:00)          RBC: 3.94 [3.8  - 5.1] (12/10/19 11:42:00)          Hgb: 12.1 gm/dL [11.7 gm/dL - 15.5 gm/dL] (12/10/19 11:42:00)          Hct: 35.2 % [35 % - 45 %] (12/10/19 11:42:00)          MCV: 89.3 fL [80 fL - 100 fL] (12/10/19 11:42:00)          MCH: 30.7 pg [27 pg - 33 pg] (12/10/19 11:42:00)          MCHC: 34.4 gm/dL [32 gm/dL - 36 gm/dL] (12/10/19 11:42:00)          RDW: 11.7 % [11 % - 15 %] (12/10/19 11:42:00)          Platelet: 176 [140  - 400] (12/10/19 11:42:00)          MPV: 10.3 fL [7.5 fL - 12.5 fL] (12/10/19 11:42:00)          PT: 17.7 High [9  - 11.5] (12/10/19 11:42:00)          INR: 1.3 (02/26/20 15:21:00)          INR: 2.2 (01/10/20 15:21:00)          INR: 2 (12/27/19 11:25:00)          INR: 1.8 High (12/10/19 11:42:00)

## 2022-02-15 NOTE — TELEPHONE ENCOUNTER
---------------------  From: Itzel Ramos CMA (Phone Messages Pool ([id number]_[facility]))   To: [employer] Message Pool ([id number]_[place]);     Sent: 3/1/2021 11:07:53 AM CST  Subject: metoprolol     called: 10:30 am  PCP:  NINFA    Medication:  Metoprolol  Last Filled:  7/21/2020    Quantity: 90  Refills:  1    Date of last office visit and reason:  _10/26/2020 hopsital f/u  Date of last labs pertaining to condition:  2/8/2021 BMP      Please advise med refill.     Pharmacy: [facility]---------------------  From: Yumiko Escamilla ([employer] Message Pool ([id number]_[place]))   To: Hilario Whiting MD;     Sent: 3/1/2021 11:11:36 AM CST  Subject: FW: metoprolol---------------------  From: Hilario Whiting MD   To: [employer] Message Pool ([id number]_[place]);     Sent: 3/1/2021 11:14:14 AM CST  Subject: RE: metoprolol     OK to fill for 3 monthsSent. Patient and daughter notified.

## 2022-02-15 NOTE — LETTER
(Inserted Image. Unable to display)     March 11, 2019      GERHARD HOWE  [address]  [place], [place] [id number]          Dear GERHARD,      Thank you for selecting [facility] (previously [facility]) for your healthcare needs.    Your Healthcare Provider has recommended that you schedule a diabetes management appointment with our Certified Diabetes Educator, Ashley Sawyer RD, CD, CDE.     Please bring your glucose meter and your blood glucose diary to your appointment.    Available services include:  - Education about diabetes with guidance and support   - Education on the 7 Self Care Behaviors for Diabetes Management:     Healthy Eating   portion control, label readings, carbohydrate recommendations, heart healthy guidelines, meal planning    Being Active - Physical activity guidelines     Monitoring   blood glucose targets, evaluation of blood glucose readings and lab results    Taking Medication   medication management    Problem Solving   discuss any concerns/ questions     Healthy Coping   disease progression and management    Reducing Risks   prevention strategies to help avoid potential complications related to uncontrolled diabetes  - Weight loss strategies    To schedule an appointment or if you have further questions, please contact your primary clinic:   [facility]       [phone]   [facility]       [phone]              [facility]     [phone]      Powered by [email or web address] and [employer]    Sincerely,    Providers at [facility]

## 2022-02-15 NOTE — TELEPHONE ENCOUNTER
---------------------  From: Eric Rocha (Phone Messages Pool ([id number]))   To: [facility] Message Pool ([id number]);     Sent: 10/8/2020 12:14:02 PM CDT  Subject: High blood pressure      Phone Message    PCP:   NINFA                         Time of Call:  1155am                                        Person Calling:  Daughter   Phone number:      Note:   Daughter called w/ concerns about pts blood pressure. Its at 180/93, pulse: 85. She is wondering if it could be from the new medication she started for dementia? Wants a call back to discuss.     Last office visit and reason:  10/05/2020 ; Dementia---------------------  From: Yumiko Escamilla ([employer] Message Pool ([id number]))   To: Hilario Whiting MD;     Sent: 10/8/2020 12:27:51 PM CDT  Subject: FW: High blood pressure---------------------  From: Hilario Whiting MD   To: [employer] Message Pool ([id number]_[place]);     Sent: 10/8/2020 1:11:33 PM CDT  Subject: RE: High blood pressure      I do not think so. Continue to monitor. How long has she been on Lisinopril 20 mg?Spoke to daughter Lynne. She stated that pt is now throwing up and she believes that pt is having Vertigo. Daughter stated that they are worried that this is due to dementia med. Patient stated throwing up 40 min after morning pills. Daughter is wondering if she should give pt some meclizine to help with this and they are also looking for feedback from [facility] about this. Patient has been on ?Lisinopril for a couple weeks.---------------------  From: Yumiko Escamilla ([employer] Message Pool ([id number]))   To: Hilario Whiting MD;     Sent: 10/8/2020 2:12:34 PM CDT  Subject: FW: High blood pressure---------------------  From: Hilario Whiting MD   To: NINFA [employer] Pool ([id number]_[place]);     Sent: 10/8/2020 2:46:27 PM CDT  Subject: RE: High blood pressure      Needs to go to ED.Daughter notified. They will go to ED.

## 2022-02-15 NOTE — TELEPHONE ENCOUNTER
---------------------  From: Vaishali Gannon CMA (Phone Messages Pool ([id number]_[facility]))   To: Appointment Pool ([id number]_[place]);     Sent: 7/23/2019 11:53:12 AM CDT  Subject: General Message         Phone Message    PCP:   NINFA      Time of Call:  11:27am       Person Calling:  Karma  Phone number:  did not leave a phone number    Returned call at: _    Note:   Karma CARDENAS asking to schedule an appointment . Could you contact her to help her schedule.patient rescheduled

## 2022-02-15 NOTE — NURSING NOTE
Anticoagulation Therapy Management Entered On:  12/3/2020 4:12 PM CST    Performed On:  12/3/2020 4:08 PM CST by Jennifer Astudillo RN               Anticoagulation Visit Assessment   Type of Visit - Anticoagulation :   Telephone   Anticoagulation Indication :   Atrial fibrillation   Anticoagulant Duration :   Undetermined   Patient Preferred Contact Method :   Cell   Jennifer Astudillo RN - 12/3/2020 4:08 PM CST   Anticoagulation Patient Assessment Grid   Change in Alcohol Consumption :   No   Change in Diet :   No   Change in Medications :   No   Diarrhea :   No   Rectal Bleeding :   No   Signs of Clotting :   No   Signs of Warfarin Intolerance :   No   Unusual Bleeding, Bruising :   No   Upcoming Procedures :   No   Vomiting :   No   Jennifer Astudillo RN - 12/3/2020 4:08 PM CST   Patient on Warfarin :   Yes   Patient on Other Anticoagulant :   No   Jennifer Astudillo RN - 12/3/2020 4:08 PM CST   Warfarin   International Normalization Ratio TR :   2.9    Anticoagulant INR Goal Lower :   2    Anticoagulant INR Goal Upper :   3    Information Given by :   Daughter   Sunday :   7.5 mg   Monday :   5 mg   Tuesday :   7.5 mg   Wednesday :   5 mg   Thursday :   7.5 mg   Friday :   5 mg   Saturday :   5 mg   Total Dose :   42.5 mg   Warfarin Pt Reported Previous Week Dose :    Sun Mon Tues Wed Thurs Fri Sat Weekly Total Dose   Week 1 7.5 mg 5 mg 7.5 mg 5 mg 7.5 mg 5 mg 5 mg 42.5 mg   Week 2  mg  mg  mg  mg  mg  mg  mg  mg   Week 3  mg  mg  mg  mg  mg  mg  mg  mg   Week 4  mg  mg  mg  mg  mg  mg  mg  mg         Patient is taking single or multiple strength tablet(s) :   Single strength tab(s)   One Tab Strength :   5 mg tab   Sunday :   7.5 mg   Monday :   5 mg   Tuesday :   7.5 mg   Wednesday :   5 mg   Thursday :   7.5 mg   Friday :   5 mg   Saturday :   5 mg   Week 1 Total Dose :   42.5 mg   Sunday :   1.5 tab(s)   Monday :   1 tab(s)   Tuesday :   1.5 tab(s)   Wednesday :   1 tab(s)   Thursday :   1.5 tab(s)   Friday :    1 tab(s)   Saturday :   1 tab(s)   Patient Instructions :   INR = 2.9 per MDINR Patient is to stay on 7.5mg warfarin on Sun, Tues and Thurs and 5mg the rest of the days of the week recheck INR in 1 week per protocol. Daughter advised via call to home. This is first home test INR. Discusss home test process with daughter and she and patient feel comfortable with it.      Jennifer Astudillo RN - 12/3/2020 4:08 PM CST   Medication History   Medication List   (As Of: 12/3/2020 4:12:21 PM CST)   Prescription/Discharge Order    amoxicillin  :   amoxicillin ; Status:   Prescribed ; Ordered As Mnemonic:   amoxicillin 500 mg oral tablet ; Simple Display Line:   See Instructions, 4 tab(s) Oral 1 hour before dental procedure, 4 EA, 1 Refill(s) ; Ordering Provider:   Beatriz Petit; Catalog Code:   amoxicillin ; Order Dt/Tm:   5/7/2020 2:05:13 PM CDT          donepezil  :   donepezil ; Status:   Suspended ; Ordered As Mnemonic:   Aricept 5 mg oral tablet ; Simple Display Line:   5 mg, 1 tab(s), Oral, hs, 30 tab(s), 5 Refill(s) ; Ordering Provider:   Hilario Whiting MD; Catalog Code:   donepezil ; Order Dt/Tm:   10/5/2020 3:59:19 PM CDT          fluticasone nasal  :   fluticasone nasal ; Status:   Prescribed ; Ordered As Mnemonic:   Flonase 50 mcg/inh nasal spray ; Simple Display Line:   2 spray(s), nasal, daily, 1 EA, 11 Refill(s) ; Ordering Provider:   Shimon Nesbitt MD; Catalog Code:   fluticasone nasal ; Order Dt/Tm:   7/21/2020 11:28:21 AM CDT          levothyroxine  :   levothyroxine ; Status:   Prescribed ; Ordered As Mnemonic:   levothyroxine 125 mcg (0.125 mg) oral tablet ; Simple Display Line:   1 tab(s), Oral, daily, 90 tab(s), 1 Refill(s) ; Ordering Provider:   Shimon Nesbitt MD; Catalog Code:   levothyroxine ; Order Dt/Tm:   7/21/2020 11:28:22 AM CDT          metoprolol  :   metoprolol ; Status:   Prescribed ; Ordered As Mnemonic:   Metoprolol Succinate  mg oral tablet, extended release ; Simple  Display Line:   100 mg, 1 tab(s), Oral, daily, 90 tab(s), 1 Refill(s) ; Ordering Provider:   Shimon Nesbitt MD; Catalog Code:   metoprolol ; Order Dt/Tm:   7/21/2020 11:28:23 AM CDT          Miscellaneous Rx Supply  :   Miscellaneous Rx Supply ; Status:   Prescribed ; Ordered As Mnemonic:   ACCU-CHECK GUIDE LANCETS ; Simple Display Line:   See Instructions, TEST BLOOD SUGAR DAILY, 100 EA, 11 Refill(s) ; Ordering Provider:   Shimon Nesbitt MD; Catalog Code:   Miscellaneous Rx Supply ; Order Dt/Tm:   7/21/2020 8:47:38 AM CDT          Miscellaneous Rx Supply  :   Miscellaneous Rx Supply ; Status:   Prescribed ; Ordered As Mnemonic:   ACCU-CHECK GUIDE TEST STRIPS ; Simple Display Line:   See Instructions, TEST BLOOD SUGAR DAILY, 100 EA, 11 Refill(s) ; Ordering Provider:   Shimon Nesbitt MD; Catalog Code:   Miscellaneous Rx Supply ; Order Dt/Tm:   7/21/2020 8:47:37 AM CDT          warfarin  :   warfarin ; Status:   Prescribed ; Ordered As Mnemonic:   warfarin 5 mg oral tablet ; Simple Display Line:   See Instructions, TAKE 1 AND 1/2 TABLETS BY MOUTH FRIDAYS AND 1 TABLET EVERY OTHER DAY, 180 tab(s), 1 Refill(s) ; Ordering Provider:   Shimon Nesbitt MD; Catalog Code:   warfarin ; Order Dt/Tm:   7/21/2020 11:28:22 AM CDT            Home Meds    acetaminophen  :   acetaminophen ; Status:   Documented ; Ordered As Mnemonic:   acetaminophen ; Simple Display Line:   0 Refill(s) ; Catalog Code:   acetaminophen ; Order Dt/Tm:   5/14/2019 1:44:43 PM CDT          ascorbic acid  :   ascorbic acid ; Status:   Documented ; Ordered As Mnemonic:   Vitamin C ; Simple Display Line:   500 mg, Oral, daily, 0 Refill(s) ; Catalog Code:   ascorbic acid ; Order Dt/Tm:   10/26/2020 3:10:37 PM CDT          cholecalciferol  :   cholecalciferol ; Status:   Documented ; Ordered As Mnemonic:   Vitamin D3 ; Simple Display Line:   2,000 International Unit, Oral, daily, 0 Refill(s) ; Catalog Code:   cholecalciferol ; Order Dt/Tm:    10/26/2020 3:10:48 PM CDT          lisinopril  :   lisinopril ; Status:   Documented ; Ordered As Mnemonic:   lisinopril 20 mg oral tablet ; Simple Display Line:   20 mg, 1 tab(s), Oral, daily, 0 Refill(s) ; Catalog Code:   lisinopril ; Order Dt/Tm:   10/5/2020 3:26:04 PM CDT          melatonin  :   melatonin ; Status:   Documented ; Ordered As Mnemonic:   melatonin 3 mg oral tablet ; Simple Display Line:   3 mg, 1 tab(s), Oral, hs, 0 Refill(s) ; Catalog Code:   melatonin ; Order Dt/Tm:   10/29/2020 10:23:22 AM CDT          Miscellaneous Prescription  :   Miscellaneous Prescription ; Status:   Documented ; Ordered As Mnemonic:   Calcium with vitamin D ; Simple Display Line:   1 tab, Oral, daily, 0 Refill(s) ; Catalog Code:   Miscellaneous Prescription ; Order Dt/Tm:   10/26/2020 3:08:29 PM CDT          multivitamin  :   multivitamin ; Status:   Documented ; Ordered As Mnemonic:   Daily Multiple Vitamins ; Simple Display Line:   1 tab(s), Oral, daily, 0 Refill(s) ; Catalog Code:   multivitamin ; Order Dt/Tm:   10/26/2020 3:09:47 PM CDT          multivitamin with minerals  :   multivitamin with minerals ; Status:   Documented ; Ordered As Mnemonic:   PreserVision ; Simple Display Line:   1 cap(s), Oral, bid, 0 Refill(s) ; Catalog Code:   multivitamin with minerals ; Order Dt/Tm:   10/26/2020 3:10:54 PM CDT          omega-3 polyunsaturated fatty acids  :   omega-3 polyunsaturated fatty acids ; Status:   Documented ; Ordered As Mnemonic:   Fish Oil ; Simple Display Line:   1 tab, Oral, daily, 0 Refill(s) ; Catalog Code:   omega-3 polyunsaturated fatty acids ; Order Dt/Tm:   10/26/2020 3:10:12 PM CDT          ondansetron  :   ondansetron ; Status:   Documented ; Ordered As Mnemonic:   Zofran 4 mg oral tablet ; Simple Display Line:   4 mg, 1 tab(s), Oral, q8 hrs, 0 Refill(s) ; Catalog Code:   ondansetron ; Order Dt/Tm:   10/26/2020 3:07:28 PM CDT

## 2022-02-15 NOTE — NURSING NOTE
Quick Intake Entered On:  7/30/2019 8:17 AM CDT    Performed On:  7/30/2019 8:17 AM CDT by Jennifer Astudillo RN               Summary   Systolic Blood Pressure :   144 mmHg (HI)    Diastolic Blood Pressure :   74 mmHg   Mean Arterial Pressure :   97 mmHg   Race :      Languages :   English   Jennifer Astudillo RN - 7/30/2019 8:17 AM CDT

## 2022-02-15 NOTE — PROGRESS NOTES
Chief Complaint    Patient c/o trouble walking and hip pain x6 months or longer  History of Present Illness      Patient complains of 6 months of bilateral hip pain.  Today she describes the pain is at least some of the time being provoked by physical activity she is attempting to walk on a daily basis.  Pain typically starts in the right lateral hip area before the left.  Today she describes it as radiating down to the ankles.  Mild low back pain.  No leg weakness.  No falls.  Diabetes well controlled.  Physical therapy and steroid injection were not helpful.  Review of Systems      No calf pain.  No weight loss.  No hypoglycemia.  No headache, chest pain, dyspnea, edema.  Physical Exam   Vitals & Measurements    T: 97.3   F (Tympanic)  HR: 62(Peripheral)  BP: 130/72     HT: 64 in  WT: 146.0 lb  BMI: 25.06       Patient is a healthy-appearing older woman in no distress.  Alert and oriented.  Chest is clear.  Cardiac exam is regular.  Remedies have trace edema.  Feet appear well perfused.  Sensation in the feet to monofilament and vibration diminished consistent with her neuropathy.  Strength in the legs normal.  Lumbar spine, hips, and pelvis nontender.  Assessment/Plan       Neurogenic claudication due to lumbar spinal stenosis (M48.062)        Doubt trochanteric bursitis or hip osteoarthritis.  MRI of the lumbar spine.         Ordered:          [address] office outpatient visit 15 minutes (Charge), Quantity: 1, Neurogenic claudication due to lumbar spinal stenosis          MRI Lumbar Spine (Request), Instructions: W/O CONTRAST, Neurogenic claudication due to lumbar spinal stenosis                Orders:          diab manage trn per indiv (Charge), Quantity: 2, Dyslipidemia, goal LDL below 100  Diabetes Mellitus Type II, Controlled         Return to Clinic (Request), Return in 4 mo w/ DKhadijah Sawyer bring meter and log         Review Orders for Potential Authorizations, 07/25/19 11:24:55 CDT  Patient Information      Name:GERHARD HOWE      Address:      [address] [place] [address] [address]      [place] [address]     Sex:Female     YOB: 1929     [phone]     Emergency Contact:GENTRY PARK     MRN:[id number]     FIN:[id number]     Location:[facility]     Date of Service:07/25/2019      Primary Care Physician:       Hilario Whiting MD, [phone]      Attending Physician:       Hilario Whiting MD, [phone]  Problem List/Past Medical History    Ongoing     Allergic rhinitis     Benign positional vertigo     Bilateral hearing loss     Carotid artery plaque     Diabetic peripheral neuropathy     Dyslipidemia, goal LDL below 100     Frailty     History of mitral valve insufficiency       Comments: Mild by Echo in 2009     Hypothyroidism (acquired)     Long term (current) use of anticoagulants     Obese     Osteoarthritis     Paroxysmal atrial fibrillation     S/P placement of cardiac pacemaker     Seborrheic dermatitis     Statin intolerance     Symptomatic varicose veins     Tinnitus     Type 2 diabetes mellitus with other circulatory complications     White coat syndrome with hypertension    Historical     Inpatient stay       Comments: @[place], [place] - Atrial fibrillation with an episode of hypotension     Inpatient stay       Comments: @[place], [place] - Vertigo     Inpatient stay       Comments: @[place], [place] - Postoperative surgical complication involving subcutaneous tissue  Procedure/Surgical History     Replacement of pacemaker pulse generator (05/30/2019)      Comments: Implanted left pectoral.      Medtronic [id number]      [id number].     Phacoemulsification of cataract with intraocular lens implantation (03/27/2018)      Comments: Left..     Colonoscopy (05/23/2012)      Comments: Unremarkable.     LAVH-BSO (04/06/2012)     Hysteroscopy, D&C, polypectomy (11/04/2011)     Implantation of heart pacemaker (2009)     Replacement of  right knee joint (11.2008)     Replacement of left knee joint (12/10/2007)     Flexible sigmoidoscopy (06/13/2006)     Cholecystectomy (1951)     Repair of umbilical hernia (1949)     Appendectomy (1947)     Extracapsular cataract extraction and insertion of intraocular lens      Comments: Right..     Plastic repair of rotator cuff of shoulder     Pregnancy      Comments: full term X 3.  Medications        pravastatin 10 mg oral tablet: 10 mg, 1 tab(s), PO, Daily, 90 tab(s), 3 Refill(s).        digoxin 125 mcg (0.125 mg) oral tablet: 125 mcg, 1 tab(s), po, daily, 90 tab(s), 3 Refill(s).        levothyroxine 125 mcg (0.125 mg) oral tablet: 125 mcg, 1 tab(s), po, daily, for 90 day(s), 90 tab(s), 3 Refill(s).        Metoprolol Succinate  mg oral tablet, extended release: See Instructions, TAKE ONE TABLET BY MOUTH EVERY DAY, 90 tab(s), 3 Refill(s).        warfarin 5 mg oral tablet: See Instructions, 1.5 tabs Fridays and one every other day., 100 EA, 3 Refill(s).        Flonase 50 mcg/inh nasal spray: 2 spray(s), nasal, daily, 1 EA, 11 Refill(s).        acetaminophen: 0 Refill(s).         Allergies    Vicodin (rash)    Ancef (Diarrhea)    Calan    ciprofloxacin    phenobarbital (tremors)    probiotic (Rash..)    simvastatin (GI upset)  Social History    Smoking Status - 07/25/2019     Never smoker     Alcohol - Denies Alcohol Use, 04/06/2010      Never, 05/09/2017     Employment/School      Retired, 10/27/2011     Home/Environment      Marital status: ., 10/27/2011     Substance Abuse - Denies Substance Abuse, 08/29/2018     Tobacco - Denies Tobacco Use, 04/06/2010  Family History    CABG - Coronary artery bypass graft: Mother.    CAD - Coronary artery disease: Mother and Father.    Diabetes mellitus: Mother.    Gout: Mother and Brother.    Hypertension: Mother.    Leukemia: Father.    MI - Myocardial infarction: Brother.    MS - Multiple sclerosis: Sister.    Parkinson's disease: Sister.    Valvular heart  disease: Brother.  Immunizations      Vaccine Date Status Comments      influenza virus vaccine, inactivated 10/10/2018 Given      influenza virus vaccine, inactivated 09/01/2017 Given      influenza virus vaccine, inactivated 01/24/2017 Given      ZOS, shingles 11/24/2015 Recorded [11/24/2015] Freemans      influenza virus vaccine, inactivated 10/07/2015 Given      pneumococcal (PCV13) 03/19/2015 Given      influenza virus vaccine, inactivated 10/09/2014 Given      influenza virus vaccine, inactivated 10/11/2013 Given      influenza virus vaccine, inactivated 10/09/2012 Given      Td 01/11/2012 Given      influenza virus vaccine, inactivated 10/08/2011 Given      influenza virus vaccine, inactivated 10/07/2010 Given      influenza, H1N1, inactivated 12/18/2009 Recorded      pneumococcal (PPSV23) 04/24/2006 Recorded      pneumococcal (PPSV23) 01/08/2001 Recorded  Lab Results          Lab Results (Last 4 results within 90 days)           INR: 1.9 (06/28/19 14:35:00)          INR: 1.8 (06/13/19 15:41:00)          INR: 1.4 (06/06/19 11:39:00)          INR: 1.8 (05/23/19 15:33:00)  Diagnostic Results   X-rays of the hips earlier this year with mild degenerative change.

## 2022-02-15 NOTE — PROGRESS NOTES
Patient:   GERHARD HOWE            MRN: [id number]            FIN: [id number]               Age:   88 years     Sex:  Female     :  11/10/1929   Associated Diagnoses:   Diabetes Mellitus Type II, Controlled; Dyslipidemia, goal LDL below 100   Author:   Ashley Sawyer      Visit Information   Visit type:  Diabetes Self Management Education - last seen by RD 17.    Referral source:  Hilario Whiting MD.       Chief Complaint   DM Type II controlled, Dyslipidemia (LDL goal <100)      History of Present Illness   Discussed nutrition, diabetes, and lifestyle management with pt.    Nutrition:  Pt is eating most evening meals at her daughters and has guests over frequently.  Purchases fresh and organic foods.  Enjoys tuna, egg, whole grain, fruits, and some vegetables.  Minimal sweets - currently likes an elderberry supplement chewy 1x/ day.    Physical Activity:walking 10 - 15 min/ day   Stress:   low  Sleep: good   Support: family   BG Testing: am testing 14 day avg 137 mg/dL stable (did not test 2 hr pp as recommended)  (114 - 170's in am)  DM related medication: none at this time, pt would like to avoid medication and continue to monitor A1C   Routine diabetes care: eye exam UTD with Dr. Llanes, and dentist exam 2x/ year, influenza vaccine q fall,  feet examined with MD, no open elayne/ skin issues, influenza vaccine today    Estimated Average Glucose (eAG) 152 mg/dL with A1C of 6.5% on 17      Review of Systems             Health Status   Allergies:    Allergic Reactions (Selected)  Moderate  Vicodin (Rash)  Severity Not Documented  Ancef (No reactions were documented)  Calan (No reactions were documented)  Ciprofloxacin (No reactions were documented)  Phenobarbital (No reactions were documented)  Probiotic (Rash..)  Simvastatin (Gi upset)   Medications:  (Selected)   Prescriptions  Prescribed  Flonase 50 mcg/inh nasal spray: 2 spray(s), nasal, daily, # 1 EA, 11 Refill(s), Type: Maintenance, Pharmacy:  [id number], 2 spray(s) nasal daily  Metoprolol Succinate  mg oral tablet, extended release: 1 tab(s) ( 100 mg ), po, daily, # 90 tab(s), 1 Refill(s), Type: Maintenance, Pharmacy: [id number], To replace Atenolol Rx, 1 tab(s) po daily  digoxin 125 mcg (0.125 mg) oral tablet: 1 tab(s) ( 125 mcg ), po, daily, # 90 tab(s), 3 Refill(s), Type: Maintenance, Pharmacy: [id number], 1 tab(s) po daily  levothyroxine 125 mcg (0.125 mg) oral tablet: 1 tab(s) ( 125 mcg ), po, daily, # 90 tab(s), 3 Refill(s), Type: Soft Stop, Pharmacy: [id number], 1 tab(s) po daily,x90 day(s)  pravastatin 10 mg oral tablet: 1 tab(s) ( 10 mg ), PO, Daily, # 90 tab(s), 3 Refill(s), Type: Maintenance, Pharmacy: [id number], 1 tab(s) po daily  warfarin 5 mg oral tablet: 1 tab(s) ( 5 mg ), po, daily, # 90 tab(s), 3 Refill(s), Type: Soft Stop, Pharmacy: [id number], 1 tab(s) po daily,x90 day(s)   Problem list:    All Problems  Hypothyroidism (acquired) / SNOMED CT 635092968 / Confirmed  Allergic rhinitis / SNOMED CT 950980915 / Confirmed  Benign positional vertigo / SNOMED CT 869437039 / Confirmed  Bilateral hearing loss / SNOMED CT 061245733 / Confirmed  Carotid artery plaque / SNOMED CT 920226349 / Confirmed  Rhinitis, chronic / SNOMED CT 892087356 / Confirmed  Statin intolerance / SNOMED CT 83280616 / Confirmed  White coat hypertension / SNOMED CT 0504621649 / Confirmed  Long-term (current) use of anticoagulants, INR goal 2.0-3.0 / SNOMED CT 545479746 / Confirmed  S/P placement of cardiac pacemaker / SNOMED CT 806374831 / Confirmed  History of mitral valve insufficiency / SNOMED CT 149637105 / Confirmed  Dyslipidemia, goal LDL below 100 / SNOMED CT 19539456 / Confirmed  HTN, goal below 140/90 / SNOMED CT 8984190252 / Confirmed  Obese / SNOMED CT 9009336767 / Probable  Osteoarthritis / SNOMED CT 3080461482 / Confirmed  Paroxysmal atrial fibrillation /  SNOMED CT 228559354 / Confirmed  Seborrheic dermatitis / SNOMED CT 40745809 / Confirmed  Tinnitus / SNOMED CT 198329836 / Confirmed  Diabetes mellitus type II, controlled / SNOMED CT 921305404 / Confirmed  Symptomatic varicose veins / SNOMED CT 918385055 / Confirmed  White coat hypertension / SNOMED CT 9259109592 / Confirmed  Resolved: *Hospitalized@[facility] - Atrial fibrillation with an episode of hypotension  Resolved: *Hospitalized@[facility] - Vertigo  Canceled: Diabetic Neuropathy, Type II Diabetes Mellitus / ICD-9-.60  Canceled: Hypertension / SNOMED CT 1159854019      Histories   Past Medical History:    Active  Diabetes mellitus type II, controlled (882240935): Onset on 1999 at 69 years.  Osteoarthritis (4933046962)  Dyslipidemia, goal LDL below 100 (76256161)  Paroxysmal atrial fibrillation (219994182)  Hypothyroidism (acquired) (121515264)  White coat hypertension (5209848104)  Allergic rhinitis (766745967)  Obese (8868722502)  Benign positional vertigo (105515613)  Statin intolerance (54463584)  Long-term (current) use of anticoagulants, INR goal 2.0-3.0 (925770691)  Rhinitis, chronic (542325091)  Bilateral hearing loss (674265995)  Tinnitus (761084007)  White coat hypertension (4517887172)  S/P placement of cardiac pacemaker (356121966)  HTN, goal below 140/90 (2375032353)  Resolved  *Hospitalized@[facility] - Vertigo: Onset on 2012 at 83 years.  Resolved.  *Hospitalized@[facility] - Atrial fibrillation with an episode of hypotension: Onset on 2010 at 80 years.  Resolved.   Family History:    Leukemia  Father ()  Gout  Brother  Mother ()  Diabetes mellitus  Mother ()  CA - Lung cancer  Spouse ()  Hypertension  Mother ()  Parkinson's disease  Sister  Valvular heart disease  Brother ()  MS - Multiple sclerosis  Sister  CABG - Coronary artery bypass graft  Mother ()  MI - Myocardial infarction  Brother ()  CAD - Coronary artery disease  Father  ()  Mother ()     Procedure history:    Colonoscopy (SNOMED CT 903852305) on 2012 at 82 Years.  Comments:  2012 8:59 AM - Hilario Whiting MD-BSO performed by Nba Alcaraz MD on 2012 at 82 Years.  Hysteroscopy, D&C, polypectomy performed by Nba Alcaraz MD on 2011 at 81 Years.  Implantation of heart pacemaker (SNOMED CT 4257934598) in  at 80 Years.  Replacement of right knee joint (SNOMED CT 8315902350) in the month of 2008 at 78 Years.  Replacement of left knee joint (SNOMED CT 4333490442) on 12/10/2007 at 78 Years.  Flexible sigmoidoscopy (SNOMED CT 87720090) performed by Hilario Whiting MD on 2006 at 76 Years.  Cholecystectomy (SNOMED CT 20664520) in 195 at 22 Years.  Repair of umbilical hernia (SNOMED CT 88714109) in 1949 at 20 Years.  Appendectomy (SNOMED CT 240120802) in 1947 at 18 Years.  Plastic repair of rotator cuff of shoulder (SNOMED CT 614689380).  Pregnancy (SNOMED CT 083552440).  Comments:  2010 9:08 AM - Vaishali Cortés  full term X 3   Social History:        Alcohol Assessment: Denies Alcohol Use            Never      Tobacco Assessment: Denies Tobacco Use      Employment and Education Assessment            Retired      Home and Environment Assessment            Marital status: .        Physical Examination   VS/Measurements      Health Maintenance      Recommendations     Pending (in the next year)        OverDue           DM - Eye Exam due  10/05/17  and every 1  year(s)        Due            DM - Communication with Managing Provider due  02/10/18  and every 1  year(s)           Fall Risk Screen (Female) due  02/10/18  and every 1  year(s)           Osteoporosis Screen due  02/10/18  and every 2  year(s)        Near Due            DM - HgbA1c near due  18  and every 3  month(s)           Lipid Disorders Screen (Female) near due  18  and every 1  year(s)        Due In Future            Depression  Screen (Female) not due until  05/08/18  and every 1  year(s)           Influenza Vaccine not due until  09/01/18  and every 1  year(s)           DM - Microalbumin not due until  11/24/18  and every 1  year(s)           Type 2 Diabetes Mellitus Screen (Female) not due until  11/24/18  and every 1  year(s)           Tobacco Use Screen (Female) not due until  11/30/18  and every 1  year(s)           DM - Foot Exam not due until  11/30/18  and every 1  year(s)           High Blood Pressure Screen (Female) not due until  01/29/19  and every 1  year(s)           Body Mass Index Check (Female) not due until  02/08/19  and every 1  year(s)           Obesity Screen and Counseling (Female) not due until  02/08/19  and every 1  year(s)     Satisfied (in the past 1 year)        Satisfied            Body Mass Index Check (Female) on  02/08/18.           Body Mass Index Check (Female) on  11/30/17.           Body Mass Index Check (Female) on  09/01/17.           Body Mass Index Check (Female) on  08/29/17.           Body Mass Index Check (Female) on  08/22/17.           Body Mass Index Check (Female) on  06/08/17.           Body Mass Index Check (Female) on  05/08/17.           Body Mass Index Check (Female) on  05/02/17.           Body Mass Index Check (Female) on  03/23/17.           Body Mass Index Check (Female) on  03/21/17.           DM - Foot Exam on  11/30/17.           DM - HgbA1c on  11/24/17.           DM - HgbA1c on  05/08/17.           DM - Microalbumin on  11/24/17.           DM - Microalbumin on  11/24/17.           Depression Screen (Female) on  05/08/17.           Depression Screen (Female) on  05/08/17.           Depression Screen (Female) on  05/08/17.           High Blood Pressure Screen (Female) on  01/29/18.           High Blood Pressure Screen (Female) on  11/30/17.           High Blood Pressure Screen (Female) on  11/30/17.           High Blood Pressure Screen (Female) on  11/24/17.           High Blood  Pressure Screen (Female) on  11/24/17.           High Blood Pressure Screen (Female) on  11/17/17.           High Blood Pressure Screen (Female) on  10/04/17.           High Blood Pressure Screen (Female) on  09/11/17.           High Blood Pressure Screen (Female) on  08/29/17.           High Blood Pressure Screen (Female) on  08/22/17.           High Blood Pressure Screen (Female) on  08/14/17.           High Blood Pressure Screen (Female) on  07/17/17.           High Blood Pressure Screen (Female) on  06/19/17.           High Blood Pressure Screen (Female) on  06/08/17.           High Blood Pressure Screen (Female) on  05/12/17.           High Blood Pressure Screen (Female) on  05/08/17.           High Blood Pressure Screen (Female) on  05/02/17.           High Blood Pressure Screen (Female) on  05/02/17.           High Blood Pressure Screen (Female) on  04/27/17.           High Blood Pressure Screen (Female) on  04/13/17.           High Blood Pressure Screen (Female) on  03/28/17.           High Blood Pressure Screen (Female) on  03/28/17.           High Blood Pressure Screen (Female) on  03/21/17.           High Blood Pressure Screen (Female) on  03/21/17.           High Blood Pressure Screen (Female) on  02/17/17.           Influenza Vaccine on  09/01/17.           Lipid Disorders Screen (Female) on  03/17/17.           Lipid Disorders Screen (Female) on  03/17/17.           Lipid Disorders Screen (Female) on  03/17/17.           Lipid Disorders Screen (Female) on  03/17/17.           Obesity Screen and Counseling (Female) on  02/08/18.           Obesity Screen and Counseling (Female) on  11/30/17.           Obesity Screen and Counseling (Female) on  09/01/17.           Obesity Screen and Counseling (Female) on  08/29/17.           Obesity Screen and Counseling (Female) on  08/22/17.           Obesity Screen and Counseling (Female) on  06/08/17.           Obesity Screen and Counseling (Female) on  05/08/17.            Obesity Screen and Counseling (Female) on  05/02/17.           Obesity Screen and Counseling (Female) on  03/28/17.           Obesity Screen and Counseling (Female) on  03/23/17.           Obesity Screen and Counseling (Female) on  03/21/17.           Tobacco Use Screen (Female) on  11/30/17.           Tobacco Use Screen (Female) on  08/29/17.           Tobacco Use Screen (Female) on  08/22/17.           Tobacco Use Screen (Female) on  06/08/17.           Tobacco Use Screen (Female) on  05/08/17.           Tobacco Use Screen (Female) on  03/21/17.           Type 2 Diabetes Mellitus Screen (Female) on  11/24/17.           Type 2 Diabetes Mellitus Screen (Female) on  05/08/17.          Review / Management   Results review:  Lab results   1/29/2018 10:14 AM CST INR 2.1   1/2/2018 8:19 AM CST INR 2.6   11/30/2017 4:07 PM CST INR 1.9   11/24/2017 1:00 PM CST Sodium Level 139 mmol/L    Potassium Level 4.6 mmol/L    Chloride Level 103 mmol/L    CO2 Level 28 mmol/L    Glucose Level 145 mg/dL  HI    BUN 18 mg/dL    Creatinine 0.80 mg/dL    BUN/Creat Ratio NOT APPLICABLE    eGFR 66 mL/min/1.73m2    eGFR African American 76 mL/min/1.73m2    Calcium Level 9.1 mg/dL    Hgb A1c 6.5  HI    U Microalbumin 1.8 mg/dL    Ur Creatinine 190 mg/dL    Ur Microalb/Creat Ratio 9   11/17/2017 12:09 PM CST INR 1.9   11/2/2017 2:14 PM CDT PT 16.4  HI    INR 1.6  HI   .       Impression and Plan   Diagnosis     Diabetes Mellitus Type II, Controlled (IPR08-IH E11.9).     Dyslipidemia, goal LDL below 100 (IYA20-MI E78.5).       Counseled: Patient,  Review of instruction on AADE7 Self Care Behaviors;   Healthy Eating - Reviewed carbohydrate controlled meal plan.  Diabetic plate method as a general rule, basic reading food labels, appropriate portion sizes, well balanced meals.  Patient is provided with snack and meal ideas to incorporate dietary recommendations, meal planning and preperation.  Reviewed education on dietary sources of omega 3  FA and soluble fiber 10gm/d ay to help maintain good LDL cholesterol.    Being Active - Education on how exercise helps with : encouraged 15 walk BID   - lowering BG by increasing the muscles ability to take up and use glucose   - weight loss   - healthier heart (improve lipid profile)   - improve sleep, mood, energy   - decrease stress  Monitoring - Reviewed recommended testing schedule and blood glucose target levels.  Again encouraged to test pre/ 2 hr pp the largest meal of the day a couple times/ week for pt to understand how intake significantly affects glucose readings.  Highlighted BG logbook again to help pt understand testing schedule.    Taking Medication - Discussed potential need to add medications and pt does not want medication.  Pt is willing to continue to adjust diet and lifestyle.    Problem Solving -  medical support team assistance, resources provided (written); good control of stress  Healthy Coping - support of friends, family, and medical support team   Reducing Risks - Detailed education on potential complications associated with uncontrolled diabetes and prevention recommendations.  Recommendations include: annual eye exam, good oral cares with brushing BID and flossing daily, routine dental apts, good foot care tips and shoewear - discussed monofilament test yearly.  Importance of adequate sleep and good control of BG to prevent developing complications related to uncontrolled DM including nephropathy, neuropathy, retinopathy, and cardiovascular disease.  Weight loss goal of 7 - 10% of current body weight pounds as a reasonable goal to help increase insulin sensitivity      Goals:   1.  Practice healthy stress management and get good quality sleep with the goal of 7-8 hours per night.    2.   Physical activity: Recommend increasing chair exercises, walking in place, stay active throughout the day  3.  Eat in a healthy way, per diabetic plate method.  Nutrition reference: Eat 3 meals a day;  snacks are optional.  A meal is three or more food groups; make it colorful for better nutrition.  Incorporate TLC dietary heart healthy guidelines.  **continue to increasing fiber in diet, more vegeterian meals, fish 2+x/ week)  4.  Read handouts provided.  F/u 4 month  5.  Pt to monitor BG BID on 2 - 3 days/ week.  BG goals: Fasting and before meals 80 - 130 mg/dL, 2 hrs after the start of a meal 80 - 160 mg/dL.           Professional Services   Time spent with pt 60 min   champ OCASIO

## 2022-02-15 NOTE — NURSING NOTE
Anticoagulation Therapy Entered On:  4/3/2020 8:53 AM CDT    Performed On:  4/3/2020 8:51 AM CDT by Ava Velasquez RN               Warfarin Management   Week 1 Sunday Dose :   7.5    Week 1 Monday Dose :   5    Week 1 Tuesday Dose :   7.5    Week 1 Wednesday Dose :   5    Week 1 Thursday Dose :   7.5    Week 1 Friday Dose :   5    Week 1 Saturday Dose :   5    Week 1 Dose Total :   42.5    Week 2 Sunday Dose :   7.5    Week 2 Monday Dose :   5    Week 2 Tuesday Dose :   7.5    Week 2 Wednesday Dose :   5    Week 2 Thursday Dose :   7.5    Week 2 Friday Dose :   5    Week 2 Saturday Dose :   5    Week 2 Dose Total :   42.5    Planned Duration :   Indefinite   Indication :   Atrial fibrillation   Warfarin Management Comments :   Lab test performed by:  [facility]  [address] [place] [address]  Phone # [phone]  Fax# [phone]   INR Range :   2.0 - 3.0   INR Therapeutic Range :   No   Warfarin Abnormal Findings :   Took Thursday and Friday dose on Thursday night   Ava Velasquez RN - 4/3/2020 8:51 AM CDT   Warfarin Management and Results Grid   Signs of Thrombolic :   No   Signs of Warfarin Intolerance :   No   Changes in Diet or Alcohol Intake :   No   Changes in Medication or Antibiotics :   No   Unusual Bleeding or Bruising :   No   Rectal Bleeding or Black Stools :   No   Vitamin K Food Handout :   No   Heart Valve Replacement :   No   Ava Velasquez RN - 4/3/2020 8:51 AM CDT   Anticoagulation Recheck :   1 week   Warfarin Special Instructions :   [facility] POC INR = 1.8; per protocol continue warfarin 7.5 mg Sun/Tue/Thur and 5 mg ROW; recheck INR in 1 week; pt daughter notified by phone   Ava Velasquez RN - 4/3/2020 8:51 AM CDT

## 2022-02-15 NOTE — NURSING NOTE
Anticoagulation Therapy Management Entered On:  12/31/2020 11:49 AM CST    Performed On:  12/31/2020 11:43 AM CST by Jennifer Astudillo RN               Anticoagulation Visit Assessment   Type of Visit - Anticoagulation :   Telephone   Anticoagulation Indication :   Atrial fibrillation   Anticoagulant Duration :   Undetermined   Anticoagulation Medication Verified :   Yes   Patient Preferred Contact Method :   Cell   Jennifer Astudillo RN - 12/31/2020 11:43 AM CST   Anticoagulation Patient Assessment Grid   Change in Alcohol Consumption :   No   Change in Diet :   No   Change in Medications :   No   Diarrhea :   No   Rectal Bleeding :   No   Signs of Clotting :   No   Signs of Warfarin Intolerance :   No   Unusual Bleeding, Bruising :   No   Upcoming Procedures :   No   Vomiting :   No   Jennifer Astudillo RN - 12/31/2020 11:43 AM CST   Patient on Warfarin :   Yes   Patient on Other Anticoagulant :   No   Jennifer Astudillo RN - 12/31/2020 11:43 AM CST   Warfarin   International Normalization Ratio TR :   3.3    Anticoagulant INR Goal Lower :   2    Anticoagulant INR Goal Upper :   3    Information Given by :   Daughter   Sunday :   7.5 mg   Monday :   5 mg   Tuesday :   7.5 mg   Wednesday :   5 mg   Thursday :   7.5 mg   Friday :   5 mg   Saturday :   5 mg   Total Dose :   42.5 mg   Warfarin Pt Reported Previous Week Dose :    Sun Mon Tues Wed Thurs Fri Sat Weekly Total Dose   Week 1 7.5 mg 5 mg 7.5 mg 5 mg 7.5 mg 5 mg 5 mg 42.5 mg   Week 2  mg  mg  mg  mg  mg  mg  mg  mg   Week 3  mg  mg  mg  mg  mg  mg  mg  mg   Week 4  mg  mg  mg  mg  mg  mg  mg  mg         Patient is taking single or multiple strength tablet(s) :   Single strength tab(s)   One Tab Strength :   5 mg tab   Sunday :   7.5 mg   Monday :   5 mg   Tuesday :   7.5 mg   Wednesday :   5 mg   Thursday :   7.5 mg   Friday :   5 mg   Saturday :   5 mg   Week 1 Total Dose :   42.5 mg   Sunday :   1.5 tab(s)   Monday :   1 tab(s)   Tuesday :   1.5 tab(s)    Wednesday :   1 tab(s)   Thursday :   1.5 tab(s)   Friday :   1 tab(s)   Saturday :   1 tab(s)   Patient Instructions :   INR = 3.3 per MDINR today. Patient is to take 2.5mg warfarin today then resume 7.5mg warfarin on Sun, Tues, and Thurs and 5 mg the rest of the days of the week Recheck INR in 1 week Daughter advised via call to home.     Jennifer Astudillo RN - 12/31/2020 11:43 AM CST

## 2022-02-15 NOTE — LETTER
(Inserted Image. Unable to display)   August 20, 2019      GERHARD HOWE  [address]  [place], [place] [id number]        Dear GERHARD,      Thank you for selecting [facility] (previously [facility]) for your healthcare needs.       CT shows mild to moderate osteoarthritis of the right hip. No fracture.          Please contact me or my assistant at [phone] you have any questions or concerns.     Sincerely,        Hilario Whiting MD

## 2022-02-15 NOTE — NURSING NOTE
Quick Intake Entered On:  5/23/2019 3:33 PM CDT    Performed On:  5/23/2019 3:33 PM CDT by Ava Velasquez RN               Summary   Chief Complaint :   BP   Systolic Blood Pressure :   134 mmHg (HI)    Diastolic Blood Pressure :   68 mmHg   Mean Arterial Pressure :   90 mmHg   BP Site :   Right arm   BP Method :   Manual   Race :      Languages :   English   Ava Velasquez RN - 5/23/2019 3:33 PM CDT

## 2022-02-15 NOTE — NURSING NOTE
Anticoagulation Therapy Management Entered On:  7/21/2020 8:55 AM CDT    Performed On:  7/21/2020 8:54 AM CDT by Ava Velasquez RN               Anticoagulation Visit Assessment   Type of Visit - Anticoagulation :   Telephone   Anticoagulation Indication :   Atrial fibrillation   Anticoagulant Duration :   Undetermined   Anticoagulation Medication Verified :   Yes   Patient Preferred Contact Method :   Cell   Ava Velasquez RN - 7/21/2020 8:54 AM CDT   Anticoagulation Patient Assessment Grid   Change in Alcohol Consumption :   No   Change in Diet :   No   Change in Medications :   No   Diarrhea :   No   Rectal Bleeding :   No   Signs of Clotting :   No   Signs of Warfarin Intolerance :   No   Unusual Bleeding, Bruising :   No   Upcoming Procedures :   No   Vomiting :   No   Ava Velasquez RN - 7/21/2020 8:54 AM CDT   Patient on Warfarin :   Yes   Ava Velasquez RN - 7/21/2020 8:54 AM CDT   Warfarin   Anticoagulant INR Goal Lower :   2    Anticoagulant INR Goal Upper :   3    Sunday :   7.5 mg   Monday :   5 mg   Tuesday :   7.5 mg   Wednesday :   5 mg   Thursday :   7.5 mg   Friday :   5 mg   Saturday :   5 mg   Total Dose :   42.5 mg   Warfarin Pt Reported Previous Week Dose :    Sun Mon Tues Wed Thurs Fri Sat Weekly Total Dose   Week 1 7.5 mg 5 mg 7.5 mg 5 mg 7.5 mg 5 mg 5 mg 42.5 mg   Week 2  mg  mg  mg  mg  mg  mg  mg  mg   Week 3  mg  mg  mg  mg  mg  mg  mg  mg   Week 4  mg  mg  mg  mg  mg  mg  mg  mg         Patient is taking single or multiple strength tablet(s) :   Single strength tab(s)   One Tab Strength :   5 mg tab   Sunday :   7.5 mg   Monday :   5 mg   Tuesday :   7.5 mg   Wednesday :   5 mg   Thursday :   7.5 mg   Friday :   5 mg   Saturday :   5 mg   Week 1 Total Dose :   42.5 mg   Sunday :   1.5 tab(s)   Monday :   1 tab(s)   Tuesday :   1.5 tab(s)   Wednesday :   1 tab(s)   Thursday :   1.5 tab(s)   Friday :   1 tab(s)   Saturday :   1 tab(s)   Patient Instructions :   [facility] P[facility] INR = 2.8; per  protocol continue warfarin 7.5 mg Sun/Tue/Thur and 5 mg ROW; recheck INR in 4 weeks; LVM on pt daughter phone per request.     Eva LARA, Ava HIGUERA - 7/21/2020 8:54 AM CDT

## 2022-02-15 NOTE — TELEPHONE ENCOUNTER
---------------------  From: Sari Turner CMA (Phone Messages Pool ([address] [place] [address]))   To: [employer] Message Pool ([address] [place] [address]);     Sent: 8/17/2021 2:56:56 PM CDT  Subject: Pharmacy call-Levo ?     Aditya from  pharmacy called at 2:53pm requesting clarification on pt's Levo dose. One script sent in for 125mcg and 2 min later second script sent in for 137mcg. I advised likely the 137mcg that needs to be filled but advised I would double check with [employer] and they would let Aditya know.---------------------  From: Yumiko Escamilla ([employer] Message Pool ([facility]))   To: Hilario Whiting MD;     Sent: 8/17/2021 3:12:29 PM CDT  Subject: FW: Pharmacy call-Levo ?---------------------  From: Hilario Whiting MD   To: [facility] Message Pool ([address]);     Sent: 8/17/2021 3:21:13 PM CDT  Subject: RE: Pharmacy call-Levo ?     137 is correctBruce @  notified.

## 2022-02-15 NOTE — NURSING NOTE
Comprehensive Intake Entered On:  2/22/2020 9:11 AM CST    Performed On:  2/22/2020 9:08 AM CST by Sarvanthi Dietz               Summary   Chief Complaint :   Rash on both lower legs, developed after using topical agent for circulation   Weight Measured :   161.8 lb(Converted to: 161 lb 13 oz, 73.39 kg)    Height Measured :   64 in(Converted to: 5 ft 4 in, 162.56 cm)    Body Mass Index :   27.77 kg/m2 (HI)    Body Surface Area :   1.82 m2   Systolic Blood Pressure :   154 mmHg (HI)    Diastolic Blood Pressure :   82 mmHg (HI)    Mean Arterial Pressure :   106 mmHg   Peripheral Pulse Rate :   81 bpm   Temperature Tympanic :   97.2 DegF(Converted to: 36.2 DegC)  (LOW)    Oxygen Saturation :   98 %   Race :      Languages :   English   Sravanthi Dietz - 2/22/2020 9:08 AM CST

## 2022-02-15 NOTE — PROGRESS NOTES
Chief Complaint    Medical f/u  History of Present Illness       Patient with dementia, diabetes, atrial fibrillation, and hypertension here with her daughter because of irritability, paranoia, and delusions.  Patient lives with her daughter.  She has had these episodes for some time but generally only once or twice a week, however in the last several weeks has become a daily occurrence.  Interferes with her care at times.  No dangerous behaviors such as leaving the house.  No fever dysuria bleeding or recent illness.  Daughter feels that she is anxious sometimes.  Sometimes has trouble falling asleep but generally sleeps well  Review of Systems       No headache, myalgia, dysuria, fever, chills, cough, dyspnea.  Physical Exam   Vitals & Measurements    T: 98.1  F (Tympanic)  HR: 86 (Peripheral)  BP: 95/63     HT: 64 in  WT: 178 lb  BMI: 30.55        Patient appears comfortable and in no distress.  Alert and oriented to person and place.  Delusional and forgetful.  Speech fluent.  Assessment/Plan       Delusion (F22)          I think the underlying cause is anxiety.  Not nothing to suggest an acute metabolic or infectious cause or urinary retention.  No pain.         We will start citalopram 10 mg daily.  MICHAEL difficulty of her symptoms will start Seroquel 25 mg at bedtime to help to discontinue this as she responds to the citalopram.  Did discuss risk of falls and increased mortality.  Follow-up in a month.         Ordered:          86736 office o/p est mod 30-39 min (Charge), Quantity: 1, White coat syndrome with hypertension  Type 2 diabetes mellitus with other circulatory complications  Paroxysmal atrial fibrillation  Long term (current) use of anticoagulants  Hypothyroidism (acquired)  Frailty  Dementia  Delusion                Dementia (F03.90)          Progressing.         Ordered:          96545 office o/p est mod 30-39 min (Charge), Quantity: 1, White coat syndrome with hypertension  Type 2 diabetes  mellitus with other circulatory complications  Paroxysmal atrial fibrillation  Long term (current) use of anticoagulants  Hypothyroidism (acquired)  Frailty  Dementia  Delusion                Frailty (R54)          Unchanged         Ordered:          84534 office o/p est mod 30-39 min (Charge), Quantity: 1, White coat syndrome with hypertension  Type 2 diabetes mellitus with other circulatory complications  Paroxysmal atrial fibrillation  Long term (current) use of anticoagulants  Hypothyroidism (acquired)  Frailty  Dementia  Delusion                Hypothyroidism (acquired) (E03.9)          Stable         Ordered:          92257 office o/p est mod 30-39 min (Charge), Quantity: 1, White coat syndrome with hypertension  Type 2 diabetes mellitus with other circulatory complications  Paroxysmal atrial fibrillation  Long term (current) use of anticoagulants  Hypothyroidism (acquired)  Frailty  Dementia  Delusion                Long term (current) use of anticoagulants (Z79.01)          No bleeding complications         Ordered:          46933 office o/p est mod 30-39 min (Charge), Quantity: 1, White coat syndrome with hypertension  Type 2 diabetes mellitus with other circulatory complications  Paroxysmal atrial fibrillation  Long term (current) use of anticoagulants  Hypothyroidism (acquired)  Frailty  Dementia  Delusion                Paroxysmal atrial fibrillation (I48.0)          Stable         Ordered:          25162 office o/p est mod 30-39 min (Charge), Quantity: 1, White coat syndrome with hypertension  Type 2 diabetes mellitus with other circulatory complications  Paroxysmal atrial fibrillation  Long term (current) use of anticoagulants  Hypothyroidism (acquired)  Frailty  Dementia  Delusion                Type 2 diabetes mellitus with other circulatory complications (E11.59)         Not requiring medication         Ordered:          80558 office o/p est mod 30-39 min (Charge),  Quantity: 1, White coat syndrome with hypertension  Type 2 diabetes mellitus with other circulatory complications  Paroxysmal atrial fibrillation  Long term (current) use of anticoagulants  Hypothyroidism (acquired)  Frailty  Dementia  Delusion                White coat syndrome with hypertension (I10)          Home readings reveal blood pressures between 120 140/70.         Ordered:          00827 office o/p est mod 30-39 min (Charge), Quantity: 1, White coat syndrome with hypertension  Type 2 diabetes mellitus with other circulatory complications  Paroxysmal atrial fibrillation  Long term (current) use of anticoagulants  Hypothyroidism (acquired)  Frailty  Dementia  Delusion                Orders:         citalopram, = 1 tab(s) ( 10 mg ), Oral, daily, # 30 tab(s), 1 Refill(s), Type: Maintenance, Pharmacy: [facility], 1 tab(s) Oral daily, 64, in, 03/04/21 14:36:00 CST, Height Measured, 178, lb, 03/04/21 14:36:00 CST, Weight Measured, (Ordered)         QUEtiapine, = 1 tab(s) ( 25 mg ), Oral, qhs, # 30 tab(s), 1 Refill(s), Type: Maintenance, Pharmacy: [facility], 1 tab(s) Oral qhs,x30 day(s), 64, in, 03/04/21 14:36:00 CST, Height Measured, 178, lb, 03/04/21 14:36:00 CST, Weight Measured, (Ordered)         Return to Clinic (Request), RFV: Dementia, Return in 4 weeks  Patient Information     Name:GERHARD HOWE      Address:      [address] [place] [id number]     Sex:Female     YOB: 1929     [phone]     Emergency Contact:GENTRY PARK     MRN:[id number]     FIN:[id number]     Location:[facility]     Date of Service:03/04/2021      Primary Care Physician:       Hilario Whiting MD, [phone]      Attending Physician:       Hilario Whiting MD, [phone]  Problem List/Past Medical History    Ongoing     Allergic rhinitis     Anticoagulated     Benign positional vertigo     Bilateral hearing  loss     Carotid artery plaque     Dementia     Diabetic peripheral neuropathy     Dyslipidemia, goal LDL below 100     Frailty     History of mitral valve insufficiency       Comments: Mild by Echo in 2009     Hypothyroidism (acquired)     Long term (current) use of anticoagulants     Obese     Osteoarthritis     Paroxysmal atrial fibrillation     S/P placement of cardiac pacemaker     Seborrheic dermatitis     Seborrheic dermatitis of scalp     Statin intolerance     Symptomatic varicose veins     Tinnitus     Type 2 diabetes mellitus with other circulatory complications     White coat syndrome with hypertension    Historical     Inpatient stay       Comments: @[facility], [place] - Atrial fibrillation with an episode of hypotension     Inpatient stay       Comments: @[facility], [place] - Vertigo     Inpatient stay       Comments: @[place], [place] - Postoperative surgical complication involving subcutaneous tissue     Inpatient stay       Comments: [facility], [place] - Benign paroxysmal positional vertigo.  Procedure/Surgical History     Replacement of pacemaker pulse generator (05/30/2019)      Comments: Implanted left pectoral.      [employer]onic [id number]      [id number].     Phacoemulsification of cataract with intraocular lens implantation (03/27/2018)      Comments: Left..     Colonoscopy (05/23/2012)      Comments: Unremarkable.     LAV-BSO (04/06/2012)     Hysteroscopy, D&C, polypectomy (11/04/2011)     Implantation of heart pacemaker (2009)     Replacement of right knee joint (11/2008)     Replacement of left knee joint (12/10/2007)     Flexible sigmoidoscopy (06/13/2006)     Cholecystectomy (1951)     Repair of umbilical hernia (1949)     Appendectomy (1947)     Extracapsular cataract extraction and insertion of intraocular lens      Comments: Right..     Plastic repair of rotator cuff of shoulder     Pregnancy      Comments: full term X 3.  Medications     ACCU-CHECK GUIDE LANCETS, See Instructions, 11 refills    ACCU-CHECK GUIDE TEST STRIPS, See Instructions, 11 refills    acetaminophen    Calcium with vitamin D, 1 tab, Oral, daily    citalopram 10 mg oral tablet, 10 mg= 1 tab(s), Oral, daily, 1 refills    Daily Multiple Vitamins, 1 tab(s), Oral, daily    Fish Oil, 1 tab, Oral, daily    Flonase 50 mcg/inh nasal spray, 2 spray(s), Nasal, daily, 11 refills    levothyroxine 125 mcg (0.125 mg) oral tablet, 1 tab(s), Oral, daily, 3 refills    lisinopril 20 mg oral tablet, 20 mg= 1 tab(s), Oral, daily    Metoprolol Succinate  mg oral tablet, extended release, 100 mg= 1 tab(s), Oral, daily    PreserVision, 1 cap(s), Oral, bid    SEROquel 25 mg oral tablet, 25 mg= 1 tab(s), Oral, qhs, 1 refills    Vitamin C, 500 mg, Oral, daily    Vitamin D3, 2000 International Unit, Oral, daily    warfarin 5 mg oral tablet, See Instructions, 1 refills  Allergies    Vicodin (rash)    Ancef (Diarrhea)    Calan    ciprofloxacin    phenobarbital (tremors)    probiotic (Rash..)    simvastatin (GI upset)  Social History    Smoking Status     Never smoker     Alcohol - Denies Alcohol Use      Never     Electronic Cigarette/Vaping      Electronic Cigarette Use: Never.     Employment/School      Retired     Home/Environment      Marital status: .     Substance Abuse - Denies Substance Abuse     Tobacco - Denies Tobacco Use      Never (less than 100 in lifetime)  Family History    CABG - Coronary artery bypass graft: Mother.    CAD - Coronary artery disease: Mother and Father.    Diabetes mellitus: Mother.    Gout: Mother and Brother.    Hypertension: Mother.    Leukemia: Father.    MI - Myocardial infarction: Brother.    MS - Multiple sclerosis: Sister.    Parkinson's disease: Sister.    Valvular heart disease: Brother.  Immunizations      Vaccine Date Status          SARS-CoV-2 (COVID-19) Moderna-1273 02/18/2021 Given          influenza virus vaccine, inactivated 10/05/2020 Given           zoster vaccine, inactivated 12/16/2019 Recorded          zoster vaccine, inactivated 10/15/2019 Recorded          influenza virus vaccine, inactivated 09/05/2019 Given          influenza virus vaccine, inactivated 10/10/2018 Given          influenza virus vaccine, inactivated 09/01/2017 Given          influenza virus vaccine, inactivated 01/24/2017 Given          ZOS, shingles 11/24/2015 Recorded              Comments : [11/24/2015] Freemans          influenza virus vaccine, inactivated 10/07/2015 Given          pneumococcal (PCV13) 03/19/2015 Given          influenza virus vaccine, inactivated 10/09/2014 Given          influenza virus vaccine, inactivated 10/11/2013 Given          influenza virus vaccine, inactivated 10/09/2012 Given          Td 01/11/2012 Given          influenza virus vaccine, inactivated 10/08/2011 Given          influenza virus vaccine, inactivated 10/07/2010 Given          influenza, H1N1, inactivated 12/18/2009 Recorded          pneumococcal (PPSV23) 04/24/2006 Recorded          pneumococcal (PPSV23) 01/08/2001 Recorded  Lab Results          Lab Results (Last 4 results within 90 days)           Sodium Level: 136 mmol/L [135 mmol/L - 146 mmol/L] (02/08/21 09:53:00)          Potassium Level: 4.3 mmol/L [3.5 mmol/L - 5.3 mmol/L] (02/08/21 09:53:00)          Chloride Level: 99 mmol/L [98 mmol/L - 110 mmol/L] (02/08/21 09:53:00)          CO2 Level: 30 mmol/L [20 mmol/L - 32 mmol/L] (02/08/21 09:53:00)          Glucose Level: 174 mg/dL High [65 mg/dL - 99 mg/dL] (02/08/21 09:53:00)          BUN: 21 mg/dL [7 mg/dL - 25 mg/dL] (02/08/21 09:53:00)          Creatinine Level: 0.83 mg/dL [0.6 mg/dL - 0.88 mg/dL] (02/08/21 09:53:00)          BUN/Creat Ratio: NOT APPLICABLE [6  - 22] (02/08/21 09:53:00)          eGFR: 62 mL/min/1.73m2 (02/08/21 09:53:00)          eGFR African American: 71 mL/min/1.73m2 (02/08/21 09:53:00)          Calcium Level: 9.3 mg/dL [8.6 mg/dL - 10.4 mg/dL] (02/08/21 09:53:00)           Bilirubin Total: 0.9 mg/dL [0.2 mg/dL - 1.2 mg/dL] (02/08/21 09:53:00)          Alkaline Phosphatase: 47 unit/L [37 unit/L - 153 unit/L] (02/08/21 09:53:00)          AST/SGOT: 17 unit/L [10 unit/L - 35 unit/L] (02/08/21 09:53:00)          ALT/SGPT: 14 unit/L [6 unit/L - 29 unit/L] (02/08/21 09:53:00)          Protein Total: 6.3 gm/dL [6.1 gm/dL - 8.1 gm/dL] (02/08/21 09:53:00)          Albumin Level: 4.1 gm/dL [3.6 gm/dL - 5.1 gm/dL] (02/08/21 09:53:00)          Globulin: 2.2 [1.9  - 3.7] (02/08/21 09:53:00)          A/G Ratio: 1.9 [1  - 2.5] (02/08/21 09:53:00)          Hgb A1c: 7.6 High (02/08/21 09:53:00)          Vitamin B12 Level: 522 pg/mL [200 pg/mL - 1100 pg/mL] (02/08/21 09:53:00)          Cholesterol: 235 mg/dL High (02/08/21 09:53:00)          Non-HDL Cholesterol: 193 High (02/08/21 09:53:00)          HDL: 42 mg/dL Low (02/08/21 09:53:00)          Cholesterol/HDL Ratio: 5.6 High (02/08/21 09:53:00)          LDL: 148 High (02/08/21 09:53:00)          Triglyceride: 277 mg/dL High (02/08/21 09:53:00)          TSH: 6.52 mIU/L High [0.4 mIU/L - 4.5 mIU/L] (02/08/21 09:53:00)          U Creatinine: 85 mg/dL [20 mg/dL - 275 mg/dL] (02/08/21 09:53:00)          U Microalbumin: 1.3 mg/dL (02/08/21 09:53:00)          Ur Microalbumin/Creatinine Ratio: 15 (02/08/21 09:53:00)          WBC: 4.1 [3.8  - 10.8] (02/08/21 09:53:00)          RBC: 4.03 [3.8  - 5.1] (02/08/21 09:53:00)          Hgb: 12.2 gm/dL [11.7 gm/dL - 15.5 gm/dL] (02/08/21 09:53:00)          Hct: 35.7 % [35 % - 45 %] (02/08/21 09:53:00)          MCV: 88.6 fL [80 fL - 100 fL] (02/08/21 09:53:00)          MCH: 30.3 pg [27 pg - 33 pg] (02/08/21 09:53:00)          MCHC: 34.2 gm/dL [32 gm/dL - 36 gm/dL] (02/08/21 09:53:00)          RDW: 11.9 % [11 % - 15 %] (02/08/21 09:53:00)          Platelet: 170 [140  - 400] (02/08/21 09:53:00)          MPV: 10.1 fL [7.5 fL - 12.5 fL] (02/08/21 09:53:00)          INR TR: 3.3 (02/11/21 14:24:00)          INR TR: 3.3  (12/31/20 11:43:00)

## 2022-02-15 NOTE — PROGRESS NOTES
Chief Complaint    Pt here for UTI. C/o low back pain and incontinence.  History of Present Illness      Chief complaint as above reviewed and confirmed with patient.  Pt presents to the clinic with concerns re: possible uti.  She noted urine to be darker yellow/orange today.  That is not typical and she read a medical book stating she should be seen.  she has mild low R back pain but that is chronic, not necessarily worse.  minimal lower abdominal pressure. no fevers.  no chills.  had incontinence x 1 today which is not typical.  nocturia x 2 which is typical for her. no cloudy urine. no dysuria.  complains of frequency but states she feels urge to go every 3 hours.  otherwise feels quite well.  tends to be constipated, last BM was yesterday.  she wonders if her cataract eye surgery eye drops (prednisone and ketorolac) may be related, she took for 30 days and stopped this week as she was done with the course.  Review of Systems      Review of systems is negative with the exception of those noted in HPI          Physical Exam   Vitals & Measurements    T: 97.8   F (Tympanic)  HR: 71(Peripheral)  BP: 183/98     HT: 64 in  WT: 154 lb  BMI: 26.43       Pt is in no acute distress.  Appears well.      MM moist, skin turger good.      Lungs CTA      Heart RRR      Abdomen: BS active, soft, non-distended. non tender to light or deep palpation.                           no rebound or peritoneal signs.  no CVAT      she brings a urine with her from earlier today: it is clear, pale yellow (she agrees but the earlier one today was darker), no clouding.  I relayed the lab will require a new sample as this was from over 6 hours ago and not refrigerated.       Urine POC is unremarkable   Assessment/Plan       Urine frequency (R35.0)         reassured pt urine is unremarkable. may be OAB or constipation causing her urge/frequency/incontenece today.  observation, push fluids.  return for fevers, abdominal pain, worsening sx.  pt  agreeable.         Orders:          POC URINALYSIS, UA* ([facility]), Specimen Type: Urine, Collection Date: 05/09/18 18:31:00 CDT           Patient Information     Name:GERHARD HOWE      Address:      [address] [place] [id number]     Sex:Female     YOB: 1929     [phone]     Emergency Contact:GENTRY PARK     MRN:[id number]     FIN:[id number]     Location:[facility]     Date of Service:05/09/2018      Primary Care Physician:       Hilario Whiting MD, [phone]      Attending Physician:       Asia Brownlee PA-C, [phone]  Problem List/Past Medical History    Ongoing     Allergic rhinitis     Benign positional vertigo     Bilateral hearing loss     Carotid artery plaque     Diabetes mellitus type II, controlled     Dyslipidemia, goal LDL below 100     Frailty     History of mitral valve insufficiency       Comments: Mild by Echo in 2009     HTN, goal below 140/90     Hypothyroidism (acquired)     Long-term (current) use of anticoagulants, INR goal 2.0-3.0     Obese     Osteoarthritis     Paroxysmal atrial fibrillation     S/P placement of cardiac pacemaker     Seborrheic dermatitis     Statin intolerance     Symptomatic varicose veins     Tinnitus     White coat syndrome with hypertension    Historical     *Hospitalized@[facility] - Atrial fibrillation with an episode of hypotension     *Hospitalized@[facility] - Vertigo  Procedure/Surgical History     Phacoemulsification of cataract with intraocular lens implantation (03/27/2018)             Comments: Left.     Colonoscopy (05/23/2012)             Comments: Unremarkable     LAVH-BSO (04/06/2012)           Hysteroscopy, D&C, polypectomy (11/04/2011)           Implantation of heart pacemaker (2009)           Replacement of right knee joint (11/2008)           Replacement of left knee joint (12/10/2007)           Flexible sigmoidoscopy (06/13/2006)           Cholecystectomy (1951)           Repair  of umbilical hernia (1949)           Appendectomy (1947)           Plastic repair of rotator cuff of shoulder           Pregnancy             Comments: full term X 3  Medications     levothyroxine 125 mcg (0.125 mg) oral tablet: 125 mcg, 1 tab(s), po, daily, for 90 day(s), 90 tab(s), 3 Refill(s).     digoxin 125 mcg (0.125 mg) oral tablet: 125 mcg, 1 tab(s), po, daily, 90 tab(s), 3 Refill(s).     pravastatin 10 mg oral tablet: 10 mg, 1 tab(s), PO, Daily, 90 tab(s), 3 Refill(s).     Flonase 50 mcg/inh nasal spray: 2 spray(s), nasal, daily, 1 EA, 11 Refill(s).     Metoprolol Succinate  mg oral tablet, extended release: See Instructions, TAKE ONE TABLET BY MOUTH EVERY DAY, 90 tab(s), 3 Refill(s).     warfarin 5 mg oral tablet: 5 mg, 1 tab(s), po, daily, for 90 day(s), 90 tab(s), 3 Refill(s).          Allergies    Vicodin (rash)    Ancef    Calan    ciprofloxacin    phenobarbital    probiotic (Rash..)    simvastatin (GI upset)  Social History    Smoking Status - 05/09/2018     Never smoker     Alcohol - Denies Alcohol Use, 04/06/2010      Never, 05/09/2017     Employment and Education      Retired, 10/27/2011     Home and Environment      Marital status: ., 10/27/2011     Tobacco - Denies Tobacco Use, 04/06/2010  Family History    CA - Lung cancer: Spouse.    CABG - Coronary artery bypass graft: Mother.    CAD - Coronary artery disease: Mother and Father.    Diabetes mellitus: Mother.    Gout: Mother and Brother.    Hypertension: Mother.    Leukemia: Father.    MI - Myocardial infarction: Brother.    MS - Multiple sclerosis: Sister.    Parkinson's disease: Sister.    Valvular heart disease: Brother.  Immunizations      Vaccine Date Status Comments      influenza virus vaccine, inactivated 09/01/2017 Given      influenza virus vaccine, inactivated 01/24/2017 Given      ZOS, shingles 11/24/2015 Recorded [11/24/2015] Freemans      influenza virus vaccine, inactivated 10/07/2015 Given      pneumococcal (PCV13)  03/19/2015 Given      influenza virus vaccine, inactivated 10/09/2014 Given      influenza virus vaccine, inactivated 10/11/2013 Given      influenza virus vaccine, inactivated 10/09/2012 Given      Td 01/11/2012 Given      influenza virus vaccine, inactivated 10/08/2011 Given      influenza virus vaccine, inactivated 10/07/2010 Given      influenza, H1N1, inactivated 12/18/2009 Recorded      pneumococcal (PPSV23) 04/24/2006 Recorded      pneumococcal (PPSV23) 01/08/2001 Recorded  Lab Results       Lab Results (Last 4 results within 90 days)        Sodium Level: 141 mmol/L [135 mmol/L - 146 mmol/L] (03/22/18 09:33:00 CDT)       Potassium Level: 4.6 mmol/L [3.5 mmol/L - 5.3 mmol/L] (03/22/18 09:33:00 CDT)       Chloride Level: 104 mmol/L [98 mmol/L - 110 mmol/L] (03/22/18 09:33:00 CDT)       CO2 Level: 27 mmol/L [20 mmol/L - 31 mmol/L] (03/22/18 09:33:00 CDT)       Glucose Level: 155 mg/dL High [65 mg/dL - 99 mg/dL] (03/22/18 09:33:00 CDT)       BUN: 18 mg/dL [7 mg/dL - 25 mg/dL] (03/22/18 09:33:00 CDT)       Creatinine Level: 0.84 mg/dL [0.6 mg/dL - 0.88 mg/dL] (03/22/18 09:33:00 CDT)       BUN/Creat Ratio: NOT APPLICABLE [6  - 22] (03/22/18 09:33:00 CDT)       eGFR: 62 mL/min/1.73m2 [8.6 mg/dL - 10.4 mg/dL] (03/22/18 09:33:00 CDT)       eGFR African American: 72 mL/min/1.73m2 [6  - 22] (03/22/18 09:33:00 CDT)       Calcium Level: 9.3 mg/dL [8.6 mg/dL - 10.4 mg/dL] (03/22/18 09:33:00 CDT)       Hgb A1c: 7 High [11.7 gm/dL - 15.5 gm/dL] (03/22/18 09:33:00 CDT)       Cholesterol: 195 mg/dL [8.6 mg/dL - 10.4 mg/dL] (03/22/18 09:33:00 CDT)       Non-HDL Cholesterol: 146 High [20 mmol/L - 31 mmol/L] (03/22/18 09:33:00 CDT)       HDL: 49 mg/dL Low [35 % - 45 %] (03/22/18 09:33:00 CDT)       Cholesterol/HDL Ratio: 4 [0.6 mg/dL - 0.88 mg/dL] (03/22/18 09:33:00 CDT)       LDL: 118 High [0.6 mg/dL - 0.88 mg/dL] (03/22/18 09:33:00 CDT)       Triglyceride: 158 mg/dL High [8.6 mg/dL - 10.4 mg/dL] (03/22/18 09:33:00 CDT)        TSH: 1.35 mIU/L [0.4 mIU/L - 4.5 mIU/L] (03/22/18 09:33:00 CDT)       WBC: 5.2 [3.8  - 10.8] (03/22/18 09:33:00 CDT)       RBC: 4.27 [3.8  - 5.1] (03/22/18 09:33:00 CDT)       Hgb: 13 gm/dL [11.7 gm/dL - 15.5 gm/dL] (03/22/18 09:33:00 CDT)       Hct: 37.9 % [35 % - 45 %] (03/22/18 09:33:00 CDT)       MCV: 88.8 fL [80 fL - 100 fL] (03/22/18 09:33:00 CDT)       MCH: 30.4 pg [27 pg - 33 pg] (03/22/18 09:33:00 CDT)       MCHC: 34.3 gm/dL [32 gm/dL - 36 gm/dL] (03/22/18 09:33:00 CDT)       RDW: 11.7 % [11 % - 15 %] (03/22/18 09:33:00 CDT)       Platelet: 159 [140  - 400] (03/22/18 09:33:00 CDT)       MPV: 10.6 fL [7.5 fL - 12.5 fL] (03/22/18 09:33:00 CDT)       PT: 21.3 High [9  - 11.5] (03/22/18 09:33:00 CDT)       INR: 2.9 [0.6 mg/dL - 0.88 mg/dL] (05/04/18 08:53:00 CDT)       INR: 2 [0.6 mg/dL - 0.88 mg/dL] (04/09/18 11:05:00 CDT)       INR: 2.1 High [0.6 mg/dL - 0.88 mg/dL] (03/22/18 09:33:00 CDT)       INR: 2.5 [0.6 mg/dL - 0.88 mg/dL] (03/13/18 15:43:00 CDT)       UA pH: 6 [5  - 8] (05/09/18 18:48:00 CDT)       UA Specific Gravity: < OR = 1.005 [1.001  - 1.035] (05/09/18 18:48:00 CDT)       UA Glucose: NEGATIVE [0.6 mg/dL - 0.88 mg/dL] (05/09/18 18:48:00 CDT)       UA Bilirubin: NEGATIVE [0.6 mg/dL - 0.88 mg/dL] (05/09/18 18:48:00 CDT)       UA Ketones: NEGATIVE [0.6 mg/dL - 0.88 mg/dL] (05/09/18 18:48:00 CDT)       Urine Occult Blood: NEGATIVE [0.6 mg/dL - 0.88 mg/dL] (05/09/18 18:48:00 CDT)       UA Protein: NEGATIVE [0.6 mg/dL - 0.88 mg/dL] (05/09/18 18:48:00 CDT)       UA Nitrite: NEGATIVE [0.6 mg/dL - 0.88 mg/dL] (05/09/18 18:48:00 CDT)       UA Leukocyte Esterase: NEGATIVE [0.6 mg/dL - 0.88 mg/dL] (05/09/18 18:48:00 CDT)       Test in Question - Test(s) Ordered: BMP A1C [6 - 22] (03/22/18 09:33:00 CDT)       Misc Test CPT Code: 06516 496 [6 - 22] (03/22/18 09:33:00 CDT)       Misc Test Client Contact: DESHAUN PEREZ IOP [6 - 22] (03/22/18 09:33:00 CDT)       Misc Test Comment: See comment [6 - 22] (03/22/18  09:33:00 CDT)       Misc Test Comment: See comment [6  - 22] (03/22/18 09:33:00 CDT)

## 2022-02-15 NOTE — TELEPHONE ENCOUNTER
---------------------  From: Eva LARA, Ava HIGUERA   Sent: 5/15/2019 4:23:12 PM CDT  Subject: EKG question     Phone message    PCP:   NINFA      Time of Call:  1545       Person Calling:  Pt daughter, Collette  Phone number:  [phone]    Returned call at: 1617    Note:   LUDWIG states pt needs to get set up for EKG but not sure what to do.    I called and Collette states the pt saw Dr. Cuadra at [facility] yesterday but [facility] is calling today to set up EKG. She is wondering why [facility] is calling when pt has never been seen there. I explained that Dr. Cuadra with [facility] is owned by [facility] and that is who has the order for the EKG. If pt wants to have EKG done here we just need the order faxed to us. Collette states she will just call [facility] back and make the appointment with them.

## 2022-02-15 NOTE — PROGRESS NOTES
Patient:   GERHARD HOWE            MRN: [id number]            FIN: [id number]               Age:   87 years     Sex:  Female     :  11/10/1929   Associated Diagnoses:   None   Author:   Ashley Sawyer      Doctor: Henok   Patient Information  (Medicare and address information is on file)  Medicare HICN#: _  Address:_ City:_ State:_ Zip:_  Home Phone:_ Work Phone:_ Other Contact Phone:_    Diabetes self-management training (DSMT) and medical nutrition therapy (MNT) are individual and complementary services to improve diabetes care. For Medicare beneficiaries, both services can be ordered in the same year. Research indicates MNT combined with DSMT improves outcomes.    Diabetes Self-Managment Training (DSMT)  Medicare: 10 hours initial DSMT in 12 month period, plus 2 hours follow-up annually  *Check type of training services and number of hours requested:  _ Initial DSMT:  10 hours or _ no. hrs. requested  X Follow-up DSMT: X 2 hours or _ no. hrs. requested  _ Additional insulin training: _ no. hrs. requested    *Patients with special needs requiring individual DSMT  Check all special needs that apply:  _ Vision _ Hearing  _ Physical  _Cognitive Impairment  _ Language limitations X Other  No classes offered    *DSMT Content  X All ten content areas, as appropriate  _ Monitoring diabetes    _ Diabetes as disease process  _ Psychological adjustment _ Physical activity  _ Nutritional management  _ Goal setting, problem solving  _ Medications       _ Prevent, detect and treat acute complications  _ Preconception/pregnancy _ Prevent, detect and teat chronic complications     management or gestational     diabetes management    Medical Nutrition Therapy (MNT)  Medicare: 3 hours initial MNT in the first calendar year, plus two hours follow-up MNT annually. Additional MNT hours available for change in medical condition, treament and/or diagnosis.  *Check the type of MNT and/or number of additional hours requested:  _  Initial MNT:   X Annual follow-up MNT  _ Additional MNT services in the same calendar year, per RD recommendations  _ number of additional hours requested    Please specify change in medical condition, treatment and/or diagnosis      *Diagnosis  Please send recent labs for patient eligibility & outcomes monitoring  _ Type 1 uncontrolled  _ Type 1 controlled  _ Type 2 uncontrolled  X Type 2 controlled  _ Gestational diabetes _ Other _    Complications/Comorbidities  Check all that apply:  X Hypertension   X Dyslipidemia _ Stroke      _ Nephropathy  X Neuropathy    _ Retinopathy  _ Renal disease   _ CHD  _ PVD       _ Pregnancy  _ Non-healing wound _ Obesity    _ Mental/affective disorder     _ Other _    Current Diabetes Medications  Specify type, dose and frequency  Oral: _  Insulin:   Patient now uses: _ Pen _ Needle _ Pump    Patient Behavior Goals/Plan of Care    To maintain good blood sugar control via lifestyle.  Minimize the risk for hypoglycemia and reduce risks of developing complications related to uncontrolled diabetes.    Signature and UPIN #: (UPIN and NPI are on file)  Group/Practice name, address and phone: [facility], [address] [place] [address]  [phone]

## 2022-02-15 NOTE — LETTER
(Inserted Image. Unable to display)     January 25, 2021      GERHARDAARON HOWE  [address]  [place], [place] [id number]          Dear GERHARD,      Thank you for selecting [facility] (previously [facility]) for your healthcare needs.    Our records indicate you are due for the following services:    Diabetic Exam ~ Please bring your glucose meter and/or your blood glucose diary to your appointment.   Urine Labs ~ Please be prepared to leave a urine specimen for evaluation.  Fasting Lab Tests ~ Please do not eat or drink anything 10 hours prior to your scheduled appointment time.  (Water and any medications that you may need are allowed unless directed otherwise.)    If you had your labs done at another facility or with Direct Access Lab Testing at Critical access hospital, please bring in a copy of the results to your next visit, mail a copy, or drop off a copy of your results to your Healthcare Provider.    (FYI   Regarding office visits: In some instances, a video visit or telephone visit may be offered as an option.)    You are due for lab work and an office visit; please schedule the lab appointment 1 week before the office visit.  This will assure all results are available to discuss with your Healthcare Provider during your visit.    **It is very helpful if you bring your medication bottles to your appointment.  This assures we have all of your current medications, including strength and dosing information, documented accurately in your medical record.    To schedule an appointment or if you have further questions, please contact your clinic at [phone].      Powered by [employer]    Sincerely,    Shimon Nesbitt MD

## 2022-02-15 NOTE — NURSING NOTE
Quick Intake Entered On:  8/17/2021 2:29 PM CDT    Performed On:  8/17/2021 2:28 PM CDT by Hilario Whiting MD               Summary   Advance Directive :   Yes   Height Measured :   64 in(Converted to: 5 ft 4 in, 162.56 cm)    Systolic Blood Pressure :   124 mmHg   Diastolic Blood Pressure :   78 mmHg   Mean Arterial Pressure :   93 mmHg   BP Site :   Right arm   BP Method :   Manual   Race :      Languages :   English   Hilario Whiting MD - 8/17/2021 2:28 PM CDT

## 2022-02-15 NOTE — LETTER
(Inserted Image. Unable to display)   March 29, 2019      GERHARD HOWE      [address]  [place], [place] [id number]        Dear GERHARD,    Thank you for selecting [facility] (previously [facility]) for your healthcare needs.  Below you will find the results of your recent tests done at our clinic.     Results are acceptable.      Result Name Current Result Previous Result Reference Range   Sodium Level (mmol/L)  137 3/28/2019  141 3/22/2018 135 - 146   Potassium Level (mmol/L)  4.4 3/28/2019  4.6 3/22/2018 3.5 - 5.3   Chloride Level (mmol/L)  101 3/28/2019  104 3/22/2018 98 - 110   CO2 Level (mmol/L)  26 3/28/2019  27 3/22/2018 20 - 32   Glucose Level (mg/dL) ((H)) 141 3/28/2019 ((H)) 155 3/22/2018 65 - 99   BUN (mg/dL)  25 3/28/2019  18 3/22/2018 7 - 25   Creatinine Level (mg/dL)  0.80 3/28/2019  0.84 3/22/2018 0.60 - 0.88   eGFR (mL/min/1.73m2)  65 3/28/2019  62 3/22/2018 > OR = 60 -    Calcium Level (mg/dL)  9.3 3/28/2019  9.3 3/22/2018 8.6 - 10.4   Hgb A1c ((H)) 6.7 3/28/2019 ((H)) 7.2 12/24/2018  - <5.7   Cholesterol (mg/dL)  165 3/28/2019  195 3/22/2018  - <200   Non-HDL Cholesterol  124 3/28/2019 ((H)) 146 3/22/2018  - <130   HDL (mg/dL) ((L)) 41 3/28/2019 ((L)) 49 3/22/2018 >50 -    Cholesterol/HDL Ratio  4.0 3/28/2019  4.0 3/22/2018  - <5.0   LDL  99 3/28/2019 ((H)) 118 3/22/2018    Triglyceride (mg/dL) ((H)) 150 3/28/2019 ((H)) 158 3/22/2018  - <150   WBC  5.9 3/28/2019  5.2 3/22/2018 3.8 - 10.8   Hgb (gm/dL)  13.4 3/28/2019  13.0 3/22/2018 11.7 - 15.5   Platelet  204 3/28/2019  159 3/22/2018 140 - 400   INR ((H)) 2.0 3/28/2019  1.6 3/8/2019        Please contact me or my assistant at [phone] if you have any questions.     Sincerely,        Hilario Whiting MD

## 2022-02-15 NOTE — PROGRESS NOTES
"Chief Complaint    woke up and her head felt heavy  History of Present Illness      Patient with long-standing paroxysmal atrial fibrillation has been in atrial fibrillation today.  She is aware of her heart being irregular feels a little lightheaded and \"heavy headed\".  This is typical.  No chest pain dyspnea edema.  Does not feel presyncopal.  She is concerned about stroke.  No speech or cognitive symptoms.  No weakness.  Review of Systems      No fever or recent illness.  No headache, chest pain, dyspnea, edema.  Physical Exam   Vitals & Measurements    HR: 96(Peripheral)  BP: 119/81     HT: 64 in  WT: 155 lb  BMI: 26.6       Patient appears comfortable and in no distress.  Alert and oriented.  HEENT exam is unremarkable.  Neck supple no adenopathy or thyromegaly.  Chest reveals device in place.  Cardiac exam is irregularly not particularly fast.  Trace ankle edema.  Neurologic exam alert oriented with fluent speech and cognition.  Cranial nerves and strength normal.  Assessment/Plan       Carotid artery plaque         On statin and aspirin.         Ordered:          43427 office outpatient visit 25 minutes (Charge), Quantity: 1, Paroxysmal atrial fibrillation  White coat hypertension  Diabetes mellitus type II, controlled  Carotid artery plaque  Hypothyroidism (acquired)                Diabetes mellitus type II, controlled         Not requiring medical treatment.         Ordered:          [id number] office outpatient visit 25 minutes (Charge), Quantity: 1, Paroxysmal atrial fibrillation  White coat hypertension  Diabetes mellitus type II, controlled  Carotid artery plaque  Hypothyroidism (acquired)                Hypothyroidism (acquired)         Clinically euthyroid.         Ordered:          [id number] office outpatient visit 25 minutes (Charge), Quantity: 1, Paroxysmal atrial fibrillation  White coat hypertension  Diabetes mellitus type II, controlled  Carotid artery plaque  Hypothyroidism (acquired)        "         Paroxysmal atrial fibrillation         Patient is currently in atrial fibrillation typically last a few days.  I asked her to call or return if not improving in the next few days or if symptoms become worse.         Ordered:          [id number] office outpatient visit 25 minutes (Charge), Quantity: 1, Paroxysmal atrial fibrillation  White coat hypertension  Diabetes mellitus type II, controlled  Carotid artery plaque  Hypothyroidism (acquired)                White coat hypertension         Ordered:          [id number] office outpatient visit 25 minutes (Charge), Quantity: 1, Paroxysmal atrial fibrillation  White coat hypertension  Diabetes mellitus type II, controlled  Carotid artery plaque  Hypothyroidism (acquired)           Problem List/Past Medical History    Ongoing     Allergic rhinitis     Benign positional vertigo     Bilateral hearing loss     Carotid artery plaque     Diabetes mellitus type II, controlled     Dyslipidemia, goal LDL below 100     History of mitral valve insufficiency     HTN, goal below 140/90     Hypothyroidism (acquired)     Long-term (current) use of anticoagulants, INR goal 2.0-3.0     Obese     Osteoarthritis     Paroxysmal atrial fibrillation     Rhinitis, chronic     S/P placement of cardiac pacemaker     Seborrheic dermatitis     Statin intolerance     Symptomatic varicose veins     Tinnitus     White coat hypertension     White coat hypertension    Historical     *Hospitalized@[facility] - Atrial fibrillation with an episode of hypotension     *Hospitalized@[facility] - Vertigo  Procedure/Surgical History     78266 unlisted px skin muc membrane +subq tissue (Charge) (09/18/2015)     Colonoscopy (05/23/2012)     LAVH-BSO (04/06/2012)     Hysteroscopy, D&C, polypectomy (11/04/2011)     Removal Impacted Cerumen (sp), one/both ears (POC) (03/04/2011)     Implantation of heart pacemaker (2009)     Replacement of right knee joint (11/2008)     Replacement of left knee joint (12/10/2007)      Flexible sigmoidoscopy (06/13/2006)     Cholecystectomy (1951)     Repair of umbilical hernia (1949)     Appendectomy (1947)     Plastic repair of rotator cuff of shoulder     Pregnancy  Medications    atenolol 50 mg oral tablet, 50 mg= 1 tab(s), po, daily, 3 refills    digoxin 125 mcg (0.125 mg) oral tablet, 125 mcg= 1 tab(s), po, daily, 3 refills    Flonase 50 mcg/inh nasal spray, 2 spray(s), nasal, daily, 11 refills    levothyroxine 125 mcg (0.125 mg) oral tablet, 125 mcg= 1 tab(s), po, daily, 3 refills    pravastatin 10 mg oral tablet, 10 mg= 1 tab(s), po, daily, 3 refills    warfarin 5 mg oral tablet, 5 mg= 1 tab(s), po, daily, 3 refills  Allergies    Vicodin (rash)    Ancef    Calan    ciprofloxacin    phenobarbital    probiotic (Rash..)    simvastatin (GI upset)  Social History    Smoking Status - 08/22/2017     Never smoker     Alcohol - Denies Alcohol Use, 05/09/2017      Never     Employment and Education - 05/09/2017      Retired     Home and Environment - 05/09/2017      Marital status: .     Tobacco - Denies Tobacco Use, 05/09/2017  Family History    CA - Lung cancer: Spouse.    CABG - Coronary artery bypass graft: Mother.    CAD - Coronary artery disease: Mother and Father.    Diabetes mellitus: Mother.    Gout: Mother and Brother.    Hypertension: Mother.    Leukemia: Father.    MI - Myocardial infarction: Brother.    MS - Multiple sclerosis: Sister.    Parkinson's disease: Sister.    Valvular heart disease: Brother.  Immunizations      Vaccine Date Status Comments      influenza virus vaccine, inactivated 01/24/2017 Given      ZOS, shingles 11/24/2015 Recorded [11/24/2015] Freemans      influenza virus vaccine, inactivated 10/07/2015 Given      pneumococcal (PCV13) 03/19/2015 Given      influenza virus vaccine, inactivated 10/09/2014 Given      influenza virus vaccine, inactivated 10/11/2013 Given      influenza virus vaccine, inactivated 10/09/2012 Given      Td 01/11/2012 Given       influenza virus vaccine, inactivated 10/08/2011 Given      influenza virus vaccine, inactivated 10/07/2010 Given      influenza, H1N1, inactivated 12/18/2009 Recorded      pneumococcal (PPSV23) 04/24/2006 Recorded      pneumococcal (PPSV23) 01/08/2001 Recorded  Lab Results      Results (Last 90 days)                Laboratory                     Coagulation                          INR                               INR                                     2.0   (06/19/17 08:07 AM CDT)                                                                                                                                                2.0   (07/17/17 08:58 AM CDT)                                                                                                                                                2.3   (08/14/17 09:08 AM CDT)

## 2022-02-15 NOTE — TELEPHONE ENCOUNTER
---------------------  From: Madiha Britt RN   Sent: 2/3/2021 1:33:09 PM CST  Subject: General Message     Received call from patient's daughter regarding letter they received about pt being due for labs.  She was calling to make sure this is correct.    Returned call and informed her pt is due - transferred to scheduling to set up appointment.

## 2022-02-15 NOTE — NURSING NOTE
Quick Intake Entered On:  11/20/2019 9:15 AM CST    Performed On:  11/20/2019 9:15 AM CST by Ava Velasquez RN               Summary   Chief Complaint :   BP   Height Measured :   64 in(Converted to: 5 ft 4 in, 162.56 cm)    Systolic Blood Pressure :   146 mmHg (HI)    Diastolic Blood Pressure :   72 mmHg   Mean Arterial Pressure :   97 mmHg   BP Site :   Right arm   BP Method :   Manual   Race :      Languages :   English   Ava Velasquez RN - 11/20/2019 9:15 AM CST

## 2022-02-15 NOTE — RESULTS
Anticoagulation Therapy Entered On:  5/23/2019 3:37 PM CDT    Performed On:  5/23/2019 3:33 PM CDT by Ava Velasquez RN               Warfarin Management   Week 1 Sunday Dose :   5    Week 1 Monday Dose :   5    Week 1 Tuesday Dose :   5    Week 1 Wednesday Dose :   5    Week 1 Thursday Dose :   5    Week 1 Friday Dose :   7.5    Week 1 Saturday Dose :   5    Week 1 Dose Total :   37.5    Week 2 Sunday Dose :   5    Week 2 Monday Dose :   5    Week 2 Tuesday Dose :   5    Week 2 Wednesday Dose :   5    Week 2 Thursday Dose :   5    Week 2 Friday Dose :   7.5    Week 2 Saturday Dose :   5    Week 2 Dose Total :   37.5    Planned Duration :   Indefinite   Indication :   Atrial fibrillation   Warfarin Management Comments :   Lab test performed by:  [facility] Office  [address] [place] [address]  Phone # [phone]  Fax# [phone]  Capillary   International Normalization Ratio :   1.8    INR Range :   2.0 - 3.0   INR Therapeutic Range :   No   Warfarin Abnormal Findings :   Patient is having new pacemaker placed on 5/30/19. She has been instructed to hold warfarin dose on 5/28/19 and 5/29/19. She states her daughter has the instructions as well.   Ava Velasquez RN - 5/23/2019 3:34 PM CDT   Warfarin Management and Results Grid   Signs of Thrombolic :   No   Signs of Warfarin Intolerance :   No   Changes in Diet or Alcohol Intake :   No   Changes in Medication or Antibiotics :   No   Unusual Bleeding or Bruising :   No   Rectal Bleeding or Black Stools :   No   Vitamin K Food Handout :   No   Heart Valve Replacement :   No   Ava Velasquez RN - 5/23/2019 3:34 PM CDT   Anticoagulation Recheck :   2 weeks   Warfarin Special Instructions :   Per protocol patient is to continue warfairn 7.5 mg Fridays and 5 mg all other days of the week; she is to hold warfarin as directed by [facility] provider and return for INR check one week after re-starting warfarin; Patient was given  written directions.   Eva LARA, Ava HIGUERA - 5/23/2019 3:34 PM CDT

## 2022-02-15 NOTE — TELEPHONE ENCOUNTER
Entered by Heather Henry MA on December 21, 2020 5:19:00 PM CST  ---------------------  From: Heather Henry MA   To: [facility] #[id number]    Sent: 12/21/2020 5:19:00 PM CST  Subject: Medication Management     ** Not Approved: Patient has requested refill too soon, was refilled 7/21/2020 for #90 w/ 1 refill **  levothyroxine (LEVOTHYROXINE 0.125MG (125MCG) TAB)  TAKE 1 TABLET BY MOUTH DAILY  Qty:  90 tab(s)        Days Supply:  90        Refills:  0          Substitutions Allowed     Route To Pharmacy - [facility] #[id number]   Signed by Heather Henry MA            ------------------------------------------  From: [facility] #[id number]  To: Shimon Nesbitt MD  Sent: December 19, 2020 1:27:49 PM CST  Subject: Medication Management  Due: December 16, 2020 8:25:29 PM CST     ** On Hold Pending Signature **     Dispensed Drug: levothyroxine (levothyroxine 125 mcg (0.125 mg) oral tablet), TAKE 1 TABLET BY MOUTH DAILY  Quantity: 90 tab(s)  Days Supply: 90  Refills: 0  Substitutions Allowed  Notes from Pharmacy:  ------------------------------------------

## 2022-02-15 NOTE — TELEPHONE ENCOUNTER
---------------------  From: Yumiko Escamilla (Phone Messages Pool (32224_[facility]))   To: [employer] Message Pool ([address]);     Sent: 9/21/2020 12:13:30 PM CDT  Subject: Dementia      Phone Message    PCP: NINFA    Time of Call: 1201    Phone Number: [phone]    Returned call at: 1208    Note: Daughter called stating that she is wondering if there is a medication that pt can get for dementia or if pt needs to be seen. Daughter stated that pt dementia seems to be getting worse and daughter is wondering what they can do.     Called daughter back at 1208. Advised to set up appointment with [facility]. Daughter would like a call back after JDL sees this message to see if pt needs a visit or if he will prescribe something. Transferred daughter to scheduling to make an appointment.---------------------  From: Yumiko Escamilla ([employer] Message Pool ([address]))   To: Hilario Whiting MD;     Sent: 9/28/2020 8:15:31 AM CDT  Subject: FW: Dementia---------------------  From: Hilario Whiting MD   To: [employer] Message Pool ([address]);     Sent: 9/28/2020 11:08:15 AM CDT  Subject: RE: Dementia      Schedule 40' visit with me for mental status testing.Patient scheduled.Collette LM at 4:22pm wanting to verify that Dr. Whiting wanted to see her still. I called her back and informed her he does. I also verified the appt time with her. Pt ask states that both her and her sister will be coming to the appt.

## 2022-02-15 NOTE — RESULTS
Anticoagulation Therapy Entered On:  1/9/2019 4:12 PM CST    Performed On:  1/9/2019 1:50 PM CST by Ava Velasquez RN               Warfarin Management   Week 1 Sunday Dose :   5    Week 1 Monday Dose :   5    Week 1 Tuesday Dose :   5    Week 1 Wednesday Dose :   5    Week 1 Thursday Dose :   5    Week 1 Friday Dose :   7.5    Week 1 Saturday Dose :   5    Week 1 Dose Total :   37.5    Week 2 Sunday Dose :   5    Week 2 Monday Dose :   5    Week 2 Tuesday Dose :   5    Week 2 Wednesday Dose :   5    Week 2 Thursday Dose :   5    Week 2 Friday Dose :   7.5    Week 2 Saturday Dose :   5    Week 2 Dose Total :   37.5    Planned Duration :   Indefinite   Indication :   Atrial fibrillation   Warfarin Management Comments :   Lab test performed by:  [facility]  [address] [place] [id number]  Phone # [phone]  Fax# [phone]  Capillary   International Normalization Ratio :   1.8    INR Range :   2.0 - 3.0   INR Therapeutic Range :   No   Ava Velasquez RN - 1/9/2019 4:11 PM CST   Warfarin Management and Results Grid   Signs of Thrombolic :   No   Signs of Warfarin Intolerance :   No   Changes in Diet or Alcohol Intake :   No   Changes in Medication or Antibiotics :   No   Unusual Bleeding or Bruising :   No   Rectal Bleeding or Black Stools :   No   Vitamin K Food Handout :   No   Heart Valve Replacement :   No   Ava Velasquez RN - 1/9/2019 4:11 PM CST   Anticoagulation Recheck :   2 weeks   Warfarin Special Instructions :   Per [facility] continue warfarin 7.5 mg Fridays and 5 mg ROW; recheck 2 weeks; notified by  on personal phone; consent signed.   Ava Velasquez RN - 1/9/2019 4:11 PM CST

## 2022-02-15 NOTE — LETTER
(Inserted Image. Unable to display)   July 12, 2021    GERHARDAARON HOWE  [address] [place] [address]            Dear GERHARD,      Thank you for selecting [facility] for your healthcare needs.    Our records indicate you are due for the following services:     Non-Fasting Labs.    If you had your labs done at another facility or with Direct Access Lab Testing at [facility], please bring in a copy of the results to your next visit, mail a copy, or drop off a copy of your results to your Healthcare Provider.    (FYI   Regarding office visits: In some instances, a video visit or telephone visit may be offered as an option.)      To schedule an appointment or if you have further questions, please contact your clinic at [phone].      Powered by [employer]    Sincerely,    Hilario Whiting MD, FACP

## 2022-02-15 NOTE — TELEPHONE ENCOUNTER
---------------------  From: Madiha Britt RN (INR Pool ( [id number]_[facility]))   To: INR Pool ( 32224_[facility]);     Sent: 5/22/2019 1:37:46 PM CDT  Subject: [facility] Procedure     JANE Thomas from [facility] called stating that Rebecca will be having a procedure at [place] on 5-30-19. They are requesting Rebecca's INR results from appointment tomorrow (5-23-19) be faxed to [phone] ATTN: Tripp.   Rebecca has been instructed by her provider at [facility] to take her last dose of Warfarin on Monday 5-27-19, hold for two days, and resume Warfarin on the evening of 5-30-19. [facility] RN advises follow up INR one week after procedure.   Martha's direct line: [phone] with patient at INR today. Faxed result to [facility] per request.

## 2022-02-15 NOTE — TELEPHONE ENCOUNTER
---------------------  From: Yumiko Escamilla (Phone Messages Pool (32224_[facility]))   To: [employer] Message Pool (32224_[place] [facility]);     Sent: 10/28/2020 10:55:46 AM CDT  Subject: Melatonin      Phone Message    PCP: NINFA    Time of Call: 1055    Phone Number: [phone] ok [facility]    Returned call at: n/a    Note: Baudilio from [facility] stating that pt is wanting to start Melatonin 3mg once @ hs. She is wanting to know if this is okay and if she can add it to her medication list. Please advise.     Pharmacy: [facility]     Last office visit and reason: 10/26/20 Hospital f/u     Transferred to: [employer] Pool---------------------  From: Yumiko Escamilla ([employer] Message Pool ([address]224_[place] [facility]))   To: Hilario Whiting MD;     Sent: 10/29/2020 8:41:29 AM CDT  Subject: FW: Melatonin---------------------  From: Hilario Whiting MD   To: [employer] Pool ([address]_[place] [facility]);     Sent: 10/29/2020 10:12:41 AM CDT  Subject: RE: Melatonin      okLDVM on number listed below

## 2022-02-15 NOTE — RESULTS
Anticoagulation Therapy Entered On:  10/23/2019 3:39 PM CDT    Performed On:  10/23/2019 3:37 PM CDT by Ava Velasquez RN               Warfarin Management   Week 1 Sunday Dose :   5    Week 1 Monday Dose :   5    Week 1 Tuesday Dose :   5    Week 1 Wednesday Dose :   5    Week 1 Thursday Dose :   5    Week 1 Friday Dose :   7.5    Week 1 Saturday Dose :   5    Week 1 Dose Total :   37.5    Week 2 Sunday Dose :   5    Week 2 Monday Dose :   5    Week 2 Tuesday Dose :   5    Week 2 Wednesday Dose :   5    Week 2 Thursday Dose :   5    Week 2 Friday Dose :   7.5    Week 2 Saturday Dose :   5    Week 2 Dose Total :   37.5    Planned Duration :   Indefinite   Indication :   Atrial fibrillation   Warfarin Management Comments :   Lab test performed by:  [facility] Office  [address] [place] [address]  Phone # [phone]  Fax# [phone]  Capillary   International Normalization Ratio :   2.3    INR Range :   2.0 - 3.0   INR Therapeutic Range :   Yes   Ava Velasquez RN - 10/23/2019 3:38 PM CDT   Warfarin Management and Results Grid   Signs of Thrombolic :   No   Signs of Warfarin Intolerance :   No   Changes in Diet or Alcohol Intake :   No   Changes in Medication or Antibiotics :   No   Unusual Bleeding or Bruising :   No   Rectal Bleeding or Black Stools :   No   Vitamin K Food Handout :   No   Heart Valve Replacement :   No   Ava Velasquez RN - 10/23/2019 3:38 PM CDT   Anticoagulation Recheck :   4 weeks   Warfarin Special Instructions :   Per protocol continue warfarin 7.5 mg Fridays and 5 mg ROW; recheck INR in 4 weeks; pt notified in office.   Ava Velasquez RN - 10/23/2019 3:38 PM CDT

## 2022-02-15 NOTE — NURSING NOTE
Anticoagulation Therapy Management Entered On:  8/24/2020 9:34 AM CDT    Performed On:  8/24/2020 9:28 AM CDT by Madiha Britt RN               Anticoagulation Visit Assessment   Type of Visit - Anticoagulation :   Face to face   Anticoagulation Indication :   Atrial fibrillation   Anticoagulant Duration :   Undetermined   Anticoagulation Medication Verified :   Yes   Patient Preferred Contact Method :   Cell   Patient on Warfarin :   Yes   Madiha Britt RN - 8/24/2020 9:28 AM CDT   Warfarin   Anticoagulant INR Goal Lower :   2    Anticoagulant INR Goal Upper :   3    Sunday :   7.5 mg   Monday :   5 mg   Tuesday :   7.5 mg   Wednesday :   5 mg   Thursday :   7.5 mg   Friday :   5 mg   Saturday :   5 mg   Total Dose :   42.5 mg   Warfarin Pt Reported Previous Week Dose :    Sun Mon Tues Wed Thurs Fri Sat Weekly Total Dose   Week 1 7.5 mg 5 mg 7.5 mg 5 mg 7.5 mg 5 mg 5 mg 42.5 mg   Week 2  mg  mg  mg  mg  mg  mg  mg  mg   Week 3  mg  mg  mg  mg  mg  mg  mg  mg   Week 4  mg  mg  mg  mg  mg  mg  mg  mg         Patient is taking single or multiple strength tablet(s) :   Single strength tab(s)   One Tab Strength :   5 mg tab   Sunday :   7.5 mg   Monday :   5 mg   Tuesday :   7.5 mg   Wednesday :   5 mg   Thursday :   7.5 mg   Friday :   5 mg   Saturday :   5 mg   Week 1 Total Dose :   42.5 mg   Sunday :   1.5 tab(s)   Monday :   1 tab(s)   Tuesday :   1.5 tab(s)   Wednesday :   1 tab(s)   Thursday :   1.5 tab(s)   Friday :   1 tab(s)   Saturday :   1 tab(s)   Patient Instructions :   INR = 2.1 per [facility] P[facility]. Per protocol, continue Warfarin 7.5mg Sun/Tues/Thurs and 5mg ROW. Recheck INR in 4 weeks. Advised patient and daughter in office.      Madiha Britt RN - 8/24/2020 9:28 AM CDT

## 2022-02-15 NOTE — NURSING NOTE
Comprehensive Intake Entered On:  8/11/2021 12:11 PM CDT    Performed On:  8/11/2021 12:01 PM CDT by Yaneth Joens CMA               Summary   Chief Complaint :   Patient presents for lip drooping with chewing her lip was tension on the left side, but loose and drooping on the right side of her lips x 2 days seemed like the left side of her liip was drooping when she woke up.   Advance Directive :   Yes   Weight Measured :   188.1 lb(Converted to: 188 lb 2 oz, 85.321 kg)    Height Measured :   64 in(Converted to: 5 ft 4 in, 162.56 cm)    Body Mass Index :   32.28 kg/m2 (HI)    Body Surface Area :   1.96 m2   Systolic Blood Pressure :   129 mmHg   Diastolic Blood Pressure :   81 mmHg (HI)    Mean Arterial Pressure :   97 mmHg   Peripheral Pulse Rate :   80 bpm   BP Site :   Right arm   BP Method :   Electronic   HR Method :   Electronic   Temperature Tympanic :   97.5 DegF(Converted to: 36.4 DegC)  (LOW)    Oxygen Saturation :   97 %   Race :      Languages :   English   Yaneth Jones CMA - 8/11/2021 12:01 PM CDT   Health Status   Allergies Verified? :   Yes   Medication History Verified? :   Yes   Tobacco Use? :   Never smoker   Yaneth Jones CMA - 8/11/2021 12:01 PM CDT   Consents   Consent for Immunization Exchange :   Consent Granted   Consent for Immunizations to Providers :   Consent Granted   Yaneth Jones CMA - 8/11/2021 12:01 PM CDT   Meds / Allergies   (As Of: 8/11/2021 12:11:03 PM CDT)   Allergies (Active)   Ancef  Estimated Onset Date:   Unspecified ; Reactions:   Diarrhea ; Created By:   Chloe Bruce; Reaction Status:   Active ; Category:   Drug ; Substance:   Ancef ; Type:   Allergy ; Updated By:   Chloe Bruce; Reviewed Date:   8/11/2021 12:01 PM CDT      Calan  Estimated Onset Date:   Unspecified ; Created By:   Sravanthi Dietz; Reaction Status:   Active ; Category:   Drug ; Substance:   Calan ; Updated By:   Sravanthi Dietz; Source:   Paper Chart ; Reviewed Date:    8/11/2021 12:01 PM CDT      ciprofloxacin  Estimated Onset Date:   Unspecified ; Created By:   Cherise Christiansen MA; Reaction Status:   Active ; Category:   Drug ; Substance:   ciprofloxacin ; Type:   Allergy ; Updated By:   Cherise Christiansen MA; Reviewed Date:   8/11/2021 12:01 PM CDT      phenobarbital  Estimated Onset Date:   Unspecified ; Reactions:   tremors ; Created By:   Chloe Bruce; Reaction Status:   Active ; Category:   Drug ; Substance:   phenobarbital ; Type:   Allergy ; Updated By:   Chloe Bruce; Source:   Paper Chart ; Reviewed Date:   8/11/2021 12:01 PM CDT      probiotic  Estimated Onset Date:   Unspecified ; Reactions:   Rash.. ; Created By:   Sari Turner CMA; Reaction Status:   Active ; Category:   Drug ; Substance:   probiotic ; Type:   Allergy ; Updated By:   Sari Turner CMA; Reviewed Date:   8/11/2021 12:01 PM CDT      simvastatin  Estimated Onset Date:   Unspecified ; Reactions:   GI upset ; Created By:   Sravanthi Dietz; Reaction Status:   Active ; Category:   Drug ; Substance:   simvastatin ; Type:   Allergy ; Updated By:   Sravanthi Dietz; Source:   Paper Chart ; Reviewed Date:   8/11/2021 12:01 PM CDT      Vicodin  Estimated Onset Date:   Unspecified ; Reactions:   rash ; Created By:   Mis Donaldson MA; Reaction Status:   Active ; Category:   Drug ; Substance:   Vicodin ; Type:   Allergy ; Severity:   Moderate ; Updated By:   Mis Donaldson MA; Reviewed Date:   8/11/2021 12:01 PM CDT        Medication List   (As Of: 8/11/2021 12:11:03 PM CDT)   Prescription/Discharge Order    citalopram  :   citalopram ; Status:   Prescribed ; Ordered As Mnemonic:   citalopram 10 mg oral tablet ; Simple Display Line:   10 mg, 1 tab(s), Oral, daily, 30 tab(s), 11 Refill(s) ; Ordering Provider:   Hilario Whiting MD; Catalog Code:   citalopram ; Order Dt/Tm:   4/8/2021 2:27:40 PM CDT          donepezil  :   donepezil ; Status:   Suspended ; Ordered As Mnemonic:   Aricept 5 mg oral tablet ;  Simple Display Line:   5 mg, 1 tab(s), Oral, hs, 30 tab(s), 5 Refill(s) ; Ordering Provider:   Hilario Whiting MD; Catalog Code:   donepezil ; Order Dt/Tm:   10/5/2020 3:59:19 PM CDT          fluticasone nasal  :   fluticasone nasal ; Status:   Prescribed ; Ordered As Mnemonic:   Flonase 50 mcg/inh nasal spray ; Simple Display Line:   2 spray(s), nasal, daily, 1 EA, 11 Refill(s) ; Ordering Provider:   Shimon Nesbitt MD; Catalog Code:   fluticasone nasal ; Order Dt/Tm:   7/21/2020 11:28:21 AM CDT          levothyroxine  :   levothyroxine ; Status:   Prescribed ; Ordered As Mnemonic:   levothyroxine 125 mcg (0.125 mg) oral tablet ; Simple Display Line:   1 tab(s), Oral, daily, 90 tab(s), 3 Refill(s) ; Ordering Provider:   Hilario Whiting MD; Catalog Code:   levothyroxine ; Order Dt/Tm:   12/28/2020 12:52:12 PM CST          metoprolol  :   metoprolol ; Status:   Prescribed ; Ordered As Mnemonic:   Metoprolol Succinate  mg oral tablet, extended release ; Simple Display Line:   100 mg, 1 tab(s), Oral, daily, 90 tab(s), 0 Refill(s) ; Ordering Provider:   Hilario Whiting MD; Catalog Code:   metoprolol ; Order Dt/Tm:   5/27/2021 4:05:37 PM CDT          Miscellaneous Rx Supply  :   Miscellaneous Rx Supply ; Status:   Prescribed ; Ordered As Mnemonic:   ACCU-CHECK GUIDE LANCETS ; Simple Display Line:   See Instructions, TEST BLOOD SUGAR DAILY, 100 EA, 11 Refill(s) ; Ordering Provider:   Shimon Nesbitt MD; Catalog Code:   Miscellaneous Rx Supply ; Order Dt/Tm:   7/21/2020 8:47:38 AM CDT          Miscellaneous Rx Supply  :   Miscellaneous Rx Supply ; Status:   Prescribed ; Ordered As Mnemonic:   ACCU-CHECK GUIDE TEST STRIPS ; Simple Display Line:   See Instructions, TEST BLOOD SUGAR DAILY, 100 EA, 11 Refill(s) ; Ordering Provider:   Shimon Nesbitt MD; Catalog Code:   Miscellaneous Rx Supply ; Order Dt/Tm:   7/21/2020 8:47:37 AM CDT          QUEtiapine  :   QUEtiapine ; Status:   Prescribed ; Ordered As  Mnemonic:   SEROquel 25 mg oral tablet ; Simple Display Line:   25 mg, 1 tab(s), Oral, qhs, 30 tab(s), 5 Refill(s) ; Ordering Provider:   Hilario Whiting MD; Catalog Code:   QUEtiapine ; Order Dt/Tm:   5/7/2021 1:19:02 PM CDT          warfarin  :   warfarin ; Status:   Prescribed ; Ordered As Mnemonic:   warfarin 5 mg oral tablet ; Simple Display Line:   See Instructions, 7.5mg Sun/Tues/Thurs and 5mg rest of days, 90 tab(s), 1 Refill(s) ; Ordering Provider:   Hilario Whiting MD; Catalog Code:   warfarin ; Order Dt/Tm:   5/3/2021 3:42:11 PM CDT            Home Meds    acetaminophen  :   acetaminophen ; Status:   Documented ; Ordered As Mnemonic:   acetaminophen ; Simple Display Line:   0 Refill(s) ; Catalog Code:   acetaminophen ; Order Dt/Tm:   5/14/2019 1:44:43 PM CDT          ascorbic acid  :   ascorbic acid ; Status:   Documented ; Ordered As Mnemonic:   Vitamin C ; Simple Display Line:   500 mg, Oral, daily, 0 Refill(s) ; Catalog Code:   ascorbic acid ; Order Dt/Tm:   10/26/2020 3:10:37 PM CDT          cholecalciferol  :   cholecalciferol ; Status:   Documented ; Ordered As Mnemonic:   Vitamin D3 ; Simple Display Line:   2,000 International Unit, Oral, daily, 0 Refill(s) ; Catalog Code:   cholecalciferol ; Order Dt/Tm:   10/26/2020 3:10:48 PM CDT          Miscellaneous Prescription  :   Miscellaneous Prescription ; Status:   Documented ; Ordered As Mnemonic:   Calcium with vitamin D ; Simple Display Line:   1 tab, Oral, daily, 0 Refill(s) ; Catalog Code:   Miscellaneous Prescription ; Order Dt/Tm:   10/26/2020 3:08:29 PM CDT          multivitamin  :   multivitamin ; Status:   Documented ; Ordered As Mnemonic:   Daily Multiple Vitamins ; Simple Display Line:   1 tab(s), Oral, daily, 0 Refill(s) ; Catalog Code:   multivitamin ; Order Dt/Tm:   10/26/2020 3:09:47 PM CDT          multivitamin with minerals  :   multivitamin with minerals ; Status:   Documented ; Ordered As Mnemonic:   PreserVision ; Simple Display  Line:   1 cap(s), Oral, bid, 0 Refill(s) ; Catalog Code:   multivitamin with minerals ; Order Dt/Tm:   10/26/2020 3:10:54 PM CDT          omega-3 polyunsaturated fatty acids  :   omega-3 polyunsaturated fatty acids ; Status:   Documented ; Ordered As Mnemonic:   Fish Oil ; Simple Display Line:   1 tab, Oral, daily, 0 Refill(s) ; Catalog Code:   omega-3 polyunsaturated fatty acids ; Order Dt/Tm:   10/26/2020 3:10:12 PM CDT

## 2022-02-15 NOTE — NURSING NOTE
Anticoagulation Therapy Management Entered On:  11/18/2020 1:51 PM CST    Performed On:  11/18/2020 1:50 PM CST by Madiha Britt RN               Anticoagulation Visit Assessment   Type of Visit - Anticoagulation :   Telephone   Anticoagulation Indication :   Atrial fibrillation   Anticoagulant Duration :   Undetermined   Anticoagulation Medication Verified :   Yes   Patient Preferred Contact Method :   Cell   Madiha Britt RN - 11/18/2020 1:50 PM CST   Anticoagulation Patient Assessment Grid   Change in Alcohol Consumption :   No   Change in Diet :   No   Change in Medications :   No   Diarrhea :   No   Rectal Bleeding :   No   Signs of Clotting :   No   Signs of Warfarin Intolerance :   No   Unusual Bleeding, Bruising :   No   Upcoming Procedures :   No   Vomiting :   No   Madiha Britt RN - 11/18/2020 1:50 PM CST   Patient on Warfarin :   Yes   Madiha Britt RN - 11/18/2020 1:50 PM CST   Warfarin   International Normalization Ratio TR :   3.0    Anticoagulant INR Goal Lower :   2    Anticoagulant INR Goal Upper :   3    Sunday :   7.5 mg   Monday :   5 mg   Tuesday :   7.5 mg   Wednesday :   0 mg   Thursday :   7.5 mg   Friday :   5 mg   Saturday :   5 mg   Total Dose :   37.5 mg   Warfarin Pt Reported Previous Week Dose :    Sun Mon Tues Wed Thurs Fri Sat Weekly Total Dose   Week 1 7.5 mg 5 mg 7.5 mg 5 mg 7.5 mg 5 mg 5 mg 42.5 mg   Week 2  mg  mg  mg  mg  mg  mg  mg  mg   Week 3  mg  mg  mg  mg  mg  mg  mg  mg   Week 4  mg  mg  mg  mg  mg  mg  mg  mg         Patient is taking single or multiple strength tablet(s) :   Single strength tab(s)   One Tab Strength :   5 mg tab   Sunday :   7.5 mg   Monday :   5 mg   Tuesday :   7.5 mg   Wednesday :   5 mg   Thursday :   7.5 mg   Friday :   5 mg   Saturday :   5 mg   Week 1 Total Dose :   42.5 mg   Sunday :   1.5 tab(s)   Monday :   1 tab(s)   Tuesday :   1.5 tab(s)   Wednesday :   1 tab(s)   Thursday :   1.5 tab(s)   Friday :   1 tab(s)   Saturday :   1 tab(s)   Patient  Instructions :   INR = 3.0 per [facility]. Per protocol, continue Warfarin 7.5mg Tues/Thurs/Sun and 5mg ROW. Recheck INR in 1 week. Advised RN by phone.      Lorenza LARA, Madiha - 11/18/2020 1:50 PM CST

## 2022-02-15 NOTE — NURSING NOTE
Received fax from [facility] requesting Levothyroixine refill. Last visit was 3/23/20 for rash. Due DM check up in June. I sent in refills to given her time to make appt.    CMcRoberts CMA

## 2022-02-17 LAB — INR (EXTERNAL): 2.6 (ref 0.9–1.1)

## 2022-02-24 ENCOUNTER — DOCUMENTATION ONLY (OUTPATIENT)
Dept: ANTICOAGULATION | Facility: CLINIC | Age: 87
End: 2022-02-24

## 2022-02-24 DIAGNOSIS — I48.20 CHRONIC ATRIAL FIBRILLATION (H): Primary | ICD-10-CM

## 2022-02-24 PROBLEM — I48.91 ATRIAL FIBRILLATION (H): Status: ACTIVE | Noted: 2022-02-24

## 2022-02-24 LAB — INR (EXTERNAL): 2.1 (ref 0.9–1.1)

## 2022-03-02 NOTE — LETTER
(Inserted Image. Unable to display)   February 18, 2022  GERHARD HOWE  [address] [place] [address]        Dear GERHARD,    Thank you for selecting [facility] for your healthcare needs.    Our records indicate you are due for the following services:     Follow-up office visit    Non-Fasting Labs    If you had your labs done at another facility or with Direct Access Lab Testing at [facility], please bring in a copy of the results to your next visit, mail a copy, or drop off a copy of your results to your Healthcare Provider.     (FYI   Regarding office visits: In some instances, a video visit or telephone visit may be offered as an option.)    To schedule an appointment or if you have further questions, please contact your clinic at [phone].    Powered by [employer] and [employer]    Sincerely,    Hilario Whiting MD, FACP

## 2022-03-03 ENCOUNTER — ANTICOAGULATION THERAPY VISIT (OUTPATIENT)
Dept: ANTICOAGULATION | Facility: CLINIC | Age: 87
End: 2022-03-03

## 2022-03-03 DIAGNOSIS — I48.91 ATRIAL FIBRILLATION (H): Primary | ICD-10-CM

## 2022-03-03 DIAGNOSIS — I48.20 CHRONIC ATRIAL FIBRILLATION (H): ICD-10-CM

## 2022-03-03 LAB — INR (EXTERNAL): 1.9 (ref 0.9–1.1)

## 2022-03-03 NOTE — PROGRESS NOTES
ANTICOAGULATION MANAGEMENT     Rebecca Morris 89 year old female is on warfarin with subtherapeutic INR result. (Goal INR 2.0-3.0)    Recent labs: (last 7 days)     03/03/22  0000   INR 1.9*       ASSESSMENT       Source(s): Chart Review and Patient/Caregiver Call       Warfarin doses taken: Warfarin taken as instructed    Diet: No new diet changes identified    New illness, injury, or hospitalization: No    Medication/supplement changes: None noted    Signs or symptoms of bleeding or clotting: No    Previous INR: Therapeutic last 2(+) visits    Additional findings: None       PLAN     Recommended plan for no diet, medication or health factor changes affecting INR     Dosing Instructions: Continue your current warfarin dose with next INR in 1 week       Summary  As of 3/3/2022    Full warfarin instructions:  5 mg every Mon, Wed, Fri; 7.5 mg all other days   Next INR check:  3/10/2022             Telephone call with  daughter, Collette who verbalizes understanding and agrees to plan    Patient to recheck with home meter    Education provided: Goal range and significance of current result and Contact [phone] with any changes, questions or concerns.     Plan made per [facility] anticoagulation protocol    Krysten Jimenez RN  Anticoagulation Clinic  3/3/2022    _______________________________________________________________________     Anticoagulation Episode Summary     Current INR goal:  2.0-3.0   TTR:  --   Target end date:  Indefinite   Send INR reminders to:  [facility] [place]    Indications    Atrial fibrillation (H) [I48.91]  Chronic atrial fibrillation (H) [I48.20]           Comments:  mdINR         Anticoagulation Care Providers     Provider Role Specialty Phone number    Hilario Whiting MD Referring Internal Medicine [phone]

## 2022-03-10 ENCOUNTER — TRANSFERRED RECORDS (OUTPATIENT)
Dept: HEALTH INFORMATION MANAGEMENT | Facility: CLINIC | Age: 87
End: 2022-03-10
Payer: MEDICARE

## 2022-03-10 ENCOUNTER — ANTICOAGULATION THERAPY VISIT (OUTPATIENT)
Dept: FAMILY MEDICINE | Facility: CLINIC | Age: 87
End: 2022-03-10

## 2022-03-10 DIAGNOSIS — I48.20 CHRONIC ATRIAL FIBRILLATION (H): Primary | ICD-10-CM

## 2022-03-10 LAB — INR (EXTERNAL): 2.1 (ref 2–3)

## 2022-03-10 NOTE — PROGRESS NOTES
ANTICOAGULATION MANAGEMENT     Rebecca Morris 89 year old female is on warfarin with therapeutic INR result. (Goal INR 2.0-3.0)    Recent labs: (last 7 days)     03/10/22  1115   INR 2.1       ASSESSMENT       Source(s): Chart Review and Patient/Caregiver Call       Warfarin doses taken: Warfarin taken as instructed    Diet: No new diet changes identified    New illness, injury, or hospitalization: No    Medication/supplement changes: None noted    Signs or symptoms of bleeding or clotting: No    Previous INR: Subtherapeutic    Additional findings: None       PLAN     Recommended plan for no diet, medication or health factor changes affecting INR     Dosing Instructions: Continue your current warfarin dose with next INR in 1 week       Summary  As of 3/10/2022    Full warfarin instructions:  5 mg every Mon, Wed, Fri; 7.5 mg all other days   Next INR check:  3/17/2022             Telephone call with  Collette who verbalizes understanding and agrees to plan and who agrees to plan and repeated back plan correctly    Patient to recheck with home meter    Education provided: Please call back if any changes to your diet, medications or how you've been taking warfarin and Contact [phone]  with any changes, questions or concerns.     Plan made per [facility] anticoagulation protocol    Neha Garces, RN  Anticoagulation Clinic  3/10/2022    _______________________________________________________________________     Anticoagulation Episode Summary     Current INR goal:  2.0-3.0   TTR:  83.6 % (4 d)   Target end date:  Indefinite   Send INR reminders to:  [facility] [place]    Indications    Atrial fibrillation (H) [I48.91]  Chronic atrial fibrillation (H) [I48.20]           Comments:  mdINR         Anticoagulation Care Providers     Provider Role Specialty Phone number    Hilario Whiting MD Referring Internal Medicine [phone]

## 2022-03-17 ENCOUNTER — TRANSFERRED RECORDS (OUTPATIENT)
Dept: HEALTH INFORMATION MANAGEMENT | Facility: CLINIC | Age: 87
End: 2022-03-17
Payer: MEDICARE

## 2022-03-17 LAB — INR (EXTERNAL): 2.1 (ref 0.9–1.1)

## 2022-03-18 ENCOUNTER — ANTICOAGULATION THERAPY VISIT (OUTPATIENT)
Dept: ANTICOAGULATION | Facility: CLINIC | Age: 87
End: 2022-03-18

## 2022-03-18 DIAGNOSIS — I48.91 ATRIAL FIBRILLATION (H): Primary | ICD-10-CM

## 2022-03-18 DIAGNOSIS — I48.20 CHRONIC ATRIAL FIBRILLATION (H): ICD-10-CM

## 2022-03-18 NOTE — PROGRESS NOTES
ANTICOAGULATION MANAGEMENT     Rebecca Morris 89 year old female is on warfarin with therapeutic INR result. (Goal INR 2.0-3.0)    Recent labs: (last 7 days)     03/17/22  0800   INR 2.1*       ASSESSMENT       Source(s): Chart Review and Patient/Caregiver Call       Warfarin doses taken: Warfarin taken as instructed    Diet: No new diet changes identified    New illness, injury, or hospitalization: No    Medication/supplement changes: None noted    Signs or symptoms of bleeding or clotting: No    Previous INR: Therapeutic last visit; previously outside of goal range    Additional findings: Ok to resume manage by exception       PLAN     Recommended plan for no diet, medication or health factor changes affecting INR     Dosing Instructions: Continue your current warfarin dose with next INR in 1 week       Summary  As of 3/18/2022    Full warfarin instructions:  5 mg every Mon, Wed, Fri; 7.5 mg all other days   Next INR check:  3/24/2022             Telephone call with  Collette, daughter who verbalizes understanding and agrees to plan    Patient to recheck with home meter    Education provided: Please call back if any changes to your diet, medications or how you've been taking warfarin    Plan made per [facility] anticoagulation protocol    Thalia Delcid, RN  Anticoagulation Clinic  3/18/2022    _______________________________________________________________________     Anticoagulation Episode Summary     Current INR goal:  2.0-3.0   TTR:  93.5 % (1.6 wk)   Target end date:  Indefinite   Send INR reminders to:  [facility]    Indications    Atrial fibrillation (H) [I48.91]  Chronic atrial fibrillation (H) [I48.20]           Comments:  mdINR Manage by exception         Anticoagulation Care Providers     Provider Role Specialty Phone number    Hilario Whiting MD Referring Internal Medicine [phone]

## 2022-03-21 ENCOUNTER — MEDICAL CORRESPONDENCE (OUTPATIENT)
Dept: HEALTH INFORMATION MANAGEMENT | Facility: CLINIC | Age: 87
End: 2022-03-21

## 2022-03-21 ENCOUNTER — OFFICE VISIT (OUTPATIENT)
Dept: FAMILY MEDICINE | Facility: CLINIC | Age: 87
End: 2022-03-21
Payer: MEDICARE

## 2022-03-21 VITALS
TEMPERATURE: 97.3 F | WEIGHT: 189.5 LBS | HEART RATE: 63 BPM | BODY MASS INDEX: 32.53 KG/M2 | OXYGEN SATURATION: 97 % | SYSTOLIC BLOOD PRESSURE: 112 MMHG | DIASTOLIC BLOOD PRESSURE: 62 MMHG

## 2022-03-21 DIAGNOSIS — N39.3 FEMALE STRESS INCONTINENCE: ICD-10-CM

## 2022-03-21 DIAGNOSIS — F02.818 LATE ONSET ALZHEIMER'S DEMENTIA WITH BEHAVIORAL DISTURBANCE (H): Primary | ICD-10-CM

## 2022-03-21 DIAGNOSIS — Z11.1 SCREENING EXAMINATION FOR PULMONARY TUBERCULOSIS: ICD-10-CM

## 2022-03-21 DIAGNOSIS — E87.5 HYPERKALEMIA: Primary | ICD-10-CM

## 2022-03-21 DIAGNOSIS — G30.1 LATE ONSET ALZHEIMER'S DEMENTIA WITH BEHAVIORAL DISTURBANCE (H): Primary | ICD-10-CM

## 2022-03-21 DIAGNOSIS — E11.42 DIABETIC PERIPHERAL NEUROPATHY (H): ICD-10-CM

## 2022-03-21 DIAGNOSIS — I48.20 CHRONIC ATRIAL FIBRILLATION (H): ICD-10-CM

## 2022-03-21 DIAGNOSIS — E11.42 TYPE 2 DIABETES MELLITUS WITH DIABETIC POLYNEUROPATHY, WITHOUT LONG-TERM CURRENT USE OF INSULIN (H): ICD-10-CM

## 2022-03-21 DIAGNOSIS — Z23 ENCOUNTER FOR IMMUNIZATION: ICD-10-CM

## 2022-03-21 DIAGNOSIS — E03.9 ACQUIRED HYPOTHYROIDISM: ICD-10-CM

## 2022-03-21 PROBLEM — I83.90 VARICOSE VEINS OF LOWER EXTREMITY: Status: ACTIVE | Noted: 2022-03-21

## 2022-03-21 PROBLEM — J30.9 ALLERGIC RHINITIS: Status: ACTIVE | Noted: 2022-03-21

## 2022-03-21 PROBLEM — F41.9 ANXIETY: Status: ACTIVE | Noted: 2022-03-21

## 2022-03-21 PROBLEM — I65.29 CAROTID ATHEROSCLEROSIS: Status: ACTIVE | Noted: 2022-03-21

## 2022-03-21 PROBLEM — H91.93 BILATERAL HEARING LOSS: Status: ACTIVE | Noted: 2022-03-21

## 2022-03-21 PROBLEM — R54 FRAILTY: Status: ACTIVE | Noted: 2022-03-21

## 2022-03-21 PROBLEM — I10 HYPERTENSION: Status: ACTIVE | Noted: 2022-03-21

## 2022-03-21 PROBLEM — H81.10 BENIGN PAROXYSMAL POSITIONAL VERTIGO: Status: ACTIVE | Noted: 2022-03-21

## 2022-03-21 PROBLEM — I48.91 ATRIAL FIBRILLATION (H): Status: RESOLVED | Noted: 2022-02-24 | Resolved: 2022-03-21

## 2022-03-21 PROBLEM — E66.9 OBESITY: Status: ACTIVE | Noted: 2022-03-21

## 2022-03-21 PROBLEM — I34.0 MILD MITRAL INSUFFICIENCY: Status: ACTIVE | Noted: 2022-03-21

## 2022-03-21 PROBLEM — L21.0 SEBORRHEA CAPITIS: Status: ACTIVE | Noted: 2022-03-21

## 2022-03-21 PROBLEM — M19.90 OSTEOARTHRITIS: Status: ACTIVE | Noted: 2022-03-21

## 2022-03-21 PROBLEM — Z95.0 HISTORY OF CARDIAC PACEMAKER IN SITU: Status: ACTIVE | Noted: 2022-03-21

## 2022-03-21 PROBLEM — H93.19 TINNITUS: Status: ACTIVE | Noted: 2022-03-21

## 2022-03-21 PROBLEM — F03.918 DEMENTIA WITH BEHAVIORAL DISTURBANCE (H): Status: ACTIVE | Noted: 2022-03-21

## 2022-03-21 PROBLEM — E78.5 HYPERLIPIDEMIA: Status: ACTIVE | Noted: 2022-03-21

## 2022-03-21 LAB
ANION GAP SERPL CALCULATED.3IONS-SCNC: 5 MMOL/L (ref 3–14)
BUN SERPL-MCNC: 28 MG/DL (ref 7–30)
CALCIUM SERPL-MCNC: 8.9 MG/DL (ref 8.5–10.1)
CHLORIDE BLD-SCNC: 102 MMOL/L (ref 94–109)
CO2 SERPL-SCNC: 27 MMOL/L (ref 20–32)
CREAT SERPL-MCNC: 0.87 MG/DL (ref 0.52–1.04)
ERYTHROCYTE [DISTWIDTH] IN BLOOD BY AUTOMATED COUNT: 12.1 % (ref 10–15)
GFR SERPL CREATININE-BSD FRML MDRD: 62 ML/MIN/1.73M2
GLUCOSE BLD-MCNC: 284 MG/DL (ref 70–99)
HBA1C MFR BLD: 7.3 % (ref 0–5.6)
HCT VFR BLD AUTO: 37.3 % (ref 35–47)
HGB BLD-MCNC: 11.8 G/DL (ref 11.7–15.7)
MCH RBC QN AUTO: 29.3 PG (ref 26.5–33)
MCHC RBC AUTO-ENTMCNC: 31.6 G/DL (ref 31.5–36.5)
MCV RBC AUTO: 93 FL (ref 78–100)
PLATELET # BLD AUTO: 172 10E3/UL (ref 150–450)
POTASSIUM BLD-SCNC: 5.5 MMOL/L (ref 3.4–5.3)
RBC # BLD AUTO: 4.03 10E6/UL (ref 3.8–5.2)
SODIUM SERPL-SCNC: 134 MMOL/L (ref 133–144)
TSH SERPL DL<=0.005 MIU/L-ACNC: 1.23 MU/L (ref 0.4–4)
WBC # BLD AUTO: 5.1 10E3/UL (ref 4–11)

## 2022-03-21 PROCEDURE — 80048 BASIC METABOLIC PNL TOTAL CA: CPT | Performed by: INTERNAL MEDICINE

## 2022-03-21 PROCEDURE — 85027 COMPLETE CBC AUTOMATED: CPT | Performed by: INTERNAL MEDICINE

## 2022-03-21 PROCEDURE — 84443 ASSAY THYROID STIM HORMONE: CPT | Performed by: INTERNAL MEDICINE

## 2022-03-21 PROCEDURE — 83036 HEMOGLOBIN GLYCOSYLATED A1C: CPT | Performed by: INTERNAL MEDICINE

## 2022-03-21 PROCEDURE — 99214 OFFICE O/P EST MOD 30 MIN: CPT | Mod: 25 | Performed by: INTERNAL MEDICINE

## 2022-03-21 PROCEDURE — 36415 COLL VENOUS BLD VENIPUNCTURE: CPT | Performed by: INTERNAL MEDICINE

## 2022-03-21 PROCEDURE — 86580 TB INTRADERMAL TEST: CPT | Performed by: INTERNAL MEDICINE

## 2022-03-21 RX ORDER — FLUTICASONE PROPIONATE 50 MCG
1 SPRAY, SUSPENSION (ML) NASAL
COMMUNITY

## 2022-03-21 RX ORDER — LEVOTHYROXINE SODIUM 137 UG/1
137 CAPSULE ORAL DAILY
COMMUNITY
Start: 2021-08-17

## 2022-03-21 RX ORDER — METOPROLOL SUCCINATE 100 MG/1
100 TABLET, EXTENDED RELEASE ORAL DAILY
COMMUNITY
Start: 2021-08-17

## 2022-03-21 RX ORDER — CITALOPRAM HYDROBROMIDE 10 MG/1
10 TABLET ORAL DAILY
COMMUNITY
Start: 2021-08-17

## 2022-03-21 RX ORDER — ACETAMINOPHEN 160 MG
1 TABLET,DISINTEGRATING ORAL DAILY
COMMUNITY
Start: 2020-10-26

## 2022-03-21 RX ORDER — MULTIVIT-MIN/IRON/FOLIC ACID/K 18-600-40
CAPSULE ORAL
COMMUNITY
End: 2022-03-21

## 2022-03-21 RX ORDER — CHLORAL HYDRATE 500 MG
2 CAPSULE ORAL DAILY
COMMUNITY
End: 2022-03-21

## 2022-03-21 RX ORDER — UBIDECARENONE 100 MG
100 CAPSULE ORAL DAILY
COMMUNITY
End: 2022-03-21

## 2022-03-21 RX ORDER — WARFARIN SODIUM 5 MG/1
TABLET ORAL
COMMUNITY
Start: 2021-08-17

## 2022-03-21 RX ORDER — LISINOPRIL 20 MG/1
20 TABLET ORAL
COMMUNITY
Start: 2021-09-02

## 2022-03-21 RX ORDER — QUETIAPINE FUMARATE 25 MG/1
25 TABLET, FILM COATED ORAL
COMMUNITY
Start: 2021-08-17

## 2022-03-21 NOTE — LETTER
March 21, 2022      Rebecca Morris  [address] [facility] [address] [address]        Dear ,    We are writing to inform you of your test results.    Potassium is high. Repeat blood test for potassium in one week.       Resulted Orders   CBC with platelets   Result Value Ref Range    WBC Count 5.1 4.0 - 11.0 10e3/uL    RBC Count 4.03 3.80 - 5.20 10e6/uL    Hemoglobin 11.8 11.7 - 15.7 g/dL    Hematocrit 37.3 35.0 - 47.0 %    MCV 93 78 - 100 fL    MCH 29.3 26.5 - 33.0 pg    MCHC 31.6 31.5 - 36.5 g/dL    RDW 12.1 10.0 - 15.0 %    Platelet Count 172 150 - 450 10e3/uL   Hemoglobin A1c (aka HBA1C)   Result Value Ref Range    Hemoglobin A1C 7.3 (H) 0.0 - 5.6 %      Comment:      Normal <5.7%   Prediabetes 5.7-6.4%    Diabetes 6.5% or higher     Note: Adopted from ADA consensus guidelines.   TSH   Result Value Ref Range    TSH 1.23 0.40 - 4.00 mU/L   Basic metabolic panel  (Ca, Cl, CO2, Creat, Gluc, K, Na, BUN)   Result Value Ref Range    Sodium 134 133 - 144 mmol/L    Potassium 5.5 (H) 3.4 - 5.3 mmol/L    Chloride 102 94 - 109 mmol/L    Carbon Dioxide (CO2) 27 20 - 32 mmol/L    Anion Gap 5 3 - 14 mmol/L    Urea Nitrogen 28 7 - 30 mg/dL    Creatinine 0.87 0.52 - 1.04 mg/dL    Calcium 8.9 8.5 - 10.1 mg/dL    Glucose 284 (H) 70 - 99 mg/dL    GFR Estimate 62 >60 mL/min/1.73m2      Comment:      Effective December 21, 2021 eGFRcr in adults is calculated using the 2021 CKD-EPI creatinine equation which includes age and gender ( et al., NEJM, DOI: 10.1056/NDNAlm4850432)       If you have any questions or concerns, please call the clinic at the number listed above.       Sincerely,      Hilario Whiting MD

## 2022-03-21 NOTE — LETTER
March 24, 2022      Rebecca Morris  [address] [facility] [address]        Dear ,    We are writing to inform you of your test results.    TB test results are negative.    Resulted Orders   TB INTRADERMAL TEST   Result Value Ref Range    PPD Induration 0 0 - 4.99 mm    PPD Redness Not Present    CBC with platelets   Result Value Ref Range    WBC Count 5.1 4.0 - 11.0 10e3/uL    RBC Count 4.03 3.80 - 5.20 10e6/uL    Hemoglobin 11.8 11.7 - 15.7 g/dL    Hematocrit 37.3 35.0 - 47.0 %    MCV 93 78 - 100 fL    MCH 29.3 26.5 - 33.0 pg    MCHC 31.6 31.5 - 36.5 g/dL    RDW 12.1 10.0 - 15.0 %    Platelet Count 172 150 - 450 10e3/uL   Hemoglobin A1c (aka HBA1C)   Result Value Ref Range    Hemoglobin A1C 7.3 (H) 0.0 - 5.6 %      Comment:      Normal <5.7%   Prediabetes 5.7-6.4%    Diabetes 6.5% or higher     Note: Adopted from ADA consensus guidelines.   TSH   Result Value Ref Range    TSH 1.23 0.40 - 4.00 mU/L   Basic metabolic panel  (Ca, Cl, CO2, Creat, Gluc, K, Na, BUN)   Result Value Ref Range    Sodium 134 133 - 144 mmol/L    Potassium 5.5 (H) 3.4 - 5.3 mmol/L    Chloride 102 94 - 109 mmol/L    Carbon Dioxide (CO2) 27 20 - 32 mmol/L    Anion Gap 5 3 - 14 mmol/L    Urea Nitrogen 28 7 - 30 mg/dL    Creatinine 0.87 0.52 - 1.04 mg/dL    Calcium 8.9 8.5 - 10.1 mg/dL    Glucose 284 (H) 70 - 99 mg/dL    GFR Estimate 62 >60 mL/min/1.73m2      Comment:      Effective December 21, 2021 eGFRcr in adults is calculated using the 2021 CKD-EPI creatinine equation which includes age and gender (Magalie et al., NEJM, DOI: 10.1056/QBUOfk9977781)       If you have any questions or concerns, please call the clinic at the number listed above.       Sincerely,      Hilario Whiting MD

## 2022-03-21 NOTE — LETTER
March 24, 2022      Rebecca Morris  [address] [facility] [address]        Dear ,    We are writing to inform you of your test results.    TB test was negative. Potassium is high. Repeat blood test for potassium in one week.    Resulted Orders   TB INTRADERMAL TEST   Result Value Ref Range    PPD Induration 0 0 - 4.99 mm    PPD Redness Not Present    CBC with platelets   Result Value Ref Range    WBC Count 5.1 4.0 - 11.0 10e3/uL    RBC Count 4.03 3.80 - 5.20 10e6/uL    Hemoglobin 11.8 11.7 - 15.7 g/dL    Hematocrit 37.3 35.0 - 47.0 %    MCV 93 78 - 100 fL    MCH 29.3 26.5 - 33.0 pg    MCHC 31.6 31.5 - 36.5 g/dL    RDW 12.1 10.0 - 15.0 %    Platelet Count 172 150 - 450 10e3/uL   Hemoglobin A1c (aka HBA1C)   Result Value Ref Range    Hemoglobin A1C 7.3 (H) 0.0 - 5.6 %      Comment:      Normal <5.7%   Prediabetes 5.7-6.4%    Diabetes 6.5% or higher     Note: Adopted from ADA consensus guidelines.   TSH   Result Value Ref Range    TSH 1.23 0.40 - 4.00 mU/L   Basic metabolic panel  (Ca, Cl, CO2, Creat, Gluc, K, Na, BUN)   Result Value Ref Range    Sodium 134 133 - 144 mmol/L    Potassium 5.5 (H) 3.4 - 5.3 mmol/L    Chloride 102 94 - 109 mmol/L    Carbon Dioxide (CO2) 27 20 - 32 mmol/L    Anion Gap 5 3 - 14 mmol/L    Urea Nitrogen 28 7 - 30 mg/dL    Creatinine 0.87 0.52 - 1.04 mg/dL    Calcium 8.9 8.5 - 10.1 mg/dL    Glucose 284 (H) 70 - 99 mg/dL    GFR Estimate 62 >60 mL/min/1.73m2      Comment:      Effective December 21, 2021 eGFRcr in adults is calculated using the 2021 CKD-EPI creatinine equation which includes age and gender (Magalie et al., NEJM, DOI: 10.1056/SJHTiu8686029)       If you have any questions or concerns, please call the clinic at the number listed above.       Sincerely,      Hilario Whiting MD

## 2022-03-21 NOTE — PROGRESS NOTES
Assessment & Plan     Acquired hypothyroidism  Stable    Chronic atrial fibrillation (H)  Anticoagulated with rate controlled.  Home monitoring for INR.    Late onset Alzheimer's dementia with behavioral disturbance (H)  Stable.  No behavioral issues.    Diabetic peripheral neuropathy (H)  Unchanged.    Type 2 diabetes mellitus with diabetic polyneuropathy, without long-term current use of insulin (H)  Stable    Female stress incontinence  Mild                 Hilario Whiting MD  [facility]    Mars Fernandez is a 89 year old who presents for the following health issues  accompanied by her daughter.    History of Present Illness       Reason for visit:  Check before memory care residency    She eats 2-3 servings of fruits and vegetables daily.She consumes 1 sweetened beverage(s) daily.She exercises with enough effort to increase her heart rate 9 or less minutes per day.  She exercises with enough effort to increase her heart rate 3 or less days per week.   She is taking medications regularly.       Concern - [facility] admission paperwork. Fax completed forms to [phone].      Patient has been well.  Had an upper respiratory infection which she got over.  No hypoglycemia.  No behavior issues.  No bleeding palpitations.  No complaints.  Review of Systems   She was told that she has the start of diabetic retinopathy recently.  No hearing problems.  No speech problems.  No cough or dyspnea.  No chest pain or palpitations.  Recent upper respiratory infection resolved.  Patient is quite forgetful and has neuropathy.  Stress incontinence with a tendency to have incontinence with cough.  No skin, GI, or musculoskeletal complaints currently.      Objective    /62 (BP Location: Right arm)   Pulse 63   Temp 97.3  F (36.3  C)   Wt 86 kg (189 lb 8 oz)   SpO2 97%   BMI 32.53 kg/m    Body mass index is 32.53 kg/m .  Physical Exam   Patient appears comfortable.   Alert.  Memory is very poor.  Chest is clear.  Cardiac exam is regular.  Trace edema.  Dense peripheral neuropathy.

## 2022-03-24 ENCOUNTER — TRANSFERRED RECORDS (OUTPATIENT)
Dept: HEALTH INFORMATION MANAGEMENT | Facility: CLINIC | Age: 87
End: 2022-03-24

## 2022-03-24 ENCOUNTER — ALLIED HEALTH/NURSE VISIT (OUTPATIENT)
Dept: FAMILY MEDICINE | Facility: CLINIC | Age: 87
End: 2022-03-24
Payer: MEDICARE

## 2022-03-24 ENCOUNTER — ANTICOAGULATION THERAPY VISIT (OUTPATIENT)
Dept: ANTICOAGULATION | Facility: CLINIC | Age: 87
End: 2022-03-24

## 2022-03-24 DIAGNOSIS — Z11.1 VISIT FOR MANTOUX TEST: Primary | ICD-10-CM

## 2022-03-24 DIAGNOSIS — I48.20 CHRONIC ATRIAL FIBRILLATION (H): Primary | ICD-10-CM

## 2022-03-24 LAB
INR (EXTERNAL): 2.5 (ref 0.9–1.1)
PPDINDURATION: 0 MM (ref 0–4.99)
PPDREDNESS: NORMAL

## 2022-03-24 PROCEDURE — 99207 PR NO CHARGE NURSE ONLY: CPT | Performed by: INTERNAL MEDICINE

## 2022-03-24 NOTE — PROGRESS NOTES
Patient is here today for a Mantoux (TST) test results.    Did patient return to clinic 48-72 hours from Mantoux (TST) placement: Yes -     PPD Induration   Date Value Ref Range Status   03/24/2022 0 0 - 4.99 mm Final     PPD Redness   Date Value Ref Range Status   03/24/2022 Not Present  Final           Induration Size?0    Patient needs form signed? No    Patient reports having previously had the BCG Vaccine: No    Does patient need a two step? No

## 2022-03-24 NOTE — PROGRESS NOTES
ANTICOAGULATION  MANAGEMENT-Home Monitor Managed by Exception    Rebecca CLOVER Morris 89 year old female is on warfarin with therapeutic INR result. (Goal INR 2.0-3.0)    Recent labs: (last 7 days)     03/24/22  0854   INR 2.5*         Previous INR was Therapeutic    Medication, diet, health changes since last INR:chart reviewed; none identified    Contacted within the last 12 weeks by phone on 3/18/2022      LUCIA Fernandez was NOT contacted regarding therapeutic result today per home monitoring policy manage by exception agreement.   Current warfarin dose is to be continued:     Summary  As of 3/24/2022    Full warfarin instructions:  5 mg every Mon, Wed, Fri; 7.5 mg all other days   Next INR check:  3/31/2022           ?   Ewa Arroyo RN  Anticoagulation Clinic  3/24/2022    _______________________________________________________________________     Anticoagulation Episode Summary     Current INR goal:  2.0-3.0   TTR:  96.0 % (2.6 wk)   Target end date:  Indefinite   Send INR reminders to:  [facility]    Indications    Atrial fibrillation (H) (Resolved) [I48.91]  Chronic atrial fibrillation (H) [I48.20]           Comments:  mdINR Manage by exception         Anticoagulation Care Providers     Provider Role Specialty Phone number    Hilario Whiting MD Referring Internal Medicine [phone]

## 2022-03-25 ENCOUNTER — TELEPHONE (OUTPATIENT)
Dept: FAMILY MEDICINE | Facility: CLINIC | Age: 87
End: 2022-03-25
Payer: MEDICARE

## 2022-03-25 NOTE — TELEPHONE ENCOUNTER
Reason for Call:  Other Medication List     Detailed comments: Chloe from [facility] is needing a updated med list    Phone Number Patient can be reached at: Other phone number:  [phone]    Best Time: any    Can we leave a detailed message on this number? YES    Call taken on 3/25/2022 at 10:47 AM by Deandra Tavares

## 2022-04-03 ENCOUNTER — LAB REQUISITION (OUTPATIENT)
Dept: LAB | Facility: CLINIC | Age: 87
End: 2022-04-03
Payer: MEDICARE

## 2022-04-03 DIAGNOSIS — Z79.01 LONG TERM (CURRENT) USE OF ANTICOAGULANTS: ICD-10-CM

## 2022-04-05 ENCOUNTER — TELEPHONE (OUTPATIENT)
Dept: ANTICOAGULATION | Facility: CLINIC | Age: 87
End: 2022-04-05

## 2022-04-05 DIAGNOSIS — I48.20 CHRONIC ATRIAL FIBRILLATION (H): Primary | ICD-10-CM

## 2022-04-05 LAB — INR PPP: 3.94 (ref 0.85–1.15)

## 2022-04-05 PROCEDURE — P9603 ONE-WAY ALLOW PRORATED MILES: HCPCS | Mod: ORL | Performed by: FAMILY MEDICINE

## 2022-04-05 PROCEDURE — 85610 PROTHROMBIN TIME: CPT | Mod: ORL | Performed by: FAMILY MEDICINE

## 2022-04-05 PROCEDURE — 36415 COLL VENOUS BLD VENIPUNCTURE: CPT | Mod: ORL | Performed by: FAMILY MEDICINE

## 2022-04-05 NOTE — TELEPHONE ENCOUNTER
[facility] received INR result for patient today, chart reviewed, order was placed by [place] JORGE. Contacted [facility] and spoke with Nica [facility] nurse, who informed [facility] RN that patient was admitted to their facility and now has an inpatient provider who will manage INRs going forward.     [facility] RN will discharge patient from [facility] program.     Logan Chow RN

## 2022-04-05 NOTE — TELEPHONE ENCOUNTER
ANTICOAGULATION  MANAGEMENT    Rebecca Morris is being discharged from the [facility] Anticoagulation Management Program ([facility]).    Reason for discharge: care has been transferred to [facility]    Anticoagulation episode resolved, [facility] referral closed and INR Standing order discontinued    If patient needs warfarin management in the future, please send a new referral    Logan Chow RN

## 2022-04-11 ENCOUNTER — LAB REQUISITION (OUTPATIENT)
Dept: LAB | Facility: CLINIC | Age: 87
End: 2022-04-11
Payer: MEDICARE

## 2022-04-11 DIAGNOSIS — Z79.01 LONG TERM (CURRENT) USE OF ANTICOAGULANTS: ICD-10-CM

## 2022-04-12 LAB — INR PPP: 1.97 (ref 0.85–1.15)

## 2022-04-12 PROCEDURE — 85610 PROTHROMBIN TIME: CPT | Mod: ORL | Performed by: FAMILY MEDICINE

## 2022-04-12 PROCEDURE — 36415 COLL VENOUS BLD VENIPUNCTURE: CPT | Mod: ORL | Performed by: FAMILY MEDICINE

## 2022-04-12 PROCEDURE — P9604 ONE-WAY ALLOW PRORATED TRIP: HCPCS | Mod: ORL | Performed by: FAMILY MEDICINE

## 2022-04-17 ENCOUNTER — LAB REQUISITION (OUTPATIENT)
Dept: LAB | Facility: CLINIC | Age: 87
End: 2022-04-17
Payer: MEDICARE

## 2022-04-17 DIAGNOSIS — Z79.01 LONG TERM (CURRENT) USE OF ANTICOAGULANTS: ICD-10-CM

## 2022-04-19 LAB — INR PPP: 2.35 (ref 0.85–1.15)

## 2022-04-19 PROCEDURE — P9604 ONE-WAY ALLOW PRORATED TRIP: HCPCS | Mod: ORL | Performed by: FAMILY MEDICINE

## 2022-04-19 PROCEDURE — 85610 PROTHROMBIN TIME: CPT | Mod: ORL | Performed by: FAMILY MEDICINE

## 2022-04-19 PROCEDURE — 36415 COLL VENOUS BLD VENIPUNCTURE: CPT | Mod: ORL | Performed by: FAMILY MEDICINE

## 2022-04-24 ENCOUNTER — LAB REQUISITION (OUTPATIENT)
Dept: LAB | Facility: CLINIC | Age: 87
End: 2022-04-24
Payer: MEDICARE

## 2022-04-24 DIAGNOSIS — Z79.1 LONG TERM (CURRENT) USE OF NON-STEROIDAL ANTI-INFLAMMATORIES (NSAID): ICD-10-CM

## 2022-04-26 LAB — INR PPP: 2.63 (ref 0.85–1.15)

## 2022-04-26 PROCEDURE — 85610 PROTHROMBIN TIME: CPT | Mod: ORL | Performed by: FAMILY MEDICINE

## 2022-04-26 PROCEDURE — P9604 ONE-WAY ALLOW PRORATED TRIP: HCPCS | Mod: ORL | Performed by: FAMILY MEDICINE

## 2022-04-26 PROCEDURE — 36415 COLL VENOUS BLD VENIPUNCTURE: CPT | Mod: ORL | Performed by: FAMILY MEDICINE

## 2022-05-08 ENCOUNTER — LAB REQUISITION (OUTPATIENT)
Dept: LAB | Facility: CLINIC | Age: 87
End: 2022-05-08
Payer: MEDICARE

## 2022-05-08 DIAGNOSIS — Z79.01 LONG TERM (CURRENT) USE OF ANTICOAGULANTS: ICD-10-CM

## 2022-05-10 LAB
HBA1C MFR BLD: 7.5 %
INR PPP: 2.63 (ref 0.85–1.15)

## 2022-05-10 PROCEDURE — 36415 COLL VENOUS BLD VENIPUNCTURE: CPT | Mod: ORL | Performed by: FAMILY MEDICINE

## 2022-05-10 PROCEDURE — P9603 ONE-WAY ALLOW PRORATED MILES: HCPCS | Mod: ORL | Performed by: FAMILY MEDICINE

## 2022-05-10 PROCEDURE — 83036 HEMOGLOBIN GLYCOSYLATED A1C: CPT | Mod: ORL | Performed by: FAMILY MEDICINE

## 2022-05-10 PROCEDURE — 85610 PROTHROMBIN TIME: CPT | Mod: ORL | Performed by: FAMILY MEDICINE

## 2022-05-29 ENCOUNTER — LAB REQUISITION (OUTPATIENT)
Dept: LAB | Facility: CLINIC | Age: 87
End: 2022-05-29
Payer: MEDICARE

## 2022-05-29 DIAGNOSIS — E87.5 HYPERKALEMIA: ICD-10-CM

## 2022-05-29 DIAGNOSIS — Z79.01 LONG TERM (CURRENT) USE OF ANTICOAGULANTS: ICD-10-CM

## 2022-05-31 LAB
INR PPP: 3.22 (ref 0.85–1.15)
POTASSIUM BLD-SCNC: 4.2 MMOL/L (ref 3.5–5)

## 2022-05-31 PROCEDURE — 85610 PROTHROMBIN TIME: CPT | Mod: ORL | Performed by: FAMILY MEDICINE

## 2022-05-31 PROCEDURE — 84132 ASSAY OF SERUM POTASSIUM: CPT | Mod: ORL | Performed by: FAMILY MEDICINE

## 2022-05-31 PROCEDURE — P9603 ONE-WAY ALLOW PRORATED MILES: HCPCS | Mod: ORL | Performed by: FAMILY MEDICINE

## 2022-05-31 PROCEDURE — 36415 COLL VENOUS BLD VENIPUNCTURE: CPT | Mod: ORL | Performed by: FAMILY MEDICINE

## 2022-06-06 ENCOUNTER — LAB REQUISITION (OUTPATIENT)
Dept: LAB | Facility: CLINIC | Age: 87
End: 2022-06-06
Payer: MEDICARE

## 2022-06-06 DIAGNOSIS — Z79.01 LONG TERM (CURRENT) USE OF ANTICOAGULANTS: ICD-10-CM

## 2022-06-07 LAB — INR PPP: 2.59 (ref 0.85–1.15)

## 2022-06-07 PROCEDURE — 85610 PROTHROMBIN TIME: CPT | Mod: ORL | Performed by: FAMILY MEDICINE

## 2022-06-07 PROCEDURE — P9603 ONE-WAY ALLOW PRORATED MILES: HCPCS | Mod: ORL | Performed by: FAMILY MEDICINE

## 2022-06-07 PROCEDURE — 36415 COLL VENOUS BLD VENIPUNCTURE: CPT | Mod: ORL | Performed by: FAMILY MEDICINE

## 2022-06-26 ENCOUNTER — LAB REQUISITION (OUTPATIENT)
Dept: LAB | Facility: CLINIC | Age: 87
End: 2022-06-26
Payer: OTHER MISCELLANEOUS

## 2022-06-26 DIAGNOSIS — Z79.01 LONG TERM (CURRENT) USE OF ANTICOAGULANTS: ICD-10-CM

## 2022-06-28 LAB — INR PPP: 2.93 (ref 0.85–1.15)

## 2022-06-28 PROCEDURE — 85610 PROTHROMBIN TIME: CPT | Mod: ORL | Performed by: FAMILY MEDICINE

## 2022-06-28 PROCEDURE — 36415 COLL VENOUS BLD VENIPUNCTURE: CPT | Mod: ORL | Performed by: FAMILY MEDICINE

## 2022-06-28 PROCEDURE — P9603 ONE-WAY ALLOW PRORATED MILES: HCPCS | Mod: ORL | Performed by: FAMILY MEDICINE

## 2022-07-23 ENCOUNTER — LAB REQUISITION (OUTPATIENT)
Dept: LAB | Facility: CLINIC | Age: 87
End: 2022-07-23
Payer: MEDICARE

## 2022-07-23 DIAGNOSIS — Z79.01 LONG TERM (CURRENT) USE OF ANTICOAGULANTS: ICD-10-CM

## 2022-08-12 ENCOUNTER — LAB REQUISITION (OUTPATIENT)
Dept: LAB | Facility: CLINIC | Age: 87
End: 2022-08-12
Payer: MEDICARE

## 2022-08-12 DIAGNOSIS — I48.91 UNSPECIFIED ATRIAL FIBRILLATION (H): ICD-10-CM

## 2022-08-16 LAB — INR PPP: 1.19 (ref 0.85–1.15)

## 2022-08-16 PROCEDURE — 36415 COLL VENOUS BLD VENIPUNCTURE: CPT | Mod: ORL | Performed by: FAMILY MEDICINE

## 2022-08-16 PROCEDURE — P9603 ONE-WAY ALLOW PRORATED MILES: HCPCS | Mod: ORL | Performed by: FAMILY MEDICINE

## 2022-08-16 PROCEDURE — 85610 PROTHROMBIN TIME: CPT | Mod: ORL | Performed by: FAMILY MEDICINE

## 2022-08-20 ENCOUNTER — LAB REQUISITION (OUTPATIENT)
Dept: LAB | Facility: CLINIC | Age: 87
End: 2022-08-20
Payer: MEDICARE

## 2022-08-20 DIAGNOSIS — I48.91 UNSPECIFIED ATRIAL FIBRILLATION (H): ICD-10-CM

## 2022-08-23 LAB — INR PPP: 2.13 (ref 0.85–1.15)

## 2022-08-23 PROCEDURE — P9603 ONE-WAY ALLOW PRORATED MILES: HCPCS | Mod: ORL | Performed by: FAMILY MEDICINE

## 2022-08-23 PROCEDURE — 85610 PROTHROMBIN TIME: CPT | Mod: ORL | Performed by: FAMILY MEDICINE

## 2022-08-23 PROCEDURE — 36415 COLL VENOUS BLD VENIPUNCTURE: CPT | Mod: ORL | Performed by: FAMILY MEDICINE

## 2022-08-26 ENCOUNTER — LAB REQUISITION (OUTPATIENT)
Dept: LAB | Facility: CLINIC | Age: 87
End: 2022-08-26
Payer: OTHER MISCELLANEOUS

## 2022-08-26 DIAGNOSIS — I48.91 UNSPECIFIED ATRIAL FIBRILLATION (H): ICD-10-CM

## 2022-08-30 LAB — INR PPP: 2.2 (ref 0.85–1.15)

## 2022-08-30 PROCEDURE — 85610 PROTHROMBIN TIME: CPT | Mod: ORL | Performed by: FAMILY MEDICINE

## 2022-08-30 PROCEDURE — 36415 COLL VENOUS BLD VENIPUNCTURE: CPT | Mod: ORL | Performed by: FAMILY MEDICINE

## 2022-08-30 PROCEDURE — P9603 ONE-WAY ALLOW PRORATED MILES: HCPCS | Mod: ORL | Performed by: FAMILY MEDICINE

## 2023-02-01 ENCOUNTER — DOCUMENTATION ONLY (OUTPATIENT)
Dept: OTHER | Facility: CLINIC | Age: 88
End: 2023-02-01
Payer: OTHER MISCELLANEOUS

## 2023-03-03 DIAGNOSIS — F41.9 ANXIETY: ICD-10-CM

## 2023-03-03 DIAGNOSIS — I10 HYPERTENSION: ICD-10-CM

## 2023-03-03 DIAGNOSIS — E03.9 ACQUIRED HYPOTHYROIDISM: Primary | ICD-10-CM

## 2023-03-06 NOTE — TELEPHONE ENCOUNTER
Routing refill request to provider for review/approval because:  Medication is reported/historical    Last Written Prescription Date:  8/17/21  Patient reported   Last office visit: 3/21/2022 with prescribing provider

## 2023-03-07 RX ORDER — METOPROLOL SUCCINATE 100 MG/1
100 TABLET, EXTENDED RELEASE ORAL DAILY
Qty: 31 TABLET | Refills: 0 | OUTPATIENT
Start: 2023-03-07

## 2023-03-07 RX ORDER — LEVOTHYROXINE SODIUM 137 UG/1
137 TABLET ORAL DAILY
Qty: 30 TABLET | Refills: 0 | OUTPATIENT
Start: 2023-03-07

## 2023-03-07 RX ORDER — CITALOPRAM HYDROBROMIDE 10 MG/1
10 TABLET ORAL DAILY
Qty: 30 TABLET | Refills: 0 | OUTPATIENT
Start: 2023-03-07

## 2023-03-07 RX ORDER — LISINOPRIL 20 MG/1
20 TABLET ORAL DAILY
Qty: 30 TABLET | Refills: 0 | OUTPATIENT
Start: 2023-03-07